# Patient Record
Sex: FEMALE | Race: ASIAN | NOT HISPANIC OR LATINO | URBAN - METROPOLITAN AREA
[De-identification: names, ages, dates, MRNs, and addresses within clinical notes are randomized per-mention and may not be internally consistent; named-entity substitution may affect disease eponyms.]

---

## 2017-01-04 DIAGNOSIS — E78.00 PURE HYPERCHOLESTEROLEMIA: Primary | ICD-10-CM

## 2017-01-04 RX ORDER — ATORVASTATIN CALCIUM 40 MG/1
40 TABLET, FILM COATED ORAL DAILY
Qty: 90 TABLET | Refills: 4 | Status: SHIPPED | OUTPATIENT
Start: 2017-01-04 | End: 2018-04-16

## 2017-01-19 ENCOUNTER — MEDICAL CORRESPONDENCE (OUTPATIENT)
Dept: HEALTH INFORMATION MANAGEMENT | Facility: CLINIC | Age: 72
End: 2017-01-19

## 2017-01-26 ENCOUNTER — MEDICAL CORRESPONDENCE (OUTPATIENT)
Dept: HEALTH INFORMATION MANAGEMENT | Facility: CLINIC | Age: 72
End: 2017-01-26

## 2017-02-13 DIAGNOSIS — E11.9 TYPE 2 DIABETES MELLITUS WITHOUT COMPLICATION, WITHOUT LONG-TERM CURRENT USE OF INSULIN (H): ICD-10-CM

## 2017-03-06 ENCOUNTER — DOCUMENTATION ONLY (OUTPATIENT)
Dept: FAMILY MEDICINE | Facility: CLINIC | Age: 72
End: 2017-03-06

## 2017-03-08 ENCOUNTER — TELEPHONE (OUTPATIENT)
Dept: FAMILY MEDICINE | Facility: CLINIC | Age: 72
End: 2017-03-08

## 2017-03-08 NOTE — TELEPHONE ENCOUNTER
Dm: The interp reached out to the  and they gave him a different number for this patient  but that is a wrong number EDILIA 3/8/17

## 2017-03-15 DIAGNOSIS — M54.50 CHRONIC LEFT-SIDED LOW BACK PAIN WITHOUT SCIATICA: ICD-10-CM

## 2017-03-15 DIAGNOSIS — G89.29 CHRONIC LEFT-SIDED LOW BACK PAIN WITHOUT SCIATICA: ICD-10-CM

## 2017-03-15 RX ORDER — GABAPENTIN 300 MG/1
CAPSULE ORAL
Qty: 90 CAPSULE | Refills: 5 | Status: SHIPPED | OUTPATIENT
Start: 2017-03-15 | End: 2017-11-27

## 2017-03-16 ENCOUNTER — TRANSFERRED RECORDS (OUTPATIENT)
Dept: HEALTH INFORMATION MANAGEMENT | Facility: CLINIC | Age: 72
End: 2017-03-16

## 2017-03-20 ENCOUNTER — MEDICAL CORRESPONDENCE (OUTPATIENT)
Dept: HEALTH INFORMATION MANAGEMENT | Facility: CLINIC | Age: 72
End: 2017-03-20

## 2017-03-22 DIAGNOSIS — E11.29 TYPE 2 DIABETES MELLITUS WITH MICROALBUMINURIA, WITHOUT LONG-TERM CURRENT USE OF INSULIN (H): ICD-10-CM

## 2017-03-22 DIAGNOSIS — R80.9 TYPE 2 DIABETES MELLITUS WITH MICROALBUMINURIA, WITHOUT LONG-TERM CURRENT USE OF INSULIN (H): ICD-10-CM
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
## 2017-04-18 ENCOUNTER — MEDICAL CORRESPONDENCE (OUTPATIENT)
Dept: HEALTH INFORMATION MANAGEMENT | Facility: CLINIC | Age: 72
End: 2017-04-18

## 2017-04-20 DIAGNOSIS — E11.9 TYPE 2 DIABETES MELLITUS WITHOUT COMPLICATION, WITHOUT LONG-TERM CURRENT USE OF INSULIN (H): ICD-10-CM

## 2017-05-01 ENCOUNTER — OFFICE VISIT (OUTPATIENT)
Dept: FAMILY MEDICINE | Facility: CLINIC | Age: 72
End: 2017-05-01

## 2017-05-01 ENCOUNTER — RECORDS - HEALTHEAST (OUTPATIENT)
Dept: ADMINISTRATIVE | Facility: OTHER | Age: 72
End: 2017-05-01

## 2017-05-01 VITALS
BODY MASS INDEX: 25.5 KG/M2 | SYSTOLIC BLOOD PRESSURE: 115 MMHG | DIASTOLIC BLOOD PRESSURE: 73 MMHG | HEART RATE: 84 BPM | OXYGEN SATURATION: 97 % | WEIGHT: 121.5 LBS | HEIGHT: 58 IN

## 2017-05-01 DIAGNOSIS — E11.65 TYPE 2 DIABETES MELLITUS WITH HYPERGLYCEMIA, WITHOUT LONG-TERM CURRENT USE OF INSULIN (H): ICD-10-CM

## 2017-05-01 DIAGNOSIS — E11.65 TYPE 2 DIABETES MELLITUS WITH HYPERGLYCEMIA, WITHOUT LONG-TERM CURRENT USE OF INSULIN (H): Primary | ICD-10-CM

## 2017-05-01 LAB
BUN SERPL-MCNC: 12.1 MG/DL (ref 7–19)
CALCIUM SERPL-MCNC: 9.9 MG/DL (ref 8.5–10.1)
CHLORIDE SERPLBLD-SCNC: 97.6 MMOL/L (ref 98–110)
CO2 SERPL-SCNC: 24.2 MMOL/L (ref 20–32)
CREAT SERPL-MCNC: 0.6 MG/DL (ref 0.5–1)
GFR SERPL CREATININE-BSD FRML MDRD: >90 ML/MIN/1.7 M2
GLUCOSE SERPL-MCNC: 359.8 MG'DL (ref 70–99)
HBA1C MFR BLD: 12.3 % (ref 4.1–5.7)
POTASSIUM SERPL-SCNC: 3.9 MMOL/DL (ref 3.2–4.6)
SODIUM SERPL-SCNC: 131.6 MMOL/L (ref 132–142)

## 2017-05-01 RX ORDER — GLYBURIDE-METFORMIN HYDROCHLORIDE 2.5; 5 MG/1; MG/1
2 TABLET ORAL 2 TIMES DAILY WITH MEALS
Qty: 120 TABLET | Refills: 11 | Status: SHIPPED | OUTPATIENT
Start: 2017-05-01 | End: 2017-07-13 | Stop reason: ALTCHOICE

## 2017-05-01 RX ORDER — GLYBURIDE-METFORMIN HYDROCHLORIDE 2.5; 5 MG/1; MG/1
2 TABLET ORAL 2 TIMES DAILY WITH MEALS
Qty: 60 TABLET | Refills: 11 | Status: SHIPPED | OUTPATIENT
Start: 2017-05-01 | End: 2017-05-01

## 2017-05-01 NOTE — PATIENT INSTRUCTIONS
Increase januvia to 100mg daily.    Switch to glyburide/metformin 2.5mg/500mg 2 pills twice a day.    Recheck in 1 month.    Consider insulin at that time.

## 2017-05-01 NOTE — MR AVS SNAPSHOT
After Visit Summary   5/1/2017    Emmanuel Say    MRN: [id number]           Patient Information     Date Of Birth          1945        Visit Information        Provider Department      5/1/2017 3:10 PM Clarissa Rodriguez MD [facility]        Today's Diagnoses     Type 2 diabetes mellitus with hyperglycemia, without long-term current use of insulin (H)          Care Instructions    Increase januvia to 100mg daily.    Switch to glyburide/metformin 2.5mg/500mg 2 pills twice a day.    Recheck in 1 month.    Consider insulin at that time.        Follow-ups after your visit        Follow-up notes from your care team     Return in about 4 weeks (around 5/29/2017) for Diabetes.      Your next 10 appointments already scheduled     Jun 01, 2017  9:20 AM CDT   Return Visit with Clarissa Rodriguez MD   [facility] ([facility])    [address] [place] [address]   [phone]              Who to contact     Please call your clinic at [phone] to:    Ask questions about your health    Make or cancel appointments    Discuss your medicines    Learn about your test results    Speak to your doctor   If you have compliments or concerns about an experience at your clinic, or if you wish to file a complaint, please contact [facility] Patient Relations at [phone] or email us at [email or web address]         Additional Information About Your Visit        MyChart Information     Capzles is an electronic gateway that provides easy, online access to your medical records. With Capzles, you can request a clinic appointment, read your test results, renew a prescription or communicate with your care team.     To sign up for [employer] visit the website at [email or web address]   You will be asked to enter the access code listed below, as well as some personal information. Please follow the directions to create your username and password.     Your access code is:  [email or web address]  Expires: 2017  4:12 PM     Your access code will  in 90 days. If you need help or a new code, please contact your [facility] or call [phone] for assistance.        Care EveryWhere ID     This is your Care EveryWhere ID. This could be used by other organizations to access your [facility] medical records  [id number]        Your Vitals Were     Pulse Height Pulse Oximetry Breastfeeding? BMI (Body Mass Index)       84 4' 10.47\" (148.5 cm) 97% No 24.99 kg/m2        Blood Pressure from Last 3 Encounters:   17 115/73   16 117/79   16 125/80    Weight from Last 3 Encounters:   17 121 lb 8 oz (55.1 kg)   16 119 lb 9.6 oz (54.3 kg)   16 116 lb (52.6 kg)              We Performed the Following     Basic Metabolic Panel (LabDAQ)     Hemoglobin A1c (LabDAQ)          Today's Medication Changes          These changes are accurate as of: 17  4:17 PM.  If you have any questions, ask your nurse or doctor.               Start taking these medicines.        Dose/Directions    glyBURIDE-metFORMIN 2.5-500 MG per tablet   Commonly known as:  GLUCOVANCE   Used for:  Type 2 diabetes mellitus with hyperglycemia, without long-term current use of insulin (H)   Started by:  Clarissa Rodriguez MD        Dose:  2 tablet   Take 2 tablets by mouth 2 times daily (with meals)   Quantity:  120 tablet   Refills:  11         These medicines have changed or have updated prescriptions.        Dose/Directions    sitagliptin 100 MG tablet   Commonly known as:  JANUVIA   This may have changed:    - medication strength  - how much to take   Used for:  Type 2 diabetes mellitus with hyperglycemia, without long-term current use of insulin (H)   Changed by:  Clarissa Rodriguez MD        Dose:  100 mg   Take 1 tablet (100 mg) by mouth daily   Quantity:  90 tablet   Refills:  3         Stop taking these medicines if you haven't already. Please contact your care team if " you have questions.     metFORMIN 500 MG tablet   Commonly known as:  GLUCOPHAGE   Stopped by:  Clarissa Rodriguez MD                Where to get your medicines      These medications were sent to [facility] - [place] - [address]  [address], [place] [address]     Phone:  [phone]     glyBURIDE-metFORMIN 2.5-500 MG per tablet    sitagliptin 100 MG tablet                Primary Care Provider Office Phone # Fax #    Clarissa Rodriguez [phone]       [address] [address]        Thank you!     Thank you for choosing [facility]  for your care. Our goal is always to provide you with excellent care. Hearing back from our patients is one way we can continue to improve our services. Please take a few minutes to complete the written survey that you may receive in the mail after your visit with us. Thank you!             Your Updated Medication List - Protect others around you: Learn how to safely use, store and throw away your medicines at www.disposemymeds.org.          This list is accurate as of: 5/1/17  4:17 PM.  Always use your most recent med list.                   Brand Name Dispense Instructions for use    acetaminophen 500 MG tablet    TYLENOL    100 tablet    Take 1-2 tablets by mouth 3 times daily as needed for pain.       atorvastatin 40 MG tablet    LIPITOR    90 tablet    Take 1 tablet (40 mg) by mouth daily       blood glucose lancing device     100 each    Use to test blood sugars 2 times daily. Fasting and 2 hours after eating.       blood glucose monitoring meter device kit     1 kit    Check blood glucose twice a day.  Once fasting and once 2 hours after eating.       blood glucose monitoring test strip    REINIER CONTOUR    1 Box    Check blood glucose twice a day, once fasting and once 2 hours after eating.       calcium-vitamin D 600-400 MG-UNIT per tablet    calcium 600 + D    100  tablet    Take 2 tablets by mouth daily       cetirizine 10 MG tablet    zyrTEC    30 tablet    Take 1 tablet (10 mg) by mouth every evening       DIPHENHYDRAMINE HCL PO      Take 25 mg by mouth       eucerin cream     226 g    Use as directed for dry skin       gabapentin 300 MG capsule    NEURONTIN    90 capsule    Take 1 tablet three times daily       glyBURIDE-metFORMIN 2.5-500 MG per tablet    GLUCOVANCE    120 tablet    Take 2 tablets by mouth 2 times daily (with meals)       hydrocortisone 1 % cream    CORTAID    40 g    Apply twice a day to affected areas prn itching and rash.       ketotifen 0.025 % Soln ophthalmic solution    ZADITOR    1 Bottle    Place 1 drop into the right eye 3 times daily       ranitidine 150 MG tablet    ZANTAC    180 tablet    Take 1 tablet (150 mg) by mouth 2 times daily as needed for heartburn       sitagliptin 100 MG tablet    JANUVIA    90 tablet    Take 1 tablet (100 mg) by mouth daily

## 2017-05-01 NOTE — PROGRESS NOTES
"    There are no exam notes on file for this visit.  Chief Complaint   Patient presents with     Recheck Medication     Blood pressure 115/73, pulse 84, height 4' 10.47\" (148.5 cm), weight 121 lb 8 oz (55.1 kg), SpO2 97 %, not currently breastfeeding.                 KEYUR Vences is a 71 year old  female with a PMH significant for:     Patient Active Problem List   Diagnosis     Health Care Home     Esophageal reflux     Osteoarthrosis, unspecified whether generalized or localized, involving lower leg     Lumbosacral spondylosis without myelopathy     Hyperlipidemia     Type 2 diabetes mellitus with hyperglycemia, without long-term current use of insulin (H)     Seasonal allergies     Screening for depression     Microalbuminuria     She presents with follow up of DM.  She does not want as many medications as she thinks they make her too tired.  We discussed how her DM is very uncontrolled and that this is more likely making her feel poorly.  She states she is taking her medication regularly.  Misses every once in awhile and thinks she feels better.    No hypoglycemia symptoms, CP, SOB, numbness, tingling or burning pain.      PMH, Medications and Allergies were reviewed and updated as needed.     A Barafon  was used for this visit.           Physical Exam:     Vitals:    05/01/17 1526   BP: 115/73   Pulse: 84   SpO2: 97%   Weight: 121 lb 8 oz (55.1 kg)   Height: 4' 10.47\" (148.5 cm)     Body mass index is 24.99 kg/(m^2).    Exam:  Constitutional: healthy, alert and no distress  Neck: Neck supple. No adenopathy. Thyroid symmetric, normal size,  Eyes: PERRLA, EOMI, conjunctiva are clear.  ENT: No nasal discharge. TMs clear, Oropharynx clear with no erythema or exudates.  Cardiovascular:RRR. No murmurs, clicks gallops or rub  Respiratory:  normal respiratory rate and rhythm, lungs clear to auscultation. No wheezes or crackles.  Extremities: No C/C/E in BLE. 2+DP pulses.  Normal foot exam  Skin: no " marce.  Psychiatric: mentation appears normal and affect normal/bright      PHQ-9 SCORE 11/29/2016   Total Score 0     Results for orders placed or performed in visit on 05/01/17   Hemoglobin A1c (LabDAQ)   Result Value Ref Range    Hemoglobin A1C 12.3 (H) 4.1 - 5.7 %   Basic Metabolic Panel (LabDAQ)   Result Value Ref Range    Urea Nitrogen 12.1 7.0 - 19.0 mg/dL    Calcium 9.9 8.5 - 10.1 mg/dL    Chloride 97.6 (L) 98.0 - 110.0 mmol/L    Carbon Dioxide 24.2 20.0 - 32.0 mmol/L    Creatinine 0.6 0.5 - 1.0 mg/dL    Glucose 359.8 (H) 70.0 - 99.0 mg'dL    Potassium 3.9 3.2 - 4.6 mmol/dL    Sodium 131.6 (L) 132.0 - 142.0 mmol/L    GFR Estimate >90 >60.0 mL/min/1.7 m2    GFR Estimate If Black >90 >60.0 mL/min/1.7 m2       Assessment and Plan     Emmanuel was seen today for recheck medication.    Diagnoses and all orders for this visit:    Type 2 diabetes mellitus with hyperglycemia, without long-term current use of insulin (H):  Discussed diabetes complications in detail.  Discussed the possibility of insulin but she is quite resistant to this. She is willing to increase and add medications to get better control. Discussed diet changes a little as well. Declined further DM ed, but will encourage again at next visit.  -     Hemoglobin A1c (LabDAQ)  -     Basic Metabolic Panel (LabDAQ)  -     sitagliptin (JANUVIA) 100 MG tablet; Take 1 tablet (100 mg) by mouth daily  -     glyBURIDE-metFORMIN (GLUCOVANCE) 2.5-500 MG per tablet; Take 2 tablets by mouth 2 times daily (with meals)        Patient Instructions   Increase januvia to 100mg daily.    Switch to glyburide/metformin 2.5mg/500mg 2 pills twice a day.    Recheck in 1 month.    Consider insulin at that time.       Options for treatment and/or follow-up care were reviewed with the patient. Emmanuel Vences was engaged and actively involved in the decision making process. She verbalized understanding of the options discussed and was satisfied with the final plan.    Clarissa Rodriguez MD

## 2017-05-03 ENCOUNTER — TELEPHONE (OUTPATIENT)
Dept: FAMILY MEDICINE | Facility: CLINIC | Age: 72
End: 2017-05-03

## 2017-05-03 DIAGNOSIS — R80.9 TYPE 2 DIABETES MELLITUS WITH MICROALBUMINURIA, WITHOUT LONG-TERM CURRENT USE OF INSULIN (H): ICD-10-CM

## 2017-05-03 DIAGNOSIS — E11.29 TYPE 2 DIABETES MELLITUS WITH MICROALBUMINURIA, WITHOUT LONG-TERM CURRENT USE OF INSULIN (H): ICD-10-CM

## 2017-05-04 ENCOUNTER — TELEPHONE (OUTPATIENT)
Dept: FAMILY MEDICINE | Facility: CLINIC | Age: 72
End: 2017-05-04

## 2017-05-04 NOTE — TELEPHONE ENCOUNTER
Questions if the patient should be taking the calcium and D? States the patient has not been on this medication for some time now because it was discontinued. States she can restart it if Dr. Rodriguez want her to. Please advise. /ADEN Jorge  Routed to Dr. Rodriguez

## 2017-05-04 NOTE — TELEPHONE ENCOUNTER
[facility] phone call message- general phone call:    Reason for call: She is calling re an updated me list she is not sure of some of them are discontinued or not.    Return call needed: Yes    OK to leave a message on voice mail? Yes    Primary language: Yasmin      needed? Yes    Call taken on May 4, 2017 at 12:06 PM by Mike Del Cid

## 2017-05-08 DIAGNOSIS — E11.65 TYPE 2 DIABETES MELLITUS WITH HYPERGLYCEMIA, WITHOUT LONG-TERM CURRENT USE OF INSULIN (H): Primary | ICD-10-CM

## 2017-05-08 NOTE — TELEPHONE ENCOUNTER
No does not need calcium and vitamin D at this time.  But should schedule DEXA scan soon to evaluate for osteoporosis. Patient currently declining.    Clarissa Rodriguez MD

## 2017-05-16 ENCOUNTER — MEDICAL CORRESPONDENCE (OUTPATIENT)
Dept: HEALTH INFORMATION MANAGEMENT | Facility: CLINIC | Age: 72
End: 2017-05-16

## 2017-05-17 DIAGNOSIS — E11.65 TYPE 2 DIABETES MELLITUS WITH HYPERGLYCEMIA, WITHOUT LONG-TERM CURRENT USE OF INSULIN (H): ICD-10-CM

## 2017-05-19 ENCOUNTER — MEDICAL CORRESPONDENCE (OUTPATIENT)
Dept: HEALTH INFORMATION MANAGEMENT | Facility: CLINIC | Age: 72
End: 2017-05-19

## 2017-06-01 ENCOUNTER — OFFICE VISIT (OUTPATIENT)
Dept: FAMILY MEDICINE | Facility: CLINIC | Age: 72
End: 2017-06-01

## 2017-06-01 ENCOUNTER — RECORDS - HEALTHEAST (OUTPATIENT)
Dept: ADMINISTRATIVE | Facility: OTHER | Age: 72
End: 2017-06-01

## 2017-06-01 VITALS
OXYGEN SATURATION: 98 % | SYSTOLIC BLOOD PRESSURE: 110 MMHG | HEIGHT: 59 IN | WEIGHT: 122.8 LBS | HEART RATE: 92 BPM | DIASTOLIC BLOOD PRESSURE: 70 MMHG | TEMPERATURE: 98.4 F | BODY MASS INDEX: 24.76 KG/M2

## 2017-06-01 DIAGNOSIS — E11.65 TYPE 2 DIABETES MELLITUS WITH HYPERGLYCEMIA, WITHOUT LONG-TERM CURRENT USE OF INSULIN (H): Primary | ICD-10-CM

## 2017-06-01 DIAGNOSIS — Z00.00 ROUTINE GENERAL MEDICAL EXAMINATION AT A HEALTH CARE FACILITY: ICD-10-CM

## 2017-06-01 NOTE — PATIENT INSTRUCTIONS
Come back in one month for diabetes.    Bring your blood sugar readings or meter.    We will talk about starting insulin at next visit.    [facility]  [phone]  Referral and records have been faxed they will contact pt to schedule  Bee Warner 7:47 AM 6/2/2017      [facility]  [address], [place] [address]  [phone]    Appointment  Date:6/8/17  Time:11:00am arrival     No lotions, smith, or perfumes the day of the mammogram.    Please contact the above clinic if you need to cancel or reschedule. Feel free to contact me with any questions. Thanks!    Juana  Referral Coordinator  [phone]    Gave to Don Longoria over the phone.

## 2017-06-01 NOTE — NURSING NOTE
name: Don Longoria  Language: Yasmin  Agency: [facility]  Phone number: [phone]      Don't review the medication today because patient doesn't know the name of the medication she is taking or doesn't bring the med bottles with her.

## 2017-06-01 NOTE — MR AVS SNAPSHOT
After Visit Summary   6/1/2017    Emmanuel Vences    MRN: [id number]           Patient Information     Date Of Birth          1945        Visit Information        Provider Department      6/1/2017 9:20 AM Clarissa Rodriguez MD [facility]        Today's Diagnoses     Type 2 diabetes mellitus with hyperglycemia, without long-term current use of insulin (H)    -  1      Care Instructions    Come back in one month for diabetes.    Bring your blood sugar readings or meter.    We will talk about starting insulin at next visit.            Follow-ups after your visit        Additional Services     DIABETES EDUCATOR REFERRAL       Date of Diagnosis: 2013, but recently worsened control. Trying to convince to start insulin.    Diabetes Education Order:  Choose either self management, prediabetes,  one on one, endocrine consult, GDM, pump therapy or exanitide start   Diabetes Self Management Class  One on One with Diabetes Educator:  Medical Nutrition Therapy, Glucose Monitoring, Meal Planning , Weight Management/Exercise, Hypoglycemia, Foot Care and Coping with Diabetes   Insulin Start or Adjust:                        New to insulin?  Yes, has not agreed to start yet.  Planning to start at next visit.                        RN to calculate and adjust insulin?   N/A                        Patient to continue oral medicine?  Yes       needed: Yes  Language: Yasmin    Recent labs including A1C, Lipid panel, FBS or casual glucose or GCT:    Lab Results       Component                Value               Date                       A1C                      12.3                05/01/2017                 A1C                      9.7                 09/07/2016            Lab Results       Component                Value               Date                       CHOL                     252.0               09/07/2016                 CHOL                     303                 12/01/2014            Lab Results        Component                Value               Date                       HDL                      58.8                09/07/2016                 HDL                      54                  12/01/2014            Lab Results       Component                Value               Date                       LDL                      145                 09/07/2016                 LDL                      190                 12/01/2014            Lab Results       Component                Value               Date                       TRIG                     239.5               09/07/2016                 TRIG                     191.0               01/03/2014            Lab Results       Component                Value               Date                       CHOLHDLRATIO             4.3                 09/07/2016                 CHOLHDLRATIO             4.7                 01/03/2014            Lab Results       Component                Value               Date                       GLC                      359.8               05/01/2017                 GLC                      286.8               09/07/2016          ]    ([place] Only) Referral should be tracked (Yes/No)?                  Follow-up notes from your care team     Return in about 4 weeks (around 6/29/2017) for Diabetes.      Future tests that were ordered for you today     Open Future Orders        Priority Expected Expires Ordered    DIABETES EDUCATOR REFERRAL Routine  6/1/2018 6/1/2017            Who to contact     Please call your clinic at [phone] to:    Ask questions about your health    Make or cancel appointments    Discuss your medicines    Learn about your test results    Speak to your doctor   If you have compliments or concerns about an experience at your clinic, or if you wish to file a complaint, please contact [facility] Patient Relations at [phone] or email us at [email or web address]          "Additional Information About Your Visit        NEBOTRADEhart Information     JamOrigin is an electronic gateway that provides easy, online access to your medical records. With JamOrigin, you can request a clinic appointment, read your test results, renew a prescription or communicate with your care team.     To sign up for JamOrigin visit the website at [email or web address]   You will be asked to enter the access code listed below, as well as some personal information. Please follow the directions to create your username and password.     Your access code is: [email or web address]  Expires: 2017  4:12 PM     Your access code will  in 90 days. If you need help or a new code, please contact your [facility] or call [phone] for assistance.        Care EveryWhere ID     This is your Care EveryWhere ID. This could be used by other organizations to access your [place] medical records  [id number]        Your Vitals Were     Pulse Temperature Height Pulse Oximetry BMI (Body Mass Index)       92 98.4  F (36.9  C) (Oral) 4' 10.5\" (148.6 cm) 98% 25.23 kg/m2        Blood Pressure from Last 3 Encounters:   17 110/70   17 115/73   16 117/79    Weight from Last 3 Encounters:   17 122 lb 12.8 oz (55.7 kg)   17 121 lb 8 oz (55.1 kg)   16 119 lb 9.6 oz (54.3 kg)                 Today's Medication Changes          These changes are accurate as of: 17  9:54 AM.  If you have any questions, ask your nurse or doctor.               Stop taking these medicines if you haven't already. Please contact your care team if you have questions.     metFORMIN 500 MG tablet   Commonly known as:  GLUCOPHAGE   Stopped by:  Clarissa Rodriguez MD                    Primary Care Provider Office Phone # Fax #    Clarissa Rodriguez [phone]       [address] [address]        Thank you!     Thank you for choosing [facility]  for your " care. Our goal is always to provide you with excellent care. Hearing back from our patients is one way we can continue to improve our services. Please take a few minutes to complete the written survey that you may receive in the mail after your visit with us. Thank you!             Your Updated Medication List - Protect others around you: Learn how to safely use, store and throw away your medicines at www.disposemymeds.org.          This list is accurate as of: 6/1/17  9:54 AM.  Always use your most recent med list.                   Brand Name Dispense Instructions for use    acetaminophen 500 MG tablet    TYLENOL    100 tablet    Take 1-2 tablets by mouth 3 times daily as needed for pain.       atorvastatin 40 MG tablet    LIPITOR    90 tablet    Take 1 tablet (40 mg) by mouth daily       blood glucose lancing device     100 each    Use to test blood sugars 2 times daily. Fasting and 2 hours after eating.       blood glucose monitoring meter device kit     1 kit    Check blood glucose twice a day.  Once fasting and once 2 hours after eating.       blood glucose monitoring test strip    REINIER CONTOUR    1 Box    Check blood glucose twice a day, once fasting and once 2 hours after eating.       cetirizine 10 MG tablet    zyrTEC    30 tablet    Take 1 tablet (10 mg) by mouth every evening       DIPHENHYDRAMINE HCL PO      Take 25 mg by mouth       eucerin cream     226 g    Use as directed for dry skin       gabapentin 300 MG capsule    NEURONTIN    90 capsule    Take 1 tablet three times daily       glyBURIDE-metFORMIN 2.5-500 MG per tablet    GLUCOVANCE    120 tablet    Take 2 tablets by mouth 2 times daily (with meals)       hydrocortisone 1 % cream    CORTAID    40 g    Apply twice a day to affected areas prn itching and rash.       ketotifen 0.025 % Soln ophthalmic solution    ZADITOR    1 Bottle    Place 1 drop into the right eye 3 times daily       ranitidine 150 MG tablet    ZANTAC    180 tablet    Take 1  tablet (150 mg) by mouth 2 times daily as needed for heartburn       sitagliptin 100 MG tablet    JANUVIA    90 tablet    Take 1 tablet (100 mg) by mouth daily

## 2017-06-01 NOTE — PROGRESS NOTES
"    Nursing Notes:   oVn, November, WADE  6/1/2017  9:25 AM  Signed   name: Don Longoria  Language: Yasmin  Agency: [employer]  Phone number: [phone]      Don't review the medication today because patient doesn't know the name of the medication she is taking or doesn't bring the med bottles with her.   Chief Complaint   Patient presents with     RECHECK     come here today to follow on medication per pt.      other     with the new medication, get little burning on the sides (both sides) after taking it per pt.      Blood pressure 110/70, pulse 92, temperature 98.4  F (36.9  C), temperature source Oral, height 4' 10.5\" (148.6 cm), weight 122 lb 12.8 oz (55.7 kg), SpO2 98 %, not currently breastfeeding.                 KEYUR Vences is a 71 year old  female with a PMH significant for:     Patient Active Problem List   Diagnosis     Health Care Home     Esophageal reflux     Osteoarthrosis, unspecified whether generalized or localized, involving lower leg     Lumbosacral spondylosis without myelopathy     Hyperlipidemia     Type 2 diabetes mellitus with hyperglycemia, without long-term current use of insulin (H)     Seasonal allergies     Screening for depression     Microalbuminuria     She presents with diabetes follow up.  A1c 12.3% last month.  Added Januvia 100mg on 5/17.  She is having a little bit of burning on both sides after taking the medicine.  This only lasts for about an hour and then goes away.  It is not that bothersome and she can tolerate it.  She is willing to continue the medication.  She is only checking fasting blood sugars in the morning.  She forgot her meter today.  She states her blood sugars run between 130s and 180s.  She does not check postprandials.  She does think she can check twice a day if needed.  She usually around 4 PM goes to bed about 9 or 10 PM.  She is going to start checking at bedtime as well.  Discussed diabetes education in detail and she is willing to go to that.  " "She denies any changes in vision, chest pain, shortness of breath, nausea, vomiting, abdominal pain, or foot pain.    Discussed that she is due for colon cancer screening mammogram and DEXA scan.  She is willing to do 1 of these at a time.  It is too overwhelming to discuss more than one at a time.  Agree to mammogram at this time.      PMH, Medications and Allergies were reviewed and updated as needed.     A Adwanted  was used for this visit.           Physical Exam:     Vitals:    06/01/17 0922   BP: 110/70   Pulse: 92   Temp: 98.4  F (36.9  C)   TempSrc: Oral   SpO2: 98%   Weight: 122 lb 12.8 oz (55.7 kg)   Height: 4' 10.5\" (148.6 cm)     Body mass index is 25.23 kg/(m^2).    Exam:  Constitutional: healthy, alert and no distress  Cardiovascular:RRR. No murmurs, clicks gallops or rub  Respiratory:  normal respiratory rate and rhythm, lungs clear to auscultation. No wheezes or crackles.  Abdomen:  soft, nontender, nondistended.   Extremities: No C/C/E in BLE. 2+DP pulses.  Joint exam without erythema, effusion and full ROM throughout.  Skin: no rashes.  Psychiatric: mentation appears normal and affect normal/bright      PHQ-9 SCORE 11/29/2016   Total Score 0     Results for orders placed or performed in visit on 05/01/17   Hemoglobin A1c (LabDAQ)   Result Value Ref Range    Hemoglobin A1C 12.3 (H) 4.1 - 5.7 %   Basic Metabolic Panel (LabDAQ)   Result Value Ref Range    Urea Nitrogen 12.1 7.0 - 19.0 mg/dL    Calcium 9.9 8.5 - 10.1 mg/dL    Chloride 97.6 (L) 98.0 - 110.0 mmol/L    Carbon Dioxide 24.2 20.0 - 32.0 mmol/L    Creatinine 0.6 0.5 - 1.0 mg/dL    Glucose 359.8 (H) 70.0 - 99.0 mg'dL    Potassium 3.9 3.2 - 4.6 mmol/dL    Sodium 131.6 (L) 132.0 - 142.0 mmol/L    GFR Estimate >90 >60.0 mL/min/1.7 m2    GFR Estimate If Black >90 >60.0 mL/min/1.7 m2       Assessment and Plan     Khu was seen today for recheck and other.    Diagnoses and all orders for this visit:    Type 2 diabetes mellitus with " hyperglycemia, without long-term current use of insulin (H)  Controlled.  A1c 12.3 last month.  Have added Januvia and glipizide to her regimen since that time.  Sending to diabetes education.  I want to start long-acting insulin on her but she is resistant to this at this time.  I discussed in detail that this is likely the only way to control her diabetes at this point.  Showing insulin pen and pen needle to familiarize her with this.  She is most afraid of using the needle.  She was less afraid after seeing the needle.  She promises to consider using insulin at her next visit.    -     DIABETES EDUCATOR REFERRAL; Future    Routine general medical examination at a health care facility: Agreed to mammo.  Will discuss colorectal and Dexa at next visit.  -     PCS Use: Screening Digital Bilateral Mammogram; Future      Patient Instructions   Come back in one month for diabetes.    Bring your blood sugar readings or meter.    We will talk about starting insulin at next visit.         Options for treatment and/or follow-up care were reviewed with the patient. Emmanuel Vences was engaged and actively involved in the decision making process. She verbalized understanding of the options discussed and was satisfied with the final plan.    Clarissa Rodriguez MD

## 2017-06-02 ENCOUNTER — COMMUNICATION - HEALTHEAST (OUTPATIENT)
Dept: ADMINISTRATIVE | Facility: CLINIC | Age: 72
End: 2017-06-02

## 2017-06-02 ENCOUNTER — MEDICAL CORRESPONDENCE (OUTPATIENT)
Dept: HEALTH INFORMATION MANAGEMENT | Facility: CLINIC | Age: 72
End: 2017-06-02

## 2017-06-02 ENCOUNTER — RECORDS - HEALTHEAST (OUTPATIENT)
Dept: ADMINISTRATIVE | Facility: OTHER | Age: 72
End: 2017-06-02

## 2017-06-09 ENCOUNTER — AMBULATORY - HEALTHEAST (OUTPATIENT)
Dept: EDUCATION SERVICES | Facility: CLINIC | Age: 72
End: 2017-06-09

## 2017-06-09 ENCOUNTER — COMMUNICATION - HEALTHEAST (OUTPATIENT)
Dept: ADMINISTRATIVE | Facility: CLINIC | Age: 72
End: 2017-06-09

## 2017-06-19 ENCOUNTER — OFFICE VISIT - HEALTHEAST (OUTPATIENT)
Dept: EDUCATION SERVICES | Facility: CLINIC | Age: 72
End: 2017-06-19

## 2017-06-19 DIAGNOSIS — E11.9 DIABETES (H): ICD-10-CM

## 2017-06-22 ENCOUNTER — MEDICAL CORRESPONDENCE (OUTPATIENT)
Dept: HEALTH INFORMATION MANAGEMENT | Facility: CLINIC | Age: 72
End: 2017-06-22

## 2017-07-05 DIAGNOSIS — E11.9 TYPE 2 DIABETES, HBA1C GOAL < 8% (H): ICD-10-CM

## 2017-07-10 ENCOUNTER — OFFICE VISIT - HEALTHEAST (OUTPATIENT)
Dept: EDUCATION SERVICES | Facility: CLINIC | Age: 72
End: 2017-07-10

## 2017-07-10 DIAGNOSIS — E11.9 DIABETES (H): ICD-10-CM

## 2017-07-13 ENCOUNTER — OFFICE VISIT (OUTPATIENT)
Dept: FAMILY MEDICINE | Facility: CLINIC | Age: 72
End: 2017-07-13

## 2017-07-13 VITALS
DIASTOLIC BLOOD PRESSURE: 71 MMHG | TEMPERATURE: 98 F | BODY MASS INDEX: 25.11 KG/M2 | WEIGHT: 122.2 LBS | OXYGEN SATURATION: 97 % | HEART RATE: 74 BPM | SYSTOLIC BLOOD PRESSURE: 115 MMHG

## 2017-07-13 DIAGNOSIS — E11.65 TYPE 2 DIABETES MELLITUS WITH HYPERGLYCEMIA, WITHOUT LONG-TERM CURRENT USE OF INSULIN (H): Primary | ICD-10-CM

## 2017-07-13 LAB — HBA1C MFR BLD: 9.1 % (ref 4.1–5.7)

## 2017-07-13 RX ORDER — GLIPIZIDE 10 MG/1
10 TABLET, FILM COATED, EXTENDED RELEASE ORAL DAILY
Qty: 30 TABLET | Refills: 3 | Status: SHIPPED | OUTPATIENT
Start: 2017-07-13 | End: 2017-11-06

## 2017-07-13 RX ORDER — METFORMIN HCL 500 MG
2000 TABLET, EXTENDED RELEASE 24 HR ORAL
Qty: 360 TABLET | Refills: 3 | Status: SHIPPED | OUTPATIENT
Start: 2017-07-13 | End: 2018-04-16

## 2017-07-13 NOTE — MR AVS SNAPSHOT
After Visit Summary   7/13/2017    Emmanuel Say    MRN: [id number]           Patient Information     Date Of Birth          1945        Visit Information        Provider Department      7/13/2017 9:00 AM Clarissa Rodriguez MD [facility]        Today's Diagnoses     Type 2 diabetes mellitus with hyperglycemia, without long-term current use of insulin (H)    -  1      Care Instructions    Stop by the lab to get your blood drawn to check for your diabetes. Your A1c last visit was very high    I will change your medicines to all be taken in the morning    I will add on a Aspirin for you to take daily in the morning    Stop the Metformin/Glyburide     Start Metformin ER 500mg, take 4 tablets in the morning    Start Glipizide ER 10mg, take 1 tablet in the morning    Keep Januvia the same, continue taking     I will update your med list and print out the AVS and will fax to your nurse at [facility]    Return to clinic in 1 mos for f/u Diabetes    I will call Don Longoria once your lab result comes back    Schedule a mammogram, they will call you to schedule          Follow-ups after your visit        Who to contact     Please call your clinic at [phone] to:    Ask questions about your health    Make or cancel appointments    Discuss your medicines    Learn about your test results    Speak to your doctor   If you have compliments or concerns about an experience at your clinic, or if you wish to file a complaint, please contact [facility] Patient Relations at [phone] or email us at [email or web address]         Additional Information About Your Visit        MyChart Information     PK Clean is an electronic gateway that provides easy, online access to your medical records. With [employer], you can request a clinic appointment, read your test results, renew a prescription or communicate with your care team.     To sign up for [employer] visit the website at  [email or web address]   You will be asked to enter the access code listed below, as well as some personal information. Please follow the directions to create your username and password.     Your access code is: [email or web address]  Expires: 2017  4:12 PM     Your access code will  in 90 days. If you need help or a new code, please contact your [facility] or call [phone] for assistance.        Care EveryWhere ID     This is your Care EveryWhere ID. This could be used by other organizations to access your [place] medical records  [id number]        Your Vitals Were     Pulse Temperature Pulse Oximetry BMI (Body Mass Index)          74 98  F (36.7  C) (Oral) 97% 25.11 kg/m2         Blood Pressure from Last 3 Encounters:   17 115/71   17 110/70   17 115/73    Weight from Last 3 Encounters:   17 122 lb 3.2 oz (55.4 kg)   17 122 lb 12.8 oz (55.7 kg)   17 121 lb 8 oz (55.1 kg)              We Performed the Following     Hemoglobin A1c (LabDAQ)          Today's Medication Changes          These changes are accurate as of: 17  9:48 AM.  If you have any questions, ask your nurse or doctor.               Start taking these medicines.        Dose/Directions    aspirin 81 MG tablet   Used for:  Type 2 diabetes mellitus with hyperglycemia, without long-term current use of insulin (H)   Started by:  Clarissa Rodriguez MD        Dose:  81 mg   Take 1 tablet (81 mg) by mouth daily   Quantity:  90 tablet   Refills:  3       glipiZIDE 10 MG 24 hr tablet   Commonly known as:  glipiZIDE XL   Used for:  Type 2 diabetes mellitus with hyperglycemia, without long-term current use of insulin (H)   Started by:  Clarissa Rodriguez MD        Dose:  10 mg   Take 1 tablet (10 mg) by mouth daily   Quantity:  30 tablet   Refills:  3       metFORMIN 500 MG 24 hr tablet   Commonly known as:  GLUCOPHAGE-XR   Used for:  Type 2 diabetes mellitus with hyperglycemia,  without long-term current use of insulin (H)   Started by:  Clarissa Rodriguez MD        Dose:  2000 mg   Take 4 tablets (2,000 mg) by mouth daily (with breakfast)   Quantity:  360 tablet   Refills:  3         Stop taking these medicines if you haven't already. Please contact your care team if you have questions.     glyBURIDE-metFORMIN 2.5-500 MG per tablet   Commonly known as:  GLUCOVANCE   Stopped by:  Clarissa Rodriguez MD                Where to get your medicines      These medications were sent to [facility] - [place], [place] - [address]  [address], [place] [address]     Phone:  [phone]     aspirin 81 MG tablet    glipiZIDE 10 MG 24 hr tablet    metFORMIN 500 MG 24 hr tablet                Primary Care Provider Office Phone # Fax #    Clarissa Rodriguez [phone]       [address] [address]        Equal Access to Services     ANGELA PERDOMO AH: Hadii aad ku hadasho Soomaali, waaxda luqadaha, qaybta kaalmada adeegyada, waxay idiin hayaan adeeg josearaveronique tran . So [facility] [phone].    ATENCIÓN: Si habla español, tiene a em disposición servicios gratuitos de asistencia lingüística. Llame al [phone].    We comply with applicable federal civil rights laws and [place] laws. We do not discriminate on the basis of race, color, national origin, age, disability sex, sexual orientation or gender identity.            Thank you!     Thank you for choosing [facility]  for your care. Our goal is always to provide you with excellent care. Hearing back from our patients is one way we can continue to improve our services. Please take a few minutes to complete the written survey that you may receive in the mail after your visit with us. Thank you!             Your Updated Medication List - Protect others around you: Learn how to safely use, store and throw away your medicines at www.disposemymeds.org.           This list is accurate as of: 7/13/17  9:48 AM.  Always use your most recent med list.                   Brand Name Dispense Instructions for use Diagnosis    acetaminophen 500 MG tablet    TYLENOL    100 tablet    Take 1-2 tablets by mouth 3 times daily as needed for pain.    Osteoarthritis       aspirin 81 MG tablet     90 tablet    Take 1 tablet (81 mg) by mouth daily    Type 2 diabetes mellitus with hyperglycemia, without long-term current use of insulin (H)       atorvastatin 40 MG tablet    LIPITOR    90 tablet    Take 1 tablet (40 mg) by mouth daily    Pure hypercholesterolemia       blood glucose lancing device     100 each    Use to test blood sugars 2 times daily. Fasting and 2 hours after eating.    Type 2 diabetes, HbA1C goal < 8% (H)       blood glucose monitoring meter device kit     1 kit    Check blood glucose twice a day.  Once fasting and once 2 hours after eating.    Type 2 diabetes, HbA1C goal < 8% (H)       blood glucose monitoring test strip    REINIER CONTOUR    100 each    Check blood glucose twice a day, once fasting and once 2 hours after eating.    Type 2 diabetes, HbA1C goal < 8% (H)       cetirizine 10 MG tablet    zyrTEC    30 tablet    Take 1 tablet (10 mg) by mouth every evening    Seasonal allergies       DIPHENHYDRAMINE HCL PO      Take 25 mg by mouth        eucerin cream     226 g    Use as directed for dry skin    Dry skin       gabapentin 300 MG capsule    NEURONTIN    90 capsule    Take 1 tablet three times daily    Chronic left-sided low back pain without sciatica       glipiZIDE 10 MG 24 hr tablet    glipiZIDE XL    30 tablet    Take 1 tablet (10 mg) by mouth daily    Type 2 diabetes mellitus with hyperglycemia, without long-term current use of insulin (H)       hydrocortisone 1 % cream    CORTAID    40 g    Apply twice a day to affected areas prn itching and rash.    Dermatitis       ketotifen 0.025 % Soln ophthalmic solution    ZADITOR    1 Bottle    Place 1 drop into the  right eye 3 times daily    Seasonal allergies       metFORMIN 500 MG 24 hr tablet    GLUCOPHAGE-XR    360 tablet    Take 4 tablets (2,000 mg) by mouth daily (with breakfast)    Type 2 diabetes mellitus with hyperglycemia, without long-term current use of insulin (H)       ranitidine 150 MG tablet    ZANTAC    180 tablet    Take 1 tablet (150 mg) by mouth 2 times daily as needed for heartburn    Gastroesophageal reflux disease, esophagitis presence not specified       sitagliptin 100 MG tablet    JANUVIA    90 tablet    Take 1 tablet (100 mg) by mouth daily    Type 2 diabetes mellitus with hyperglycemia, without long-term current use of insulin (H)

## 2017-07-13 NOTE — PROGRESS NOTES
Nursing Notes:   Juancarlos Warner, [facility]  7/13/2017  9:11 AM  Signed   name: Don Longoria  Language: Lisu/ Yasmin/ Servando/ French  Agency: [employer]  Phone number: [phone]      Chief Complaint   Patient presents with     Diabetes     Blood pressure 115/71, pulse 74, temperature 98  F (36.7  C), temperature source Oral, weight 122 lb 3.2 oz (55.4 kg), SpO2 97 %, not currently breastfeeding.                 KEYUR Vences is a 71 year old  female with a PMH significant for:     Patient Active Problem List   Diagnosis     Health Care Home     Esophageal reflux     Osteoarthrosis, unspecified whether generalized or localized, involving lower leg     Lumbosacral spondylosis without myelopathy     Hyperlipidemia     Type 2 diabetes mellitus with hyperglycemia, without long-term current use of insulin (H)     Seasonal allergies     Screening for depression     Microalbuminuria     She presents in follow-up for diabetes.  She forgot to bring her meter and sugars today.  She did bring them to diabetes educator a couple days ago however.  I reviewed this note in detail.  She is tolerating all medications without any problem.  This did report to me that she is doing all her medicines besides her diabetes wants.  She does confirm this today.  However she has restarted these now and is finally taking this.  She now states she understands why she needs to be on them.  We discussed again why I wanted her to take the aspirin and the cholesterol medicine.  She does state that she forgets her evening meds about 3 times a week.  In the past she had wanted to decrease the pain greater than hence her current regimen with metformin and glyburide combination.  However now she is willing to switch medications to take once a day so she forgets them less often.  She denies chest pain shortness of breath weakness numbness tingling.    She has not gotten her mammogram yet.  She is not sure how to do this.  Last time.  She is willing to get  this.      PMH, Medications and Allergies were reviewed and updated as needed.     A DancingAnchovy  was used for this visit.           Physical Exam:     Vitals:    07/13/17 0909   BP: 115/71   Pulse: 74   Temp: 98  F (36.7  C)   TempSrc: Oral   SpO2: 97%   Weight: 122 lb 3.2 oz (55.4 kg)     Body mass index is 25.11 kg/(m^2).    Exam:  Constitutional: healthy, alert and no distress  Cardiovascular:RRR. No murmurs, clicks gallops or rub  Respiratory:  normal respiratory rate and rhythm, lungs clear to auscultation. No wheezes or crackles.  Abdomen:  soft, nontender, nondistended.   Extremities: No C/C/E in BLE. 2+DP pulses.   Psychiatric: mentation appears normal and affect normal/bright      PHQ-9 SCORE 11/29/2016   Total Score 0     Results for orders placed or performed in visit on 07/13/17   Hemoglobin A1c ([facility])   Result Value Ref Range    Hemoglobin A1C 9.1 (H) 4.1 - 5.7 %       Assessment and Plan     Emmanuel was seen today for diabetes.    Diagnoses and all orders for this visit:    Type 2 diabetes mellitus with hyperglycemia, without long-term current use of insulin (H):   -     aspirin 81 MG tablet; Take 1 tablet (81 mg) by mouth daily  -     Hemoglobin A1c ([facility])  -     metFORMIN (GLUCOPHAGE-XR) 500 MG 24 hr tablet; Take 4 tablets (2,000 mg) by mouth daily (with breakfast)  -     glipiZIDE (GLIPIZIDE XL) 10 MG 24 hr tablet; Take 1 tablet (10 mg) by mouth daily      Patient Instructions   Stop by the lab to get your blood drawn to check for your diabetes. Your A1c last visit was very high    I will change your medicines to all be taken in the morning    I will add on a Aspirin for you to take daily in the morning    Stop the Metformin/Glyburide     Start Metformin ER 500mg, take 4 tablets in the morning    Start Glipizide ER 10mg, take 1 tablet in the morning    Keep Januvia the same, continue taking     I will update your med list and print out the AVS and will fax to your nurse at [facility]    Return to clinic in 1 mos for f/u Diabetes    I will call Don Longoria once your lab result comes back    Schedule a mammogram, they will call you to schedule       Options for treatment and/or follow-up care were reviewed with the patient. Emmanuel Ru was engaged and actively involved in the decision making process. She verbalized understanding of the options discussed and was satisfied with the final plan.    Clarissa Rodriguez MD

## 2017-07-13 NOTE — PROGRESS NOTES
Good news!  Your diabetes test is much lower than last check.  It is still not at goal, so we can discuss this at your next visit. Continue to take your medications ever day.  We will see you in a month for follow up.

## 2017-07-13 NOTE — NURSING NOTE
name: Don Longoria  Language: Ramya/ Yasmin/ Servando/ Sierra Leonean  Agency: [facility]  Phone number: [phone]

## 2017-07-13 NOTE — PATIENT INSTRUCTIONS
Stop by the lab to get your blood drawn to check for your diabetes. Your A1c last visit was very high    I will change your medicines to all be taken in the morning    I will add on a Aspirin for you to take daily in the morning    Stop the Metformin/Glyburide     Start Metformin ER 500mg, take 4 tablets in the morning    Start Glipizide ER 10mg, take 1 tablet in the morning    Keep Januvia the same, continue taking     I will update your med list and print out the AVS and will fax to your nurse at [facility]    Return to clinic in 1 mos for f/u Diabetes    I will call Don Longoria once your lab result comes back    Schedule a mammogram, they will call you to schedule

## 2017-07-17 ENCOUNTER — TELEPHONE (OUTPATIENT)
Dept: FAMILY MEDICINE | Facility: CLINIC | Age: 72
End: 2017-07-17

## 2017-07-17 NOTE — TELEPHONE ENCOUNTER
States she was waiting on medication orders but have not received them yet. Patient had medication changes at her las visit and she would like to know what was sent and the directions. Information was given to DEJON Hoffman per EMR. States she was not able to set up medications as they were not in the home, but she was told by the pharmacy that they will be mailed out today /ADEN Jorge  Routed to Dr. Rodriguez

## 2017-07-17 NOTE — TELEPHONE ENCOUNTER
We had planned to fax the AVS from the visit that summarized the changes.  Please give her that information.  I did not physically see my MA do this but she said she would.  Clarissa Rodriguez MD   Routed to RN.

## 2017-07-17 NOTE — TELEPHONE ENCOUNTER
[facility] phone call message- general phone call:    Reason for call: the home care nurse called to ask about the order she was supposed to get she did not get them and would like a call back     Return call needed: Yes    OK to leave a message on voice mail? Yes    Primary language: Yasmin      needed? Yes    Call taken on July 17, 2017 at 12:54 PM by Edmond Buchanan

## 2017-07-18 ENCOUNTER — MEDICAL CORRESPONDENCE (OUTPATIENT)
Dept: HEALTH INFORMATION MANAGEMENT | Facility: CLINIC | Age: 72
End: 2017-07-18

## 2017-08-14 ENCOUNTER — MEDICAL CORRESPONDENCE (OUTPATIENT)
Dept: HEALTH INFORMATION MANAGEMENT | Facility: CLINIC | Age: 72
End: 2017-08-14

## 2017-09-06 ENCOUNTER — MEDICAL CORRESPONDENCE (OUTPATIENT)
Dept: HEALTH INFORMATION MANAGEMENT | Facility: CLINIC | Age: 72
End: 2017-09-06

## 2017-09-16 ENCOUNTER — MEDICAL CORRESPONDENCE (OUTPATIENT)
Dept: HEALTH INFORMATION MANAGEMENT | Facility: CLINIC | Age: 72
End: 2017-09-16

## 2017-09-27 ENCOUNTER — MEDICAL CORRESPONDENCE (OUTPATIENT)
Dept: HEALTH INFORMATION MANAGEMENT | Facility: CLINIC | Age: 72
End: 2017-09-27

## 2017-10-10 DIAGNOSIS — J30.2 CHRONIC SEASONAL ALLERGIC RHINITIS, UNSPECIFIED TRIGGER: ICD-10-CM

## 2017-10-10 RX ORDER — CETIRIZINE HYDROCHLORIDE 10 MG/1
10 TABLET ORAL EVERY EVENING
Qty: 90 TABLET | Refills: 0 | Status: SHIPPED | OUTPATIENT
Start: 2017-10-10 | End: 2018-04-16

## 2017-11-06 DIAGNOSIS — E11.65 TYPE 2 DIABETES MELLITUS WITH HYPERGLYCEMIA, WITHOUT LONG-TERM CURRENT USE OF INSULIN (H): ICD-10-CM

## 2017-11-06 RX ORDER — GLIPIZIDE 10 MG/1
10 TABLET, FILM COATED, EXTENDED RELEASE ORAL DAILY
Qty: 90 TABLET | Refills: 0 | Status: SHIPPED | OUTPATIENT
Start: 2017-11-06 | End: 2018-04-16

## 2017-11-21 ENCOUNTER — MEDICAL CORRESPONDENCE (OUTPATIENT)
Dept: HEALTH INFORMATION MANAGEMENT | Facility: CLINIC | Age: 72
End: 2017-11-21

## 2017-11-26 NOTE — TELEPHONE ENCOUNTER
[facility] Family Medicine phone call message- medication clarification/question:    Full Medication Name: glyBURIDE-metFORMIN (GLUCOVANCE) 2.5-500 MG per tablet    Question: Dr Rodriguez just prescribed the above medication and the patient is already on Metformin with refills available.  Should she just suspend the regular Metformin or do you want her on all of it.      Pharmacy confirmed as    [place], [place] - [address] [place] [address]. [address]       OK to leave a message on voice mail? Yes    Primary language: Yasmin      needed? Yes    Call taken on May 3, 2017 at 4:56 PM by Emily Curry          Needs appt with me for labs etc, haven't seen her in a year

## 2017-11-27 DIAGNOSIS — M54.50 CHRONIC LEFT-SIDED LOW BACK PAIN WITHOUT SCIATICA: ICD-10-CM

## 2017-11-27 DIAGNOSIS — G89.29 CHRONIC LEFT-SIDED LOW BACK PAIN WITHOUT SCIATICA: ICD-10-CM

## 2017-11-28 RX ORDER — GABAPENTIN 300 MG/1
CAPSULE ORAL
Qty: 90 CAPSULE | Refills: 5 | Status: SHIPPED | OUTPATIENT
Start: 2017-11-28 | End: 2019-01-21

## 2018-01-25 ENCOUNTER — TELEPHONE (OUTPATIENT)
Dept: FAMILY MEDICINE | Facility: CLINIC | Age: 73
End: 2018-01-25

## 2018-01-25 NOTE — TELEPHONE ENCOUNTER
[facility] Family Medicine phone call message- general phone call:    Reason for call: The patient is on Glipizide and Glyburide usual patients are on one or the other he just need to know which one she is supposed to be on.    Return call needed: Yes    OK to leave a message on voice mail? Yes    Primary language: Yasmin      needed? Yes    Call taken on January 25, 2018 at 9:04 AM by Mike Del Cid

## 2018-01-30 NOTE — TELEPHONE ENCOUNTER
Called pt yesterday and was interpreted by her friend. Told her that she needs to be on the Glipizide only, pt understood.  Juancarlos Warner, CMA

## 2018-03-15 ENCOUNTER — MEDICAL CORRESPONDENCE (OUTPATIENT)
Dept: HEALTH INFORMATION MANAGEMENT | Facility: CLINIC | Age: 73
End: 2018-03-15

## 2018-03-16 ENCOUNTER — DOCUMENTATION ONLY (OUTPATIENT)
Dept: FAMILY MEDICINE | Facility: CLINIC | Age: 73
End: 2018-03-16
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
## 2018-03-19 ENCOUNTER — OFFICE VISIT (OUTPATIENT)
Dept: FAMILY MEDICINE | Facility: CLINIC | Age: 73
End: 2018-03-19
Payer: COMMERCIAL

## 2018-03-19 VITALS
WEIGHT: 115.4 LBS | SYSTOLIC BLOOD PRESSURE: 127 MMHG | RESPIRATION RATE: 16 BRPM | HEART RATE: 68 BPM | DIASTOLIC BLOOD PRESSURE: 73 MMHG | BODY MASS INDEX: 23.26 KG/M2 | HEIGHT: 59 IN

## 2018-03-19 DIAGNOSIS — E11.65 TYPE 2 DIABETES MELLITUS WITH HYPERGLYCEMIA, WITHOUT LONG-TERM CURRENT USE OF INSULIN (H): Primary | ICD-10-CM

## 2018-03-19 LAB
CHOLEST SERPL-MCNC: 284 MG/DL
FASTING?: ABNORMAL
HBA1C MFR BLD: 11.1 % (ref 4.1–5.7)
HDLC SERPL-MCNC: 57 MG/DL
LDLC SERPL CALC-MCNC: 182 MG/DL
TRIGL SERPL-MCNC: 224 MG/DL

## 2018-03-19 NOTE — PROGRESS NOTES
Preceptor attestation:  Patient seen and discussed with the resident. Assessment and plan reviewed with resident and agreed upon.  Supervising physician: Florentino Schmidt  [facility]

## 2018-03-19 NOTE — LETTER
March 20, 2018      Emmanuel Say  [address]  [place] [address]        Dear Emmanuel,    Please see below for your test results.  The result of the lab resuls show that your cholesterol is slightly higher this year than last year. Please bring your medications at your next visit to ensure that you are taking the correct medications.     Resulted Orders   Hemoglobin A1c ([facility])   Result Value Ref Range    Hemoglobin A1C 11.1 (H) 4.1 - 5.7 %   Lipid [place] ([facility]) - Results > 1 hr   Result Value Ref Range    Cholesterol 284 (H) <=199 mg/dL    Triglycerides 224 (H) <=149 mg/dL    HDL Cholesterol 57 >=50 mg/dL    LDL Cholesterol Calculated 182 (H) <=129 mg/dL    Fasting? Unknown     Narrative    Test performed by:  [facility]  [address] [place] [address]       If you have any questions, please call the clinic to make an appointment.    Sincerely,    John Ramirez, DO

## 2018-03-19 NOTE — PROGRESS NOTES
"  Family History   Problem Relation Age of Onset     DIABETES No family hx of      Hypertension No family hx of      Coronary Artery Disease No family hx of      Hyperlipidemia No family hx of      CEREBROVASCULAR DISEASE No family hx of      Breast Cancer No family hx of      Colon Cancer No family hx of      Prostate Cancer No family hx of      Other Cancer No family hx of      Depression No family hx of      Anxiety Disorder No family hx of      MENTAL ILLNESS No family hx of      Substance Abuse No family hx of      Anesthesia Reaction No family hx of      Asthma No family hx of      OSTEOPOROSIS No family hx of      Genetic Disorder No family hx of      Thyroid Disease No family hx of      Obesity No family hx of      Unknown/Adopted No family hx of      Social History     Social History     Marital status:      Spouse name: N/A     Number of children: N/A     Years of education: N/A     Social History Main Topics     Smoking status: Former Smoker     Years: 50.00     Types: Cigarettes     Smokeless tobacco: Never Used      Comment: chews betel nut     Alcohol use No     Drug use: No     Sexual activity: Not Asked     Other Topics Concern     None     Social History Narrative       Nursing Notes:   Stephanie Balderas CMA  3/19/2018 10:22 AM  Signed   name: Evaristo SilvioDemarcus Finn  Language: Yasmin  Agency: [employer]  Phone number: [phone]  Chief Complaint   Patient presents with     RECHECK     DM, Doctor wanted a followup     Blood pressure 127/73, pulse 68, resp. rate 16, height 4' 10.5\" (148.6 cm), weight 115 lb 6.4 oz (52.3 kg), not currently breastfeeding.    S:  Patient is here for \"labs\". Reports that she has an appointement today for labs and a physical exam in 3 weeks. No acute concerns today. Patient is on Sitagliptin 100mg, Metformin 2000mg, glipizide 10 mg. However, Reports that she only has 4 medications she takes 2 x a day but did not bring with her. Nurse sets up medication and " "does not know which are diabetes medications. Knows she has medication for diabetes abd cholesterol  Reports seeing eye doctor and getting cataracts surgery.  No foot pain, no numbness, tingling, no lesion. Patient reports that she has knee pain \"with weather\". No other complaints.    Type 2 Diabetes Assessment  Do you have any questions about your diabetes today? no  How often do you check your blood sugar? daily  What are your average readings? 150-200,   Have you had any low blood sugars? no  Do/will you need Refills on meds or testing supplies soon? no  How often do you miss taking your medicine? Mark home health nurse set up medications  Changes to diabetes care today: no  Future orders placed in Epic this visit? (A1c, microalbumin, BMP): Yes, A1C, Microalbumin, lipid,   F/u plan (next appt date or testing): 4/16/18 for CPE    O:  /73  Pulse 68  Resp 16  Ht 4' 10.5\" (148.6 cm)  Wt 115 lb 6.4 oz (52.3 kg)  BMI 23.71 kg/m2  General: On Chair, no acute distress  HEENT: No conjunctivitis, EOM grossly intact,   Heart: RRR, no murmurs, rubs, or clicks  Lungs: CTA all fields, no wheeze, no crackles, no respiratory distress  Extremites: No edema. Patient had sensation intact with monofilament exam. Full ROM of ankle and toes. No open lesions, no erythema, no redness  Skin: No lesions, no edema, excorations, or rashes noted      1. Type 2 diabetes mellitus with hyperglycemia, without long-term current use of insulin (H)  A1C increase today to 11.1. Unsure if or what medications patient is taken as she reports taking 4 meds BID but it may be 8 different medications which is somewhat consistent with her med list. Due to concern for hypoglycemia especially as patient may be taking glipizide, patients medication will not be changed at this time. May benefit from starting insulin according to med rec findings and may need to discuss goals of care with with patient as well. Will draw lipids and   - Hemoglobin A1c " ([facility])  - Microalbumin Creatinine Ratio Random Ur ([facility])  - Lipid Panel ([facility])  - INITIAL FOOT EXAM PT MISSY Ramirez  Family Medicine Resident PGY3

## 2018-03-19 NOTE — MR AVS SNAPSHOT
"              After Visit Summary   3/19/2018    Emmanuel Say    MRN: [id number]           Patient Information     Date Of Birth          1945        Visit Information        Provider Department      3/19/2018 10:20 AM John Ramirez DO [facility]        Today's Diagnoses     Type 2 diabetes mellitus with hyperglycemia, without long-term current use of insulin (H)    -  1       Follow-ups after your visit        Your next 10 appointments already scheduled     2018 10:00 AM CDT   PHYSICAL with Clarissa Rodriguez MD   [facility] ([facility])    [address] [place] [id number]   [phone]              Who to contact     Please call your clinic at [phone] to:    Ask questions about your health    Make or cancel appointments    Discuss your medicines    Learn about your test results    Speak to your doctor            Additional Information About Your Visit        MyChart Information     avandeo is an electronic gateway that provides easy, online access to your medical records. With avandeo, you can request a clinic appointment, read your test results, renew a prescription or communicate with your care team.     To sign up for [employer] visit the website at [email or web address]   You will be asked to enter the access code listed below, as well as some personal information. Please follow the directions to create your username and password.     Your access code is: [email or web address]  Expires: 2018 12:55 PM     Your access code will  in 90 days. If you need help or a new code, please contact your [facility] or call [phone] for assistance.        Care EveryWhere ID     This is your Care EveryWhere ID. This could be used by other organizations to access your [place] medical records  [id number]        Your Vitals Were     Pulse Respirations Height BMI (Body Mass Index)          68 16 4' 10.5\" (148.6 cm) 23.71 kg/m2         Blood Pressure from " Last 3 Encounters:   03/19/18 127/73   07/13/17 115/71   06/01/17 110/70    Weight from Last 3 Encounters:   03/19/18 115 lb 6.4 oz (52.3 kg)   07/13/17 122 lb 3.2 oz (55.4 kg)   06/01/17 122 lb 12.8 oz (55.7 kg)              We Performed the Following     Hemoglobin A1c ([facility])     INITIAL FOOT EXAM PT LOPS     Lipid [place] ([facility]) - Results > 1 hr        Primary Care Provider Office Phone # Fax #    Clarissa Rodriguez [phone]       [place] [address]        Equal Access to Services     ANGELA PERDOMO : Kareen Grigsby, waeva chiu, chantel kaalmada lani, sandra tran . So [facility] [phone].    ATENCIÓN: Si habla español, tiene a em disposición servicios gratuitos de asistencia lingüística. Llame al [phone].    We comply with applicable federal civil rights laws and [place] laws. We do not discriminate on the basis of race, color, national origin, age, disability, sex, sexual orientation, or gender identity.            Thank you!     Thank you for choosing [facility]  for your care. Our goal is always to provide you with excellent care. Hearing back from our patients is one way we can continue to improve our services. Please take a few minutes to complete the written survey that you may receive in the mail after your visit with us. Thank you!             Your Updated Medication List - Protect others around you: Learn how to safely use, store and throw away your medicines at www.disposemymeds.org.          This list is accurate as of 3/19/18 12:55 PM.  Always use your most recent med list.                   Brand Name Dispense Instructions for use Diagnosis    acetaminophen 500 MG tablet    TYLENOL    100 tablet    Take 1-2 tablets by mouth 3 times daily as needed for pain.    Osteoarthritis       aspirin 81 MG tablet     90 tablet    Take 1 tablet (81 mg) by mouth daily    Type 2 diabetes mellitus  with hyperglycemia, without long-term current use of insulin (H)       atorvastatin 40 MG tablet    LIPITOR    90 tablet    Take 1 tablet (40 mg) by mouth daily    Pure hypercholesterolemia       blood glucose lancing device     100 each    Use to test blood sugars 2 times daily. Fasting and 2 hours after eating.    Type 2 diabetes, HbA1C goal < 8% (H)       blood glucose monitoring meter device kit     1 kit    Check blood glucose twice a day.  Once fasting and once 2 hours after eating.    Type 2 diabetes, HbA1C goal < 8% (H)       blood glucose monitoring test strip    REINIER CONTOUR    100 each    Check blood glucose twice a day, once fasting and once 2 hours after eating.    Type 2 diabetes, HbA1C goal < 8% (H)       cetirizine 10 MG tablet    zyrTEC    90 tablet    Take 1 tablet (10 mg) by mouth every evening    Chronic seasonal allergic rhinitis, unspecified trigger       DIPHENHYDRAMINE HCL PO      Take 25 mg by mouth        eucerin cream     226 g    Use as directed for dry skin    Dry skin       gabapentin 300 MG capsule    NEURONTIN    90 capsule    Take 1 tablet three times daily    Chronic left-sided low back pain without sciatica       glipiZIDE 10 MG 24 hr tablet    glipiZIDE XL    90 tablet    Take 1 tablet (10 mg) by mouth daily    Type 2 diabetes mellitus with hyperglycemia, without long-term current use of insulin (H)       hydrocortisone 1 % cream    CORTAID    40 g    Apply twice a day to affected areas prn itching and rash.    Dermatitis       ketotifen 0.025 % Soln ophthalmic solution    ZADITOR    1 Bottle    Place 1 drop into the right eye 3 times daily    Seasonal allergies       metFORMIN 500 MG 24 hr tablet    GLUCOPHAGE-XR    360 tablet    Take 4 tablets (2,000 mg) by mouth daily (with breakfast)    Type 2 diabetes mellitus with hyperglycemia, without long-term current use of insulin (H)       ranitidine 150 MG tablet    ZANTAC    180 tablet    Take 1 tablet (150 mg) by mouth 2 times  daily as needed for heartburn    Gastroesophageal reflux disease, esophagitis presence not specified       sitagliptin 100 MG tablet    JANUVIA    90 tablet    Take 1 tablet (100 mg) by mouth daily    Type 2 diabetes mellitus with hyperglycemia, without long-term current use of insulin (H)

## 2018-03-19 NOTE — NURSING NOTE
name: Evaristo (Thea) Huma  Language: Yasmin  Agency: [employer]  Phone number: [phone]

## 2018-04-16 ENCOUNTER — OFFICE VISIT (OUTPATIENT)
Dept: FAMILY MEDICINE | Facility: CLINIC | Age: 73
End: 2018-04-16
Payer: COMMERCIAL

## 2018-04-16 VITALS
HEART RATE: 98 BPM | BODY MASS INDEX: 23.47 KG/M2 | SYSTOLIC BLOOD PRESSURE: 129 MMHG | DIASTOLIC BLOOD PRESSURE: 78 MMHG | WEIGHT: 116.4 LBS | HEIGHT: 59 IN | RESPIRATION RATE: 16 BRPM

## 2018-04-16 DIAGNOSIS — Z00.00 MEDICARE ANNUAL WELLNESS VISIT, INITIAL: Primary | ICD-10-CM

## 2018-04-16 DIAGNOSIS — E78.00 PURE HYPERCHOLESTEROLEMIA: ICD-10-CM

## 2018-04-16 DIAGNOSIS — E11.65 TYPE 2 DIABETES MELLITUS WITH HYPERGLYCEMIA, WITHOUT LONG-TERM CURRENT USE OF INSULIN (H): ICD-10-CM

## 2018-04-16 LAB
CREAT UR-MCNC: 32.6 MG/DL
MICROALBUMIN UR-MCNC: 9.36 MG/DL (ref 0–1.99)
MICROALBUMIN/CREAT UR: 287.1 MG/G

## 2018-04-16 RX ORDER — GLYBURIDE-METFORMIN HYDROCHLORIDE 2.5; 5 MG/1; MG/1
2 TABLET ORAL 2 TIMES DAILY WITH MEALS
COMMUNITY
End: 2018-07-24

## 2018-04-16 RX ORDER — ATORVASTATIN CALCIUM 40 MG/1
40 TABLET, FILM COATED ORAL DAILY
Qty: 90 TABLET | Refills: 4 | Status: SHIPPED | OUTPATIENT
Start: 2018-04-16 | End: 2019-04-05

## 2018-04-16 NOTE — PATIENT INSTRUCTIONS
MEDICARE PERSONAL PREVENTIVE SERVICES PLAN - IMMUNIZATIONS     Here are your recommended immunizations.  Take this home for your reference.                                                    IMMUNIZATIONS Description Recommend today?     Influenza (Flu shot) Prevents flu; should get every year No; is up to date.   PCV 13 Pneumonia vaccination; you get it once No; is up to date.   PPSV 23 Second pneumonia vaccination; usually get it 1 year after PCV 13 No; is up to date.   Zoster (Shingles) Prevents shingles; you get it once  (Check with Part D insurance for coverage, must receive at a pharmacy, not clinic) Recommended   Tetanus Prevents tetanus; once every 10 years No; is up to date.     MEDICARE PERSONAL PREVENTIVE SERVICES PLAN   SERVICES     Review these tests with your doctor then decide which ones you want and take this page home for your reference                                                    IMMUNIZATIONS Description Recommend today?     Hepatitis B  If you have any of the following risk factors you should be immunized for hepatitis B: severe kidney disease, people who live in the same house as a carrier of Hepatitis B virus, people who live in  institutions (e.g. nursing homes or group homes), homosexual men, patients with hemophilia who received Factor VIII or IX concentrates, abusers of illicit injectable drugs No; is up to date.     SCREENING TESTS     Description   Year of Last Screening   Recommended Today?   Heart disease screening blood tests    Cholesterol level Reducing cholesterol can reduce your risk of heart attacks by 25%.  Screening is recommended yearly if you are at risk of heart disease otherwise every 4-5 years 3/2018 No, Needs to take statin daily   Diabetes screening tests    Hemoglobin A1c blood test   Finding and treating diabetes early can reduce complications.  Screening recommended/covered yearly if you have high blood pressure, high cholesterol, obesity (BMI >30), or a history  of high blood glucose tests; or 2 of the following: family history of diabetes, overweight (BMI >25 but <30), age 65 years or older, and a history of diabetes of pregnancy or gave birth to baby weighing more than 9 lbs. 3/2018, has diabetes No: is not indicated today.     Needs follow up of this every  Month until controlled.   Hepatitis B screening Finding hepatitis B early can reduce complications.  Screening is recommended for persons with selected risk factors. Not needed No: is not indicated today.   Hepatitis C screening Finding hepatitis C early can reduce complications.  Screening is recommended for all persons born from 1945 through 1965 and for those with selected other risk factors.  9/2017 No; is up to date.   HIV screening Finding HIV early can reduce complications.  Screening is recommended for persons with risk factors for HIV infection. Not needed No: is not indicated today.   Glaucoma screening Early detection of glaucoma can prevent blindness.   Please talk to your eye doctor about this.       SCREENING TESTS     Description   Year of Last Screening   Recommended Today?   Colorectal cancer screening    Fecal occult blood test     Screening colonoscopy Screening for colon cancer has been shown to reduce death from colon cancer by 25-30%. Screening recommended to start at 50 years and continuing until age 75 years.   Positive FIT test  6/2015 Yes; Recommended and  Patient declining colonoscopy   Breast Cancer Screening (women)    Mammogram Mammogram screening for breast cancer has been shown to reduce the risk of dying from breast cancer and prolong life. Screening is recommended every 1-2 years for women aged 50 to 74 years.  2014 Yes; Recommended and ordered.   Cervical Cancer screening (women)    Pap Cervical pap smears can reduce cervical cancer. Screening is recommended annually if high risk (history of abnormal pap smears) otherwise every 2-3 years, stop screening at 65 years of age if history  of normal paps. unclear No: is not indicated today.   Screening for Osteoporosis:  Bone mass measurements (women)    Dexa Scan Screening and treating Osteoporosis can reduce the risk of hip and spine fractures. Screening is recommended in women 65 years or older and in women and men at risk of osteoporosis. Never Yes; Recommended and ordered.   Screening for Lung Cancer     Low-dose CT scanning Screening can reduce mortality in persons aged 55-80 who have smoked at least 30 pack-years and who are either still smoking or have quit in the past 15 years. N/A No: is not indicated today.   Abdominal Aortic Aneurysm (AAA) screening    Ultrasound (US)   An aneurysm treated before rupture is very safe  a ruptured aneurysm can be fatal.  Screening  by US for AAA is limited to patients who meet one of the following criteria:    Men who are 65-75 years old and have smoked more than 100 cigarettes in their lifetime    Anyone with a family history of abdominal aortic aneurysm n/a No: is not indicated today.             MEDICARE WELLNESS EXAM PATIENT INSTRUCTIONS    Yearly exam:     See your health care provider every year in order to review changes in your health, review medicines that you take, and discuss preventive care needs such as immunizations and cancer screening.    Get a flu shot each year.     Advance Directives:    If you have not done so, you are encouraged to complete advance directives and/or a living will.   More information about advance directives can be found at: http://www.[place]med.org/advocacy/Key-Issues/Advance-Directives    Nutrition:     Eat at least 5 servings of fruits and vegetables each day.     Eat whole-grain bread, whole-wheat pasta and brown rice instead of white grains and rice.     Talk to your doctor about Calcium and Vitamin D.     Lifestyle:    Exercise for at least 150 minutes a week (30 minutes a day, 5 days a week). This will help you control your weight and prevent disease.     Limit alcohol  to one drink per day.     If you smoke, try to quit   your doctor will be happy to help.     Wear sunscreen to prevent skin cancer.     See your dentist every six months for an exam and cleaning.     See your eye doctor every 1 to 2 years to screen for conditions such as glaucoma, macular degeneration and cataracts.        Evaristo will scheduled the eye appointment.   Juana  04/16/18    MAMMOGRAM SCREENING AND DEXA SCAN:  [facility]  [address] [place] [id number]  [phone]  Date:   Friday April 27, 2018  Time:   11:00 AM   has been requested for this appointment.  Mammogram instructions:  No lotion, powder, deodorant or perfume under the arms and around the breast area.    Dexa instructions:  No vitamin pills, calcium supplements or antacids such as tums, rolaids etc.. 2 days prior to appointment and the day of the appointment.  No clothing with metal such as zippers, buttons or belts.    Given to Evaristo MASON Pack  4/18/18

## 2018-04-16 NOTE — MR AVS SNAPSHOT
After Visit Summary   4/16/2018    Emmanuel Say    MRN: [id number]           Patient Information     Date Of Birth          1945        Visit Information        Provider Department      4/16/2018 10:00 AM Clarissa Rodriguez MD [facility]        Today's Diagnoses     Medicare annual wellness visit, initial    -  1    Type 2 diabetes mellitus with hyperglycemia, without long-term current use of insulin (H)        Pure hypercholesterolemia          Care Instructions    MEDICARE PERSONAL PREVENTIVE SERVICES PLAN - IMMUNIZATIONS     Here are your recommended immunizations.  Take this home for your reference.                                                    IMMUNIZATIONS Description Recommend today?     Influenza (Flu shot) Prevents flu; should get every year No; is up to date.   PCV 13 Pneumonia vaccination; you get it once No; is up to date.   PPSV 23 Second pneumonia vaccination; usually get it 1 year after PCV 13 No; is up to date.   Zoster (Shingles) Prevents shingles; you get it once  (Check with Part D insurance for coverage, must receive at a pharmacy, not clinic) Recommended   Tetanus Prevents tetanus; once every 10 years No; is up to date.     MEDICARE PERSONAL PREVENTIVE SERVICES PLAN - SERVICES     Review these tests with your doctor then decide which ones you want and take this page home for your reference                                                    IMMUNIZATIONS Description Recommend today?     Hepatitis B  If you have any of the following risk factors you should be immunized for hepatitis B: severe kidney disease, people who live in the same house as a carrier of Hepatitis B virus, people who live in  institutions (e.g. nursing homes or group homes), homosexual men, patients with hemophilia who received Factor VIII or IX concentrates, abusers of illicit injectable drugs No; is up to date.     SCREENING TESTS     Description   Year of Last Screening   Recommended Today?   Heart  disease screening blood tests    Cholesterol level Reducing cholesterol can reduce your risk of heart attacks by 25%.  Screening is recommended yearly if you are at risk of heart disease otherwise every 4-5 years 3/2018 No, Needs to take statin daily   Diabetes screening tests    Hemoglobin A1c blood test   Finding and treating diabetes early can reduce complications.  Screening recommended/covered yearly if you have high blood pressure, high cholesterol, obesity (BMI >30), or a history of high blood glucose tests; or 2 of the following: family history of diabetes, overweight (BMI >25 but <30), age 65 years or older, and a history of diabetes of pregnancy or gave birth to baby weighing more than 9 lbs. 3/2018, has diabetes No: is not indicated today.     Needs follow up of this every  Month until controlled.   Hepatitis B screening Finding hepatitis B early can reduce complications.  Screening is recommended for persons with selected risk factors. Not needed No: is not indicated today.   Hepatitis C screening Finding hepatitis C early can reduce complications.  Screening is recommended for all persons born from 1945 through 1965 and for those with selected other risk factors.  9/2017 No; is up to date.   HIV screening Finding HIV early can reduce complications.  Screening is recommended for persons with risk factors for HIV infection. Not needed No: is not indicated today.   Glaucoma screening Early detection of glaucoma can prevent blindness.   Please talk to your eye doctor about this.       SCREENING TESTS     Description   Year of Last Screening   Recommended Today?   Colorectal cancer screening    Fecal occult blood test     Screening colonoscopy Screening for colon cancer has been shown to reduce death from colon cancer by 25-30%. Screening recommended to start at 50 years and continuing until age 75 years.   Positive FIT test  6/2015 Yes; Recommended and  Patient declining colonoscopy   Breast Cancer  Screening (women)    Mammogram Mammogram screening for breast cancer has been shown to reduce the risk of dying from breast cancer and prolong life. Screening is recommended every 1-2 years for women aged 50 to 74 years.  2014 Yes; Recommended and ordered.   Cervical Cancer screening (women)    Pap Cervical pap smears can reduce cervical cancer. Screening is recommended annually if high risk (history of abnormal pap smears) otherwise every 2-3 years, stop screening at 65 years of age if history of normal paps. unclear No: is not indicated today.   Screening for Osteoporosis:  Bone mass measurements (women)    Dexa Scan Screening and treating Osteoporosis can reduce the risk of hip and spine fractures. Screening is recommended in women 65 years or older and in women and men at risk of osteoporosis. Never Yes; Recommended and ordered.   Screening for Lung Cancer     Low-dose CT scanning Screening can reduce mortality in persons aged 55-80 who have smoked at least 30 pack-years and who are either still smoking or have quit in the past 15 years. N/A No: is not indicated today.   Abdominal Aortic Aneurysm (AAA) screening    Ultrasound (US)   An aneurysm treated before rupture is very safe -a ruptured aneurysm can be fatal.  Screening  by US for AAA is limited to patients who meet one of the following criteria:    Men who are 65-75 years old and have smoked more than 100 cigarettes in their lifetime    Anyone with a family history of abdominal aortic aneurysm n/a No: is not indicated today.             MEDICARE WELLNESS EXAM PATIENT INSTRUCTIONS    Yearly exam:     See your health care provider every year in order to review changes in your health, review medicines that you take, and discuss preventive care needs such as immunizations and cancer screening.    Get a flu shot each year.     Advance Directives:    If you have not done so, you are encouraged to complete advance directives and/or a living will.   More  information about advance directives can be found at: http://www.[place]med.org/advocacy/Key-Issues/Advance-Directives    Nutrition:     Eat at least 5 servings of fruits and vegetables each day.     Eat whole-grain bread, whole-wheat pasta and brown rice instead of white grains and rice.     Talk to your doctor about Calcium and Vitamin D.     Lifestyle:    Exercise for at least 150 minutes a week (30 minutes a day, 5 days a week). This will help you control your weight and prevent disease.     Limit alcohol to one drink per day.     If you smoke, try to quit - your doctor will be happy to help.     Wear sunscreen to prevent skin cancer.     See your dentist every six months for an exam and cleaning.     See your eye doctor every 1 to 2 years to screen for conditions such as glaucoma, macular degeneration and cataracts.                  Follow-ups after your visit        Additional Services     OPHTHALMOLOGY ADULT REFERRAL       Patient to stop at the [facility]    Reason for Referral: diabetes. Due for yearly eye exam.     needed: Yes  Language: Yasmin    May leave message on voicemail: Yes    ([place] Only) Referral should be tracked (Yes/No)?                  Follow-up notes from your care team     Return in about 4 weeks (around 5/14/2018) for Diabetes.      Future tests that were ordered for you today     Open Future Orders        Priority Expected Expires Ordered    OPHTHALMOLOGY ADULT REFERRAL Routine  4/16/2019 4/16/2018    Dexa hip/pelvis/spine* Routine  4/16/2019 4/16/2018            Who to contact     Please call your clinic at [phone] to:    Ask questions about your health    Make or cancel appointments    Discuss your medicines    Learn about your test results    Speak to your doctor            Additional Information About Your Visit        [employer] Information     [employer] is an electronic gateway that provides easy, online access to your medical records. With [employer], you can request a clinic  "appointment, read your test results, renew a prescription or communicate with your care team.     To sign up for [employer] visit the website at [email or web address]   You will be asked to enter the access code listed below, as well as some personal information. Please follow the directions to create your username and password.     Your access code is: [email or web address]  Expires: 2018 12:55 PM     Your access code will  in 90 days. If you need help or a new code, please contact your [facility] or call [phone] for assistance.        Care EveryWhere ID     This is your Care EveryWhere ID. This could be used by other organizations to access your [place] medical records  [id number]        Your Vitals Were     Pulse Respirations Height BMI (Body Mass Index)          98 16 4' 10.5\" (148.6 cm) 23.91 kg/m2         Blood Pressure from Last 3 Encounters:   18 129/78   18 127/73   17 115/71    Weight from Last 3 Encounters:   18 116 lb 6.4 oz (52.8 kg)   18 115 lb 6.4 oz (52.3 kg)   17 122 lb 3.2 oz (55.4 kg)              We Performed the Following     Microalbumin Random Ur ([facility])          Today's Medication Changes          These changes are accurate as of 18 11:25 AM.  If you have any questions, ask your nurse or doctor.               Stop taking these medicines if you haven't already. Please contact your care team if you have questions.     cetirizine 10 MG tablet   Commonly known as:  zyrTEC   Stopped by:  Clarissa Rodriguez MD           DIPHENHYDRAMINE HCL PO   Stopped by:  Clarissa Rodriguez MD           eucerin cream   Stopped by:  Clarissa Rodriguez MD           glipiZIDE 10 MG 24 hr tablet   Commonly known as:  glipiZIDE XL   Stopped by:  Clarissa Rodriguez MD           hydrocortisone 1 % cream   Commonly known as:  CORTAID   Stopped by:  Clarissa Rodriguez MD           ketotifen 0.025 % Soln ophthalmic solution   Commonly known as: "  ZADITOR   Stopped by:  Clarissa Rodriguez MD           metFORMIN 500 MG 24 hr tablet   Commonly known as:  GLUCOPHAGE-XR   Stopped by:  Clarissa Rodriguez MD           ranitidine 150 MG tablet   Commonly known as:  ZANTAC   Stopped by:  Clarissa Rodriguez MD                Where to get your medicines      These medications were sent to [facility] [place], [place] - [address] [facility] [address]  [address] [facility] [address], [place] [address]     Phone:  [phone]     atorvastatin 40 MG tablet                Primary Care Provider Office Phone # Fax #    Clarissa Rodriguez [phone]       [address] [address]        Equal Access to Services     YADIRA PERDOMO : Hadii aad ku hadasho Soomaali, waaxda luqadaha, qaybta kaalmada adeegyada, waxay idiin hayryley tran . So [facility] [phone].    ATENCIÓN: Si habla español, tiene a em disposición servicios gratuitos de asistencia lingüística. [facility] [phone].    We comply with applicable federal civil rights laws and [place] laws. We do not discriminate on the basis of race, color, national origin, age, disability, sex, sexual orientation, or gender identity.            Thank you!     Thank you for choosing [facility]  for your care. Our goal is always to provide you with excellent care. Hearing back from our patients is one way we can continue to improve our services. Please take a few minutes to complete the written survey that you may receive in the mail after your visit with us. Thank you!             Your Updated Medication List - Protect others around you: Learn how to safely use, store and throw away your medicines at www.disposemymeds.org.          This list is accurate as of 4/16/18 11:25 AM.  Always use your most recent med list.                   Brand Name Dispense Instructions for use Diagnosis    acetaminophen 500 MG tablet    TYLENOL    100 tablet    Take 1-2 tablets  by mouth 3 times daily as needed for pain.    Osteoarthritis       aspirin 81 MG tablet     90 tablet    Take 1 tablet (81 mg) by mouth daily    Type 2 diabetes mellitus with hyperglycemia, without long-term current use of insulin (H)       atorvastatin 40 MG tablet    LIPITOR    90 tablet    Take 1 tablet (40 mg) by mouth daily    Pure hypercholesterolemia       blood glucose lancing device     100 each    Use to test blood sugars 2 times daily. Fasting and 2 hours after eating.    Type 2 diabetes, HbA1C goal < 8% (H)       blood glucose monitoring meter device kit     1 kit    Check blood glucose twice a day.  Once fasting and once 2 hours after eating.    Type 2 diabetes, HbA1C goal < 8% (H)       blood glucose monitoring test strip    REINIER CONTOUR    100 each    Check blood glucose twice a day, once fasting and once 2 hours after eating.    Type 2 diabetes, HbA1C goal < 8% (H)       gabapentin 300 MG capsule    NEURONTIN    90 capsule    Take 1 tablet three times daily    Chronic left-sided low back pain without sciatica       glyBURIDE-metFORMIN 2.5-500 MG per tablet    GLUCOVANCE     Take 2 tablets by mouth 2 times daily (with meals)        sitagliptin 100 MG tablet    JANUVIA    90 tablet    Take 1 tablet (100 mg) by mouth daily    Type 2 diabetes mellitus with hyperglycemia, without long-term current use of insulin (H)

## 2018-04-16 NOTE — PROGRESS NOTES
65+ Annual Wellness Visit         HPI     This 72 year old female presents as an established patient  Clarissa Rodriguez who presents for a Initial Medicare Wellness Visit    Other issues patient wants to be addressed today:    Chief Complaint   Patient presents with     Medicare Visit     Medicare Wellness visit.     Wonders why she needs to take so many diabetes medicines when she feels fine.     Patient Active Problem List   Diagnosis     Health Care Home     Esophageal reflux     Osteoarthrosis, unspecified whether generalized or localized, involving lower leg     Lumbosacral spondylosis without myelopathy     Hyperlipidemia     Type 2 diabetes mellitus with hyperglycemia, without long-term current use of insulin (H)     Seasonal allergies     Screening for depression     Microalbuminuria       Past Medical History:   Diagnosis Date     Aspirin contraindicated 11/8/13    allergy to ibuprofen/amp     Diabetes (H)      Hypertension         Family History   Problem Relation Age of Onset     DIABETES No family hx of      Hypertension No family hx of      Coronary Artery Disease No family hx of      Hyperlipidemia No family hx of      CEREBROVASCULAR DISEASE No family hx of      Breast Cancer No family hx of      Colon Cancer No family hx of      Prostate Cancer No family hx of      Other Cancer No family hx of      Depression No family hx of      Anxiety Disorder No family hx of      MENTAL ILLNESS No family hx of      Substance Abuse No family hx of      Anesthesia Reaction No family hx of      Asthma No family hx of      OSTEOPOROSIS No family hx of      Genetic Disorder No family hx of      Thyroid Disease No family hx of      Obesity No family hx of      Unknown/Adopted No family hx of          Problem List, Family History and past Medical History reviewed and unchanged/updated.    Nursing Notes:   Stephanie Balderas, [facility]  4/16/2018 10:38 AM  Signed   name: Evaristo GoldbergooIrwin Finn  Language: Yasmin  Agency:  [employer]  Phone number: [phone]    Stephanie Balderas CMA  4/16/2018 10:38 AM  Signed  MEDICARE PERSONAL PREVENTIVE SERVICES PLAN - IMMUNIZATIONS     Here are your recommended immunizations.  Take this home for your reference.                                                    IMMUNIZATIONS Description Recommend today?     Influenza (Flu shot) Prevents flu; should get every year No; is up to date.   PCV 13 Pneumonia vaccination; you get it once No; is up to date.   PPSV 23 Second pneumonia vaccination; usually get it 1 year after PCV 13 No; is up to date.   Zoster (Shingles) Prevents shingles; you get it once  (Check with Part D insurance for coverage, must receive at a pharmacy, not clinic) Yes; Recommended and ordered.   Tetanus Prevents tetanus; once every 10 years No; is up to date.     Medicare Wellness Visit  Health Risk Assessment        Visual Acuity:  Right Eye: 10/20   Left Eye:         FALL RISK ASSESSMENT 4/16/2018 3/16/2018   Fallen 2 or more times in the past year? No No   Any fall with injury in the past year? No No            Health Risk Assessment / Review of Systems     Constitutional: Any fevers or night sweats? No     Eyes:  Vision problems    YES Had cataract surgery, says didn't improve    Hearing Do you feel you have hearing loss?   YES Left ear trouble hearing sometimes    Cardiovascular: Any chest pain, fast or irregular heart beat, calf pain with walking?     No           Respiratory:   Any breathing problems or cough?   No     Gastrointestinal: Any stomach or stool problems?   No      Genitourinary: Do you have difficulty controlling urination?    YES Sometimes incontinence    Muscles and Joints: Any joint stiffness or soreness?    YES Legs sore    Skin: Any concerning lesions or moles?   No     Nervous System: Any loss of strength or feeling, numbness or tingling, shaking, dizziness, or headache?  No     Mental Health: Any depression, anxiety or problems sleeping?    Yes, sometimes  has trouble sleeping    Cognition: Do you have any problems with your memory?   YES Sometimes    PHQ-2 Score:   PHQ-2 ( 1999 Pfizer) 6/1/2017 9/26/2016   Q1: Little interest or pleasure in doing things 0 0   Q2: Feeling down, depressed or hopeless 0 0   PHQ-2 Score 0 0       PHQ-9 Score:   No flowsheet data found.         Medical Care     What other specialists or organizations are involved in your medical care?    Patient Care Team       Relationship Specialty Notifications Start End    Clarissa Rodriguez MD PCP - General Family Practice  9/26/16     Phone: [phone] Fax: [phone]         [facility] [address] [address]    Mere Lainez [facility] Coordinator  Abnormal results only, Admissions 5/14/13     Comment:  [facility]  Care Manager:  :  [phone]    Don Law Other (see comments)   1/22/15     Comment:      Phone: [phone]         Yasmin     10/20/16     Comment:  DEJON frye/ [employer]    Phone: [phone]         [facility]   11/15/16     Comment:  Nicole Harper    Phone: [phone]         [address] [place], [place]                 Social History / Home Safety     Social History   Substance Use Topics     Smoking status: Former Smoker     Years: 50.00     Types: Cigarettes     Smokeless tobacco: Never Used      Comment: chews betel nut     Alcohol use No     Marital Status:  Who lives in your household? Lives with Niece    Does your home have any of the following safety concerns? Loose rugs in the hallway, no grab bars in the bathroom, no handrails on the stairs or have poorly lit areas?  No     Do you feel threatened or controlled by a partner, ex-partner or anyone in your life? No     Has anyone hurt you physically, for example by pushing, hitting, slapping or kicking you   or forcing you to have sex? No          Functional Status     Do you need help with dressing yourself, bathing, or  "walking?No     Do you need help with the phone, transportation, shopping, preparing meals, housework, laundry, medications or managing money? YES       Risk Behaviors and Healthy Habits     History   Smoking Status     Former Smoker     Years: 50.00     Types: Cigarettes   Smokeless Tobacco     Never Used     Comment: chews betel nut     How many servings of fruits and vegetables do you eat a day? 2-3    Exercise: 1 day/week for an average of less than 15 minutes walking      Do you frequently drive without a seatbelt? No     Do you use any other drugs? No         Do you use alcohol?No      Frailty Assessment            1. By yourself and note using aids, do you have difficulty walking up 10 steps without resting?  No  (1 for Yes, 0 for No)    2. By yourself and not using mobility aids, do you have any difficulty walking several hundred yards?  YES 1 (1 for Yes, 0 for No)    3. Have you lost 10 or more pounds unintentionally in the previous year? No  (If \"Yes\" and >5% weight loss, then score 1.  Score 0, if <5% weight loss or \"No\" weight loss)    4. How much of the times during the past 3 weeks did you feel tired? 3. Some of the time (\"1\" or \"2\" are scored 1, others 0)    5.  A doctor told the patient they had the following illnesses:  Diabetes (0-4 = score 0, 5-11= score 1)      Stephanie Balderas, [facility]        Frailty Assessment              1. (1 for Yes, 0 for No)    2. (1 for Yes, 0 for No)    3. (If \"Yes\" and >5% weight loss, then score 1.  Score 0, if <5% weight loss or \"No\" weight loss)    4. (\"1\" or \"2\" are scored 1, others 0)    5. Count number of illnesses. (0-4 = score 0, 5-11= score 1)    Total score: 1    Frailty screen score (3-5 = frail; consider interdisciplinary assessment and care.  1-2 = prefrail; at risk for adverse health events; 0 = robust)      EVALUATION OF COGNITIVE FUNCTION     Mood/affect:Normal  Appearance:Normal  Family member/caregiver input: not available    Planned Grand, but ran out of " "time today. Will do at next visit.  Cognitive screen is:Negative based on history and conversation today.      Other Assessments     CV Risk based on Pooled Cohort Risk (consider assessing every 4-6 years; consider statin in patients with 10-yr risk > 7.5%):   The 10-year ASCVD risk score (Lenard PAREDES Jr, et al., 2013) is: 23.2%    Values used to calculate the score:      Age: 72 years      Sex: Female      Is Non- : No      Diabetic: Yes      Tobacco smoker: No      Systolic Blood Pressure: 129 mmHg      Is BP treated: No      HDL Cholesterol: 57 mg/dL      Total Cholesterol: 284 mg/dL    Advance Directives: Discussed with patient and family as appropriate.  Has patient completed advance directives and/or a living will?  no   She clearly states that she would never want dialysis.  She states a niece or nephew had to be a dialysis until he got it renal transplant in the past so she knows what this means.  She would like to talk more about advanced directives at the next visit.      Immunization History   Administered Date(s) Administered     HEPA 06/22/2015     HepB 05/15/2007, 03/10/2008, 10/14/2011     Influenza (High Dose) 3 valent vaccine 09/26/2016     Influenza (IIV3) PF 09/23/2009, 01/20/2011, 10/14/2011, 09/10/2013     Influenza Vaccine, 3 YRS +, IM (QUADRIVALENT W/PRESERVATIVES) 12/01/2014     MMR 05/05/2007     Mantoux Tuberculin Skin Test 03/03/2008     Pneumo Conj 13-V (2010&after) 11/29/2016     Pneumococcal 23 valent 10/14/2011     TD (ADULT, 7+) 05/15/2007, 10/14/2011     TDAP Vaccine (Boostrix) 06/22/2015     Tdap (Adacel,Boostrix) 03/10/2008     Typhoid IM 06/22/2015     Yellow Fever 06/22/2015     Reviewed Immunization Record Today         Physical Exam     Vitals: /78  Pulse 98  Resp 16  Ht 4' 10.5\" (148.6 cm)  Wt 116 lb 6.4 oz (52.8 kg)  BMI 23.91 kg/m2  BMI= Body mass index is 23.91 kg/(m^2).  EXAM:  Constitutional: healthy, alert and no distress   Cardiovascular: " negative, PMI normal. No lifts, heaves, or thrills. RRR. No murmurs, clicks gallops or rub  Respiratory: negative, Percussion normal. Good diaphragmatic excursion. Lungs clear  JOINT/EXTREMITIES: extremities normal- no gross deformities noted, gait normal and normal muscle tone        Assessment and Plan       Reviewed Preventive Services and Plan form with patient as specified in Patient Instructions.      Positive findings on assessment:  Some memory concerns but nothing concerning on exam today.  Plan for MOCA next visit.  May be not very helpful and non-English-speaking patient.  Emmanuel was seen today for medicare visit.    Diagnoses and all orders for this visit:    Medicare annual wellness visit, initial: Due for colorectal cancer screening and mammogram as well.  Declining colonoscopy.  Will get DEXA and mammogram.  Advanced directive talk with nurses and MOCA at next visit.  -     Dexa hip/pelvis/spine*; Future    Type 2 diabetes mellitus with hyperglycemia, without long-term current use of insulin (H): Very poorly controlled diabetes.  Had been noncompliant with medications.  Had a very long discussion about compliance and reason for needing to treat diabetes even if she does not have symptoms.  Majority of the visit was spent discussing diabetes complications and medication compliance and reasons for the necessity of this.  She does need a repeat basic metabolic panel and A1c at the next visit.  She did not want to get blood drawn today.  Started discussion about likely needing insulin in the future.  She is very scared of needles and states she would never really want to do insulin she would rather just 2 pills.  She would be willing to learn more about the pen needles in the future still not sure if she ever wants to do this.  After A1c rechecked at next visit we will likely need to add more orals or Lantus.  -     Microalbumin Random Ur ([facility])  -     OPHTHALMOLOGY ADULT REFERRAL; Future    Pure  hypercholesterolemia: Very high LDL at 182 with diabetes needs to be on a statin.  Start atorvastatin today.  Already on aspirin.  -     atorvastatin (LIPITOR) 40 MG tablet; Take 1 tablet (40 mg) by mouth daily      Return in about 4 weeks (around 5/14/2018) for Diabetes.  Needs BMP A1c, nurse education for advanced directive and diet and diabetes.  Due moca.      Options for treatment and follow-up care were reviewed with the u Say and/or guardian engaged in the decision making process and verbalized understanding of the options discussed and agreed with the final plan.    Clarissa Rodriguez MD

## 2018-04-16 NOTE — LETTER
April 19, 2018      Emmanuel Say  [address]  [place] [address]        Dear Emmanuel,    Please see below for your test results.    Resulted Orders   Microalbumin Random Ur ([facility])   Result Value Ref Range    Microalbumin, Urine 9.36 (H) 0.00 - 1.99 mg/dL    Creatinine, Urine 32.6 mg/dL    Albumin Urine mg/g Cr 287.1 (H) <=19.9 mg/g    Narrative    Test performed by:  [facility] [address] [place] [address]  Microalbumin, Random Urine  <2.0 mg/dL . . . . . . . . Normal  3.0-30.0 mg/dL . . . . . . Microalbuminuria  >30.0 mg/dL . . . . . .  . Clinical Proteinuria  Microalbumin/Creatinine Ratio, Random Urine  <20 mg/g . . . . .. . . . Normal   mg/g . . . . . . . Microalbuminuria  >300 mg/g . . . . . . . . Clinical Proteinuria       You have increasing protein in your urine.  This is the first sign of kidney damage from diabetes.  Controlling your diabetes better  will help prevent worsening kidney disease.  We can also talk about adding another medication to help protect your kidneys at your next visit.    If you have any questions, please call the clinic to make an appointment.    Sincerely,    Clarissa Rodriguez MD

## 2018-04-16 NOTE — NURSING NOTE
MEDICARE PERSONAL PREVENTIVE SERVICES PLAN - IMMUNIZATIONS     Here are your recommended immunizations.  Take this home for your reference.                                                    IMMUNIZATIONS Description Recommend today?     Influenza (Flu shot) Prevents flu; should get every year No; is up to date.   PCV 13 Pneumonia vaccination; you get it once No; is up to date.   PPSV 23 Second pneumonia vaccination; usually get it 1 year after PCV 13 No; is up to date.   Zoster (Shingles) Prevents shingles; you get it once  (Check with Part D insurance for coverage, must receive at a pharmacy, not clinic) Yes; Recommended and ordered.   Tetanus Prevents tetanus; once every 10 years No; is up to date.     Medicare Wellness Visit  Health Risk Assessment        Visual Acuity:  Right Eye: 10/20   Left Eye:         FALL RISK ASSESSMENT 4/16/2018 3/16/2018   Fallen 2 or more times in the past year? No No   Any fall with injury in the past year? No No            Health Risk Assessment / Review of Systems     Constitutional: Any fevers or night sweats? No     Eyes:  Vision problems    YES Had cataract surgery, says didn't improve    Hearing Do you feel you have hearing loss?   YES Left ear trouble hearing sometimes    Cardiovascular: Any chest pain, fast or irregular heart beat, calf pain with walking?     No           Respiratory:   Any breathing problems or cough?   No     Gastrointestinal: Any stomach or stool problems?   No      Genitourinary: Do you have difficulty controlling urination?    YES Sometimes incontinence    Muscles and Joints: Any joint stiffness or soreness?    YES Legs sore    Skin: Any concerning lesions or moles?   No     Nervous System: Any loss of strength or feeling, numbness or tingling, shaking, dizziness, or headache?  No     Mental Health: Any depression, anxiety or problems sleeping?    Yes, sometimes has trouble sleeping    Cognition: Do you have any problems with your memory?   YES  Sometimes    PHQ-2 Score:   PHQ-2 ( 1999 Pfizer) 6/1/2017 9/26/2016   Q1: Little interest or pleasure in doing things 0 0   Q2: Feeling down, depressed or hopeless 0 0   PHQ-2 Score 0 0       PHQ-9 Score:   No flowsheet data found.         Medical Care     What other specialists or organizations are involved in your medical care?    Patient Care Team       Relationship Specialty Notifications Start End    Clarissa Rodriguez MD PCP - General Family Practice  9/26/16     Phone: [phone] Fax: [phone]         [facility] [address] [address]    Mere Lainez [facility] [place]inator  Abnormal results only, Admissions 5/14/13     Comment:  [facility]  Care Manager:  :  [phone]    Don aLw Other (see comments)   1/22/15     Comment:      Phone: [phone]         Yasmin     10/20/16     Comment:  DEJON frye/ [employer]    Phone: [phone]         [facility] [employer]   11/15/16     Comment:  Nicole Harper    Phone: [phone]          [place], [place]                 Social History / Home Safety     Social History   Substance Use Topics     Smoking status: Former Smoker     Years: 50.00     Types: Cigarettes     Smokeless tobacco: Never Used      Comment: chews betel nut     Alcohol use No     Marital Status:  Who lives in your household? Lives with Niece    Does your home have any of the following safety concerns? Loose rugs in the hallway, no grab bars in the bathroom, no handrails on the stairs or have poorly lit areas?  No     Do you feel threatened or controlled by a partner, ex-partner or anyone in your life? No     Has anyone hurt you physically, for example by pushing, hitting, slapping or kicking you   or forcing you to have sex? No          Functional Status     Do you need help with dressing yourself, bathing, or walking?No     Do you need help with the phone, transportation, shopping, preparing  "meals, housework, laundry, medications or managing money? YES       Risk Behaviors and Healthy Habits     History   Smoking Status     Former Smoker     Years: 50.00     Types: Cigarettes   Smokeless Tobacco     Never Used     Comment: chews betel nut     How many servings of fruits and vegetables do you eat a day? 2-3    Exercise: 1 day/week for an average of less than 15 minutes walking      Do you frequently drive without a seatbelt? No     Do you use any other drugs? No         Do you use alcohol?No      Frailty Assessment            1. By yourself and note using aids, do you have difficulty walking up 10 steps without resting?  No  (1 for Yes, 0 for No)    2. By yourself and not using mobility aids, do you have any difficulty walking several hundred yards?  YES 1 (1 for Yes, 0 for No)    3. Have you lost 10 or more pounds unintentionally in the previous year? No  (If \"Yes\" and >5% weight loss, then score 1.  Score 0, if <5% weight loss or \"No\" weight loss)    4. How much of the times during the past 3 weeks did you feel tired? 3. Some of the time (\"1\" or \"2\" are scored 1, others 0)    5.  A doctor told the patient they had the following illnesses:  Diabetes (0-4 = score 0, 5-11= score 1)      Stephanie Balderas, [facility]  "

## 2018-04-17 ASSESSMENT — PATIENT HEALTH QUESTIONNAIRE - PHQ9: SUM OF ALL RESPONSES TO PHQ QUESTIONS 1-9: 7

## 2018-04-19 NOTE — PROGRESS NOTES
You have increasing protein in your urine.  This is the first sign of kidney damage from diabetes.  Controlling your diabetes better  will help prevent worsening kidney disease.  We can also talk about adding another medication to help protect your kidneys at your next visit.

## 2018-04-27 ENCOUNTER — TRANSFERRED RECORDS (OUTPATIENT)
Dept: HEALTH INFORMATION MANAGEMENT | Facility: CLINIC | Age: 73
End: 2018-04-27

## 2018-04-30 ENCOUNTER — RECORDS - HEALTHEAST (OUTPATIENT)
Dept: ADMINISTRATIVE | Facility: OTHER | Age: 73
End: 2018-04-30

## 2018-05-01 DIAGNOSIS — Z00.00 ROUTINE GENERAL MEDICAL EXAMINATION AT A HEALTH CARE FACILITY: ICD-10-CM

## 2018-05-01 LAB — MAMMOGRAM: NORMAL

## 2018-05-01 NOTE — LETTER
May 2, 2018      Emmanuel Say  [address]  [place] [address]        Dear Emmanuel,    Please see below for your test results.    Resulted Orders   PCS Use: Screening Digital Bilateral Mammogram   Result Value Ref Range    MAMMOGRAM       Normal mammogram. I recommend a repeat mammogram every year    If you have any questions, please call the clinic to make an appointment.    Sincerely,    Clarissa Rodriguez MD

## 2018-06-19 DIAGNOSIS — E11.65 TYPE 2 DIABETES MELLITUS WITH HYPERGLYCEMIA, WITHOUT LONG-TERM CURRENT USE OF INSULIN (H): ICD-10-CM

## 2018-07-24 DIAGNOSIS — E11.65 TYPE 2 DIABETES MELLITUS WITH HYPERGLYCEMIA, WITHOUT LONG-TERM CURRENT USE OF INSULIN (H): ICD-10-CM

## 2018-07-24 DIAGNOSIS — J30.2 CHRONIC SEASONAL ALLERGIC RHINITIS, UNSPECIFIED TRIGGER: ICD-10-CM

## 2018-07-24 DIAGNOSIS — E11.9 TYPE 2 DIABETES, HBA1C GOAL < 8% (H): ICD-10-CM

## 2018-07-24 RX ORDER — GLYBURIDE-METFORMIN HYDROCHLORIDE 2.5; 5 MG/1; MG/1
2 TABLET ORAL 2 TIMES DAILY WITH MEALS
Qty: 60 TABLET | Refills: 11 | Status: SHIPPED | OUTPATIENT
Start: 2018-07-24 | End: 2018-10-22

## 2018-07-24 RX ORDER — CETIRIZINE HYDROCHLORIDE 10 MG/1
10 TABLET ORAL EVERY EVENING
Qty: 90 TABLET | Refills: 3 | Status: SHIPPED | OUTPATIENT
Start: 2018-07-24 | End: 2019-04-05

## 2018-07-30 DIAGNOSIS — E11.9 DIABETES MELLITUS, TYPE 2 (H): Primary | ICD-10-CM

## 2018-07-30 RX ORDER — BLOOD-GLUCOSE METER
EACH MISCELLANEOUS
Qty: 1 KIT | Refills: 0 | Status: SHIPPED | OUTPATIENT
Start: 2018-07-30 | End: 2019-04-05

## 2018-09-21 ENCOUNTER — OFFICE VISIT (OUTPATIENT)
Dept: FAMILY MEDICINE | Facility: CLINIC | Age: 73
End: 2018-09-21
Payer: COMMERCIAL

## 2018-09-21 VITALS
HEIGHT: 58 IN | WEIGHT: 113.6 LBS | SYSTOLIC BLOOD PRESSURE: 122 MMHG | OXYGEN SATURATION: 98 % | BODY MASS INDEX: 23.85 KG/M2 | DIASTOLIC BLOOD PRESSURE: 82 MMHG | RESPIRATION RATE: 20 BRPM | TEMPERATURE: 97.8 F | HEART RATE: 89 BPM

## 2018-09-21 DIAGNOSIS — M81.0 AGE-RELATED OSTEOPOROSIS WITHOUT CURRENT PATHOLOGICAL FRACTURE: ICD-10-CM

## 2018-09-21 DIAGNOSIS — E11.65 TYPE 2 DIABETES MELLITUS WITH HYPERGLYCEMIA, WITHOUT LONG-TERM CURRENT USE OF INSULIN (H): Primary | ICD-10-CM

## 2018-09-21 LAB
BUN SERPL-MCNC: 8.8 MG/DL (ref 7–19)
CALCIUM SERPL-MCNC: 10 MG/DL (ref 8.5–10.1)
CHLORIDE SERPLBLD-SCNC: 95.8 MMOL/L (ref 98–110)
CO2 SERPL-SCNC: 29.8 MMOL/L (ref 20–32)
CREAT SERPL-MCNC: 0.6 MG/DL (ref 0.5–1)
GFR SERPL CREATININE-BSD FRML MDRD: >90 ML/MIN/1.7 M2
GLUCOSE SERPL-MCNC: 320.4 MG'DL (ref 70–99)
HBA1C MFR BLD: 11.1 % (ref 4.1–5.7)
HCT VFR BLD AUTO: 42.1 % (ref 35–47)
HEMOGLOBIN: 14.3 G/DL (ref 11.7–15.7)
MCH RBC QN AUTO: 31.6 PG (ref 26.5–35)
MCHC RBC AUTO-ENTMCNC: 34 G/DL (ref 32–36)
MCV RBC AUTO: 93.2 FL (ref 78–100)
PLATELET # BLD AUTO: 237 K/UL (ref 150–450)
POTASSIUM SERPL-SCNC: 4.1 MMOL/DL (ref 3.2–4.6)
RBC # BLD AUTO: 4.5 M/UL (ref 3.8–5.2)
SODIUM SERPL-SCNC: 130.9 MMOL/L (ref 132–142)
WBC # BLD AUTO: 7.6 K/UL (ref 4–11)

## 2018-09-21 NOTE — NURSING NOTE
Due to patient being non-English speaking/uses sign language, an  was used for this visit. Only for face-to-face interpretation by an external agency, date and length of interpretation can be found on the scanned worksheet.     name: Rut Mccracken  Agency: [employer]  Language: Yasmin   Telephone number: [phone]  Type of interpretation: Face-to-face, spoken

## 2018-09-21 NOTE — MR AVS SNAPSHOT
"              After Visit Summary   9/21/2018    Emmanuel Say    MRN: [id number]           Patient Information     Date Of Birth          1945        Visit Information        Provider Department      9/21/2018 1:10 PM Nelly Scott MD [facility]        Today's Diagnoses     Type 2 diabetes mellitus with hyperglycemia, without long-term current use of insulin (H)    -  1    Age-related osteoporosis without current pathological fracture          Care Instructions    Come back next week to talk about your blood sugar numbers and diabetes control.                Follow-ups after your visit        Your next 10 appointments already scheduled     Oct 05, 2018  1:10 PM CDT   Return Visit with Nelly Scott MD   [facility] ([facility])    [address] [place] [id number] [phone]              Who to contact     Please call your clinic at [phone] to:    Ask questions about your health    Make or cancel appointments    Discuss your medicines    Learn about your test results    Speak to your doctor            Additional Information About Your Visit        Care EveryWhere ID     This is your Care EveryWhere ID. This could be used by other organizations to access your [place] medical records  [id number]        Your Vitals Were     Pulse Temperature Respirations Height Pulse Oximetry BMI (Body Mass Index)    89 97.8  F (36.6  C) (Oral) 20 4' 10\" (147.3 cm) 98% 23.74 kg/m2       Blood Pressure from Last 3 Encounters:   09/21/18 122/82   04/16/18 129/78   03/19/18 127/73    Weight from Last 3 Encounters:   09/21/18 113 lb 9.6 oz (51.5 kg)   04/16/18 116 lb 6.4 oz (52.8 kg)   03/19/18 115 lb 6.4 oz (52.3 kg)              We Performed the Following     Basic Metabolic Panel ([place])     CBC with Plt ([facility])     Hemoglobin A1c ([facility])        Primary Care Provider Office Phone # Fax #    Clarissa Rodriguez [phone]       [address] [place] [id number]        Equal Access to " Services     [facility]: Hadii aad ku hadramirolizeth Grigsby, waaxda luqadaha, qaybta kaalmasandra morse . So [facility] [phone].    ATENCIÓN: Si habla español, tiene a em disposición servicios gratuitos de asistencia lingüística. Llame al [phone].    We comply with applicable federal civil rights laws and [place] laws. We do not discriminate on the basis of race, color, national origin, age, disability, sex, sexual orientation, or gender identity.            Thank you!     Thank you for choosing [facility]  for your care. Our goal is always to provide you with excellent care. Hearing back from our patients is one way we can continue to improve our services. Please take a few minutes to complete the written survey that you may receive in the mail after your visit with us. Thank you!             Your Updated Medication List - Protect others around you: Learn how to safely use, store and throw away your medicines at www.disposemymeds.org.          This list is accurate as of 9/21/18 11:59 PM.  Always use your most recent med list.                   Brand Name Dispense Instructions for use Diagnosis    acetaminophen 500 MG tablet    TYLENOL    100 tablet    Take 1-2 tablets by mouth 3 times daily as needed for pain.    Osteoarthritis       aspirin 81 MG tablet     90 tablet    Take 1 tablet (81 mg) by mouth daily    Type 2 diabetes mellitus with hyperglycemia, without long-term current use of insulin (H)       atorvastatin 40 MG tablet    LIPITOR    90 tablet    Take 1 tablet (40 mg) by mouth daily    Pure hypercholesterolemia       blood glucose monitoring lancets     100 each    Check blood glucose twice a day, once fasting and once 2 hours after eating    Diabetes mellitus, type 2 (H)       blood glucose monitoring meter device kit     1 kit    Check blood glucose twice a day, once fasting and once 2 hours after eating    Diabetes mellitus, type 2 (H)       blood  glucose monitoring test strip    ONETOUCH ULTRA    100 strip    Check blood glucose twice a day, once fasting and once 2 hours after eating    Diabetes mellitus, type 2 (H)       cetirizine 10 MG tablet    zyrTEC    90 tablet    Take 1 tablet (10 mg) by mouth every evening    Chronic seasonal allergic rhinitis, unspecified trigger       gabapentin 300 MG capsule    NEURONTIN    90 capsule    Take 1 tablet three times daily    Chronic left-sided low back pain without sciatica       glyBURIDE-metFORMIN 2.5-500 MG per tablet    GLUCOVANCE    60 tablet    Take 2 tablets by mouth 2 times daily (with meals)    Type 2 diabetes mellitus with hyperglycemia, without long-term current use of insulin (H)       sitagliptin 100 MG tablet    JANUVIA    90 tablet    Take 1 tablet (100 mg) by mouth daily    Type 2 diabetes mellitus with hyperglycemia, without long-term current use of insulin (H)

## 2018-09-21 NOTE — PROGRESS NOTES
Preceptor Attestation:   Patient seen, evaluated and discussed with the resident. I have verified the content of the note, which accurately reflects my assessment of the patient and the plan of care.   Supervising Physician:  Brynn Parish MD

## 2018-09-21 NOTE — PROGRESS NOTES
NEMESIO Benitez Say is a 73 year old  female with a PMH significant for:     Patient Active Problem List   Diagnosis     Health Care Home     Esophageal reflux     Osteoarthrosis, unspecified whether generalized or localized, involving lower leg     Lumbosacral spondylosis without myelopathy     Hyperlipidemia     Type 2 diabetes mellitus with hyperglycemia, without long-term current use of insulin (H)     Seasonal allergies     Screening for depression     Microalbuminuria     Patient presents with:  Diabetes    Patient is a 73-year-old female with a past medical history of type 2 diabetes, hyperlipidemia and GERD.  She presents to have a physical form filled out for her adult  services.  She had her Medicare wellness visit with her primary, Dr. munoz, in April.  There is concerned that her diabetes is not well-controlled on her oral medications.  She was supposed to follow-up for ongoing management but has not.    Regarding her diabetes, she is currently on glyburide/metformin twice daily and Januvia 100 mg per day.  She states that she checks her blood sugars in the mornings only and does not bring her glucometer today and she thinks her levels run from .  She has a home nurse set up her medications for her.  Her PCP has discussed insulin with her in the past, and she has not been interested.  I did bring this up again today and offered to have our pharmacist show her the insulin injections, however she refuses insulin education.  She is not interested in seeing a diabetes educator as she states that she has not done this in the past.  She typically eats rice, that she is in fish.  She does not drink any sugary beverages.    She had a DEXA scan done in April showing a T score of -3.6 at the lumbar spine and -2.7 at the femoral neck.  She would be interested in starting a medication for osteoporosis.  She also states that she followed up for her eye exam, which she was referred for back in  "April.  She is not interested in having a colonoscopy.    PMH, Medications and Allergies were reviewed and updated as needed.    ROS: As above per HPI        OBJECTIVE     Vitals:    09/21/18 1316   BP: 122/82   Pulse: 89   Resp: 20   Temp: 97.8  F (36.6  C)   TempSrc: Oral   SpO2: 98%   Weight: 113 lb 9.6 oz (51.5 kg)   Height: 4' 10\" (147.3 cm)     Body mass index is 23.74 kg/(m^2).    GEN: NAD, AAox3, appears stated age  CV: RRR no m/r/g, s1 and s2 noted  HEENT: EOMI, head normocephalic, sclera anicteric, trachea midline  PULM: clear bilaterally without wheezes/rhonchi/rales, non-labored  ABD: soft, non-tender, + BS in all quadrants  EXT: No peripheral edema  NEURO: GCS 15  GAIT: normal  PSYCH: euthymic, linear thoughts, no delusions/hallucinations, comprehensible    LABS/IMAGING/EKG  No results found for this or any previous visit (from the past 24 hour(s)).    ASSESSMENT AND PLAN     1. Type 2 diabetes mellitus with hyperglycemia, without long-term current use of insulin (H)  Diabetes control has been difficult.  Her most recent A1c from March of this year was 11.1.  She is a 3 medication regimen of glyburide, metformin and Januvia at max doses.  I did discuss that her diabetes control will likely be suboptimal unless she were to start on insulin, however she refuses consideration of this today.  I asked her to check her blood sugars postprandial as well as fasting and bring her glucometer and at her next visit.  We will check an A1c today in addition to a CBC to rule out anemia as a possible factor contributing to elevated A1c.  If her hemoglobin is normal and A1c is elevated, she would benefit from being started on a fourth medication, such as a sulfonylurea.  I did discuss this with her briefly.  She will follow-up in 2 weeks and we will plan to go over her lab results and address her medication regimen further at this time.  - Hemoglobin A1c ([facility])  - CBC with Plt ([facility])  - Basic Metabolic Panel " ([place])    2. Age-related osteoporosis without current pathological fracture  DEXA scan in 4/2018 showed an L1-L4 T score of -3.6 and femoral neck T score of -2.7, which qualifies her for osteoporosis.  We will check a vitamin D level today as well as a BMP to ensure that she has normal kidney function (she did have microalbuminuria at last check).  If these are normal, plan to start on a  bisphosphonate at the next visit.  - Vitamin D 25-Hydroxy ([facility])  - CBC with Plt ([facility])    Medicare wellness visit follow-up: We were unable to complete  a cognitive assessment today due to time or advanced care directive as teaching staff was unavailable.  Plan to address these topics at the next visit.    Next visit scheduled in 2 weeks on 10/6/18 for follow up of DM, osteoporosis or sooner if develops new or worsening symptoms.      Nelly Scott MD PGY-2  [facility]    Discussed with Dr. Rodriguez who agrees with the above assessment and plan.

## 2018-10-10 ENCOUNTER — TELEPHONE (OUTPATIENT)
Dept: FAMILY MEDICINE | Facility: CLINIC | Age: 73
End: 2018-10-10

## 2018-10-10 NOTE — TELEPHONE ENCOUNTER
----- Message from Clarissa Rodriguez MD sent at 10/8/2018  3:19 PM CDT -----  Regarding: RE: Update on mutual client  We are very good at arranging rides for patients as wekk.  We do this for many patients. I will pass along to my  to help with this for future visits.  Care coordinators: Can we note in the chart that this patient needs a ride scheduled every visit?    Clarissa Rodriguez MD      ----- Message -----     From: Mere Lainez     Sent: 10/8/2018  10:19 AM       To: Clarissa Rodriguez MD, Stephanie Balderas CMA  Subject: RE: Update on mutual client                      Good Morning,    I see that Emmanuel has been scheduled to come to the clinic on October 15th.  Transportation is an issue.  She has no family support so transportation has to be arranged in order for her to get to the clinic.    I will reach out to her and schedule transportation.    Thank you,    Mere         ----- Message -----     From: Clarissa Rodriguez MD     Sent: 10/5/2018   1:54 PM       To: Stephanie Balderas CMA, Mere Lainez  Subject: RE: Update on mutual client                      Kiet Severino for the update.  Emmanuel needs to come for a visit to discuss diabetes and travel.  In order to give the best advice for travel we need to know exactly where she is going in the country to know about malarial prophylaxis as well as see which shots she may need updated. Looks like she no showed her appointment today.    We have pharmacists at our clinic as well.  Trulicity is formulary for many insurances at this time and I agree that it is a good option for many patients.  She is on Januvia currently which is a similar category and we would need to stop that and get her buy in for a once weekly shot.  With A1c of 11.1, it is unlikely she will be controlled without insulin.    Again she needs come for visits at least every 3 months, but ideally every 2-4 weeks until her diabetes is controlled.  It appears that she no showed today.   I will have my staff reach out to her to reschedule ASAP.  Also restarting a home care nurse to help with medication compliance may be helpful.  We can reorder at her next visit.    MD Stephanie White,  Please assist in calling patient and getting her back in for a follow up appointment. For travel and diabetes.      ----- Message -----     From: Mere Lainez     Sent: 10/3/2018   1:46 PM       To: Clarissa Rodriguez MD, Nelly Scott MD  Subject: Update on mutual client                          Good Afternoon,    I just spoke with Emmanuel today.  I see that she has a clinic visit this week. She is planning on going to [facility] November 2 and returning November 28.  I am not sure if she will need any immunizations for this trip.    Also, I see that her A1C are not within range.  I have had success with non compliant clients being put on Trulicity.  I have worked closely with one of the pharmacist at [facility] and we have seen a decrease in A1C in a very short time when someone was getting the injection one time a week.  I am not sure if Emmanuel would qualify but thought I would mention it.  She is no longer getting a home care nurse but this could be restarted if needed.     Please let me know if you have any questions or need help with anything.    Thank you,    Mere Lainez [facility]

## 2018-10-11 NOTE — TELEPHONE ENCOUNTER
SW called [employer] (P: [phone]) and arranged transportation. Per [facility] rep, patient frequently uses [employer].     [employer]  : 3:10-3:40pm  10/15/18    SW called patient and informed her of the  time and ride arrangement. She was appreciative.     Patient's permanent comments updated to alert clinic staff to inform care coordination of scheduled visits to assure transportation get's arranged.     Routing to /Mere [facility] Coordinator.     ABRIL Naylor

## 2018-10-15 ENCOUNTER — OFFICE VISIT (OUTPATIENT)
Dept: FAMILY MEDICINE | Facility: CLINIC | Age: 73
End: 2018-10-15
Payer: COMMERCIAL

## 2018-10-15 VITALS
HEART RATE: 90 BPM | WEIGHT: 144.4 LBS | SYSTOLIC BLOOD PRESSURE: 144 MMHG | DIASTOLIC BLOOD PRESSURE: 80 MMHG | RESPIRATION RATE: 12 BRPM | HEIGHT: 58 IN | BODY MASS INDEX: 30.31 KG/M2 | TEMPERATURE: 98.2 F | OXYGEN SATURATION: 99 %

## 2018-10-15 DIAGNOSIS — E11.65 TYPE 2 DIABETES MELLITUS WITH HYPERGLYCEMIA, WITHOUT LONG-TERM CURRENT USE OF INSULIN (H): ICD-10-CM

## 2018-10-15 DIAGNOSIS — Z71.84 TRAVEL ADVICE ENCOUNTER: ICD-10-CM

## 2018-10-15 DIAGNOSIS — E11.65 TYPE 2 DIABETES MELLITUS WITH HYPERGLYCEMIA, WITHOUT LONG-TERM CURRENT USE OF INSULIN (H): Primary | ICD-10-CM

## 2018-10-15 DIAGNOSIS — Z23 NEED FOR IMMUNIZATION AGAINST INFLUENZA: ICD-10-CM

## 2018-10-15 DIAGNOSIS — Z23 NEED FOR IMMUNIZATION AGAINST TYPHOID: ICD-10-CM

## 2018-10-15 RX ORDER — ATOVAQUONE AND PROGUANIL HYDROCHLORIDE 250; 100 MG/1; MG/1
1 TABLET, FILM COATED ORAL DAILY
Qty: 36 TABLET | Refills: 0 | Status: SHIPPED | OUTPATIENT
Start: 2018-10-15 | End: 2018-10-22

## 2018-10-15 RX ORDER — LOPERAMIDE HYDROCHLORIDE 2 MG/1
TABLET ORAL
Qty: 30 TABLET | Refills: 0 | Status: SHIPPED | OUTPATIENT
Start: 2018-10-15 | End: 2018-10-22

## 2018-10-15 NOTE — Clinical Note
Please add this patient to my 10/22 9 AM appt for next week. Reason for visit: Diabetes. She needs to be seen by me before her trip on 11/2 and she also needs a ride and  for this visit, see permanent comment.  Thank you!

## 2018-10-15 NOTE — PROGRESS NOTES
[facility] Travel Visit       Emmanuel Vences is a 73 year old female who presents for a pre-travel assessment visit.  She will be traveling to:    Destination(s):  Destination 1  [facility] [place] [facility]  Date of arrival 11/2/2018  Date of departure 11/28/18    Reason for travel: [place].      Emmanuel Vences has completed the travel questionnaire and it is reviewed: YES  Are there special circumstances which place this traveler at higher risk (List if 'yes')?: YES and poorly controlled diabetes.    (For women only)  Is patient currently pregnant or might become pregnant within three months of this trip? NO   Is she breastfeeding? NO     Past Medical History:   Diagnosis Date     Aspirin contraindicated 11/8/13    allergy to ibuprofen/amp     Diabetes (H)      Hypertension          Current Outpatient Prescriptions on File Prior to Visit:  acetaminophen (TYLENOL) 500 MG tablet Take 1-2 tablets by mouth 3 times daily as needed for pain.   aspirin 81 MG tablet Take 1 tablet (81 mg) by mouth daily   atorvastatin (LIPITOR) 40 MG tablet Take 1 tablet (40 mg) by mouth daily (Patient not taking: Reported on 10/15/2018)   blood glucose monitoring (ONE TOUCH DELICA) lancets Check blood glucose twice a day, once fasting and once 2 hours after eating (Patient not taking: Reported on 10/15/2018)   blood glucose monitoring (ONE TOUCH ULTRA 2) meter device kit Check blood glucose twice a day, once fasting and once 2 hours after eating (Patient not taking: Reported on 10/15/2018)   blood glucose monitoring (ONETOUCH ULTRA) test strip Check blood glucose twice a day, once fasting and once 2 hours after eating (Patient not taking: Reported on 10/15/2018)   cetirizine (ZYRTEC) 10 MG tablet Take 1 tablet (10 mg) by mouth every evening (Patient not taking: Reported on 10/15/2018)   gabapentin (NEURONTIN) 300 MG capsule Take 1 tablet three times daily   glyBURIDE-metFORMIN (GLUCOVANCE) 2.5-500 MG per tablet Take  "2 tablets by mouth 2 times daily (with meals) (Patient not taking: Reported on 10/15/2018)   sitagliptin (JANUVIA) 100 MG tablet Take 1 tablet (100 mg) by mouth daily (Patient not taking: Reported on 10/15/2018)     No current facility-administered medications on file prior to visit.         Allergies   Allergen Reactions     Ibuprofen GI Disturbance       Immunizations, travel specific:  -- Yellow fever: Not Required, Not Recommended  -- Hep A: Known to be not immune, not immunized, due for #2  -- Hep B: UTD with immunization   -- Typhoid (IM: booster every 2 years; PO: booster every 5 years): had shot 2015 due for booster  -- Rabies  (Data on the need for and timing of additional booster doses are not available): Known to be not immune, not immunized  -- Meningococcal meningitis Known to be not immune, not immunized   -- Andorran encephalitis (Data on the need for and timing of additional booster doses are not available):Known to be not immune, not immunized    Immunizations, routine adult:  -- Influenza Known to be not immune, not immunized  -- Polio: UTD with immunization   -- Diphtheria, Tetanus & Pertussis (DTaP): UTD with immunization   -- Measles/ Mumps/ Rubella: UTD with immunization   -- Varicella: UTD with immunization   -- Pneumococcal (PPSV23 (Pneumovax)): UTD with immunization   -- Pneumococcal (PCV13 (Prevnar)): UTD with immunization     Malaria prophylaxis:  Recommended (refer to [facility] for destination-specific recommendation) YES       Review of Systems:     CONSTITUTIONAL: NEGATIVE for fever, chills, change in weight  ENT/MOUTH: NEGATIVE for ear, mouth and throat problems  RESP: NEGATIVE for significant cough or SOB  CV: NEGATIVE for chest pain, palpitations or peripheral edema          Objective:     /80 (BP Location: Left arm, Patient Position: Sitting, Cuff Size: Adult Regular)  Pulse 90  Temp 98.2  F (36.8  C) (Oral)  Resp 12  Ht 4' 10.25\" (148 cm)  Wt 144 lb 6.4 oz (65.5 kg)  SpO2 " 99%  BMI 29.92 kg/m2  Body mass index is 29.92 kg/(m^2).    GENERAL: healthy, alert, well nourished, well hydrated, no distress         Assessment and Plan     Emmanuel was seen today for follow up for, travel clinic and medication reconciliation.    Diagnoses and all orders for this visit:    Travel advice encounter  Based on patient's past history, review of immunization and immunity status, planned itinerary and current recommendations, the following are recommended:    Hep A vaccine   Typhoid vaccine   Influenza vaccine  Malaria: [place]: Begin 1-2 days before travel. Take daily while in the malarious area and for 7 days after returning.  Traveler's diarrhea treatment prescribed.    Patient is given a copy of the [employer] traveller report as well as destination-specific recommendations on sun protection, avoiding insect bites and travel safety.  -     atovaquone-proguanil (MALARONE) 250-100 MG per tablet; Take 1 tablet by mouth daily Start 2 days before travel and continue 7 days after return.  -     loperamide (IMODIUM A-D) 2 MG tablet; Take 2 tabs (4 mg) after first loose stool, and then take one tab (2 mg) after each diarrheal stool.  Max of 8 tabs (16 mg) per day.  -     HEPATITIS A VACCINE ADULT IM    Type 2 diabetes mellitus with hyperglycemia, without long-term current use of insulin (H): not very compliant with diabetes medications.  Needs new plan for diabetes control.  Glucose >300 at last visit and A1c 11.1%.  Sodium 130 but pseudohyponatremia and corrects to 134. Will propose Trulicity instead of Januvia or Glargine instead of glyburide at the next visit.  Could also increase glyburide dose by using a different combination pill.  We think there may be a prior auth required for Trulicity.   Did not have time to go into more options today about this. She agrees to come back next week.     Need for immunization against influenza  -     ADMIN VACCINE, INITIAL  -     FLU VACCINE, INCREASED ANTIGEN, PRESV  FREE    Need for immunization against typhoid  -     ADMIN VACCINE, EACH ADDITIONAL  -     TYPHOID VACCINE, IM      Total of 30 minutes was spent in face to face contact with patient with > 50% in counseling and coordination of care.  Options for treatment and/or follow-up care were reviewed with the patient. Emmanuel Vences was engaged and actively involved in the decision making process. She verbalized understanding of the options discussed and was satisfied with the final plan.      Clarissa Rodriguez MD

## 2018-10-15 NOTE — NURSING NOTE
Injectable influenza vaccine documentation    1. Has the patient received the information for the influenza vaccine? YES    2. Does the patient have a severe allergy to eggs (Patients with a severe egg allergy should be assessed by a medical provider, RN, or clinical pharmacist. If they receive the influenza vaccine, please have them observed for 15 minutes.)? No    3. Has the patient had an allergic reaction to previous influenza vaccines? No    4. Has the patient had any severe allergic reactions to past influenza vaccines ? No       5. Does patient have a history of Guillain-[place] syndrome? No        Based on responses above, I administered the influenza vaccine.  Debbie Majano CMA

## 2018-10-15 NOTE — NURSING NOTE
Due to patient being non-English speaking/uses sign language, an  was used for this visit. Only for face-to-face interpretation by an external agency, date and length of interpretation can be found on the scanned worksheet.     name: Kaiser Schulz  Agency: [employer]  Language: Yasmin   Telephone number: [phone]  Type of interpretation: Face-to-face, spoken

## 2018-10-15 NOTE — MR AVS SNAPSHOT
"              After Visit Summary   10/15/2018    Emmanuel Say    MRN: [id number]           Patient Information     Date Of Birth          1945        Visit Information        Provider Department      10/15/2018 4:10 PM Clarissa Rodriguez MD [facility]        Today's Diagnoses     Need for immunization against influenza        Need for immunization against typhoid        Travel advice encounter        Type 2 diabetes mellitus with hyperglycemia, without long-term current use of insulin (H)          Care Instructions    Albuterol inhaler is the medicine that can be breathed in and help people with breathing issues.    Come back on Monday 10/22/18 at 9 AM to see Dr. Rodriguez about diabetes.          Follow-ups after your visit        Who to contact     Please call your clinic at [phone] to:    Ask questions about your health    Make or cancel appointments    Discuss your medicines    Learn about your test results    Speak to your doctor            Additional Information About Your Visit        Care EveryWhere ID     This is your Care EveryWhere ID. This could be used by other organizations to access your [place] medical records  [id number]        Your Vitals Were     Pulse Temperature Respirations Height Pulse Oximetry BMI (Body Mass Index)    90 98.2  F (36.8  C) (Oral) 12 4' 10.25\" (148 cm) 99% 29.92 kg/m2       Blood Pressure from Last 3 Encounters:   10/15/18 144/80   09/21/18 122/82   04/16/18 129/78    Weight from Last 3 Encounters:   10/15/18 144 lb 6.4 oz (65.5 kg)   09/21/18 113 lb 9.6 oz (51.5 kg)   04/16/18 116 lb 6.4 oz (52.8 kg)              We Performed the Following     ADMIN VACCINE, EACH ADDITIONAL     ADMIN VACCINE, INITIAL     FLU VACCINE, INCREASED ANTIGEN, PRESV FREE     HEPATITIS A VACCINE ADULT IM     TYPHOID VACCINE, IM          Today's Medication Changes          These changes are accurate as of 10/15/18  5:23 PM.  If you have any questions, ask your nurse or doctor.             "   Start taking these medicines.        Dose/Directions    atovaquone-proguanil 250-100 MG per tablet   Commonly known as:  MALARONE   Used for:  Travel advice encounter   Started by:  Clarissa Rodriguez MD        Dose:  1 tablet   Take 1 tablet by mouth daily Start 2 days before travel and continue 7 days after return.   Quantity:  36 tablet   Refills:  0       dulaglutide 0.75 MG/0.5ML pen   Commonly known as:  TRULICITY   Used for:  Type 2 diabetes mellitus with hyperglycemia, without long-term current use of insulin (H)   Started by:  Donna Zhao RP        Dose:  0.75 mg   Inject 0.75 mg Subcutaneous every 7 days   Quantity:  2 mL   Refills:  1       loperamide 2 MG tablet   Commonly known as:  IMODIUM A-D   Used for:  Travel advice encounter   Started by:  Clarissa Rodriguez MD        Take 2 tabs (4 mg) after first loose stool, and then take one tab (2 mg) after each diarrheal stool.  Max of 8 tabs (16 mg) per day.   Quantity:  30 tablet   Refills:  0            Where to get your medicines      These medications were sent to [facility] - [place], [place] - [address] [address], [facility] [address]     Phone:  [phone]     atovaquone-proguanil 250-100 MG per tablet    dulaglutide 0.75 MG/0.5ML pen    loperamide 2 MG tablet                Primary Care Provider Office Phone # Fax #    Clarissa Rodriguez [phone]       [address] [place] [address]        Equal Access to Services     [facility]: Hadii aad ku hadasho Soomaali, waaxda luqadaha, qaybta kaalmada adeegyada, waxay domenica wang. So [facility] [phone].    ATENCIÓN: Si habla español, tiene a em disposición servicios gratuitos de asistencia lingüística. Llame al [phone].    We comply with applicable federal civil rights laws and [place] laws. We do not discriminate on the basis of race, color, national origin, age, disability, sex, sexual  orientation, or gender identity.            Thank you!     Thank you for choosing [facility]  for your care. Our goal is always to provide you with excellent care. Hearing back from our patients is one way we can continue to improve our services. Please take a few minutes to complete the written survey that you may receive in the mail after your visit with us. Thank you!             Your Updated Medication List - Protect others around you: Learn how to safely use, store and throw away your medicines at www.disposemymeds.org.          This list is accurate as of 10/15/18  5:23 PM.  Always use your most recent med list.                   Brand Name Dispense Instructions for use Diagnosis    acetaminophen 500 MG tablet    TYLENOL    100 tablet    Take 1-2 tablets by mouth 3 times daily as needed for pain.    Osteoarthritis       aspirin 81 MG tablet     90 tablet    Take 1 tablet (81 mg) by mouth daily    Type 2 diabetes mellitus with hyperglycemia, without long-term current use of insulin (H)       atorvastatin 40 MG tablet    LIPITOR    90 tablet    Take 1 tablet (40 mg) by mouth daily    Pure hypercholesterolemia       atovaquone-proguanil 250-100 MG per tablet    MALARONE    36 tablet    Take 1 tablet by mouth daily Start 2 days before travel and continue 7 days after return.    Travel advice encounter       blood glucose monitoring lancets     100 each    Check blood glucose twice a day, once fasting and once 2 hours after eating    Diabetes mellitus, type 2 (H)       blood glucose monitoring meter device kit     1 kit    Check blood glucose twice a day, once fasting and once 2 hours after eating    Diabetes mellitus, type 2 (H)       blood glucose monitoring test strip    ONETOUCH ULTRA    100 strip    Check blood glucose twice a day, once fasting and once 2 hours after eating    Diabetes mellitus, type 2 (H)       cetirizine 10 MG tablet    zyrTEC    90 tablet    Take 1 tablet (10 mg) by mouth every evening     Chronic seasonal allergic rhinitis, unspecified trigger       dulaglutide 0.75 MG/0.5ML pen    TRULICITY    2 mL    Inject 0.75 mg Subcutaneous every 7 days    Type 2 diabetes mellitus with hyperglycemia, without long-term current use of insulin (H)       gabapentin 300 MG capsule    NEURONTIN    90 capsule    Take 1 tablet three times daily    Chronic left-sided low back pain without sciatica       glyBURIDE-metFORMIN 2.5-500 MG per tablet    GLUCOVANCE    60 tablet    Take 2 tablets by mouth 2 times daily (with meals)    Type 2 diabetes mellitus with hyperglycemia, without long-term current use of insulin (H)       loperamide 2 MG tablet    IMODIUM A-D    30 tablet    Take 2 tabs (4 mg) after first loose stool, and then take one tab (2 mg) after each diarrheal stool.  Max of 8 tabs (16 mg) per day.    Travel advice encounter       sitagliptin 100 MG tablet    JANUVIA    90 tablet    Take 1 tablet (100 mg) by mouth daily    Type 2 diabetes mellitus with hyperglycemia, without long-term current use of insulin (H)

## 2018-10-22 ENCOUNTER — OFFICE VISIT (OUTPATIENT)
Dept: FAMILY MEDICINE | Facility: CLINIC | Age: 73
End: 2018-10-22
Payer: COMMERCIAL

## 2018-10-22 VITALS
DIASTOLIC BLOOD PRESSURE: 76 MMHG | OXYGEN SATURATION: 100 % | HEART RATE: 77 BPM | SYSTOLIC BLOOD PRESSURE: 119 MMHG | TEMPERATURE: 98.2 F | RESPIRATION RATE: 16 BRPM

## 2018-10-22 DIAGNOSIS — E11.65 TYPE 2 DIABETES MELLITUS WITH HYPERGLYCEMIA, WITHOUT LONG-TERM CURRENT USE OF INSULIN (H): Primary | ICD-10-CM

## 2018-10-22 RX ORDER — GLIPIZIDE AND METFORMIN HCL 5; 500 MG/1; MG/1
2 TABLET, FILM COATED ORAL
Qty: 360 TABLET | Refills: 3 | Status: SHIPPED | OUTPATIENT
Start: 2018-10-22 | End: 2019-04-05

## 2018-10-22 RX ORDER — GLYBURIDE-METFORMIN HYDROCHLORIDE 2.5; 5 MG/1; MG/1
TABLET ORAL
Qty: 0.1 TABLET | Refills: 0 | Status: SHIPPED | OUTPATIENT
Start: 2018-10-22 | End: 2018-10-22

## 2018-10-22 NOTE — NURSING NOTE
Due to patient being non-English speaking/uses sign language, an  was used for this visit. Only for face-to-face interpretation by an external agency, date and length of interpretation can be found on the scanned worksheet.     name: Kaiser Schulz  Agency: [employer]  Language: Yasmin   Telephone number: [phone]  Type of interpretation: Face-to-face, spoken

## 2018-10-22 NOTE — PROGRESS NOTES
Nursing Notes:   Mary Kate Miguel, [facility]  10/22/2018  9:23 AM  Signed  Due to patient being non-English speaking/uses sign language, an  was used for this visit. Only for face-to-face interpretation by an external agency, date and length of interpretation can be found on the scanned worksheet.     name: Kaiser Schulz  Agency: [employer]  Language: Yasmin   Telephone number: [phone]  Type of interpretation: Face-to-face, spoken      Chief Complaint   Patient presents with     Diabetes     Pt is here to discuss her diabetes.     Medication Reconciliation     Pt states she takes her daily aspirin as needed and that she does not check her blood.      Blood pressure 119/76, pulse 77, temperature 98.2  F (36.8  C), temperature source Oral, resp. rate 16, SpO2 100 %, not currently breastfeeding.                 KEYUR Vences is a 73 year old  female with a PMH significant for:     Patient Active Problem List   Diagnosis     Health Care Home     Esophageal reflux     Osteoarthrosis, unspecified whether generalized or localized, involving lower leg     Lumbosacral spondylosis without myelopathy     Hyperlipidemia     Type 2 diabetes mellitus with hyperglycemia, without long-term current use of insulin (H)     Seasonal allergies     Screening for depression     Microalbuminuria     She presents with follow-up of diabetes.  Last A1c is 11.1%.  She has been resistant to adding insulin to her regimen.  She does not like the idea of using needles.  The other reason she is supposed to insulin as that her sister had diabetes and was started on insulin and then had a stroke.  She is connecting the insulin with her sister stroke.  We had a discussion about what causes strokes and why people diabetes get strokes.  Discussed how insulin works and how it is a natural hormone in your body and ultimately the best treatment for diabetes.  Discussed that I am more worried about her having a stroke if she does  not take insulin.  She also admits that she does not take her medications every day.  She is agreeing at this time to try to be more compliant with her pills.  She is currently on metformin 2000 mg daily glyburide 10 mg daily.  These are in a combination pill.  Sitagliptin 100 mg daily.  She is not checking her blood sugars at all.  She denies chest pain, shortness of breath, weakness, numbness, tingling, swelling or pain in her feet.        PMH, Medications and Allergies were reviewed and updated as needed.     [employer]  was used for this visit.           Physical Exam:     Vitals:    10/22/18 0921   BP: 119/76   BP Location: Left arm   Patient Position: Sitting   Cuff Size: Adult Regular   Pulse: 77   Resp: 16   Temp: 98.2  F (36.8  C)   TempSrc: Oral   SpO2: 100%     There is no height or weight on file to calculate BMI.    Exam:  Constitutional: healthy, alert and no distress  Cardiovascular:RRR. No murmurs, clicks gallops or rub  Respiratory:  normal respiratory rate and rhythm, lungs clear to auscultation. No wheezes or crackles.  Extremities: No C/C/E in BLE. 2+DP pulses.   Psychiatric: mentation appears normal and affect normal/bright      PHQ-9 SCORE 11/29/2016 4/16/2018   Total Score 0 7     Results for orders placed or performed in visit on 09/21/18   Hemoglobin A1c ([facility])   Result Value Ref Range    Hemoglobin A1C 11.1 (H) 4.1 - 5.7 %   CBC with Plt ([facility])   Result Value Ref Range    WBC 7.6 4.0 - 11.0 K/uL    RBC 4.5 3.8 - 5.2 M/uL    Hemoglobin 14.3 11.7 - 15.7 g/dL    Hematocrit 42.1 35.0 - 47.0 %    MCV 93.2 78.0 - 100.0 fL    MCH 31.6 26.5 - 35.0    MCHC 34.0 32.0 - 36.0 g/dL    Platelets 237.0 150.0 - 450.0 K/uL   Basic Metabolic Panel ([facility])   Result Value Ref Range    Urea Nitrogen 8.8 7.0 - 19.0 mg/dL    Calcium 10.0 8.5 - 10.1 mg/dL    Chloride 95.8 (L) 98.0 - 110.0 mmol/L    Carbon Dioxide 29.8 20.0 - 32.0 mmol/L    Creatinine 0.6 0.5 - 1.0 mg/dL    Glucose 320.4 (H) 70.0 -  99.0 mg'dL    Potassium 4.1 3.2 - 4.6 mmol/dL    Sodium 130.9 (L) 132.0 - 142.0 mmol/L    GFR Estimate >90 >60.0 mL/min/1.7 m2    GFR Estimate If Black >90 >60.0 mL/min/1.7 m2       Assessment and Plan     Emmanuel was seen today for diabetes and medication reconciliation.    Diagnoses and all orders for this visit:    Type 2 diabetes mellitus with hyperglycemia, without long-term current use of insulin (H):   Discussed long-acting insulin as an option to add to her treatment and get her better controlled.  Also discussed Trulicity as a once weekly injection to replace  Januvia.  She is willing to try this to get better glycemic control and think she can be more compliant with this than the daily insulin.  She also is worried about the insulin due to her sister's strokes although she is feeling a little more reassured than before conversation about this.    Called pharmacy as Trulicity was sent last week to see if it would be covered.  It is not covered by insurance nor other alternatives in this category covered.  Asked for prior authorization.  Discussed with the patient that we would start this medication after she gets back from her trip to [facility], which is throughout the month of November.    Continue Januvia for now.  Switching glyburide/metformin to glipizide metformin to increase the dose of sulfonylurea from 10 to 20 mg daily.    -     Discontinue: glyBURIDE-metFORMIN (GLUCOVANCE) 2.5-500 MG per tablet; Discontinue medication and all refills  Changing to glipizide-metformin.  -     glipiZIDE-metFORMIN (METAGLIP) 5-500 MG per tablet; Take 2 tablets by mouth 2 times daily (before meals)        Patient Instructions   We will work on getting the injection covered when you get back from your vacation.    Keep taking your pills every day for now to help control your diabetes.    Return in about 6 weeks (around 12/3/2018) for Diabetes.     Options for treatment and/or follow-up care were reviewed with the patient.  Emmanuel Vences was engaged and actively involved in the decision making process. She verbalized understanding of the options discussed and was satisfied with the final plan.    Clarissa Rodriguez MD

## 2018-10-22 NOTE — PATIENT INSTRUCTIONS
We will work on getting the injection covered when you get back from your vacation.    Keep taking your pills every day for now to help control your diabetes.

## 2018-10-22 NOTE — MR AVS SNAPSHOT
After Visit Summary   10/22/2018    Emmanuel Say    MRN: [id number]           Patient Information     Date Of Birth          1945        Visit Information        Provider Department      10/22/2018 9:00 AM Clarissa Rodriguez MD [facility]        Today's Diagnoses     Type 2 diabetes mellitus with hyperglycemia, without long-term current use of insulin (H)    -  1      Care Instructions    We will work on getting the injection covered when you get back from your vacation.    Keep taking your pills every day for now to help control your diabetes.          Follow-ups after your visit        Follow-up notes from your care team     Return in about 6 weeks (around 12/3/2018) for Diabetes.      Who to contact     Please call your clinic at [phone] to:    Ask questions about your health    Make or cancel appointments    Discuss your medicines    Learn about your test results    Speak to your doctor            Additional Information About Your Visit        Care EveryWhere ID     This is your Care EveryWhere ID. This could be used by other organizations to access your [place] medical records  [id number]        Your Vitals Were     Pulse Temperature Respirations Pulse Oximetry          77 98.2  F (36.8  C) (Oral) 16 100%         Blood Pressure from Last 3 Encounters:   10/22/18 119/76   10/15/18 144/80   09/21/18 122/82    Weight from Last 3 Encounters:   10/15/18 144 lb 6.4 oz (65.5 kg)   09/21/18 113 lb 9.6 oz (51.5 kg)   04/16/18 116 lb 6.4 oz (52.8 kg)              Today, you had the following     No orders found for display         Today's Medication Changes          These changes are accurate as of 10/22/18 10:04 AM.  If you have any questions, ask your nurse or doctor.               Start taking these medicines.        Dose/Directions    glipiZIDE-metFORMIN 5-500 MG per tablet   Commonly known as:  METAGLIP   Used for:  Type 2 diabetes mellitus with hyperglycemia, without long-term current use  of insulin (H)   Started by:  Clarissa Rodriguez MD        Dose:  2 tablet   Take 2 tablets by mouth 2 times daily (before meals)   Quantity:  360 tablet   Refills:  3         Stop taking these medicines if you haven't already. Please contact your care team if you have questions.     acetaminophen 500 MG tablet   Commonly known as:  TYLENOL   Stopped by:  Clarissa Rodriguez MD           atovaquone-proguanil 250-100 MG per tablet   Commonly known as:  MALARONE   Stopped by:  Clarissa Rodriguez MD           loperamide 2 MG tablet   Commonly known as:  IMODIUM A-D   Stopped by:  Clarissa Rodriguez MD                Where to get your medicines      These medications were sent to [facility] [address] [address]  [address] [address], [facility] [address]     Phone:  [phone]     glipiZIDE-metFORMIN 5-500 MG per tablet                Primary Care Provider Office Phone # Fax #    Clarissa Rodriguez [phone]       [address] [place] [address]        Equal Access to Services     [facility]: Hadii aad ku hadasho Soomaali, waaxda luqadaha, qaybta kaalmada adeegyada, sandra tran . So [facility] [phone].    ATENCIÓN: Si habla español, tiene a em disposición servicios gratuitos de asistencia lingüística. [facility] [phone].    We comply with applicable federal civil rights laws and [place] laws. We do not discriminate on the basis of race, color, national origin, age, disability, sex, sexual orientation, or gender identity.            Thank you!     Thank you for choosing [facility]  for your care. Our goal is always to provide you with excellent care. Hearing back from our patients is one way we can continue to improve our services. Please take a few minutes to complete the written survey that you may receive in the mail after your visit with us. Thank you!             Your Updated Medication List - Protect others  around you: Learn how to safely use, store and throw away your medicines at www.disposemymeds.org.          This list is accurate as of 10/22/18 10:04 AM.  Always use your most recent med list.                   Brand Name Dispense Instructions for use Diagnosis    aspirin 81 MG tablet     90 tablet    Take 1 tablet (81 mg) by mouth daily    Type 2 diabetes mellitus with hyperglycemia, without long-term current use of insulin (H)       atorvastatin 40 MG tablet    LIPITOR    90 tablet    Take 1 tablet (40 mg) by mouth daily    Pure hypercholesterolemia       blood glucose monitoring lancets     100 each    Check blood glucose twice a day, once fasting and once 2 hours after eating    Diabetes mellitus, type 2 (H)       blood glucose monitoring meter device kit     1 kit    Check blood glucose twice a day, once fasting and once 2 hours after eating    Diabetes mellitus, type 2 (H)       blood glucose monitoring test strip    ONETOUCH ULTRA    100 strip    Check blood glucose twice a day, once fasting and once 2 hours after eating    Diabetes mellitus, type 2 (H)       cetirizine 10 MG tablet    zyrTEC    90 tablet    Take 1 tablet (10 mg) by mouth every evening    Chronic seasonal allergic rhinitis, unspecified trigger       dulaglutide 0.75 MG/0.5ML pen    TRULICITY    2 mL    Inject 0.75 mg Subcutaneous every 7 days    Type 2 diabetes mellitus with hyperglycemia, without long-term current use of insulin (H)       gabapentin 300 MG capsule    NEURONTIN    90 capsule    Take 1 tablet three times daily    Chronic left-sided low back pain without sciatica       glipiZIDE-metFORMIN 5-500 MG per tablet    METAGLIP    360 tablet    Take 2 tablets by mouth 2 times daily (before meals)    Type 2 diabetes mellitus with hyperglycemia, without long-term current use of insulin (H)       sitagliptin 100 MG tablet    JANUVIA    90 tablet    Take 1 tablet (100 mg) by mouth daily    Type 2 diabetes mellitus with hyperglycemia,  without long-term current use of insulin (H)

## 2018-11-06 ENCOUNTER — TELEPHONE (OUTPATIENT)
Dept: FAMILY MEDICINE | Facility: CLINIC | Age: 73
End: 2018-11-06

## 2018-11-06 NOTE — TELEPHONE ENCOUNTER
[facility] phone call message- general phone call:    Reason for call: Pharmacy is calling to get med clarification on   glipiZIDE-metFORMIN (METAGLIP) 5-500 MG per tablet-Take 2 tablets by mouth 2 times daily (before meals) - Oral    Return call needed: Yes    OK to leave a message on voice mail? Yes    Primary language: Yasmin      needed? Yes    Call taken on November 6, 2018 at 10:46 AM by Grisel Flores-Cardona

## 2018-11-06 NOTE — TELEPHONE ENCOUNTER
Wants to Clarify it was meant to be changed to Glipizide-Metformin.  Used to be Glyburide-Metformin.

## 2018-11-19 DIAGNOSIS — Z71.84 TRAVEL ADVICE ENCOUNTER: ICD-10-CM

## 2018-11-19 RX ORDER — ATOVAQUONE AND PROGUANIL HYDROCHLORIDE 250; 100 MG/1; MG/1
1 TABLET, FILM COATED ORAL DAILY
Qty: 36 TABLET | Refills: 0 | OUTPATIENT
Start: 2018-11-19

## 2018-11-19 NOTE — TELEPHONE ENCOUNTER
Refill not appropriate. This is one time travel prescription and patient told me that her trip is 11/2 to 11/28.  Suspect this is a pharmacy generated request.    Please call pharmacy and inform them.

## 2019-01-18 DIAGNOSIS — E11.65 TYPE 2 DIABETES MELLITUS WITH HYPERGLYCEMIA, WITHOUT LONG-TERM CURRENT USE OF INSULIN (H): ICD-10-CM

## 2019-01-18 DIAGNOSIS — G89.29 CHRONIC LEFT-SIDED LOW BACK PAIN WITHOUT SCIATICA: ICD-10-CM

## 2019-01-18 DIAGNOSIS — M54.50 CHRONIC LEFT-SIDED LOW BACK PAIN WITHOUT SCIATICA: ICD-10-CM

## 2019-01-21 RX ORDER — GABAPENTIN 300 MG/1
CAPSULE ORAL
Qty: 90 CAPSULE | Refills: 5 | Status: SHIPPED | OUTPATIENT
Start: 2019-01-21 | End: 2019-09-10

## 2019-03-20 ENCOUNTER — DOCUMENTATION ONLY (OUTPATIENT)
Dept: FAMILY MEDICINE | Facility: CLINIC | Age: 74
End: 2019-03-20

## 2019-04-05 ENCOUNTER — OFFICE VISIT (OUTPATIENT)
Dept: FAMILY MEDICINE | Facility: CLINIC | Age: 74
End: 2019-04-05
Payer: COMMERCIAL

## 2019-04-05 VITALS
RESPIRATION RATE: 16 BRPM | BODY MASS INDEX: 23 KG/M2 | TEMPERATURE: 97.7 F | DIASTOLIC BLOOD PRESSURE: 76 MMHG | WEIGHT: 111 LBS | HEART RATE: 79 BPM | OXYGEN SATURATION: 100 % | SYSTOLIC BLOOD PRESSURE: 123 MMHG

## 2019-04-05 DIAGNOSIS — E11.65 TYPE 2 DIABETES MELLITUS WITH HYPERGLYCEMIA, WITHOUT LONG-TERM CURRENT USE OF INSULIN (H): ICD-10-CM

## 2019-04-05 DIAGNOSIS — E78.5 HYPERLIPIDEMIA: Primary | ICD-10-CM

## 2019-04-05 DIAGNOSIS — E78.00 PURE HYPERCHOLESTEROLEMIA: ICD-10-CM

## 2019-04-05 DIAGNOSIS — E11.65 TYPE 2 DIABETES MELLITUS WITH HYPERGLYCEMIA, WITHOUT LONG-TERM CURRENT USE OF INSULIN (H): Primary | ICD-10-CM

## 2019-04-05 DIAGNOSIS — Z12.11 ENCOUNTER FOR SCREENING COLONOSCOPY: ICD-10-CM

## 2019-04-05 LAB
CHOLEST SERPL-MCNC: 281.7 MG/DL (ref 0–200)
CHOLEST/HDLC SERPL: 4.5 {RATIO} (ref 0–5)
CREAT UR-MCNC: 11.6 MG/DL
HBA1C MFR BLD: 11.1 % (ref 4.1–5.7)
HDLC SERPL-MCNC: 62.5 MG/DL
LDLC SERPL CALC-MCNC: 181 MG/DL (ref 0–129)
MICROALBUMIN UR-MCNC: 1.12 MG/DL (ref 0–1.99)
MICROALBUMIN/CREAT UR: 96.6 MG/G
TRIGL SERPL-MCNC: 191.4 MG/DL (ref 0–150)
VLDL CHOLESTEROL: 38.3 MG/DL (ref 7–32)

## 2019-04-05 RX ORDER — GLIPIZIDE 10 MG/1
10 TABLET, FILM COATED, EXTENDED RELEASE ORAL DAILY
Qty: 90 TABLET | Refills: 3 | Status: SHIPPED | OUTPATIENT
Start: 2019-04-05 | End: 2019-04-19

## 2019-04-05 RX ORDER — BLOOD-GLUCOSE METER
EACH MISCELLANEOUS
Qty: 1 KIT | Refills: 0 | Status: SHIPPED | OUTPATIENT
Start: 2019-04-05 | End: 2019-05-10

## 2019-04-05 RX ORDER — METFORMIN HCL 500 MG
500 TABLET, EXTENDED RELEASE 24 HR ORAL
Qty: 180 TABLET | Refills: 3 | Status: SHIPPED | OUTPATIENT
Start: 2019-04-05 | End: 2019-05-31

## 2019-04-05 RX ORDER — ATORVASTATIN CALCIUM 40 MG/1
40 TABLET, FILM COATED ORAL DAILY
Qty: 90 TABLET | Refills: 4 | Status: SHIPPED | OUTPATIENT
Start: 2019-04-05 | End: 2020-04-23

## 2019-04-05 NOTE — PROGRESS NOTES
Nursing Notes:   Juancarlos Warner, [facility]  4/5/2019  2:58 PM  Signed    Due to patient being non-English speaking/uses sign language, an  was used for this visit. Only for face-to-face interpretation by an external agency, date and length of interpretation can be found on the scanned worksheet.     name: Kaiser Joy  Agency: [employer]  Language: Yasmin   Telephone number: [phone]  Type of interpretation: Face-to-face, spoken      Chief Complaint   Patient presents with     Diabetes     Colonoscopy     FIT testing ordered     Forms     Blood pressure 123/76, pulse 79, temperature 97.7  F (36.5  C), temperature source Oral, resp. rate 16, weight 50.3 kg (111 lb), SpO2 100 %, not currently breastfeeding.                 KEYUR Vences is a 73 year old  female with a PMH significant for:     Patient Active Problem List   Diagnosis     Health Care Home     Esophageal reflux     Osteoarthrosis, unspecified whether generalized or localized, involving lower leg     Lumbosacral spondylosis without myelopathy     Hyperlipidemia     Type 2 diabetes mellitus with hyperglycemia, without long-term current use of insulin (H)     Seasonal allergies     Screening for depression     Microalbuminuria     She presents with follow up of diabetes, knee pain.    Trip to [facility] went well. Felt happy there and was refugee [place] and visited two sons.  Was there for 28 days. Has many family members there. 8 grandchildren. She was healthy.    She has not been taking any of her medications regularly and needs them refilled.  We discussed the importance of taking them for uncontrolled diabetes.  She previously had a home nurse to help with her medications but does not have that now. She would be okay restarting that service to help with compliance.    PMH, Medications and Allergies were reviewed and updated as needed.     A Yasmin  was used for this visit.           Physical Exam:     Vitals:    04/05/19 1456   BP:  123/76   Pulse: 79   Resp: 16   Temp: 97.7  F (36.5  C)   TempSrc: Oral   SpO2: 100%   Weight: 50.3 kg (111 lb)     Body mass index is 23 kg/m .    Exam:  Constitutional: healthy, alert and no distress  Cardiovascular:RRR. No murmurs, clicks gallops or rub  Respiratory:  normal respiratory rate and rhythm, lungs clear to auscultation. No wheezes or crackles.  Abdomen: +BS, soft, nontender, nondistended. No HSM.  Extremities: No C/C/E in BLE. 2+DP pulses.   Skin: no rashes.  Psychiatric: mentation appears normal and affect normal/bright      PHQ-9 SCORE 11/29/2016 4/16/2018   PHQ-9 Total Score 0 7     Results for orders placed or performed in visit on 04/05/19   Hemoglobin A1c ([facility])   Result Value Ref Range    Hemoglobin A1C 11.1 (H) 4.1 - 5.7 %   Lipid Panel ([place])   Result Value Ref Range    Cholesterol 281.7 (H) 0.0 - 200.0 mg/dL    Cholesterol/HDL Ratio 4.5 0.0 - 5.0    HDL Cholesterol 62.5 >40.0 mg/dL    LDL Cholesterol Calculated 181 (H) 0 - 129 mg/dL    Triglycerides 191.4 (H) 0.0 - 150.0 mg/dL    VLDL Cholesterol 38.3 (H) 7.0 - 32.0 mg/dL    Narrative    Triglycerides were >400 mg/dL, unable to calculate the LDL   Microalbumin Creatinine Ratio Random Ur ([facility])   Result Value Ref Range    Microalbumin, Urine 1.12 0.00 - 1.99 mg/dL    Creatinine, Urine 11.6 mg/dL    Albumin Urine mg/g Cr 96.6 (H) <=19.9 mg/g    Narrative    Test performed by:  [facility]  [address] [place] [address]  Microalbumin, Random Urine  <2.0 mg/dL . . . . . . . . Normal  3.0-30.0 mg/dL . . . . . . Microalbuminuria  >30.0 mg/dL . . . . . .  . Clinical Proteinuria  Microalbumin/Creatinine Ratio, Random Urine  <20 mg/g . . . . .. . . . Normal   mg/g . . . . . . . Microalbuminuria  >300 mg/g . . . . . . . . Clinical Proteinuria       Assessment and Plan     Emmanuel was seen today for diabetes, colonoscopy and forms.    Diagnoses and all orders for this visit:    Type 2 diabetes mellitus with  hyperglycemia, without long-term current use of insulin (H): Very poor control. Has not been taking any medications.  Education on complication and she agreed to restart the medications. Titrating up on metformin slowly. Restart trulicity and glipizide.  She did not start trulicity in the past will likely need training at the next visit. Asked her to bring all medicines with her and to schedule with PharmD as well to help set up meds for her and teach trulicity if needed.  She will also ask if adult day care could help with this going forward. If they cannot, we can help and then set her up with home nursing again.    RNs to see at next visit too!  -     Hemoglobin A1c ([facility])  -     Lipid Panel ([place])  -     Microalbumin Creatinine Ratio Random Ur ([facility])  -     metFORMIN (GLUCOPHAGE-XR) 500 MG 24 hr tablet; Take 1 tablet (500 mg) by mouth daily (with dinner) For one week then increase to two a day.  -     glipiZIDE (GLUCOTROL XL) 10 MG 24 hr tablet; Take 1 tablet (10 mg) by mouth daily  -     dulaglutide (TRULICITY) 0.75 MG/0.5ML pen; Inject 0.75 mg Subcutaneous every 7 days  -     aspirin (ASA) 81 MG tablet; Take 1 tablet (81 mg) by mouth daily  -     blood glucose monitoring (ONE TOUCH DELICA) lancets; Check blood glucose twice a day, once fasting and once 2 hours after eating  -     blood glucose monitoring (ONE TOUCH ULTRA 2) meter device kit; Check blood glucose twice a day, once fasting and once 2 hours after eating  -     blood glucose (ONETOUCH ULTRA) test strip; Check blood glucose twice a day, once fasting and once 2 hours after eating    Pure hypercholesterolemia: refilled medication.  -     atorvastatin (LIPITOR) 40 MG tablet; Take 1 tablet (40 mg) by mouth daily    Encounter for screening colonoscopy  Had a positive FIT test in the past.  Needs colonoscopy. Will discuss more at the next visit.         Patient Instructions   Bring all medications to your next visit.    Restart 4  pills.    Bring medicine to adult day care for them to help set up your medicine box for you. We can have home nurse help if they cannot.    Return in about 2 weeks (around 4/19/2019) for Diabetes.     Options for treatment and/or follow-up care were reviewed with the patient. Emmanuel Vences was engaged and actively involved in the decision making process. She verbalized understanding of the options discussed and was satisfied with the final plan.    Clarissa Rodriguez MD

## 2019-04-05 NOTE — PATIENT INSTRUCTIONS
Bring all medications to your next visit.    Restart 4 pills.    Bring medicine to adult day care for them to help set up your medicine box for you. We can have home nurse help if they cannot.

## 2019-04-05 NOTE — NURSING NOTE
Due to patient being non-English speaking/uses sign language, an  was used for this visit. Only for face-to-face interpretation by an external agency, date and length of interpretation can be found on the scanned worksheet.     name: Kaiser Joy  Agency: [employer]oya  Language: Yasmin   Telephone number: [phone]  Type of interpretation: Face-to-face, spoken

## 2019-04-05 NOTE — LETTER
April 8, 2019      Emmanuel Say  [address]  [place] [address]        Dear Emmanuel,    Please see below for your test results.    Resulted Orders   Hemoglobin A1c ([facility])   Result Value Ref Range    Hemoglobin A1C 11.1 (H) 4.1 - 5.7 %   Lipid Panel ([place])   Result Value Ref Range    Cholesterol 281.7 (H) 0.0 - 200.0 mg/dL    Cholesterol/HDL Ratio 4.5 0.0 - 5.0    HDL Cholesterol 62.5 >40.0 mg/dL    LDL Cholesterol Calculated 181 (H) 0 - 129 mg/dL    Triglycerides 191.4 (H) 0.0 - 150.0 mg/dL    VLDL Cholesterol 38.3 (H) 7.0 - 32.0 mg/dL    Narrative    Triglycerides were >400 mg/dL, unable to calculate the LDL   Microalbumin Creatinine Ratio Random Ur ([facility])   Result Value Ref Range    Microalbumin, Urine 1.12 0.00 - 1.99 mg/dL    Creatinine, Urine 11.6 mg/dL    Albumin Urine mg/g Cr 96.6 (H) <=19.9 mg/g    Narrative    Test performed by:  [facility]  [address] [place] [address]  Microalbumin, Random Urine  <2.0 mg/dL . . . . . . . . Normal  3.0-30.0 mg/dL . . . . . . Microalbuminuria  >30.0 mg/dL . . . . . .  . Clinical Proteinuria  Microalbumin/Creatinine Ratio, Random Urine  <20 mg/g . . . . .. . . . Normal   mg/g . . . . . . . Microalbuminuria  >300 mg/g . . . . . . . . Clinical Proteinuria             Your kidney function is stable.  We need to control your diabetes to protect your kidneys.      If you have any questions, please call the clinic to make an appointment.    Sincerely,    Clarissa Rodriguez MD

## 2019-04-12 DIAGNOSIS — Z12.11 SPECIAL SCREENING FOR MALIGNANT NEOPLASMS, COLON: ICD-10-CM

## 2019-04-12 DIAGNOSIS — Z12.11 SPECIAL SCREENING FOR MALIGNANT NEOPLASMS, COLON: Primary | ICD-10-CM

## 2019-04-12 LAB — HEMOCCULT STL QL IA: NEGATIVE

## 2019-04-12 NOTE — RESULT ENCOUNTER NOTE
Your FIT test was negative which is good. However, I am still worried because your had a positive test when we checked last time.  I recommend colonoscopy to rule our colon cancer.

## 2019-04-12 NOTE — LETTER
April 17, 2019      Emmanuel Say  [address]  [place] [address]        Dear Emmanuel,    Please see below for your test results.    Resulted Orders   Fecal Occult Blood - FIT, iFOB ([facility])   Result Value Ref Range    Occult Blood Scn FIT NEGATIVE Negative     Your FIT test was negative which is good. However, I am still worried because your had a positive test when we checked last time.  I recommend colonoscopy to rule our colon cancer.

## 2019-04-16 NOTE — PROGRESS NOTES
Visit to the client's home for annual health risk assessment.  An  was present.    Current situation/living environment  Client currently lives at her friends home.  This is a two story home with the client's bedroom on the second story.  Client reports that it is difficult to walk up the stairs on her own due to her bilateral knee pain.  She is requiring assistance to climb the stairs.  She does have a cane that she says she is using more often for support.  Emmanuel said that her daughter, Sandhya, is still very active in her life even though she does not live with her anymore.      Activities of daily living (ADL)/instrumental activities of daily living (IADL) and functional issues  Client needs help with the following ADL's: dressing, bathing, walking and toileting  Client needs help with the following IADL's: shopping, cooking, housekeeping, laundry and transportation  Client states she is unable to perform the above due to bilateral knee pain, back pain and difficulty with bending over.     Client had requested a PCA assessment.  She has qualified for 3.5 hours per day.  She also gets 7 hours a day for homemaking services. Client reports she had a vision exam last year.  She is not interested in seeing a dentist because she is afraid the dentist will want to extract her teeth.  Client is wanting her pull ups restarted.  Client says she needs help with changing the saturated pull up in the morning because of her stiffness and difficulty with bending over.      Health concerns for today    Client reports that her main health concerns is her bilateral knee and back pain.  Reviewed her medication bottles and she is non compliant.  She has outdated bottles.  She will report that she is compliant with medications.  Discussed in length on compliance and she is agreeable to restart home care nurse from [employer].  Discussed DM2 and high A1C.  She wants to discuss the weekly injection Trulicity with her  doctor.  Also, discussed her positive FIT test and the need for a colonoscopy.  She reports that she is afraid to do the test.  Again, she will have this discussion with her doctor. Client also refuses a mammogram because she does not feel any thing wrong with her breasts.  CM assisted in scheduling clinic visit for April 5th at 2:30pm.  Client reports that she does smoke.  With the cold weather this winter she has not gone outside to smoke as much.  Client reports no hospitalizations.     Has patient fallen 2 or more times in the last year? No  Has patient fallen with injury in the last year? No    Cognition/mental health  Client reports that her memory is worsening.  The daughter of the friend is agreeing with this statement.  She has noticed a change in needing to provide more reminders to client.  Client says that she gets lost in the house at times, where she goes to one room and meant to go to a different one.  She also is confusing or forgetting her families names. Client attends adult day care three days a week.  She enjoys the socialization with the other woman.  She participates in exercises to help with her pain.     STARS/Med Adherence  Client is non-compliant with the following quality measures: Breast cancer screening, Colon cancer screening and Medication adherence: diabetes  Comments: education efforts    Client's Plan of Care consists of:  Adult day care (3 days per week), Homemaker services (7 hours per week), Personal care assistance (PCA) (3.5 hours per day), SNV (weekly visits per week) and Specialized supplies and equipment (restart pull ups)

## 2019-04-19 ENCOUNTER — OFFICE VISIT (OUTPATIENT)
Dept: FAMILY MEDICINE | Facility: CLINIC | Age: 74
End: 2019-04-19
Payer: COMMERCIAL

## 2019-04-19 VITALS
WEIGHT: 109.2 LBS | RESPIRATION RATE: 18 BRPM | OXYGEN SATURATION: 99 % | HEART RATE: 62 BPM | DIASTOLIC BLOOD PRESSURE: 73 MMHG | HEIGHT: 59 IN | BODY MASS INDEX: 22.01 KG/M2 | SYSTOLIC BLOOD PRESSURE: 129 MMHG | TEMPERATURE: 98.2 F

## 2019-04-19 DIAGNOSIS — E11.65 TYPE 2 DIABETES MELLITUS WITH HYPERGLYCEMIA, WITHOUT LONG-TERM CURRENT USE OF INSULIN (H): Primary | ICD-10-CM

## 2019-04-19 RX ORDER — GLYBURIDE 5 MG/1
5 TABLET ORAL
Qty: 90 TABLET | Refills: 3 | Status: SHIPPED | OUTPATIENT
Start: 2019-04-19 | End: 2020-02-06

## 2019-04-19 ASSESSMENT — MIFFLIN-ST. JEOR: SCORE: 898.02

## 2019-04-19 ASSESSMENT — PATIENT HEALTH QUESTIONNAIRE - PHQ9
SUM OF ALL RESPONSES TO PHQ QUESTIONS 1-9: 11
5. POOR APPETITE OR OVEREATING: NOT AT ALL

## 2019-04-19 ASSESSMENT — ANXIETY QUESTIONNAIRES
1. FEELING NERVOUS, ANXIOUS, OR ON EDGE: NOT AT ALL
GAD7 TOTAL SCORE: 3
2. NOT BEING ABLE TO STOP OR CONTROL WORRYING: SEVERAL DAYS
IF YOU CHECKED OFF ANY PROBLEMS ON THIS QUESTIONNAIRE, HOW DIFFICULT HAVE THESE PROBLEMS MADE IT FOR YOU TO DO YOUR WORK, TAKE CARE OF THINGS AT HOME, OR GET ALONG WITH OTHER PEOPLE: NOT DIFFICULT AT ALL
7. FEELING AFRAID AS IF SOMETHING AWFUL MIGHT HAPPEN: SEVERAL DAYS
6. BECOMING EASILY ANNOYED OR IRRITABLE: NOT AT ALL
3. WORRYING TOO MUCH ABOUT DIFFERENT THINGS: SEVERAL DAYS
5. BEING SO RESTLESS THAT IT IS HARD TO SIT STILL: NOT AT ALL

## 2019-04-19 NOTE — PROGRESS NOTES
"    Nursing Notes:   Yordan Longoria, WADE  4/19/2019  9:20 AM  Signed  Due to patient being non-English speaking/uses sign language, an  was used for this visit. Only for face-to-face interpretation by an external agency, date and length of interpretation can be found on the scanned worksheet.     name: Kaiser Schulz  Agency: [employer]aline [employer]oya  Language: Yasmin   Telephone number: [phone]  Type of interpretation: Face-to-face, spoken            Chief Complaint   Patient presents with     RECHECK     come here today for follow up per patient.      Fatigue     have fatigue and fever per patient.      Medication Reconciliation     don't review the medication today because Pharm D is seeing patient.     Blood pressure 129/73, pulse 62, temperature 98.2  F (36.8  C), temperature source Oral, resp. rate 18, height 1.486 m (4' 10.5\"), weight 49.5 kg (109 lb 3.2 oz), SpO2 99 %, not currently breastfeeding.                 KEYUR Vences is a 73 year old  female with a PMH significant for:     Patient Active Problem List   Diagnosis     Health Care Home     Esophageal reflux     Osteoarthrosis, unspecified whether generalized or localized, involving lower leg     Lumbosacral spondylosis without myelopathy     Hyperlipidemia     Type 2 diabetes mellitus with hyperglycemia, without long-term current use of insulin (H)     Seasonal allergies     Screening for depression     Microalbuminuria     She presents with medications follow up for DM.    She is taking some medicine and not other.  Has not restarted metformin.  Did restart aspirin and statin and tolerating well.  Takes medicines at different times.  Reassured that it is okay to take at the same time.  She take glipizide at 6-7PM and got itching at midnight in arms and legs.  Thinks it was the glipizide because the other medicines were taken earlier in the day. This happened three days in a row and she doesn't want to take it anymore.  Sulfonylureas can cause " "pruritis.  No itching or swelling in mouth or trouble breathing.    Discussed that Trulicity is not covered.  Bydureon is but it is daily injection and a very difficult process to injection for the patient.  PharmD's suggested that this is not a good option for the patient at this time.  She is agreeable to restart sitagliptan daily instead.    She also brought her meter but doesn't know how to use it.  She is agreeable to go back to diabetes education.  She brought PCA with her today.  She is new and willing to learn about medicines to help the patient remember to take them.  Thanked her for coming today.    Discussed that she is     PMH, Medications and Allergies were reviewed and updated as needed.     A [place]  was used for this visit.           Physical Exam:     Vitals:    04/19/19 0917 04/19/19 0919   BP: 138/69 129/73   BP Location: Right arm Right arm   Patient Position: Sitting Sitting   Cuff Size: Adult Regular Adult Regular   Pulse: 62 62   Resp: 18    Temp: 98.2  F (36.8  C)    TempSrc: Oral    SpO2: 99%    Weight: 49.5 kg (109 lb 3.2 oz)    Height: 1.486 m (4' 10.5\")      Body mass index is 22.43 kg/m .    Exam:  Constitutional: healthy, alert and no distress.  Psychiatric: mentation appears normal and affect normal/bright      PHQ-9 SCORE 11/29/2016 4/16/2018 4/19/2019   PHQ-9 Total Score 0 7 11     Results for orders placed or performed in visit on 04/12/19   Fecal Occult Blood - FIT, iFOB ([facility])   Result Value Ref Range    Occult Blood Scn FIT NEGATIVE Negative       Assessment and Plan     Emmanuel was seen today for recheck, fatigue and medication reconciliation.    Diagnoses and all orders for this visit:    Type 2 diabetes mellitus with hyperglycemia, without long-term current use of insulin (H)  -     DIABETES EDUCATOR REFERRAL; Future  -     glyBURIDE (DIABETA /MICRONASE) 5 MG tablet; Take 1 tablet (5 mg) by mouth daily (with breakfast)  -     sitagliptin (JANUVIA) 100 MG tablet; Take 1 " tablet (100 mg) by mouth daily        Patient Instructions   Stop glipizide.  Start glyburide and sitagliptan.     We will teach you the meter and set your meds up.    Documentation of Face to Face and Certification for Home Health Services    I certify that patient: Emmanuel Vences is under my care and that I, or a nurse practitioner or physician's assistant working with me, had a face-to-face encounter that meets the physician face-to-face encounter requirements with this patient on: 4/19/19.    This encounter with the patient was in whole, or in part, for the following medical condition, which is the primary reason for home health care: Diabetes and medication management.    I certify that, based on my findings, the following services are medically necessary home health services: Nursing.    My clinical findings support the need for the above services because: Nurse is needed: To provide assessment and oversight required in the home to assure adherence to the medical plan due to: need for setting up her medication due to not speaking English and poor medical literacy...        Based on the above findings. I certify that this patient needs intermittent skilled nursing care.  The patient is under my care, and I have initiated the establishment of the plan of care.  This patient will be followed by a physician who will periodically review the plan of care.  Physician/Provider to provide follow up care: Clarissa Rodriguez    Attending hospital physician (the Medicare certified [place] provider): Clarissa Rodrgiuez  Physician Signature: See electronic signature associated with these discharge orders.  Date: 4/24/2019       Options for treatment and/or follow-up care were reviewed with the patient. Emmanuel Vences was engaged and actively involved in the decision making process. She verbalized understanding of the options discussed and was satisfied with the final plan.    Clarissa Rodriguez MD

## 2019-04-19 NOTE — PATIENT INSTRUCTIONS
Stop glipizide.  Start glyburide and sitagliptan.     We will teach you the meter and set your meds up.

## 2019-04-19 NOTE — NURSING NOTE
Due to patient being non-English speaking/uses sign language, an  was used for this visit. Only for face-to-face interpretation by an external agency, date and length of interpretation can be found on the scanned worksheet.     name: Kaiser Schulz  Agency: [employer]aline Loya  Language: Yasmin   Telephone number: [phone]  Type of interpretation: Face-to-face, spoken

## 2019-04-19 NOTE — PROGRESS NOTES
Pharmacy Note    Dr. Royal's requested that pharmacy set up medications with what the patient brought to today's appointment. In addition, patient will be starting 2 new medications (glyburide, sitaglipitin) but does not have those yet. Patient and PCA were instructed to set up an appointment with pharmacy when they  the new medications to show them how to set up all medication and to increase the metformin if patient is tolerating it.     Showed patient and PCA how to set up and take her current medications from the pill box. Put sun stickers on the AM and star stickers on the PM.    The medication box was set up as follows:  AM - atorvastatin, aspirin, metformin (1 tab)  PM - none     Patient asked that we dispose of the glipizide for her today.     Naye Zepeda, PharmD Student    I was present with the pharmacy student who participated in the service and in the documentation of this note. I have verified the history, personally performed the medical decision making, and have verified the content of the note, which accurately reflects my assessment of the patient and the plan of care.   Michelle Marshall, YovaniD

## 2019-04-20 ASSESSMENT — ANXIETY QUESTIONNAIRES: GAD7 TOTAL SCORE: 3

## 2019-04-23 ENCOUNTER — TELEPHONE (OUTPATIENT)
Dept: FAMILY MEDICINE | Facility: CLINIC | Age: 74
End: 2019-04-23

## 2019-04-24 ENCOUNTER — MEDICAL CORRESPONDENCE (OUTPATIENT)
Dept: HEALTH INFORMATION MANAGEMENT | Facility: CLINIC | Age: 74
End: 2019-04-24

## 2019-04-25 ENCOUNTER — TELEPHONE (OUTPATIENT)
Dept: FAMILY MEDICINE | Facility: CLINIC | Age: 74
End: 2019-04-25

## 2019-04-25 NOTE — TELEPHONE ENCOUNTER
[facility] phone call message- general phone call:    Reason for call:  He has questions re the request for Glipizide and Glyburide patients are typically on one or the other but he had a prescription for this patient for both. He would like a call back.    Return call needed: Yes    OK to leave a message on voice mail? Yes    Primary language: Yasmin      needed? Yes    Call taken on April 25, 2019 at 9:05 AM by Mike Del Cid

## 2019-04-26 ENCOUNTER — OFFICE VISIT (OUTPATIENT)
Dept: FAMILY MEDICINE | Facility: CLINIC | Age: 74
End: 2019-04-26
Payer: COMMERCIAL

## 2019-04-26 DIAGNOSIS — L29.9 PRURITIC DISORDER: Primary | ICD-10-CM

## 2019-04-26 RX ORDER — CETIRIZINE HYDROCHLORIDE 10 MG/1
10 TABLET ORAL DAILY
Qty: 90 TABLET | Refills: 1 | Status: SHIPPED | OUTPATIENT
Start: 2019-04-26 | End: 2019-10-28

## 2019-04-26 NOTE — NURSING NOTE
Chief Complaint   Patient presents with     RECHECK     DIABETES MELLITUS F/U; itching since 2-3 days all over the body;      Gary Doty CMA    Due to patient being non-English speaking/uses sign language, an  was used for this visit. Only for face-to-face interpretation by an external agency, date and length of interpretation can be found on the scanned worksheet.     name: Kaiser Schulz  Agency: [employer]oya  Language: Yasmin   Telephone number: [phone]  Type of interpretation: Face-to-face, spoken     Gary Doty CMA

## 2019-04-26 NOTE — PROGRESS NOTES
Preceptor Attestation:   Patient seen, evaluated and discussed with the resident. I have verified the content of the note, which accurately reflects my assessment of the patient and the plan of care.   Supervising Physician:  Gio Davidson MD

## 2019-04-26 NOTE — TELEPHONE ENCOUNTER
Called Pharmacy today to see why glyburide and sitaglipatin were not received.  It sounds like this call was the hold up.  Told pharmacist that we were discontinuing the glipizide in favor of the gylburide.  Pharmacist said meds would be sent out today.     Michelle Marshall PharmTieraD.

## 2019-04-26 NOTE — PROGRESS NOTES
NEMESIO Vences is a 73 year old  female with a PMH significant for:     Patient Active Problem List   Diagnosis     Health Care Home     Esophageal reflux     Osteoarthrosis, unspecified whether generalized or localized, involving lower leg     Lumbosacral spondylosis without myelopathy     Hyperlipidemia     Type 2 diabetes mellitus with hyperglycemia, without long-term current use of insulin (H)     Seasonal allergies     Screening for depression     Microalbuminuria     She presents in follow-up for itching. States that after starting new diabetes medications recently, she had onset of all-over-body itching. Notes vaginal itching as well. No rash, just itching. She brings her medication bottles to me today.  The only medication she presents are aspirin, metformin, atorvastatin.  Not present are the medications that were started recently by Dr. munoz, including glyburide and Januvia.  The patient states that these have not been delivered to her home, and that she has not yet started them.  She did however stop the glipizide as recommended.    PMH, Medications and Allergies were reviewed and updated as needed.        REVIEW OF SYSTEMS     See HPI        OBJECTIVE     There were no vitals filed for this visit.  There is no height or weight on file to calculate BMI.    General appearance: Well-appearing elderly female.  Alert, oriented and appropriate.  No acute distress  HEENT: EOMI, no conjunctival injection or scleral icterus.  Mucous members are moist  Cardiovascular: Regular rate and rhythm, no rubs, murmurs or extra heart sounds  Pulmonary: Clear to auscultation bilaterally, no wheezes, rales or rhonchi  Skin: No rash or excoriation marks appreciated.    No results found for this or any previous visit (from the past 24 hour(s)).        ASSESSMENT AND PLAN     1. Pruritic disorder  Unclear etiology.  I do not think that this is related to medications, as the patient has been taking all 3  aspirin, metformin, atorvastatin for some time.  She has not yet started the new diabetes medications that were prescribed.  I did ask pharmacy to look into why these have not been delivered to her house.  Given this, we will do a work-up for idiopathic itching.  I did recommend LFTs today, but the patient refused lab draw.  We will try colloidal cream all over the body twice daily in addition to cetirizine 10 mg once daily.  Recommend follow-up in 1 month.  As  - Skin Protectants, Misc. (EUCERIN) cream; Apply topically as needed for dry skin  Dispense: 240 g; Refill: 3  - cetirizine (ZYRTEC) 10 MG tablet; Take 1 tablet (10 mg) by mouth daily  Dispense: 90 tablet; Refill: 1        RTC in 1 month for follow up of pruritis or sooner if develops new or worsening symptoms.    Kelsey Johnson    I precepted today with Gio Davidson MD.

## 2019-05-01 ENCOUNTER — OFFICE VISIT (OUTPATIENT)
Dept: FAMILY MEDICINE | Facility: CLINIC | Age: 74
End: 2019-05-01
Payer: COMMERCIAL

## 2019-05-01 VITALS
HEART RATE: 98 BPM | HEIGHT: 59 IN | TEMPERATURE: 98.3 F | OXYGEN SATURATION: 96 % | RESPIRATION RATE: 24 BRPM | BODY MASS INDEX: 21.53 KG/M2 | WEIGHT: 106.8 LBS | SYSTOLIC BLOOD PRESSURE: 122 MMHG | DIASTOLIC BLOOD PRESSURE: 77 MMHG

## 2019-05-01 DIAGNOSIS — R74.01 TRANSAMINITIS: ICD-10-CM

## 2019-05-01 DIAGNOSIS — E11.65 TYPE 2 DIABETES MELLITUS WITH HYPERGLYCEMIA, WITHOUT LONG-TERM CURRENT USE OF INSULIN (H): ICD-10-CM

## 2019-05-01 DIAGNOSIS — R53.83 FATIGUE, UNSPECIFIED TYPE: Primary | ICD-10-CM

## 2019-05-01 LAB
ALBUMIN SERPL-MCNC: 4.3 MG/DL (ref 3.3–4.9)
ALP SERPL-CCNC: 204.8 U/L (ref 40–150)
ALT SERPL-CCNC: 66.2 U/L (ref 0–45)
AST SERPL-CCNC: 48.5 U/L (ref 0–45)
BASOPHILS # BLD AUTO: 0.1 THOU/UL (ref 0–0.2)
BASOPHILS NFR BLD AUTO: 1 % (ref 0–2)
BILIRUB SERPL-MCNC: 0.7 MG/DL (ref 0.2–1.3)
BUN SERPL-MCNC: 12.1 MG/DL (ref 7–19)
CALCIUM SERPL-MCNC: 10.1 MG/DL (ref 8.5–10.1)
CHLORIDE SERPLBLD-SCNC: 99 MMOL/L (ref 98–110)
CO2 SERPL-SCNC: 21.9 MMOL/L (ref 20–32)
CREAT SERPL-MCNC: 0.7 MG/DL (ref 0.5–1)
EOSINOPHIL # BLD AUTO: 0.6 THOU/UL (ref 0–0.4)
EOSINOPHIL NFR BLD AUTO: 5 % (ref 0–6)
ERYTHROCYTE [DISTWIDTH] IN BLOOD BY AUTOMATED COUNT: 12.1 % (ref 11–14.5)
GFR SERPL CREATININE-BSD FRML MDRD: 87.2 ML/MIN/1.7 M2
GLUCOSE SERPL-MCNC: 469 MG'DL (ref 70–99)
HCT VFR BLD AUTO: 42.7 % (ref 35–47)
HGB BLD-MCNC: 14.4 G/DL (ref 12–16)
LYMPHOCYTES # BLD AUTO: 2.2 THOU/UL (ref 0.8–4.4)
LYMPHOCYTES NFR BLD AUTO: 21 % (ref 20–40)
MCH RBC QN AUTO: 30.6 PG (ref 27–34)
MCHC RBC AUTO-ENTMCNC: 33.7 G/DL (ref 32–36)
MCV RBC AUTO: 91 FL (ref 80–100)
MONOCYTES # BLD AUTO: 0.5 THOU/UL (ref 0–0.9)
MONOCYTES NFR BLD AUTO: 4 % (ref 2–10)
NEUTROPHILS # BLD AUTO: 7.1 THOU/UL (ref 2–7.7)
NEUTROPHILS NFR BLD AUTO: 68 % (ref 50–70)
PLATELET # BLD AUTO: 245 THOU/UL (ref 140–440)
PMV BLD AUTO: 11.6 FL (ref 8.5–12.5)
POTASSIUM SERPL-SCNC: 4.1 MMOL/DL (ref 3.2–4.6)
PROT SERPL-MCNC: 8.5 G/DL (ref 6.8–8.8)
RBC # BLD AUTO: 4.7 MILL/UL (ref 3.8–5.4)
SODIUM SERPL-SCNC: 130.8 MMOL/L (ref 132–142)
TSH SERPL DL<=0.05 MIU/L-ACNC: 2.34 UIU/ML (ref 0.3–5)
WBC # BLD AUTO: 10.5 THOU/UL (ref 4–11)

## 2019-05-01 ASSESSMENT — MIFFLIN-ST. JEOR: SCORE: 887.13

## 2019-05-01 NOTE — PROGRESS NOTES
[facility] Visit    Subjective:  Emmanuel Vences is a 73 year old female with a PMHx significant for   Patient Active Problem List   Diagnosis     Health Care Home     Esophageal reflux     Osteoarthrosis, unspecified whether generalized or localized, involving lower leg     Lumbosacral spondylosis without myelopathy     Hyperlipidemia     Type 2 diabetes mellitus with hyperglycemia, without long-term current use of insulin (H)     Seasonal allergies     Screening for depression     Microalbuminuria    who presents with fatigue and body aches. Her friend who lives with her is present.     Patient reports feeling tired, fatigued and having body aches for the past 2 weeks. She thinks it's getting worse over this time, but she's able to perform all ADLs and denies any other focal symptoms. She reports feeling frustrated that she doesn't know any of her diagnoses, medications or what's happening in her body. She decided to stop all of her home meds 3-4 days ago because she's concerned she's experiencing side effects. She cannot specify any of her home meds and reports she doesn't know how to check her blood sugars. Of note, patient and her friend report that a home nurse will be visiting in 2 days for the first time to help set up her home meds and teach her how to check her blood sugars.     ROS:   Gen: No fevers, chills, weight loss. +generalized fatigue  HEENT: No headache, vision changes, hearing loss, swallowing problems   CV: No chest pain, palpitations, peripheral edema  Resp: No SOB, cough, wheezing, congestion  GI: No abdominal pain, diarrhea, nausea, vomiting, heartburn, change in bowel habits. +mild constipation  : No dysuria, hematuria, discharge  MSK: No arthralgias, trouble ambulating. +chronic mild low back aches  Skin: No rash, lesions    Objective:  Vitals:    05/01/19 1022   BP: 122/77   Pulse: 98   Resp: 24   Temp: 98.3  F (36.8  C)   TempSrc: Oral   SpO2: 96%   Weight: 48.4 kg (106 lb  "12.8 oz)   Height: 1.486 m (4' 10.5\")     Body mass index is 21.94 kg/m .    GEN: NAD, alert and oriented to person/place, pleasant, cooperative  RESP: CTAB, no w/r/r  CV: RRR, nl S1/S2, no m/r/g, no peripheral edema  MSK: no MSK defects noted, gait is age appropriate w/o ataxia  SKIN: no suspicious lesions or rashes  NEURO: equal strength and tone of upper and lower extremities, sensory exam grossly normal, mentation intact, speech normal  PSYCH: mentation appears normal, affect normal/bright    Results for orders placed or performed in visit on 05/01/19 (from the past 24 hour(s))   Comprehensive Metabolic Panel ([facility])   Result Value Ref Range    Albumin 4.3 3.3 - 4.9 mg/dL    Alkaline Phosphatase 204.8 (H) 40.0 - 150.0 U/L    ALT 66.2 (H) 0.0 - 45.0 U/L    AST 48.5 (H) 0.0 - 45.0 U/L    Bilirubin Total 0.7 0.2 - 1.3 mg/dL    Urea Nitrogen 12.1 7.0 - 19.0 mg/dL    Calcium 10.1 8.5 - 10.1 mg/dL    Chloride 99.0 98.0 - 110.0 mmol/L    Carbon Dioxide 21.9 20.0 - 32.0 mmol/L    Creatinine 0.7 0.5 - 1.0 mg/dL    Glucose 469.0 (HH) 70.0 - 99.0 mg'dL    Potassium 4.1 3.2 - 4.6 mmol/dL    Sodium 130.8 (L) 132.0 - 142.0 mmol/L    Protein Total 8.5 6.8 - 8.8 g/dL    GFR Estimate 87.2 >60.0 mL/min/1.7 m2    GFR Estimate If Black >90 >60.0 mL/min/1.7 m2    Narrative    Critical Value was reported to and read back by Mayi at 5/1/2019 12:06 PM.   (lx)   CBC w/ Diff and Plt ([facility])   Result Value Ref Range    WBC 10.5 4.0 - 11.0 thou/uL    RBC 4.70 3.80 - 5.40 mill/uL    Hemoglobin 14.4 12.0 - 16.0 g/dL    Hematocrit 42.7 35.0 - 47.0 %    MCV 91 80 - 100 fL    MCH 30.6 27.0 - 34.0 pg    MCHC 33.7 32.0 - 36.0 g/dL    RDW 12.1 11.0 - 14.5 %    Platelets 245 140 - 440 thou/uL    Mean Platelet Volume 11.6 8.5 - 12.5 fL    % Neutrophils 68 50 - 70 %    % Lymphocytes 21 20 - 40 %    % Monocytes 4 2 - 10 %    % Eosinophils 5 0 - 6 %    % Basophils 1 0 - 2 %    Neutrophils (Absolute) 7.1 2.0 - 7.7 thou/uL    Lymphs (Absolute) " 2.2 0.8 - 4.4 thou/uL    Monocytes(Absolute) 0.5 0.0 - 0.9 thou/uL    Eos (Absolute) 0.6 (H) 0.0 - 0.4 thou/uL    Baso (Absolute) 0.1 0.0 - 0.2 thou/uL    Narrative    Test performed by:  [facility]  [address] [place] [address]   TSH  Sensitive ([facility])   Result Value Ref Range    TSH 2.34 0.30 - 5.00 uIU/mL    Narrative    Test performed by:  [facility] [address] [place] [address]       Assessment/Plan:  Emmanuel was seen today for generalized body aches and fatigue.    Diagnoses and all orders for this visit:    Fatigue, unspecified type  Type 2 diabetes mellitus with hyperglycemia, without long-term current use of insulin (H)  Mild transaminitis, unspecified  Patient presents with worsening fatigue over the past couple weeks, found to be hyperglycemic 2/2 medication non-adherence with unspecified mild transminitis. Stable vitals and benign exam. Normal TSH and no anemia/normal CBC. I suspect patient is feeling fatigued related to her uncontrolled blood sugars, though she denies any polyuria/polyphagia/polydipsia. Prescribed Glyburide, Metformin and Januvia as well as supplies to check her glucose but has no knowledge of these. She has a home nurse visiting in 2 days and several young people at home to assist with medication adherence so I encouraged her and her friend today to utilize these resources. Recommended to follow up with Dr. Rodriguez, next available time that works for patient is on 5/31/19. I recommended follow up in the next week with another provider anyway to recheck blood sugars. Patient agreeable. Plan to work up elevated LFTs at next visit, patient denies any GI symptoms except mild chronic constipation.   -     Comprehensive Metabolic Panel ([facility])  -     CBC w/ Diff and Plt [facility])  -     TSH  Sensitive ([facility])    Options for treatment and follow-up care were reviewed with the patient who was engaged and actively involved in the decision  making process, verbalized understanding of the options discussed, and satisfied with the final plan.    Patient was staffed with supervising physician, Dr. Buckner.     Luigi Durant MD, PGY-2  [facility]

## 2019-05-01 NOTE — RESULT ENCOUNTER NOTE
"Please call patient with results. \"Your lab tests returned abnormal. Your liver function tests are slightly abnormal and your blood sugar is very high. The good news is that your blood level and thyroid function are normal and there are no signs of infection. Please start taking your diabetes medications again soon. I encourage you to return to clinic or go to the ED if you're feeling sick at any time. We can discuss your abnormal liver tests at your next visit. Please call with any questions. All the best, Luigi Durant\""

## 2019-05-01 NOTE — PROGRESS NOTES
"Preceptor attestation:  Vital signs reviewed: /77   Pulse 98   Temp 98.3  F (36.8  C) (Oral)   Resp 24   Ht 1.486 m (4' 10.5\")   Wt 48.4 kg (106 lb 12.8 oz)   SpO2 96%   Breastfeeding? No   BMI 21.94 kg/m      Patient seen, evaluated, and discussed with the resident.  I have verified the content of the note, which accurately reflects my assessment of the patient and the plan of care.    Supervising physician: Lesia Buckner MD  [facility]  "

## 2019-05-01 NOTE — NURSING NOTE
Due to patient being non-English speaking/uses sign language, an  was used for this visit. Only for face-to-face interpretation by an external agency, date and length of interpretation can be found on the scanned worksheet.     name: [id number]  Agency: [employer]  Language: Yasmin   Telephone number:   Type of interpretation: Face-to-face, spoken

## 2019-05-01 NOTE — PATIENT INSTRUCTIONS
- Will call with blood test results  - Ask somebody at home to help set up medications: should be taking Metformin, Glyburide, Januvia once daily  - Ask your home nurse this Friday to help set up medications and teach you how to check blood sugars  - Follow up in 1 week with Dr. Rodriguez

## 2019-05-08 DIAGNOSIS — E11.65 TYPE 2 DIABETES MELLITUS WITH HYPERGLYCEMIA, WITHOUT LONG-TERM CURRENT USE OF INSULIN (H): ICD-10-CM

## 2019-05-09 ENCOUNTER — MEDICAL CORRESPONDENCE (OUTPATIENT)
Dept: HEALTH INFORMATION MANAGEMENT | Facility: CLINIC | Age: 74
End: 2019-05-09

## 2019-05-10 ENCOUNTER — TELEPHONE (OUTPATIENT)
Dept: FAMILY MEDICINE | Facility: CLINIC | Age: 74
End: 2019-05-10

## 2019-05-10 RX ORDER — BLOOD-GLUCOSE METER
EACH MISCELLANEOUS
Qty: 1 KIT | Refills: 0 | Status: SHIPPED | OUTPATIENT
Start: 2019-05-10 | End: 2019-07-29

## 2019-05-10 NOTE — TELEPHONE ENCOUNTER
I s/w the pt, she is willing to come to DM class with our health educator on Tuesday, May 21st at 2-3pm. She is checking her blood sugars at home but is not understanding how to read it. Advised pt to bring her supplies to the meeting on Tuesday and we can go over meter instructions.     At next visit, RN needs to do the followin. Check on how things are going with checking blood sugars  2. Do the initial assessment  3. Pt was referred to [facility] diabetic education -- how is that going? Did you go to any of these classes?    /ADEN Parra

## 2019-05-31 ENCOUNTER — ALLIED HEALTH/NURSE VISIT (OUTPATIENT)
Dept: FAMILY MEDICINE | Facility: CLINIC | Age: 74
End: 2019-05-31
Payer: COMMERCIAL

## 2019-05-31 ENCOUNTER — OFFICE VISIT (OUTPATIENT)
Dept: FAMILY MEDICINE | Facility: CLINIC | Age: 74
End: 2019-05-31
Payer: COMMERCIAL

## 2019-05-31 VITALS
TEMPERATURE: 98 F | OXYGEN SATURATION: 97 % | DIASTOLIC BLOOD PRESSURE: 72 MMHG | HEART RATE: 83 BPM | RESPIRATION RATE: 16 BRPM | BODY MASS INDEX: 23.22 KG/M2 | WEIGHT: 113 LBS | SYSTOLIC BLOOD PRESSURE: 116 MMHG

## 2019-05-31 DIAGNOSIS — E11.65 TYPE 2 DIABETES MELLITUS WITH HYPERGLYCEMIA, WITHOUT LONG-TERM CURRENT USE OF INSULIN (H): ICD-10-CM

## 2019-05-31 DIAGNOSIS — E11.65 TYPE 2 DIABETES MELLITUS WITH HYPERGLYCEMIA, WITHOUT LONG-TERM CURRENT USE OF INSULIN (H): Primary | ICD-10-CM

## 2019-05-31 DIAGNOSIS — Z71.9 ENCOUNTER FOR EDUCATION: Primary | ICD-10-CM

## 2019-05-31 LAB
ALBUMIN SERPL BCP-MCNC: 3.5 G/DL (ref 3.5–5)
ALP SERPL-CCNC: 135 U/L (ref 45–120)
ALT SERPL W/O P-5'-P-CCNC: 14 U/L (ref 0–45)
ANION GAP SERPL CALCULATED.3IONS-SCNC: 9 MMOL/L (ref 5–18)
AST SERPL-CCNC: 17 U/L (ref 0–40)
BILIRUB SERPL-MCNC: 0.4 MG/DL (ref 0–1)
BUN SERPL-MCNC: 6 MG/DL (ref 8–28)
CALCIUM SERPL-MCNC: 9.6 MG/DL (ref 8.5–10.5)
CHLORIDE SERPL-SCNC: 104 MMOL/L (ref 98–107)
CO2 SERPL-SCNC: 25 MMOL/L (ref 22–31)
CREAT SERPL-MCNC: 0.79 MG/DL (ref 0.6–1.1)
GLUCOSE SERPL-MCNC: 234 MG/DL (ref 70–125)
HBA1C MFR BLD: 12.1 % (ref 4.1–5.7)
POTASSIUM SERPL-SCNC: 3.8 MMOL/L (ref 3.5–5)
PROT SERPL-MCNC: 7.5 G/DL (ref 6–8)
SODIUM SERPL-SCNC: 138 MMOL/L (ref 136–145)

## 2019-05-31 RX ORDER — METFORMIN HCL 500 MG
1500 TABLET, EXTENDED RELEASE 24 HR ORAL
Qty: 360 TABLET | Refills: 3 | Status: SHIPPED | OUTPATIENT
Start: 2019-05-31 | End: 2020-01-16

## 2019-05-31 NOTE — PATIENT INSTRUCTIONS
We will discuss insulin as a treatment for your diabetes again at your next visit.    Increase metformin to 1500mg for one week and then increase to 2000mg daily.

## 2019-05-31 NOTE — LETTER
June 5, 2019      Emmanuel Say  [address]  [place] [address]        Dear Emmanuel,    Please see below for your test results.    Resulted Orders   Hemoglobin A1c ([facility])   Result Value Ref Range    Hemoglobin A1C 12.1 (H) 4.1 - 5.7 %   Comprehensive Metabolic ([facility]) - Results > 1 hr   Result Value Ref Range    Sodium 138 136 - 145 mmol/L    Potassium 3.8 3.5 - 5.0 mmol/L    Chloride 104 98 - 107 mmol/L    CO2, Total 25 22 - 31 mmol/L    Anion Gap 9 5 - 18 mmol/L    Glucose 234 (H) 70 - 125 mg/dL    Urea Nitrogen 6 (L) 8 - 28 mg/dL    Creatinine 0.79 0.60 - 1.10 mg/dL    GFR Estimate If Black >60 >60 mL/min/1.73m2    GFR Estimate >60 >60 mL/min/1.73m2    Bilirubin Total 0.4 0.0 - 1.0 mg/dL    Calcium 9.6 8.5 - 10.5 mg/dL    Protein Total 7.5 6.0 - 8.0 g/dL    Albumin 3.5 3.5 - 5.0 g/dL    Alkaline Phosphatase 135 (H) 45 - 120 U/L    AST (SGOT) 17 0 - 40 U/L    ALT (SGPT) 14 0 - 45 U/L    Narrative    Test performed by:  [facility] [address] [place] [address]  Fasting Glucose reference range is 70-99 mg/dL per  American Diabetes Association (ADA) guidelines.                 Your sugars are still very high.  I recommend that we increase your metformin and start insulin.   We will discuss insulin more at your next visit.    If you have any questions, please call the clinic to make an appointment.    Sincerely,    Clarissa Rodriguez MD

## 2019-05-31 NOTE — PROGRESS NOTES
Nursing Notes:   Juancarlos Warner, [facility]  5/31/2019  1:24 PM  Signed  Due to patient being non-English speaking/uses sign language, an  was used for this visit. Only for face-to-face interpretation by an external agency, date and length of interpretation can be found on the scanned worksheet.     name: Kaiser Joy  Agency: [employer]  Language: Yasmin   Telephone number: [phone]  Type of interpretation: Face-to-face, spoken        Chief Complaint   Patient presents with     Diabetes     Blood pressure 116/72, pulse 83, temperature 98  F (36.7  C), temperature source Oral, resp. rate 16, weight 51.3 kg (113 lb), SpO2 97 %, not currently breastfeeding.                 KEYUR Vences is a 73 year old  female with a PMH significant for:     Patient Active Problem List   Diagnosis     Health Care Home     Esophageal reflux     Osteoarthrosis, unspecified whether generalized or localized, involving lower leg     Lumbosacral spondylosis without myelopathy     Hyperlipidemia     Type 2 diabetes mellitus with hyperglycemia, without long-term current use of insulin (H)     Seasonal allergies     Screening for depression     Microalbuminuria     She presents with follow-up of type 2 diabetes.  She is having her nurse set up all of her pills.  She confirms she is taking them regularly.  She is on glyburide, metformin 1000 mg daily, Januvia 100 mg daily.  We tried to get a Trulicity but was not covered by her insurance at this time.  She has been resistant to starting insulin.  We discussed that her A1c is up to 12 from 11.  And this is despite her saying she is compliant with her medications.  We discussed diabetes physiology and that after having diabetes for a long time we do not make enough of your own insulin meaning we need to replace it.  Discussed that there is no other treatments besides which she is already on an insulin.  She is still quite resistant to this but willing to talk about a more next  visit to consider it.  Has not been using her meter.  She did not know how to use it.  Nurses met with her today to help her learn to use it.  Denies chest pain, shortness of breath, abdominal pain, swelling, pain in the feet.  She shows me a letter from her insurance company that they are switching from current One Touch meter to Accu check guide, domi smart meter.    PMH, Medications and Allergies were reviewed and updated as needed.     A [employer]  was used for this visit.           Physical Exam:     Vitals:    05/31/19 1321   BP: 116/72   Pulse: 83   Resp: 16   Temp: 98  F (36.7  C)   TempSrc: Oral   SpO2: 97%   Weight: 51.3 kg (113 lb)     Body mass index is 23.22 kg/m .    Exam:  Constitutional: healthy, alert and no distress  Cardiovascular:RRR. No murmurs, clicks gallops or rub  Respiratory:  normal respiratory rate and rhythm, lungs clear to auscultation. No wheezes or crackles.  Abdomen: +BS, soft, nontender, nondistended. No HSM.  Extremities: No C/C/E in BLE.  Psychiatric: mentation appears normal and affect normal/bright      PHQ-9 SCORE 11/29/2016 4/16/2018 4/19/2019   PHQ-9 Total Score 0 7 11     Results for orders placed or performed in visit on 05/31/19   Hemoglobin A1c ([facility])   Result Value Ref Range    Hemoglobin A1C 12.1 (H) 4.1 - 5.7 %   Comprehensive Metabolic ([facility]) - Results > 1 hr   Result Value Ref Range    Sodium 138 136 - 145 mmol/L    Potassium 3.8 3.5 - 5.0 mmol/L    Chloride 104 98 - 107 mmol/L    CO2, Total 25 22 - 31 mmol/L    Anion Gap 9 5 - 18 mmol/L    Glucose 234 (H) 70 - 125 mg/dL    Urea Nitrogen 6 (L) 8 - 28 mg/dL    Creatinine 0.79 0.60 - 1.10 mg/dL    GFR Estimate If Black >60 >60 mL/min/1.73m2    GFR Estimate >60 >60 mL/min/1.73m2    Bilirubin Total 0.4 0.0 - 1.0 mg/dL    Calcium 9.6 8.5 - 10.5 mg/dL    Protein Total 7.5 6.0 - 8.0 g/dL    Albumin 3.5 3.5 - 5.0 g/dL    Alkaline Phosphatase 135 (H) 45 - 120 U/L    AST (SGOT) 17 0 - 40 U/L    ALT (SGPT) 14 0 -  45 U/L    Narrative    Test performed by:  [facility]  [address] [place] [address]  Fasting Glucose reference range is 70-99 mg/dL per  American Diabetes Association (ADA) guidelines.       Assessment and Plan     Emmanuel was seen today for diabetes.    Diagnoses and all orders for this visit:    Type 2 diabetes mellitus with hyperglycemia, without long-term current use of insulin (H): Increasing metformin slowly.  Continue other oral medications at current doses.  Had one today but she is still not ready for this.  Will discuss more at next visit.  -     Hemoglobin A1c ([facility])  -     Cancel: Comprehensive Metabolic Panel ([facility])  -     metFORMIN (GLUCOPHAGE-XR) 500 MG 24 hr tablet; Take 3 tablets (1,500 mg) by mouth daily (with dinner) And increase to 2000mg daily after one week.  -     Comprehensive Metabolic ([facility]) - Results > 1 hr        Patient Instructions   We will discuss insulin as a treatment for your diabetes again at your next visit.    Increase metformin to 1500mg for one week and then increase to 2000mg daily.    Return in about 1 month (around 6/28/2019), or Diabetes.     Options for treatment and/or follow-up care were reviewed with the patient. Emmanuel Vences was engaged and actively involved in the decision making process. She verbalized understanding of the options discussed and was satisfied with the final plan.    Clarissa Rodriguez MD

## 2019-05-31 NOTE — NURSING NOTE
Due to patient being non-English speaking/uses sign language, an  was used for this visit. Only for face-to-face interpretation by an external agency, date and length of interpretation can be found on the scanned worksheet.     name: Kaiser Joy  Agency: [employer]  Language: Yasmin   Telephone number: [phone]  Type of interpretation: Face-to-face, spoken

## 2019-06-04 NOTE — RESULT ENCOUNTER NOTE
Your sugars are still very high.  I recommend that we increase your metformin and start insulin.   We will discuss insulin more at your next visit.

## 2019-06-12 ENCOUNTER — TELEPHONE (OUTPATIENT)
Dept: FAMILY MEDICINE | Facility: CLINIC | Age: 74
End: 2019-06-12

## 2019-06-12 NOTE — TELEPHONE ENCOUNTER
Using [employer] ID # [id number] Called and spoke with patient.  Patient stated that she is doing well with her Blood sugars.  She states that they are ranging from 107-120's.  Patient at adult  so unable to give specific date and results.  She confirms that she is taking her DM medication.    Reminded patient that she has an appointment with Dr. Rodriguez on 6/28/19.  No other concerns or questions at this time.    Routed to Dr. Rodriguez/RICHIE Guan RN

## 2019-06-28 ENCOUNTER — OFFICE VISIT (OUTPATIENT)
Dept: FAMILY MEDICINE | Facility: CLINIC | Age: 74
End: 2019-06-28
Payer: COMMERCIAL

## 2019-06-28 ENCOUNTER — TELEPHONE (OUTPATIENT)
Dept: FAMILY MEDICINE | Facility: CLINIC | Age: 74
End: 2019-06-28

## 2019-06-28 VITALS
WEIGHT: 115.8 LBS | HEART RATE: 93 BPM | DIASTOLIC BLOOD PRESSURE: 70 MMHG | BODY MASS INDEX: 23.79 KG/M2 | OXYGEN SATURATION: 98 % | SYSTOLIC BLOOD PRESSURE: 109 MMHG | RESPIRATION RATE: 16 BRPM | TEMPERATURE: 98.5 F

## 2019-06-28 DIAGNOSIS — G89.29 CHRONIC PAIN OF RIGHT KNEE: ICD-10-CM

## 2019-06-28 DIAGNOSIS — E11.65 TYPE 2 DIABETES MELLITUS WITH HYPERGLYCEMIA, WITHOUT LONG-TERM CURRENT USE OF INSULIN (H): Primary | ICD-10-CM

## 2019-06-28 DIAGNOSIS — M25.561 CHRONIC PAIN OF RIGHT KNEE: ICD-10-CM

## 2019-06-28 RX ORDER — CAPSAICIN 0.025 %
CREAM (GRAM) TOPICAL
Qty: 120 G | Refills: 11 | Status: SHIPPED | OUTPATIENT
Start: 2019-06-28 | End: 2019-10-28

## 2019-06-28 NOTE — TELEPHONE ENCOUNTER
Emmanuel Doan Say is a pt we are care coordinating. I see that she was seen by you today. Has there been any changes in her DM treatment and would you like us to f/u with her, and in what manner? Thank you.   /ADEN Parra

## 2019-06-28 NOTE — PROGRESS NOTES
Nursing Notes:   Mary Kate Miguel, [facility]  6/28/2019  1:48 PM  Signed  Due to patient being non-English speaking/uses sign language, an  was used for this visit. Only for face-to-face interpretation by an external agency, date and length of interpretation can be found on the scanned worksheet.     name: Kaiser Schulz  Agency: [employer]  Language: Yasmin   Telephone number: [phone]  Type of interpretation: Face-to-face, spoken      Chief Complaint   Patient presents with     Diabetes     Pt is here to follow up on diabetes.     Medication Reconciliation     Complete.      Blood pressure 109/70, pulse 93, temperature 98.5  F (36.9  C), temperature source Oral, resp. rate 16, weight 52.5 kg (115 lb 12.8 oz), SpO2 98 %, not currently breastfeeding.                 KEYUR Vences is a 73 year old  female with a PMH significant for:     Patient Active Problem List   Diagnosis     Health Care Home     Esophageal reflux     Osteoarthrosis, unspecified whether generalized or localized, involving lower leg     Lumbosacral spondylosis without myelopathy     Hyperlipidemia     Type 2 diabetes mellitus with hyperglycemia, without long-term current use of insulin (H)     Seasonal allergies     Screening for depression     Microalbuminuria     She presents with diabetes follow up.  Did not bring her meter today or medication today.  The nurse sets up her meds for her. She is on maximum metformin, januvia and glyburide.  States her glucose at home are good.  Discussed again that her A1c is 12 which not good control and that she needs insulin.  She is declining daily injections.  She states she knows what this entails that she has many friends at the adult  have to do insulin.  Tried to discuss reasons why she does not want to do insulin she states she is already on a lot of medications.  Try to discuss why insulin is different and why she needs it now when she did not before.  It is unclear to me  if she is understanding this.  It became clear that she will not to a daily injection.  She did agree to try a once a week injection.  Denies changes in vision, chest pain, shortness of breath, stomach pain, nausea, vomiting, dysuria, lower extremity edema, pain in feet.    She also has some chronic right knee pain which has flared up a little recently.  She is using Tylenol as needed.  She has had no new injuries.  It hurts more when she uses it more.  She does not want injections.  She is wondering about topical medications I discussed capsaicin with her and she states this helped her in the past and she like a refill.    PMH, Medications and Allergies were reviewed and updated as needed.     A [employer]  was used for this visit.           Physical Exam:     Vitals:    06/28/19 1347   BP: 109/70   BP Location: Left arm   Patient Position: Sitting   Cuff Size: Adult Regular   Pulse: 93   Resp: 16   Temp: 98.5  F (36.9  C)   TempSrc: Oral   SpO2: 98%   Weight: 52.5 kg (115 lb 12.8 oz)     Body mass index is 23.79 kg/m .    Exam:  Constitutional: healthy, alert and no distress  Cardiovascular:RRR. No murmurs, clicks gallops or rub  Respiratory:  normal respiratory rate and rhythm, lungs clear to auscultation. No wheezes or crackles.  Abdomen: +BS, soft, nontender, nondistended. No HSM.  Extremities: No C/C/E in BLE. 2+DP pulses.  Joint exam without erythema, effusion and full ROM throughout.  Skin: no rashes.  Psychiatric: mentation appears normal and affect normal/bright      PHQ-9 SCORE 11/29/2016 4/16/2018 4/19/2019   PHQ-9 Total Score 0 7 11     Results for orders placed or performed in visit on 05/31/19   Hemoglobin A1c ([facility])   Result Value Ref Range    Hemoglobin A1C 12.1 (H) 4.1 - 5.7 %   Comprehensive Metabolic ([facility]) - Results > 1 hr   Result Value Ref Range    Sodium 138 136 - 145 mmol/L    Potassium 3.8 3.5 - 5.0 mmol/L    Chloride 104 98 - 107 mmol/L    CO2, Total 25 22 - 31 mmol/L     Anion Gap 9 5 - 18 mmol/L    Glucose 234 (H) 70 - 125 mg/dL    Urea Nitrogen 6 (L) 8 - 28 mg/dL    Creatinine 0.79 0.60 - 1.10 mg/dL    GFR Estimate If Black >60 >60 mL/min/1.73m2    GFR Estimate >60 >60 mL/min/1.73m2    Bilirubin Total 0.4 0.0 - 1.0 mg/dL    Calcium 9.6 8.5 - 10.5 mg/dL    Protein Total 7.5 6.0 - 8.0 g/dL    Albumin 3.5 3.5 - 5.0 g/dL    Alkaline Phosphatase 135 (H) 45 - 120 U/L    AST (SGOT) 17 0 - 40 U/L    ALT (SGPT) 14 0 - 45 U/L    Narrative    Test performed by:  [facility] [address] [place] [address]  Fasting Glucose reference range is 70-99 mg/dL per  American Diabetes Association (ADA) guidelines.       Assessment and Plan     Emmanuel was seen today for diabetes and medication reconciliation.    Diagnoses and all orders for this visit:    Type 2 diabetes mellitus with hyperglycemia, without long-term current use of insulin (H): Discussed that she needed insulin but she is declining to do daily injections.  She did agree to a weekly injection  Bydureon will be on her formulary as of July 1 and she agreed to try this.  Pharmacist discussed with her in detail.  She will come for another Pharm.D. visit to learn how to do the injections versus have  home nurse to do the injection weekly for her.  We will stop Januvia when starting Bydureon.  -     exenatide ER (BYDUREON) 2 MG pen; Inject 2 mg Subcutaneous every 7 days    Chronic pain of right knee: May have some component of arthritis normal exam today.  Gave capsaicin refill to help with pain.  Can also use Tylenol as needed.  -     capsaicin (ZOSTRIX) 0.025 % external cream; Apply to painful joints three times a day as needed    Follow-up with Pharm.D.'s when she gets her Bydureon and with me in 1 month.     Options for treatment and/or follow-up care were reviewed with the patient. Emmanuel Vences was engaged and actively involved in the decision making process. She verbalized understanding of the options discussed and was  satisfied with the final plan.    Clarissa Rodriguez MD

## 2019-06-28 NOTE — NURSING NOTE
Due to patient being non-English speaking/uses sign language, an  was used for this visit. Only for face-to-face interpretation by an external agency, date and length of interpretation can be found on the scanned worksheet.     name: Kaiser Schulz  Agency: [employer]  Language: Yasmin   Telephone number: [phone]  Type of interpretation: Face-to-face, spoken

## 2019-06-29 NOTE — PROGRESS NOTES
I briefly met with this patient to discuss the use of by durian pen.  Went over how to use the pen with patient including showing her a video of how to use it on the Internet.  Patient voiced understanding of how to use it however she did not believe that she would be able to actually administer it to herself.  It sounds like her we will ask her home health nurse to administer this medication.    Michelle Marshall, Pharm.D.

## 2019-07-08 ENCOUNTER — MEDICAL CORRESPONDENCE (OUTPATIENT)
Dept: HEALTH INFORMATION MANAGEMENT | Facility: CLINIC | Age: 74
End: 2019-07-08

## 2019-07-11 ENCOUNTER — TELEPHONE (OUTPATIENT)
Dept: FAMILY MEDICINE | Facility: CLINIC | Age: 74
End: 2019-07-11

## 2019-07-11 DIAGNOSIS — E11.65 TYPE 2 DIABETES MELLITUS WITH HYPERGLYCEMIA, WITHOUT LONG-TERM CURRENT USE OF INSULIN (H): Primary | ICD-10-CM

## 2019-07-11 NOTE — TELEPHONE ENCOUNTER
[facility] phone call message- medication clarification/question:    Full Medication Name:    INSULIN    Question:     Home care nurse is wondering if insulin should be administered if pt's blood sugar is lower than 100    Pharmacy confirmed as    [facility] - [place], [place] - [address]  [facility] - [place], [place] - [address]: Yes    OK to leave a message on voice mail? Yes    Primary language: Yasmin      needed? Yes    Call taken on July 11, 2019 at 3:54 PM by Wanda Cole

## 2019-07-11 NOTE — TELEPHONE ENCOUNTER
Spoke with DEJON Butts who is in home with patient.   HHN states that she has been seeing the patient for the past 60 days and has seen a notable decline in her BS.  When she first started seeing patient she was in the 300-400 range.  Example:  6/9/19= 487, 6/101/ = 316.    Patient current BS's:  7/3/19= 104  7/4/19= 144  7/5/  7/6/19=120  7/7/19= 87  7/8/19=128  7/9/19=114  7/10/18=563    Patient has been taking her oral DM meds (Metformin, Glyburide and Januvia).  Currently patient is refusing to start Bydureon (which has just recently been delivered).  Did review office note from 6/28/19 with DEJON re: stopping the Januvia when starting Bydureon.    HHN would like direction on next steps as patient is currently refusing to start the Bydureon and told the HHN that she will think about starting it next week.  Instructed to have patient continue to take (and be compliant with ) her oral medication for now and to continue to check her blood sugars.    Please advise.    Routed to Dr. Rodriguez/RICHIE Guan RN

## 2019-07-12 DIAGNOSIS — E11.65 TYPE 2 DIABETES MELLITUS WITH HYPERGLYCEMIA, WITHOUT LONG-TERM CURRENT USE OF INSULIN (H): ICD-10-CM

## 2019-07-15 NOTE — TELEPHONE ENCOUNTER
If patient is declining, continue Januvia.  Keep encouraging.  A1c 12% and really needs insulin.  Would like to transition to once weekly injection because she actually needs insulin but refused daily injections.    Great that sugars are better but likely will not be controlled with current regimen.  Clarissa Rodriguez MD

## 2019-07-16 NOTE — TELEPHONE ENCOUNTER
Relayed message to Fátima ASHFORD, that if patient is declining the weekly injection of Bydureon, then she should continue to take the Januvia and to please encourage the patient to take the injection. Fátima ASHFORD expressed her understanding of the orders.   Routed to Dr. Jennifer Chatman, RN BSN

## 2019-07-19 ENCOUNTER — OFFICE VISIT (OUTPATIENT)
Dept: FAMILY MEDICINE | Facility: CLINIC | Age: 74
End: 2019-07-19
Payer: COMMERCIAL

## 2019-07-19 VITALS
DIASTOLIC BLOOD PRESSURE: 75 MMHG | TEMPERATURE: 98.2 F | BODY MASS INDEX: 24.08 KG/M2 | HEART RATE: 91 BPM | RESPIRATION RATE: 16 BRPM | OXYGEN SATURATION: 99 % | SYSTOLIC BLOOD PRESSURE: 117 MMHG | WEIGHT: 117.2 LBS

## 2019-07-19 DIAGNOSIS — E11.65 TYPE 2 DIABETES MELLITUS WITH HYPERGLYCEMIA, WITHOUT LONG-TERM CURRENT USE OF INSULIN (H): Primary | ICD-10-CM

## 2019-07-19 LAB — HBA1C MFR BLD: 7.7 % (ref 4.1–5.7)

## 2019-07-19 NOTE — LETTER
July 19, 2019      Emmanuel Say  [address] [place] [address]        Dear Emmanuel,    Your diabetes is much better controlled when you are taking the medicines.  I think we should continue with your current medications and not worry about shots right now.    Please see below for your test results.    Resulted Orders   Hemoglobin A1c ([facility])   Result Value Ref Range    Hemoglobin A1C 7.7 (H) 4.1 - 5.7 %       If you have any questions, please call the clinic to make an appointment.    Sincerely,    Clarissa Rodriguez MD

## 2019-07-19 NOTE — PROGRESS NOTES
Nursing Notes:   Mayi Calvillo CMA  7/19/2019 11:29 AM  Signed  Due to patient being non-English speaking/uses sign language, an  was used for this visit. Only for face-to-face interpretation by an external agency, date and length of interpretation can be found on the scanned worksheet.     name: Alexander Schulz  Agency: [employer]  Language: Yasmin   Telephone number: [phone]  Type of interpretation: Face-to-face, spoken      Chief Complaint   Patient presents with     RECHECK     Follow up DM pt doesnt want to do insulin injection because her fingers are becoming tingly and numb     Blood pressure 117/75, pulse 91, temperature 98.2  F (36.8  C), temperature source Oral, resp. rate 16, weight 53.2 kg (117 lb 3.2 oz), SpO2 99 %, not currently breastfeeding.                 KEYUR Vences is a 73 year old  female with a PMH significant for:     Patient Active Problem List   Diagnosis     Health Care Home     Esophageal reflux     Osteoarthrosis, unspecified whether generalized or localized, involving lower leg     Lumbosacral spondylosis without myelopathy     Hyperlipidemia     Type 2 diabetes mellitus with hyperglycemia, without long-term current use of insulin (H)     Seasonal allergies     Screening for depression     Microalbuminuria     She presents with diabetes follow up.  Taking medications regularly.  Home nurse had reported and patient confirmed that she did not want to do the bydureon shot weekly.  She is worried that the shot will make the diabetes worse. She never wants to do shots no matter how bad the diabetes is.  But then said that if I say the shots will make the diabetes go away she would take it.  Reexplained diabetes as a chronic disease and that it won't go away but that shots are needed to better control it sometimes. She states that she is taking the pills now and would like to continue that for now and wait to do the shots.  Due for A1c today and she is okay  re-discussing this at next visit depending on that result.    Reveiwed meter: All readings are fasting in the AM and most are within range 7d avg 113, 14d 120, 30d 173.  Checking once a day.  DIET: Not following special diet.  Eats a lot of vegetables and long grained rice.  Eats a little meat a few times a week.  Eats fruit like mangos.    Fingers numb and tingling just on the tips in the morning but exercises help.  She thinks the exercises also help the diabetes.      No CP, SOB, gets more tired walking outside just .  No weakness numbness or tingling besides fingeres in the am as above.       PMH, Medications and Allergies were reviewed and updated as needed.     A [employer]  was used for this visit.           Physical Exam:     Vitals:    07/19/19 1128   BP: 117/75   Pulse: 91   Resp: 16   Temp: 98.2  F (36.8  C)   TempSrc: Oral   SpO2: 99%   Weight: 53.2 kg (117 lb 3.2 oz)     Body mass index is 24.08 kg/m .    Exam:  Constitutional: healthy, alert and no distress  Cardiovascular:RRR. No murmurs, clicks gallops or rub  Respiratory:  normal respiratory rate and rhythm, lungs clear to auscultation. No wheezes or crackles.  Abdomen: +BS, soft, nontender, nondistended.   Extremities: No C/C/E in BLE. 2+DP and PT pulses. No ulcers or sores noted.  Psychiatric: mentation appears normal and affect normal/bright      PHQ-9 SCORE 11/29/2016 4/16/2018 4/19/2019   PHQ-9 Total Score 0 7 11     Results for orders placed or performed in visit on 07/19/19   Hemoglobin A1c ([facility])   Result Value Ref Range    Hemoglobin A1C 7.7 (H) 4.1 - 5.7 %       Assessment and Plan     Emmanuel was seen today for recheck.    Diagnoses and all orders for this visit:    Type 2 diabetes mellitus with hyperglycemia, without long-term current use of insulin (H): The patient was noncompliant with medications in the past hence A1c of 12.1 at last check.  After visit today hemoglobin A1c comes back at 7.7.  Recommend continuing oral medications  at this time as she is resistant to doing injections.  Will change medication list to reflect this.  See her back in 1 month to review this.  May go to every 3-month visits if good control is continued.  -     Hemoglobin A1c ([facility])    Follow-up 1 month.    Total of 25 minutes was spent in face to face contact with patient with > 50% in counseling and coordination of care.  Options for treatment and/or follow-up care were reviewed with the patient. Emmanuel Vences was engaged and actively involved in the decision making process. She verbalized understanding of the options discussed and was satisfied with the final plan.      Clarissa Rodriguez MD

## 2019-07-19 NOTE — NURSING NOTE
Due to patient being non-English speaking/uses sign language, an  was used for this visit. Only for face-to-face interpretation by an external agency, date and length of interpretation can be found on the scanned worksheet.     name: Alexander Schulz  Agency: [employer]  Language: Yasmin   Telephone number: [phone]  Type of interpretation: Face-to-face, spoken

## 2019-07-19 NOTE — RESULT ENCOUNTER NOTE
Your diabetes is much better controlled when you are taking the medicines.  I think we should continue with your current medications and not worry about shots right now.

## 2019-07-23 ENCOUNTER — TELEPHONE (OUTPATIENT)
Dept: FAMILY MEDICINE | Facility: CLINIC | Age: 74
End: 2019-07-23

## 2019-07-23 DIAGNOSIS — E11.65 TYPE 2 DIABETES MELLITUS WITH HYPERGLYCEMIA, WITHOUT LONG-TERM CURRENT USE OF INSULIN (H): Primary | ICD-10-CM

## 2019-08-15 ENCOUNTER — OFFICE VISIT (OUTPATIENT)
Dept: FAMILY MEDICINE | Facility: CLINIC | Age: 74
End: 2019-08-15
Payer: COMMERCIAL

## 2019-08-15 VITALS
BODY MASS INDEX: 25.11 KG/M2 | TEMPERATURE: 98.1 F | SYSTOLIC BLOOD PRESSURE: 126 MMHG | HEIGHT: 58 IN | HEART RATE: 94 BPM | DIASTOLIC BLOOD PRESSURE: 79 MMHG | WEIGHT: 119.6 LBS | RESPIRATION RATE: 18 BRPM

## 2019-08-15 DIAGNOSIS — E11.65 TYPE 2 DIABETES MELLITUS WITH HYPERGLYCEMIA, WITHOUT LONG-TERM CURRENT USE OF INSULIN (H): Primary | ICD-10-CM

## 2019-08-15 ASSESSMENT — MIFFLIN-ST. JEOR: SCORE: 936.22

## 2019-08-15 NOTE — PROGRESS NOTES
Preceptor Attestation:   Patient seen, evaluated and discussed with the resident. I have verified the content of the note, which accurately reflects my assessment of the patient and the plan of care.   Supervising Physician:  Alexx Jurado MD

## 2019-08-15 NOTE — PROGRESS NOTES
SUBJECTIVE       Emmanuel Vences is a 74 year old  female with a PMHx significant for   Patient Active Problem List   Diagnosis     Health Care Home     Esophageal reflux     Osteoarthrosis, unspecified whether generalized or localized, involving lower leg     Lumbosacral spondylosis without myelopathy     Hyperlipidemia     Type 2 diabetes mellitus with hyperglycemia, without long-term current use of insulin (H)     Seasonal allergies     Screening for depression     Microalbuminuria     Who presents today to follow up DM.     Current medications include glyburide 5 mg daily, metformin 2000 mg daily, sitagliptin 100 mg daily. On atorvastatin and ASA as well. Last Hb A1c was 7.7 on 7/19/19.     Today, she is doing well. She is taking her DM medications as prescribed. She has tingling in fingers/toes. Trying to eat a lot of vegetables/fruits. Some meat.     BGL meter review: She took her blood sugar 4 times over the past month, all fasting. Numbers are: 106, 85, 83, 116.       ROS: As stated in HPI.    Current medications include:   Current Outpatient Medications   Medication Sig Dispense Refill     aspirin (ASA) 81 MG tablet Take 1 tablet (81 mg) by mouth daily 90 tablet 3     atorvastatin (LIPITOR) 40 MG tablet Take 1 tablet (40 mg) by mouth daily 90 tablet 4     cetirizine (ZYRTEC) 10 MG tablet Take 1 tablet (10 mg) by mouth daily 90 tablet 1     gabapentin (NEURONTIN) 300 MG capsule Take 1 tablet three times daily 90 capsule 5     glyBURIDE (DIABETA /MICRONASE) 5 MG tablet Take 1 tablet (5 mg) by mouth daily (with breakfast) 90 tablet 3     metFORMIN (GLUCOPHAGE-XR) 500 MG 24 hr tablet Take 3 tablets (1,500 mg) by mouth daily (with dinner) And increase to 2000mg daily after one week. 360 tablet 3     sitagliptin (JANUVIA) 100 MG tablet Take 1 tablet (100 mg) by mouth daily 90 tablet 3     blood glucose (NO BRAND SPECIFIED) lancets standard Use to test blood sugar 2 times daily or as directed. 100 each 11     blood  "glucose (NO BRAND SPECIFIED) test strip Use to test blood sugar 2 times daily or as directed. 100 strip 11     blood glucose monitoring (NO BRAND SPECIFIED) meter device kit Use to test blood sugar 2 times daily or as directed. 1 kit 0     capsaicin (ZOSTRIX) 0.025 % external cream Apply to painful joints three times a day as needed 120 g 11     Skin Protectants, Misc. (EUCERIN) cream Apply topically as needed for dry skin 240 g 3       PMH, Medications and Allergies were reviewed and updated as needed.        OBJECTIVE     Vitals:    08/15/19 1606   BP: 126/79   Pulse: 94   Resp: 18   Temp: 98.1  F (36.7  C)   Weight: 54.3 kg (119 lb 9.6 oz)   Height: 1.48 m (4' 10.25\")     Body mass index is 24.78 kg/m .    Gen:  Sitting in exam chair, pleasant, NAD  CV:  RRR. No murmurs, rubs, or gallops. Good peripheral pulses  Pulm:  CTAB, no wheezes, rales, or rhonchi  Extrem: Warm and well perfused.     No results found for this or any previous visit (from the past 24 hour(s)).    ASSESSMENT AND PLAN     1. Type 2 diabetes mellitus with hyperglycemia, without long-term current use of insulin (H)  Emmanuel presents to follow up DM. Current medications include glyburide 5 mg daily, metformin 2000 mg daily, sitagliptin 100 mg daily. On atorvastatin and ASA as well. Last Hb A1c was 7.7 on 7/19/19. She took her blood sugar 4 times over the past month, fasting ranged from . Recommended to check her BGL every morning prior to eating. Discussed continuing the current regimen and follow up with her PCP in 2 months for HbA1c.     RTC in 2 months for follow up of DMII or sooner if develops new or worsening symptoms. Return precautions discussed.     Discussed with MD Patrick Santana, PGY3  [facility]     "

## 2019-08-19 ENCOUNTER — MEDICAL CORRESPONDENCE (OUTPATIENT)
Dept: HEALTH INFORMATION MANAGEMENT | Facility: CLINIC | Age: 74
End: 2019-08-19

## 2019-08-19 ASSESSMENT — PATIENT HEALTH QUESTIONNAIRE - PHQ9: SUM OF ALL RESPONSES TO PHQ QUESTIONS 1-9: 14

## 2019-09-06 ENCOUNTER — MEDICAL CORRESPONDENCE (OUTPATIENT)
Dept: HEALTH INFORMATION MANAGEMENT | Facility: CLINIC | Age: 74
End: 2019-09-06

## 2019-09-10 DIAGNOSIS — M54.50 CHRONIC LEFT-SIDED LOW BACK PAIN WITHOUT SCIATICA: ICD-10-CM

## 2019-09-10 DIAGNOSIS — G89.29 CHRONIC LEFT-SIDED LOW BACK PAIN WITHOUT SCIATICA: ICD-10-CM

## 2019-09-10 RX ORDER — GABAPENTIN 300 MG/1
CAPSULE ORAL
Qty: 90 CAPSULE | Refills: 5 | Status: SHIPPED | OUTPATIENT
Start: 2019-09-10 | End: 2019-10-21

## 2019-10-09 ENCOUNTER — MEDICAL CORRESPONDENCE (OUTPATIENT)
Dept: HEALTH INFORMATION MANAGEMENT | Facility: CLINIC | Age: 74
End: 2019-10-09

## 2019-10-09 ENCOUNTER — DOCUMENTATION ONLY (OUTPATIENT)
Dept: FAMILY MEDICINE | Facility: CLINIC | Age: 74
End: 2019-10-09

## 2019-10-09 NOTE — PROGRESS NOTES
To be completed in Nursing note:    Please reference list for forms that require a visit for completion.  Please remind patients that providers are given 3-5 business days to complete and return forms.      Form type: [facility]    Date form received: 10/9/19    Date form completed by Physician: 10/9/19      Date form faxed  and sent to HIM for scanning: 10/11/19      Once form is left for patient, faxed, or mailed PCS will then close the documentation only encounter.

## 2019-10-21 ENCOUNTER — TELEPHONE (OUTPATIENT)
Dept: FAMILY MEDICINE | Facility: CLINIC | Age: 74
End: 2019-10-21

## 2019-10-21 NOTE — TELEPHONE ENCOUNTER
[facility] phone call message- general phone call:    Reason for call: Would like to update you on a medication that the patient stopped taking.  She stopped taking gabapentin. It was making patient to sleepy.    Return call needed: No    OK to leave a message on voice mail? No    Primary language: Yasmin      needed? Yes    Call taken on October 21, 2019 at 3:28 PM by Emily Curry

## 2019-10-28 ENCOUNTER — OFFICE VISIT (OUTPATIENT)
Dept: FAMILY MEDICINE | Facility: CLINIC | Age: 74
End: 2019-10-28
Payer: COMMERCIAL

## 2019-10-28 VITALS
TEMPERATURE: 98.1 F | WEIGHT: 114.4 LBS | SYSTOLIC BLOOD PRESSURE: 135 MMHG | RESPIRATION RATE: 18 BRPM | DIASTOLIC BLOOD PRESSURE: 81 MMHG | HEART RATE: 98 BPM | BODY MASS INDEX: 24.01 KG/M2 | HEIGHT: 58 IN

## 2019-10-28 DIAGNOSIS — R39.81 FUNCTIONAL INCONTINENCE: ICD-10-CM

## 2019-10-28 DIAGNOSIS — G89.29 CHRONIC PAIN OF RIGHT KNEE: ICD-10-CM

## 2019-10-28 DIAGNOSIS — Z23 NEED FOR PROPHYLACTIC VACCINATION AND INOCULATION AGAINST INFLUENZA: ICD-10-CM

## 2019-10-28 DIAGNOSIS — E11.65 TYPE 2 DIABETES MELLITUS WITH HYPERGLYCEMIA, WITHOUT LONG-TERM CURRENT USE OF INSULIN (H): Primary | ICD-10-CM

## 2019-10-28 DIAGNOSIS — L29.9 PRURITIC DISORDER: ICD-10-CM

## 2019-10-28 DIAGNOSIS — M25.561 CHRONIC PAIN OF RIGHT KNEE: ICD-10-CM

## 2019-10-28 LAB — HBA1C MFR BLD: 8.5 % (ref 4.1–5.7)

## 2019-10-28 RX ORDER — CETIRIZINE HYDROCHLORIDE 10 MG/1
10 TABLET ORAL DAILY
Qty: 90 TABLET | Refills: 3 | Status: SHIPPED | OUTPATIENT
Start: 2019-10-28 | End: 2020-08-31

## 2019-10-28 RX ORDER — CAPSAICIN 0.025 %
CREAM (GRAM) TOPICAL
Qty: 120 G | Refills: 11 | Status: SHIPPED | OUTPATIENT
Start: 2019-10-28 | End: 2020-06-15

## 2019-10-28 RX ORDER — GABAPENTIN 300 MG/1
300 CAPSULE ORAL 3 TIMES DAILY
COMMUNITY
End: 2019-10-28

## 2019-10-28 ASSESSMENT — MIFFLIN-ST. JEOR: SCORE: 908.66

## 2019-10-28 NOTE — PROGRESS NOTES
"    Nursing Notes:   Stephanie Balderas, [facility]  10/28/2019  1:29 PM  Signed  Due to patient being non-English speaking/uses sign language, an  was used for this visit. Only for face-to-face interpretation by an external agency, date and length of interpretation can be found on the scanned worksheet.       name:  Harjeet Chew  Agency: [employer]  Language: Yasmin  Telephone number:   [phone]  Type of interpretation:  Face-to-face, Spoken  Chief Complaint   Patient presents with     Diabetes     med     After taking Gabapentin, Hands feel tingly     Blood pressure 135/81, pulse 98, temperature 98.1  F (36.7  C), resp. rate 18, height 1.473 m (4' 10\"), weight 51.9 kg (114 lb 6.4 oz), not currently breastfeeding.                 KEYUR Vences is a 74 year old  female with a PMH significant for:     Patient Active Problem List   Diagnosis     Health Care Home     Esophageal reflux     Osteoarthrosis, unspecified whether generalized or localized, involving lower leg     Lumbosacral spondylosis without myelopathy     Hyperlipidemia     Type 2 diabetes mellitus with hyperglycemia, without long-term current use of insulin (H)     Seasonal allergies     Screening for depression     Microalbuminuria     She presents with follow up diabetes.  Also gabapentin causing tingling in her hands.   She does not think it is helping any pains.  She was started on it in 2015 for some low back pain with radiculopathy.  No h/o neuropathy that I see in the chart.    No CP, SOB, weakness, numbness, or tingling. Does have pain in knees, but does not take medicine for this.  Does not want tylenol.  Does use capsaicin which is helpful.    In the morning she feels sick if sugar in the 100s.  Feels better if her sugar is in the 200s.  Does not check sugars regularly.  She checked once in the last 30 days on her meter. 231.    Also is needing a refill of adult pull-ups.  She uses these at night to help with functional " "incontinence.  She has trouble getting up from bed and getting to the bathroom in time.  She occasionally has this during the day as well but mostly at night.  She has a new Car[employer]  that sees her once a year but they have trouble communicating with her during the rest of the year due to language barriers.  They are not sure why they are not getting refills of pull-ups.  They do not know where they get them from.    PMH, Medications and Allergies were reviewed and updated as needed.     A Red Guru  was used for this visit.           Physical Exam:     Vitals:    10/28/19 1326   BP: 135/81   Pulse: 98   Resp: 18   Temp: 98.1  F (36.7  C)   Weight: 51.9 kg (114 lb 6.4 oz)   Height: 1.473 m (4' 10\")     Body mass index is 23.91 kg/m .    Exam:  Constitutional: healthy, alert and no distress  Cardiovascular:RRR. No murmurs, clicks gallops or rub  Respiratory:  normal respiratory rate and rhythm, lungs clear to auscultation. No wheezes or crackles.  Abdomen: +BS, soft, nontender, nondistended.   Extremities: No C/C/E in BLE.   Skin: no rashes.  Psychiatric: mentation appears normal and affect normal/bright      PHQ-9 SCORE 4/16/2018 4/19/2019 8/19/2019   PHQ-9 Total Score 7 11 14     Results for orders placed or performed in visit on 10/28/19   Hemoglobin A1c (LabDAQ)   Result Value Ref Range    Hemoglobin A1C 8.5 (H) 4.1 - 5.7 %       Assessment and Plan     Emmanuel was seen today for diabetes and med.    Diagnoses and all orders for this visit:    Type 2 diabetes mellitus with hyperglycemia, without long-term current use of insulin (H): Currently on Glyburide, metformin and Januvia at max doses.  Had tried to switch to Trulicity in the past but was not covered at that time.  A1c was 7.7 at last check so did not change regimen.  She is not checking her sugars regularly.  She feels better when her sugars in the 200s.  Suspect she is more poorly controlled today than last check.  Low threshold to switch to " GLP-1 from Januvia.  -     Hemoglobin A1c (LabDAQ)  -     Multiple Vitamins-Minerals (WOMENS 50+ MULTI VITAMIN/MIN) TABS; Take 1 tablet by mouth daily    Pruritic disorder: Requests refill of cetirizine for allergies and itching.    -     cetirizine (ZYRTEC) 10 MG tablet; Take 1 tablet (10 mg) by mouth daily    Chronic pain of right knee: Declines Tylenol.  Refilled capsaicin which helps sometimes.  -     capsaicin (ZOSTRIX) 0.025 % external cream; Apply to painful joints three times a day as needed    Functional incontinence: Printed prescription for adult pull-ups will work with care coordinator and  to see how we can get these coming regularly.  -     order for DME; Equipment being ordered: Adult pull ups.  Use one every night.    Healthcare maintenance: Given flu shot today and discussed Shingrix at pharmacy as well.      Patient Instructions   Stop gabapentin.  Use capsaicin cream as needed for knees.    Ask about Shingrix at Pharmacy.  This helps prevents shingles, a painful rash.         Follow-up: If diabetes well controlled can come back in 3 months otherwise we will have her come back sooner.      Options for treatment and/or follow-up care were reviewed with the patient. Emmanuel Vences was engaged and actively involved in the decision making process. She verbalized understanding of the options discussed and was satisfied with the final plan.    Clarissa Rodriguez MD

## 2019-10-28 NOTE — NURSING NOTE
Due to patient being non-English speaking/uses sign language, an  was used for this visit. Only for face-to-face interpretation by an external agency, date and length of interpretation can be found on the scanned worksheet.       name:  Harjeet Chew  Agency: [employer]  Language: Yasmin  Telephone number:   [phone]  Type of interpretation:  Face-to-face, Spoken

## 2019-10-28 NOTE — PATIENT INSTRUCTIONS
Stop gabapentin.  Use capsaicin cream as needed for knees.    Ask about Shingrix at Pharmacy.  This helps prevents shingles, a painful rash.

## 2019-10-29 NOTE — RESULT ENCOUNTER NOTE
Please call patient and ask her to come back in the next 2-4 weeks to discuss how to control diabetes better.  Her A1c was higher than last check at 8.5% and I would like to change one of her medicines to try to control the sugars better.

## 2019-11-01 ENCOUNTER — TELEPHONE (OUTPATIENT)
Dept: FAMILY MEDICINE | Facility: CLINIC | Age: 74
End: 2019-11-01

## 2019-11-01 NOTE — PROGRESS NOTES
DELIA attempted to contact [facility] [place] Mere Lainez ([phone]) regarding delay in brief supply. Mere is currently out of the office until 11/4/19. DELIA will abstain from further contact until 11/4/19.    NATANAEL Brizuela  11/1/2019

## 2019-11-01 NOTE — TELEPHONE ENCOUNTER
Patient has reoccurring orders for DME pull ups. Patient and family report that there are gap periods when they are waiting for the new order to arrive and they've already run out of the previous order. DELIA reviewed chart and noted [facility] worker is invovled in patient's cares. One note indicates reoccurring orders for pull ups as part of the [facility] cares. Called workerMere, and left a VM requesting a call back to discuss where the orders are coming from and how to increase the supply.     left a prescription in the case the vendor needs a new script. Will hold in SW f/u folder.    DANIELLE Naylor

## 2019-11-04 NOTE — TELEPHONE ENCOUNTER
DELIA recieved the following message from Mere [facility] Care Coordinator:    Sid Christopher,     I did get your phone call but thought I would respond this way.  Emmanuel gets her pullups through [facility].  I spoke with them and she has not had a supply since March.  The problem with this is that she is on auto call which means that every month she will get a call from [employer].  She has to call them or answer the phone when they call to request supplies for that month.  If they don't then it stops.  I have explained this multiple times but it does not help.  I have also explained this to the PCA who does speak English and she is the person who gets the calls. The prescription is good and I requested an order to be sent right away.  She gets 2 packs a month which totals 40.       Hope this helps.  Let me know if you need anything else.     Mere        Per the message, a new order is being placed currently. DELIA updated care teams with [employer] information as the pull up DME supply store- for future reference. DELIA will also make a note in the specialty comments to remind patient that she is on an auto call set up so she needs to (or have her PCA) confirm that a new order is needed on that phone call.     DANIELLE Naylor

## 2019-11-05 ENCOUNTER — MEDICAL CORRESPONDENCE (OUTPATIENT)
Dept: HEALTH INFORMATION MANAGEMENT | Facility: CLINIC | Age: 74
End: 2019-11-05

## 2019-11-06 ENCOUNTER — DOCUMENTATION ONLY (OUTPATIENT)
Dept: FAMILY MEDICINE | Facility: CLINIC | Age: 74
End: 2019-11-06

## 2019-11-06 NOTE — PROGRESS NOTES
To be completed in Nursing note:    Please reference list for forms that require a visit for completion.  Please remind patients that providers are given 3-5 business days to complete and return forms.      Form type:  [facility]    Date form received: 19    Date form completed by Physician: 19     Date form faxed for patient and sent to HIM for scannin19      Once form is left for patient, faxed, or mailed PCS will then close the documentation only encounter.

## 2019-11-08 ENCOUNTER — MEDICAL CORRESPONDENCE (OUTPATIENT)
Dept: HEALTH INFORMATION MANAGEMENT | Facility: CLINIC | Age: 74
End: 2019-11-08

## 2019-11-19 ENCOUNTER — DOCUMENTATION ONLY (OUTPATIENT)
Dept: FAMILY MEDICINE | Facility: CLINIC | Age: 74
End: 2019-11-19

## 2019-11-19 NOTE — PROGRESS NOTES
To be completed in Nursing note:    Please reference list for forms that require a visit for completion.  Please remind patients that providers are given 3-5 business days to complete and return forms.      Form type:Home Health Certification and Plan of Care    Date form received: 19    Date form completed by Physician:19    Date form faxed for patient and sent to HIM for scannin19      Once form is left for patient, faxed, or mailed PCS will then close the documentation only encounter.

## 2019-11-22 ENCOUNTER — MEDICAL CORRESPONDENCE (OUTPATIENT)
Dept: HEALTH INFORMATION MANAGEMENT | Facility: CLINIC | Age: 74
End: 2019-11-22

## 2019-11-22 ENCOUNTER — DOCUMENTATION ONLY (OUTPATIENT)
Dept: FAMILY MEDICINE | Facility: CLINIC | Age: 74
End: 2019-11-22

## 2019-11-22 NOTE — PROGRESS NOTES
To be completed in Nursing note:    Please reference list for forms that require a visit for completion.  Please remind patients that providers are given 3-5 business days to complete and return forms.      Form type:[facility]    Date form received: 19    Date form completed by Physician:19      Date form faxed for patient and sent to HIM for scannin19

## 2020-01-04 ENCOUNTER — MEDICAL CORRESPONDENCE (OUTPATIENT)
Dept: HEALTH INFORMATION MANAGEMENT | Facility: CLINIC | Age: 75
End: 2020-01-04

## 2020-01-16 ENCOUNTER — OFFICE VISIT (OUTPATIENT)
Dept: FAMILY MEDICINE | Facility: CLINIC | Age: 75
End: 2020-01-16
Payer: COMMERCIAL

## 2020-01-16 VITALS
DIASTOLIC BLOOD PRESSURE: 73 MMHG | WEIGHT: 111.6 LBS | HEART RATE: 103 BPM | BODY MASS INDEX: 23.43 KG/M2 | RESPIRATION RATE: 16 BRPM | HEIGHT: 58 IN | TEMPERATURE: 97.5 F | SYSTOLIC BLOOD PRESSURE: 122 MMHG

## 2020-01-16 DIAGNOSIS — E11.65 TYPE 2 DIABETES MELLITUS WITH HYPERGLYCEMIA, WITHOUT LONG-TERM CURRENT USE OF INSULIN (H): Primary | ICD-10-CM

## 2020-01-16 DIAGNOSIS — M54.50 CHRONIC LEFT-SIDED LOW BACK PAIN WITHOUT SCIATICA: ICD-10-CM

## 2020-01-16 DIAGNOSIS — G89.29 CHRONIC PAIN OF LEFT KNEE: ICD-10-CM

## 2020-01-16 DIAGNOSIS — M25.562 CHRONIC PAIN OF LEFT KNEE: ICD-10-CM

## 2020-01-16 DIAGNOSIS — G89.29 CHRONIC LEFT-SIDED LOW BACK PAIN WITHOUT SCIATICA: ICD-10-CM

## 2020-01-16 LAB — HBA1C MFR BLD: 10.3 % (ref 4.1–5.7)

## 2020-01-16 RX ORDER — METFORMIN HCL 500 MG
2000 TABLET, EXTENDED RELEASE 24 HR ORAL
Qty: 360 TABLET | Refills: 3 | Status: SHIPPED | OUTPATIENT
Start: 2020-01-16 | End: 2021-01-19

## 2020-01-16 RX ORDER — ACETAMINOPHEN 500 MG
500-1000 TABLET ORAL EVERY 8 HOURS PRN
Qty: 100 TABLET | Refills: 11 | Status: SHIPPED | OUTPATIENT
Start: 2020-01-16 | End: 2020-06-25

## 2020-01-16 ASSESSMENT — PATIENT HEALTH QUESTIONNAIRE - PHQ9: SUM OF ALL RESPONSES TO PHQ QUESTIONS 1-9: 5

## 2020-01-16 ASSESSMENT — MIFFLIN-ST. JEOR: SCORE: 899.93

## 2020-01-16 NOTE — PATIENT INSTRUCTIONS
Bring meter to your next visit.    Family to help you remember to take medications every day.

## 2020-01-16 NOTE — PROGRESS NOTES
"Nursing Notes:   Stephanie Balderas, [facility]  1/16/2020  9:34 AM  Signed  Due to patient being non-English speaking/uses sign language, an  was used for this visit. Only for face-to-face interpretation by an external agency, date and length of interpretation can be found on the scanned worksheet.       name:  Harjeet Chew  Agency: [employer]  Language: Yasmin  Telephone number:   [phone]  Type of interpretation:  Face-to-face, Spoken  Chief Complaint   Patient presents with     Diabetes     Blood pressure 122/73, pulse 103, temperature 97.5  F (36.4  C), resp. rate 16, height 1.48 m (4' 10.25\"), weight 50.6 kg (111 lb 9.6 oz), not currently breastfeeding.                 KEYUR Vences is a 74 year old  female with a PMH significant for:     Patient Active Problem List   Diagnosis     Health Care Home     Esophageal reflux     Osteoarthrosis, unspecified whether generalized or localized, involving lower leg     Lumbosacral spondylosis without myelopathy     Hyperlipidemia     Type 2 diabetes mellitus with hyperglycemia, without long-term current use of insulin (H)     Seasonal allergies     Screening for depression     Microalbuminuria     She presents with for diabetes and aching joints.    Diabetes is not well controlled today, A1c 10.3%.  She states she misses medication 2-3 times a week.  Nurse sets up the medications so this helps. Nurse visits once a week. No CP, SOB, or weakness, numbness.  Some tingling intermittently.  No hypoglycemia symptoms.  She did not bring her meter or medications today.  Checking blood sugar once every other day.  She states the numbers are 130-150 fasting.  Eating rice and soup and veggies, not much meat. Eating handful of white rice per meal.       Aching joints is chronic and she thinks it from aging.  Off and on pain and not worse overall.  She is doing cream that is helping some.  She is also using tylenol as needed.  She would like a refill of that.  No " "injuries or fall. Continues to have bilateral knee pain. Left knee is the worse.  Has not had Xrays before.    PMH, Medications and Allergies were reviewed and updated as needed.     A Astech  was used for this visit.           Physical Exam:     Vitals:    01/16/20 0932   BP: 122/73   Pulse: 103   Resp: 16   Temp: 97.5  F (36.4  C)   Weight: 50.6 kg (111 lb 9.6 oz)   Height: 1.48 m (4' 10.25\")     Body mass index is 23.12 kg/m .  Wt Readings from Last 4 Encounters:   01/16/20 50.6 kg (111 lb 9.6 oz)   10/28/19 51.9 kg (114 lb 6.4 oz)   08/15/19 54.3 kg (119 lb 9.6 oz)   07/19/19 53.2 kg (117 lb 3.2 oz)        Exam:  Constitutional: healthy, alert and no distress  Cardiovascular:RRR. No murmurs, clicks gallops or rub  Respiratory:  normal respiratory rate and rhythm, lungs clear to auscultation. No wheezes or crackles.  Extremities: No C/C/E in BLE. 2+DP and PT pulses. Foot has normal sensation and skin intact with no ulcers or calluses. Joint exam without erythema, effusion. Bilateral knees tender on joint line bilaterally.  Psychiatric: mentation appears normal and affect normal/bright    PHQ-9 SCORE 4/19/2019 8/19/2019 1/16/2020   PHQ-9 Total Score 11 14 5     Results for orders placed or performed in visit on 01/16/20   Hemoglobin A1c (LabDAQ)     Status: Abnormal   Result Value Ref Range    Hemoglobin A1C 10.3 (H) 4.1 - 5.7 %       Assessment and Plan     Emmanuel was seen today for diabetes.    Diagnoses and all orders for this visit:    Type 2 diabetes mellitus with hyperglycemia, without long-term current use of insulin (H): Worsening control today.  This is secondary to noncompliance with medications.  Son is with her to the visit today and agrees to help her remember to take her medicines every day.  Discussed that if we can get her controlled on oral medications we would have to consider injections such as insulin or GLP-1 inhibitor.    -     Hemoglobin A1c (LabDAQ)  -     metFORMIN (GLUCOPHAGE-XR) " 500 MG 24 hr tablet; Take 4 tablets (2,000 mg) by mouth daily (with dinner)    Chronic pain of left knee  Chronic left-sided low back pain without sciatica: No recent trauma and these pains are unchanged in the past.  She was to continue using capsaicin and Tylenol as needed.  Refilled Tylenol.   Discussed that we can do x-rays of her knees to see if there is arthritis and if so could do joint injections to relieve some of the pain.  She declines this at this time.  I also would be hesitant to do steroid injections until her diabetes is better controlled which we discussed.  -     acetaminophen (TYLENOL) 500 MG tablet; Take 1-2 tablets (500-1,000 mg) by mouth every 8 hours as needed for mild pain        Patient Instructions   Bring meter to your next visit.    Family to help you remember to take medications every day.         Follow-up in 1 month for back pain and diabetes.  Bring her meter and medications to the visit.     Options for treatment and/or follow-up care were reviewed with the patient. Emmanuel Vences was engaged and actively involved in the decision making process. She verbalized understanding of the options discussed and was satisfied with the final plan.    Clarissa Rodriguez MD

## 2020-01-16 NOTE — NURSING NOTE
Due to patient being non-English speaking/uses sign language, an  was used for this visit. Only for face-to-face interpretation by an external agency, date and length of interpretation can be found on the scanned worksheet.       name:  Harjeet Chew  Agency: [employer]  Language: Yasmin  Telephone number:   [phone]  Type of interpretation:  Face-to-face, Spoken

## 2020-02-06 DIAGNOSIS — E11.65 TYPE 2 DIABETES MELLITUS WITH HYPERGLYCEMIA, WITHOUT LONG-TERM CURRENT USE OF INSULIN (H): ICD-10-CM

## 2020-02-06 RX ORDER — GLYBURIDE 5 MG/1
5 TABLET ORAL
Qty: 90 TABLET | Refills: 3 | Status: SHIPPED | OUTPATIENT
Start: 2020-02-06 | End: 2020-02-20

## 2020-02-06 RX ORDER — ASPIRIN 81 MG/1
TABLET, COATED ORAL
Qty: 90 TABLET | Refills: 3 | Status: SHIPPED | OUTPATIENT
Start: 2020-02-06 | End: 2021-02-12

## 2020-02-06 RX ORDER — SITAGLIPTIN 100 MG/1
TABLET, FILM COATED ORAL
Qty: 90 TABLET | Refills: 3 | Status: SHIPPED | OUTPATIENT
Start: 2020-02-06 | End: 2021-02-12

## 2020-02-20 ENCOUNTER — OFFICE VISIT (OUTPATIENT)
Dept: FAMILY MEDICINE | Facility: CLINIC | Age: 75
End: 2020-02-20
Payer: COMMERCIAL

## 2020-02-20 ENCOUNTER — OFFICE VISIT (OUTPATIENT)
Dept: PHARMACY | Facility: PHYSICIAN GROUP | Age: 75
End: 2020-02-20
Payer: COMMERCIAL

## 2020-02-20 VITALS
HEART RATE: 84 BPM | TEMPERATURE: 97.5 F | HEIGHT: 58 IN | SYSTOLIC BLOOD PRESSURE: 115 MMHG | BODY MASS INDEX: 23.63 KG/M2 | RESPIRATION RATE: 16 BRPM | WEIGHT: 112.6 LBS | DIASTOLIC BLOOD PRESSURE: 72 MMHG

## 2020-02-20 DIAGNOSIS — E11.65 TYPE 2 DIABETES MELLITUS WITH HYPERGLYCEMIA, WITHOUT LONG-TERM CURRENT USE OF INSULIN (H): Primary | ICD-10-CM

## 2020-02-20 DIAGNOSIS — E11.65 TYPE 2 DIABETES MELLITUS WITH HYPERGLYCEMIA, WITHOUT LONG-TERM CURRENT USE OF INSULIN (H): ICD-10-CM

## 2020-02-20 PROCEDURE — 99207 ZZC NO CHARGE LOS: CPT | Performed by: PHARMACIST

## 2020-02-20 RX ORDER — GLIPIZIDE 10 MG/1
10 TABLET, FILM COATED, EXTENDED RELEASE ORAL DAILY
Qty: 90 TABLET | Refills: 4 | Status: SHIPPED | OUTPATIENT
Start: 2020-02-20 | End: 2020-05-01

## 2020-02-20 RX ORDER — GLYBURIDE 5 MG/1
TABLET ORAL
Qty: 0.1 TABLET | Refills: 0 | Status: SHIPPED | OUTPATIENT
Start: 2020-02-20 | End: 2020-02-20

## 2020-02-20 ASSESSMENT — MIFFLIN-ST. JEOR: SCORE: 900.5

## 2020-02-20 NOTE — PROGRESS NOTES
Clinical Pharmacy Consult:                                                    Emmanuel Vences is a 74 year old female seen for a clinical pharmacist consult.  She was referred to me from Dr Rodriguez.     Reason for Consult: Accu-Chek Belle Education.    Discussion: Set the date and time on glucometer for this patient.  Provided education regarding how to use her new glucometer including how to poke her finger with the lancing device as well as how to check her blood sugars.  Patient was able to demonstrate how to use the monitor in front of me and her blood sugar was 167.  She tells me that she did not eat this morning.  I gave her blood glucose goals.  She reports that she had no additional questions.    Plan:  1. Check blood sugars twice daily as directed.    Michelle Marshall, Pharm.D.

## 2020-02-20 NOTE — NURSING NOTE
Due to patient being non-English speaking/uses sign language, an  was used for this visit. Only for face-to-face interpretation by an external agency, date and length of interpretation can be found on the scanned worksheet.       name: Blane Gallego  Language: Yasmin  Agency:  [employer]  Telephone number: [phone]  Type of interpretation:  Face-to-face, spoken

## 2020-02-20 NOTE — PROGRESS NOTES
"Nursing Notes:   Stephanie Balderas, [facility]  2/20/2020 10:08 AM  Signed  Due to patient being non-English speaking/uses sign language, an  was used for this visit. Only for face-to-face interpretation by an external agency, date and length of interpretation can be found on the scanned worksheet.       name: Blane Gallego  Language: Yasmin  Agency:  [employer]  Telephone number: [phone]  Type of interpretation:  Face-to-face, spoken  Chief Complaint   Patient presents with     Diabetes     DM check     Blood pressure 115/72, pulse 84, temperature 97.5  F (36.4  C), resp. rate 16, height 1.473 m (4' 10\"), weight 51.1 kg (112 lb 9.6 oz), not currently breastfeeding.                 KEYUR Vences is a 74 year old  female with a PMH significant for:     Patient Active Problem List   Diagnosis     Health Care Home     Esophageal reflux     Osteoarthrosis, unspecified whether generalized or localized, involving lower leg     Lumbosacral spondylosis without myelopathy     Hyperlipidemia     Type 2 diabetes mellitus with hyperglycemia, without long-term current use of insulin (H)     Seasonal allergies     Screening for depression     Microalbuminuria     She presents with diabetes follow up.  Only checked blood sugar  3 times once on the sixth and twice on the 17th.  Sugars were in the morning fasting and were 109-1 30.  She has a new meter which she does not know how to use.  I think this is part of the problem with checking her sugars.  She also forgets to check.    She is remembering her medications better than in the past.  Her son is with her today and is helping remind her.  She still thinks she may be forgets them once a week.  She really does not want to do injection medications.  We talked about Invokana and how it could help her control.    Discussed medications with Pharm.D. and they recommended some changes based on her creatinine clearance from May 2019 was 42.3.  Recommended she switch from " "glyburide to glipizide.  Reviewed chart and in April we had stopped glipizide due to possible itching from that.  She had been on it for a long time before however.  Patient is okay with restarting it.    PMH, Medications and Allergies were reviewed and updated as needed.     A [employer]ana Mobile  was used for this visit.           Physical Exam:     Vitals:    02/20/20 1009   BP: 115/72   Pulse: 84   Resp: 16   Temp: 97.5  F (36.4  C)   Weight: 51.1 kg (112 lb 9.6 oz)   Height: 1.473 m (4' 10\")     Body mass index is 23.53 kg/m .  Wt Readings from Last 4 Encounters:   02/20/20 51.1 kg (112 lb 9.6 oz)   01/16/20 50.6 kg (111 lb 9.6 oz)   10/28/19 51.9 kg (114 lb 6.4 oz)   08/15/19 54.3 kg (119 lb 9.6 oz)        Exam:  Constitutional: healthy, alert and no distress  Cardiovascular:RRR. No murmurs, clicks gallops or rub  Respiratory:  normal respiratory rate and rhythm, lungs clear to auscultation. No wheezes or crackles.  Extremities: No C/C/E in BLE.   Skin: no rashes.  Psychiatric: mentation appears normal and affect normal/bright      PHQ-9 SCORE 4/19/2019 8/19/2019 1/16/2020   PHQ-9 Total Score 11 14 5         Assessment and Plan     Emmanuel was seen today for diabetes.    Diagnoses and all orders for this visit:    Type 2 diabetes mellitus with hyperglycemia, without long-term current use of insulin (H): Has been poorly controlled.  Was noncompliant with medications at last visit but is improved her in compliance now.  We will also add canagliflozin. Switching back to glipizide from glyburide due to her age and creatinine clearance.  Will watch for itching.    Consider decreasing metformin in the future if creatinine clearance continues to be low based on let next visit's BMP.  If continues to have microalbuminuria consider small dose of lisinopril.  Decided to not change those today to keep regimen simple.  -     canagliflozin 100 MG PO tablet; Take 1 tablet (100 mg) by mouth every morning (before breakfast)  -     " glipiZIDE 10 MG PO 24 hr tablet; Take 1 tablet (10 mg) by mouth daily  -     Discontinue: glyBURIDE 5 MG PO tablet; Stop medication and all refills, switching to glipizide.    Plan to check A1c BMP lipids microalbumin at next visit   In 1 month.     Options for treatment and/or follow-up care were reviewed with the patient. Emmanuel Vences was engaged and actively involved in the decision making process. She verbalized understanding of the options discussed and was satisfied with the final plan.    Clarissa Rodriguez MD

## 2020-03-04 ENCOUNTER — DOCUMENTATION ONLY (OUTPATIENT)
Dept: FAMILY MEDICINE | Facility: CLINIC | Age: 75
End: 2020-03-04

## 2020-03-04 ENCOUNTER — MEDICAL CORRESPONDENCE (OUTPATIENT)
Dept: HEALTH INFORMATION MANAGEMENT | Facility: CLINIC | Age: 75
End: 2020-03-04

## 2020-03-04 NOTE — PROGRESS NOTES
To be completed in Nursing note:    Please reference list for forms that require a visit for completion.  Please remind patients that providers are given 3-5 business days to complete and return forms.      Form type:Home Health Certification and plan of care (2020-2020)    Date form received: 2020    Date form completed by Physician:2020    How was form returned to patient (mailed, faxed, or at  for patient to ):[phone]    Date form mailed/faxed/left at  for patient and sent to HIM for [email or web address]      Once form is left for patient, faxed, or mailed PCS will then close the documentation only encounter.

## 2020-03-12 ENCOUNTER — MEDICAL CORRESPONDENCE (OUTPATIENT)
Dept: HEALTH INFORMATION MANAGEMENT | Facility: CLINIC | Age: 75
End: 2020-03-12

## 2020-03-17 ENCOUNTER — DOCUMENTATION ONLY (OUTPATIENT)
Dept: FAMILY MEDICINE | Facility: CLINIC | Age: 75
End: 2020-03-17

## 2020-03-19 ENCOUNTER — VIRTUAL VISIT (OUTPATIENT)
Dept: FAMILY MEDICINE | Facility: CLINIC | Age: 75
End: 2020-03-19
Payer: COMMERCIAL

## 2020-03-19 DIAGNOSIS — E11.65 TYPE 2 DIABETES MELLITUS WITH HYPERGLYCEMIA, WITHOUT LONG-TERM CURRENT USE OF INSULIN (H): Primary | ICD-10-CM

## 2020-03-19 DIAGNOSIS — E11.65 TYPE 2 DIABETES MELLITUS WITH HYPERGLYCEMIA, WITHOUT LONG-TERM CURRENT USE OF INSULIN (H): ICD-10-CM

## 2020-03-19 LAB
BUN SERPL-MCNC: 11.4 MG/DL (ref 7–19)
CALCIUM SERPL-MCNC: 10 MG/DL (ref 8.5–10.1)
CHLORIDE SERPLBLD-SCNC: 102.3 MMOL/L (ref 98–110)
CHOLEST SERPL-MCNC: 328.4 MG/DL (ref 0–200)
CHOLEST/HDLC SERPL: 5.1 {RATIO} (ref 0–5)
CO2 SERPL-SCNC: 25.6 MMOL/L (ref 20–32)
CREAT SERPL-MCNC: 0.7 MG/DL (ref 0.5–1)
CREAT UR-MCNC: 59.7 MG/DL
GFR SERPL CREATININE-BSD FRML MDRD: >90 ML/MIN/1.7 M2
GLUCOSE SERPL-MCNC: 222.6 MG'DL (ref 70–99)
HBA1C MFR BLD: 11.1 % (ref 4.1–5.7)
HDLC SERPL-MCNC: 64.2 MG/DL
LDLC SERPL CALC-MCNC: 217 MG/DL (ref 0–129)
MICROALBUMIN UR-MCNC: 7.92 MG/DL (ref 0–1.99)
MICROALBUMIN/CREAT UR: 132.7 MG/G
POTASSIUM SERPL-SCNC: 4.2 MMOL/L (ref 3.2–4.6)
SODIUM SERPL-SCNC: 135.8 MMOL/L (ref 132–142)
TRIGL SERPL-MCNC: 236.6 MG/DL (ref 0–150)
VLDL CHOLESTEROL: 47.3 MG/DL (ref 7–32)

## 2020-03-19 NOTE — PROGRESS NOTES
"Family Medicine Telephone Visit Note               Telephone Visit Consent   Patient was verbally read the following and verbal consent was obtained.  \"I understand that I may revoke this request for a phone visit at any time.  This consent will automatically  3 months from the signed date and time.\"    Name person giving consent:  Patient   Date verbal consent given:  3/19/2020  Time verbal consent given:  9:37 AM      Chief Complaint   Patient presents with     Diabetes     her numbers go up and down, sometiems at 200, 100, 90, 70 they are not steady, when below 100 she does not have headaches or dizziness but sometiems she does feel shakey      Current Outpatient Medications   Medication Sig Dispense Refill     acetaminophen (TYLENOL) 500 MG tablet Take 1-2 tablets (500-1,000 mg) by mouth every 8 hours as needed for mild pain 100 tablet 11     ASPIRIN LOW DOSE 81 MG EC tablet TAKE 1 TABLET (81 MG) BY MOUTH DAILY 90 tablet 3     atorvastatin (LIPITOR) 40 MG tablet Take 1 tablet (40 mg) by mouth daily 90 tablet 4     blood glucose monitoring (NO BRAND SPECIFIED) meter device kit Use to test blood sugar 2 times daily or as directed. 1 kit 0     blood glucose VI test strip Use to test blood sugar 2 times daily or as directed. 100 strip 11     blood glucose XX lancets standard Use to test blood sugar 2 times daily or as directed. 100 each 11     canagliflozin 100 MG PO tablet Take 1 tablet (100 mg) by mouth every morning (before breakfast) 90 tablet 4     capsaicin (ZOSTRIX) 0.025 % external cream Apply to painful joints three times a day as needed 120 g 11     cetirizine (ZYRTEC) 10 MG tablet Take 1 tablet (10 mg) by mouth daily 90 tablet 3     glipiZIDE 10 MG PO 24 hr tablet Take 1 tablet (10 mg) by mouth daily 90 tablet 4     JANUVIA 100 MG tablet TAKE 1 TABLET (100 MG) BY MOUTH DAILY 90 tablet 3     metFORMIN (GLUCOPHAGE-XR) 500 MG 24 hr tablet Take 4 tablets (2,000 mg) by mouth daily (with dinner) 360 " "tablet 3     Multiple Vitamins-Minerals (WOMENS 50+ MULTI VITAMIN/MIN) TABS Take 1 tablet by mouth daily 90 tablet 4     order for DME Equipment being ordered: Adult pull ups.  Use one every night. 90 Device 3     Skin Protectants, Misc. (EUCERIN) cream Apply topically as needed for dry skin 240 g 3     Allergies   Allergen Reactions     Ibuprofen GI Disturbance                   HPI   Patients name: Emmanuel  Appointment start time:  9:38 AM     used via Language Line or similar service.  Chief Complaint   Patient presents with     Diabetes     her numbers go up and down, sometiems at 200, 100, 90, 70 they are not steady, when below 100 she does not have headaches or dizziness but sometiems she does feel shakey    She checked her BG this morning and it was 210. Sometimes it's higher and sometimes it's lower. In the morning when she checks it it's between 130 and 210. It gets as low as 70. She says that they don't stay in the normal range. She eats \"normally\" or as \"usual\" at 4 or 5pm and before she goes to bed she takes her medication. Then she checks her blood sugar in the morning. She doesn't have headache or dizziness. She says that sometimes she just feels tired. She hasn't associated her tired feeling with high or low blood sugar. At her last visit her medications were changed. She is taking the new medications and a nurse arranges her medications every 2 weeks. She is doing okay with the medication, but at times she feels tired. She says that she takes her medication and feels tired and shaky. She takes different medications throughout the day, but is unsure which medications she takes at various times. She doesn't check her BG when she feels shaky. She says this usually happens when she needs to eat. These days \"as I get older\" I don't feel hungry. She says that eating improves these symptoms.     She is due for labs and has had changes in her medications so would recommend having her electrolytes and " creatinine checked. She would like to come in today and have them checked.          Assessment and Plan   Emmanuel was seen today for diabetes.    Diagnoses and all orders for this visit:    Type 2 diabetes mellitus with hyperglycemia, without long-term current use of insulin (H): discussed current medication regimen with patient which she was unsure which medications she took when. Given that home health sets up her medications, I would assume that she is taking her medications as prescribed. Discussed checking her BG when she has episodes of shaking to see if these are true hypoglycemia episodes and how low she goes. Of course, stated that eating is the most important, but to check if she is able. Given her co morbilities, her target A1c is 8%.   -Follow up for lab only visit  -Consider changing glypizide to metformin if GFR is above 40.      Follow up in 1 month for diabetes check, likely over the phone given current pandemic.    After Visit Information:  Patient declined AVS     Appointment end time: 9:58 AM  This is a telephone visit that took 20 minutes.  Clinician location:  [facility]    ELIEZER ASHLEY MD PGY2  I precepted today with Florentino Schmidt MD.       Due to patient being non-English speaking/uses sign language, an  was used for this visit. Only for face-to-face interpretation by an external agency, date and length of interpretation can be found on the scanned worksheet.     name: ID# [id number]  Agency: [employer] - telephone  Language: Yasmin   Telephone number:   Type of interpretation: Telephone, spoken

## 2020-03-20 NOTE — PROGRESS NOTES
Preceptor Attestation:  I discussed the patient with the resident. I have verified the content of the note, which accurately reflects my assessment of the patient and the plan of care.   Supervising Physician:  Florentino Schmidt MD.

## 2020-03-23 NOTE — RESULT ENCOUNTER NOTE
Please call the patient to schedule a follow up phone visit to discuss diabetes and cholesterol management.  Her diabetes is very poorly controlled and her cholesterol is extremely high.

## 2020-04-02 ENCOUNTER — DOCUMENTATION ONLY (OUTPATIENT)
Dept: PHARMACY | Facility: PHYSICIAN GROUP | Age: 75
End: 2020-04-02

## 2020-04-02 NOTE — PROGRESS NOTES
Reviewed patient's diabetes medications. Please note that based on her renal function canaglifozin can be increased to 300mg daily and glipizide can be increased to 20mg. Please considered at next visit.      Michelle Marshall, Pharm.D.

## 2020-04-10 ENCOUNTER — TELEPHONE (OUTPATIENT)
Dept: FAMILY MEDICINE | Facility: CLINIC | Age: 75
End: 2020-04-10

## 2020-04-10 NOTE — TELEPHONE ENCOUNTER
Reached out to patient during COVID19 Clinic outreach. Reassured patient that [facility] is still open and has started implementing phone and video appointments to help patient remain safe at home.     Patient reports the following concerns: None.    I discussed that I think she needs another diabetes visit on the phone due to being so poorly controlled.    Per patient request, patient is scheduled for a visit to address their concerns on the following date: 1-2 weeks for diabetes.   transferred to the Speak With Me desk to schedule in the next couple weeks.     Offered MyChart. Patient declined.    Clarissa Rodriguez MD

## 2020-04-13 NOTE — PROGRESS NOTES
Telephone call to the client's home for annual health risk assessment and PCA assessment due to the COVID - 19 restrictions at this time.  An  was present.    Current situation/living environment  Client is now living with her son in a single family home.  She reports that her room is on the main level.  No longer has to do stairs per previous housing.  Client report things are going well living at her son's home and likes it.  Her son is the caregiver who helps with money management, MA paperwork.      Activities of daily living (ADL)/instrumental activities of daily living (IADL) and functional issues  Client needs help with the following ADL's: dressing, grooming, bathing, transfers and toileting  Client needs help with the following IADL's: shopping, cooking, housekeeping, laundry, managing finances/bills and transportation  Client states she is unable to perform the above due to osteoarthrosis of bilateral knees and joint pain.      Declines vision and dental exams at this time.  She reports hearing is fine.  Her pull ups have stopped so CC will call and have them restarted and change address.       Health concerns for today  Client reports her main health concerns are joint and bilateral knee pain.  She reports the nurse is still coming to set up medications.  She reports that she takes medications as prescribed.  Reminded her the importance of wellness visit, and preventive care.  Suggested she speak to the doctor about mammogram and colon screen.  Reports no ED/hospitalizations.    Has patient fallen 2 or more times in the last year? No  Has patient fallen with injury in the last year? No    Cognition/mental health  Emmanuel reports that she does have some memory problems and needs daily reminders for medications.  She talks highly of going to adult day care and she enjoys the socialization and exercise they offer.  Also, she has made very good friends.      STARS/Med Adherence  Client is non-compliant  with the following quality measures: Breast cancer screening, Colon cancer screening and Medication adherence: diabetes  Comments: education efforts    Client's Plan of Care consists of:  Adult day care (3 days per week), Homemaker services ( 7 hours per week), Personal care assistance (PCA) (3.5 hours per day), SNV (every other week) and Specialized supplies and equipment (monthly pull ups)

## 2020-04-23 DIAGNOSIS — E78.00 PURE HYPERCHOLESTEROLEMIA: ICD-10-CM

## 2020-04-23 RX ORDER — ATORVASTATIN CALCIUM 40 MG/1
40 TABLET, FILM COATED ORAL DAILY
Qty: 90 TABLET | Refills: 4 | Status: SHIPPED | OUTPATIENT
Start: 2020-04-23 | End: 2020-05-01

## 2020-05-01 ENCOUNTER — VIRTUAL VISIT (OUTPATIENT)
Dept: FAMILY MEDICINE | Facility: CLINIC | Age: 75
End: 2020-05-01
Payer: COMMERCIAL

## 2020-05-01 DIAGNOSIS — G89.29 CHRONIC LEFT-SIDED LOW BACK PAIN WITHOUT SCIATICA: ICD-10-CM

## 2020-05-01 DIAGNOSIS — E78.00 PURE HYPERCHOLESTEROLEMIA: ICD-10-CM

## 2020-05-01 DIAGNOSIS — M54.50 CHRONIC LEFT-SIDED LOW BACK PAIN WITHOUT SCIATICA: ICD-10-CM

## 2020-05-01 DIAGNOSIS — E11.65 TYPE 2 DIABETES MELLITUS WITH HYPERGLYCEMIA, WITHOUT LONG-TERM CURRENT USE OF INSULIN (H): Primary | ICD-10-CM

## 2020-05-01 RX ORDER — GLIPIZIDE 10 MG/1
20 TABLET, FILM COATED, EXTENDED RELEASE ORAL DAILY
Qty: 90 TABLET | Refills: 4 | Status: SHIPPED | OUTPATIENT
Start: 2020-05-01 | End: 2020-10-26

## 2020-05-01 RX ORDER — ATORVASTATIN CALCIUM 80 MG/1
80 TABLET, FILM COATED ORAL DAILY
Qty: 90 TABLET | Refills: 4 | Status: SHIPPED | OUTPATIENT
Start: 2020-05-01 | End: 2021-06-07

## 2020-05-01 NOTE — Clinical Note
Please contact n with the med changes as noted in the AVS. Please ask them to call or fax us the patient blood sugars after each visit.

## 2020-05-01 NOTE — PATIENT INSTRUCTIONS
Increase invokana to 300mg daily.  Increase glipizide to 20mg daily.  Increase atorvastatin to 80mg daily.    Home nurse to send us by fax or to call  her blood sugar log to us after every home visit.

## 2020-05-01 NOTE — PROGRESS NOTES
"Family Medicine Telephone Visit Note               Telephone Visit Consent   Patient was verbally read the following and verbal consent was obtained.    \"Telephone visits are billed at different rates depending on your insurance coverage. During this emergency period, for some insurers they may be billed the same as an in-person visit.  Please reach out to your insurance provider with any questions.  If during the course of the call the physician/provider feels a telephone visit is not appropriate, you will not be charged for this service.\"    Name person giving consent:  Patient   Date verbal consent given:  5/1/2020  Time verbal consent given:  4:15 PM           Chief Complaint   Patient presents with     Diabetes              Due to patient being non-English speaking/uses sign language, an  was used for this visit. Only for face-to-face interpretation by an external agency, date and length of interpretation can be found on the scanned worksheet.     name: Evaristo Finn  Agency: [employer]  Language: Yasmin   Telephone number: [phone]  Type of interpretation: Telephone, spoken         HPI   Patients name: Emmanuel  Appointment start time:  4:17 PM    She is having aching pain in her body and back today.  She is going with the flow with the pain.  Medications not really helping her pain. Cold makes pain worse.  Using icy hot cream and that is helping some.       For diabetes, reports that sugars are anywhere from 110 to 200. She checks her sugars once a day in the morning.  Discussed last visit A1c was 11.1%.  Discussed that this means her average sugar is around 300. Discussed that this could make her feel very sick.   Recommended increasing canaglifozin to 300mg daily and glipizide to 20mg per Pharmd recommendations. I also discussed compliance with statin as her LDL was 217. Discussed that she states she is taking the medication regularly.  Discussed that we should increase atorvastatin to 80mg.  " "Patient discussed that she does not feel good with the new diabetes medicine.      I have also tried to encourage her to start insulin but I think this would be risky and difficult in her due to poor understanding of diabetes, poor compliance and language barrier.  She declines insulin.               Review of Systems:     ROS COMP: Constitutional, HEENT, cardiovascular, pulmonary, gi and gu systems are negative, except as otherwise noted.  Does have trouble with pain as above and sleeping.          Physical Exam:     There were no vitals taken for this visit.  Estimated body mass index is 23.53 kg/m  as calculated from the following:    Height as of 2/20/20: 1.473 m (4' 10\").    Weight as of 2/20/20: 51.1 kg (112 lb 9.6 oz).    Exam:  Constitutional: alert and no distress  Psychiatric: mentation appears normal and affect normal/bright    Results from last visit:  Orders Only on 03/19/2020   Component Date Value Ref Range Status     Cholesterol 03/19/2020 328.4* 0.0 - 200.0 mg/dL Final     Cholesterol/HDL Ratio 03/19/2020 5.1* 0.0 - 5.0 Final     HDL Cholesterol 03/19/2020 64.2  >40.0 mg/dL Final     LDL Cholesterol Calculated 03/19/2020 217* 0 - 129 mg/dL Final     Triglycerides 03/19/2020 236.6* 0.0 - 150.0 mg/dL Final     VLDL Cholesterol 03/19/2020 47.3* 7.0 - 32.0 mg/dL Final     Hemoglobin A1C 03/19/2020 11.1* 4.1 - 5.7 % Final     Urea Nitrogen 03/19/2020 11.4  7.0 - 19.0 mg/dL Final     Calcium 03/19/2020 10.0  8.5 - 10.1 mg/dL Final     Chloride 03/19/2020 102.3  98.0 - 110.0 mmol/L Final     Carbon Dioxide 03/19/2020 25.6  20.0 - 32.0 mmol/L Final     Creatinine 03/19/2020 0.7  0.5 - 1.0 mg/dL Final     Glucose 03/19/2020 222.6* 70.0 - 99.0 mg'dL Final     Potassium 03/19/2020 4.2  3.2 - 4.6 mmol/L Final     Sodium 03/19/2020 135.8  132.0 - 142.0 mmol/L Final     GFR Estimate 03/19/2020 >90  >60.0 mL/min/1.7 m2 Final     GFR Estimate If Black 03/19/2020 >90  >60.0 mL/min/1.7 m2 Final     Microalbumin, " Urine 03/19/2020 7.92* 0.00 - 1.99 mg/dL Final     Creatinine, Urine 03/19/2020 59.7  mg/dL Final     Albumin Urine mg/g Cr 03/19/2020 132.7* <=19.9 mg/g Final           Assessment and Plan   Emmanuel was seen today for diabetes.    Diagnoses and all orders for this visit:    Type 2 diabetes mellitus with hyperglycemia, without long-term current use of insulin (H): Very poor control.  Patient declined insulin of injections multiple times.  We are increasing her Invokana and glipizide.  I do think that compliance is an issue for her as well.  She does have home health nurse that sets her bedside for her.  States she misses her meds once or twice a week.  -     canagliflozin (INVOKANA) 300 MG tablet; Take 1 tablet (300 mg) by mouth every morning (before breakfast)  -     glipiZIDE (GLUCOTROL XL) 10 MG 24 hr tablet; Take 2 tablets (20 mg) by mouth daily    Pure hypercholesterolemia: .  Increasing the Lipitor to 80 mg.  I think that complaint is an issue.  If LDL continues to be high at next check in 8 weeks or so.  I would send her to cardiology for alternative agents for lipid-lowering.  -     atorvastatin (LIPITOR) 80 MG tablet; Take 1 tablet (80 mg) by mouth daily    Chronic left-sided low back pain without sciatica: Chronic back pain and body aches.  I do think her pain control diabetes is making her pain worse.  No neurologic symptoms at this time.  She does state this is her chronic pain.  We discussed that more at her next visit in 2 weeks.  She is also having sleeping problems which seems related to the pain.        After Visit Information:  Will print and mail AVS     Return in about 2 weeks (around 5/15/2020) for Phone Visit, sleep and pain, 4 weeks Diabetes.    Appointment end time: 4:49 PM Patient disconnected for 5 minutes or so in the middle of the visit.   This is a telephone visit that took 27 minutes.      Clinician location:  [facility]    Clarissa Rodriguez MD

## 2020-05-03 ENCOUNTER — MEDICAL CORRESPONDENCE (OUTPATIENT)
Dept: HEALTH INFORMATION MANAGEMENT | Facility: CLINIC | Age: 75
End: 2020-05-03

## 2020-05-04 ENCOUNTER — DOCUMENTATION ONLY (OUTPATIENT)
Dept: FAMILY MEDICINE | Facility: CLINIC | Age: 75
End: 2020-05-04

## 2020-05-04 NOTE — PROGRESS NOTES
Increase invokana to 300mg daily.  Increase glipizide to 20mg daily.  Increase atorvastatin to 80mg daily.     Home nurse to send us by fax or to call  her blood sugar log to us after every home visit.           Above directions relayed to N who recommends trying Jacquie freestyle 14day testing because patient refuses to test daily will usually test once a week  She does not like needles.    Note routed to Dr.Wicks Montoya RN

## 2020-05-06 ENCOUNTER — DOCUMENTATION ONLY (OUTPATIENT)
Dept: PHARMACY | Facility: PHYSICIAN GROUP | Age: 75
End: 2020-05-06

## 2020-05-07 ENCOUNTER — TELEPHONE (OUTPATIENT)
Dept: FAMILY MEDICINE | Facility: CLINIC | Age: 75
End: 2020-05-07

## 2020-05-07 NOTE — TELEPHONE ENCOUNTER
Central Prior Authorization Team  Phone: [phone]    PA Initiation    Medication: Glipizide ER 10mg  Insurance Company: [employer] - Phone [phone] Fax [phone]  Pharmacy Filling the Rx: [place], [place] - [address] [place] [address] [id number]  Filling Pharmacy Phone: [phone]  Filling Pharmacy Fax:    Start Date: 5/7/2020

## 2020-05-07 NOTE — TELEPHONE ENCOUNTER
Prior Authorization Retail Medication Request    Medication/Dose: Glipizide ER 10mg  ICD code (if different than what is on RX): Type 2 diabetes mellitus with hyperglycemia, without long-term current use of insulin (H)   Previously Tried and Failed:  This was an increase  Rationale:     Insurance Name:  Yazan RIBEIRO  Insurance ID: [id number]        Pharmacy Information (if different than what is on RX)  Name:  [facility]  Phone:  [phone]

## 2020-05-07 NOTE — PROGRESS NOTES
Per Dr. Rodriguez, reached out to patient to discuss starting continuous glucose monitoring. She tells me she doesn't know her medications and her HHN comes every two weeks to set up her pill box.    We discussed potentially starting FreeStyle Tate to monitor her blood sugars which will replace her fingersticks.     Discussed that the FreeStyle Tate sensor looks like a small disc that has a thin filament on it. The sensor sticks on your skin like a sticker and the filament is inserted in the skin to monitor your blood sugars. When the sensor is inserted, you might feel a little pinch but that only last for a second and you should have no pain while wearing the sensor. Your HHN would change this sensor every two weeks.    Discussed with patient that based on her insurance Yazan RIBEIRO, insurance will only cover her FreeStyle Tate if she is on at least one insulin injection.    At first patient was in agreement to start the FreeStyle Tate however, after we discussed that her insurance will only cover FreeStyle Tate if she starts insulin, she changed her mind.     Patient reports she cannot afford to pay for the device and that she is scared of needle injections. She would rather go without the FreeStyle Tate and insulin.     Discussed with patient I respect her decision and I will hold off on sending in the prescription. Additionally, I will be routing a note to Dr. Rodriguez to discuss this further at the next appointment.     Felicia Monroe, [facility]  Pharmacy Resident       
I have verified the content of the note, which accurately reflects my assessment of the patient and the plan of care.   Michelle Marshall, [facility], PharmD  
.

## 2020-05-08 NOTE — TELEPHONE ENCOUNTER
Prior Authorization Approval    Authorization Effective Date: 4/23/2020  Authorization Expiration Date: 5/7/2021  Medication: Glipizide ER 10mg- APPROVED   Approved Dose/Quantity:  Reference #:     Insurance Company: [employer] - Phone [phone] Fax [phone]  Expected CoPay:       CoPay Card Available:      Foundation Assistance Needed:    Which Pharmacy is filling the prescription (Not needed for infusion/clinic administered): [place], [place] - [address] [place] [address] [address]  Pharmacy Notified: Yes  Patient Notified: Comment:  **Instructed pharmacy to notify patient when script is ready to /ship.**

## 2020-05-11 ENCOUNTER — TELEPHONE (OUTPATIENT)
Dept: FAMILY MEDICINE | Facility: CLINIC | Age: 75
End: 2020-05-11

## 2020-05-11 DIAGNOSIS — E11.65 TYPE 2 DIABETES MELLITUS WITH HYPERGLYCEMIA, WITHOUT LONG-TERM CURRENT USE OF INSULIN (H): Primary | ICD-10-CM

## 2020-05-11 NOTE — TELEPHONE ENCOUNTER
[facility] phone call message- medication clarification/question:    Full Medication Name:     Freestyle Tate    Question:     Discussed with pt about freestyle tate and pt is currently checking her blood sugars 2x daily. Wondering if Dr. Rodriguez would prescribed that to pt.     Pharmacy confirmed as      Armida will get pharmacy before returned call.     OK to leave a message on voice mail? Yes    Primary language: Yasmin      needed? Yes    Call taken on May 11, 2020 at 2:11 PM by WADE Bustillos, CMA

## 2020-05-12 RX ORDER — FLASH GLUCOSE SENSOR
KIT MISCELLANEOUS
Qty: 6 EACH | Refills: 3 | Status: SHIPPED | OUTPATIENT
Start: 2020-05-12 | End: 2022-02-11

## 2020-05-12 RX ORDER — FLASH GLUCOSE SCANNING READER
EACH MISCELLANEOUS
Qty: 1 EACH | Refills: 0 | Status: SHIPPED | OUTPATIENT
Start: 2020-05-12 | End: 2022-02-15

## 2020-05-12 NOTE — TELEPHONE ENCOUNTER
Pharmacy Note    Sent Rx for Freestyle Tate to her pharmacy just to see if it was covered, as coverage has recently changed. They said it was covered with a $0 copay. I then called DEJON Huffman, to inform her. She will next be out there May 25 and will place the sensor and do teaching with the patient then.  She goes out every 2 weeks and will change the sensor every 2 weeks and call the clinic to report her Freestyle Tate readings to a triage nurse.  Armida also said that the med changes were put in her pillbox yesterday so the new doses of DM meds just started yesterday.    Donna Zhao, Pharm.D.

## 2020-05-21 ENCOUNTER — RECORDS - HEALTHEAST (OUTPATIENT)
Dept: ADMINISTRATIVE | Facility: OTHER | Age: 75
End: 2020-05-21

## 2020-05-21 ENCOUNTER — VIRTUAL VISIT (OUTPATIENT)
Dept: FAMILY MEDICINE | Facility: CLINIC | Age: 75
End: 2020-05-21

## 2020-05-21 DIAGNOSIS — Z12.31 ENCOUNTER FOR SCREENING MAMMOGRAM FOR BREAST CANCER: ICD-10-CM

## 2020-05-21 DIAGNOSIS — E11.65 TYPE 2 DIABETES MELLITUS WITH HYPERGLYCEMIA, WITHOUT LONG-TERM CURRENT USE OF INSULIN (H): Primary | ICD-10-CM

## 2020-05-21 DIAGNOSIS — Z12.11 COLON CANCER SCREENING: ICD-10-CM

## 2020-05-21 NOTE — PATIENT INSTRUCTIONS
Evaristo Finn will help you schedule your eye exam.    I have ordered a Mammogram for you.    Continue current diabetes medicines.    Get Free style beinta placed by home nurse next week.  Home nurse will help change the sensor and call us with your blood sugars every 2 weeks after that.    20   [facility]   [phone]  Fax Orders to [phone]     Order faxed to [phone], they will contact patient to schedule.     Laurie Ortiz

## 2020-05-21 NOTE — PROGRESS NOTES
"Family Medicine Telephone Visit Note               Telephone Visit Consent   Patient was verbally read the following and verbal consent was obtained.    \"Telephone visits are billed at different rates depending on your insurance coverage. During this emergency period, for some insurers they may be billed the same as an in-person visit.  Please reach out to your insurance provider with any questions.  If during the course of the call the physician/provider feels a telephone visit is not appropriate, you will not be charged for this service.\"    Name person giving consent:  Patient   Date verbal consent given:  5/21/2020  Time verbal consent given:  10:08 AM           Chief Complaint   Patient presents with     RECHECK     Followup            Due to patient being non-English speaking/uses sign language, an  was used for this visit. Only for face-to-face interpretation by an external agency, date and length of interpretation can be found on the scanned worksheet.     name: Evaristo Finn  Agency: [employer]  Language: Yasmin   Telephone number: [phone]  Type of interpretation: Face-to-face, spoken         HPI   Patients name: Emmanuel  Appointment start time:  10:35 AM    Diabetes is about the same.  No hypoglycemia.  Sugars are around 200 to 210. New meds started on 5/11.  Home health nurse will place free style benita at her next visit and every 2 weeks after that.  She will call us with the blood sugars after her visits every two weeks. She is willing to try and she has the supplies already. The nurse is going to come next week on May 25 to place the first sensor. No CP or SOB. No side effects from medications.    HCM: due for eye exam, mammo, FIT test.  She is willing to do eye exam but not the mammogram. After discussion she agreed to the mammogram and the FIT testing again.           Review of Systems:     Constitutional, HEENT, cardiovascular, pulmonary, gi and gu systems are negative, except as " "otherwise noted.         Physical Exam:     There were no vitals taken for this visit.  Estimated body mass index is 23.53 kg/m  as calculated from the following:    Height as of 2/20/20: 1.473 m (4' 10\").    Weight as of 2/20/20: 51.1 kg (112 lb 9.6 oz).    Exam:  Constitutional: healthy, alert and no distress  Psychiatric: mentation appears normal and affect normal/bright            Assessment and Plan   Emmanuel was seen today for recheck.    Diagnoses and all orders for this visit:    Type 2 diabetes mellitus with hyperglycemia, without long-term current use of insulin (H): poor control. Plan below for Free style benita.  Need labs before next visit for monitoring.  -     Hemoglobin A1c ([facility]); Future  -     Basic Metabolic Panel ([place]); Future    Colon cancer screening: Realized after the visit that she had a previously positive FIT test and needs a colonoscopy. Cancelled FIT test and will discuss this in more detail at the next visit.  -     Cancel: Fecal Occult Blood - FIT, iFOB ([facility]); Future    Encounter for screening mammogram for breast cancer  -     PCS Use: Screening Digital Bilateral Mammogram; Future    Patient Instructions   Evaristo Finn will help you schedule your eye exam.    I have ordered a Mammogram for you.    Continue current diabetes medicines.    Get Free style benita placed by home nurse next week.  Home nurse will help change the sensor and call us with your blood sugars every 2 weeks after that.        After Visit Information:  Will print and mail AVS     Return in about 3 weeks (around 6/11/2020) for Lab Work, Phone Visit, Lab a few days before diabetes visit.    Appointment end time: 10:58 AM  This is a telephone visit that took 33 minutes.      Clinician location:  [facility]    Clarissa Rodriguez MD    "

## 2020-06-11 ENCOUNTER — TELEPHONE (OUTPATIENT)
Dept: FAMILY MEDICINE | Facility: CLINIC | Age: 75
End: 2020-06-11

## 2020-06-11 NOTE — TELEPHONE ENCOUNTER
[facility] Family Medicine phone call message- general phone call:    Reason for call: Armida is calling to speak with nurse about pts blood sugars and non compliance.     Return call needed: Yes    OK to leave a message on voice mail? Yes    Primary language: Yasmin      needed? Yes    Call taken on June 11, 2020 at 1:55 PM by Grisel Flores-Cardona

## 2020-06-15 ENCOUNTER — VIRTUAL VISIT (OUTPATIENT)
Dept: FAMILY MEDICINE | Facility: CLINIC | Age: 75
End: 2020-06-15

## 2020-06-15 DIAGNOSIS — E11.65 TYPE 2 DIABETES MELLITUS WITH HYPERGLYCEMIA, WITHOUT LONG-TERM CURRENT USE OF INSULIN (H): Primary | ICD-10-CM

## 2020-06-15 DIAGNOSIS — M54.50 CHRONIC LEFT-SIDED LOW BACK PAIN WITHOUT SCIATICA: ICD-10-CM

## 2020-06-15 DIAGNOSIS — G89.29 CHRONIC LEFT-SIDED LOW BACK PAIN WITHOUT SCIATICA: ICD-10-CM

## 2020-06-15 DIAGNOSIS — G89.29 CHRONIC PAIN OF LEFT KNEE: ICD-10-CM

## 2020-06-15 DIAGNOSIS — M25.562 CHRONIC PAIN OF LEFT KNEE: ICD-10-CM

## 2020-06-15 NOTE — PATIENT INSTRUCTIONS
Bring your meter and medicines to your appointment.    Wear your mask.    Continue acetaminophen as needed.

## 2020-06-15 NOTE — PROGRESS NOTES
"Family Medicine Telephone Visit Note               Telephone Visit Consent   Patient was verbally read the following and verbal consent was obtained.    \"Telephone visits are billed at different rates depending on your insurance coverage. During this emergency period, for some insurers they may be billed the same as an in-person visit.  Please reach out to your insurance provider with any questions.  If during the course of the call the physician/provider feels a telephone visit is not appropriate, you will not be charged for this service.\"    Name person giving consent:  Patient   Date verbal consent given:  6/15/2020  Time verbal consent given:  8:29 AM           Chief Complaint   Patient presents with     Diabetes        Due to patient being non-English speaking/uses sign language, an  was used for this visit. Only for face-to-face interpretation by an external agency, date and length of interpretation can be found on the scanned worksheet.     name: Evaristo Finn  Agency: [employer]  Language: Yasmin   Telephone number: [phone]  Type of interpretation: Face-to-face, spoken         HPI   Patients name: Emmanuel  Appointment start time:  8:34 AM    Diabetes: No blood sugars available to me.  We tried to connect with home nurse last week on 6/11, but never were able to.  She has rescheduled lab visit several times due to feeling sick last week.     Pain: Left Side pain that has now moved to the chest under the breast. She thinks it has to do with aging process.  This body pain has been described in the back before as well and it is a chronic pain that comes and goes. She knew what to do for it in [place] with herbal and traditional medicines, but does not know how to help it here. 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  Pain is only happening when reaching and bending.  Not with walking, no shortness of breath.  Knee pain is also bothering her. Has " "acetaminophen and capsaicin cream on her list. Icy hot cream is helping knee.            Review of Systems:     Constitutional, HEENT, cardiovascular, pulmonary, gi and gu systems are negative, except as otherwise noted.         Physical Exam:     There were no vitals taken for this visit.  Estimated body mass index is 23.53 kg/m  as calculated from the following:    Height as of 2/20/20: 1.473 m (4' 10\").    Weight as of 2/20/20: 51.1 kg (112 lb 9.6 oz).    Exam:  Constitutional: alert and no distress  Psychiatric: mentation appears normal and affect normal/bright            Assessment and Plan   Emmanuel was seen today for diabetes.    Diagnoses and all orders for this visit:    Type 2 diabetes mellitus with hyperglycemia, without long-term current use of insulin (H): Unable to make any assessment of diabetes today without any labs or glucose levels.  We will have her continue her medication and have her come for an in person visit with lab work next week.  Since her diabetes has been so uncontrolled recently I think it is worth the risk of COVID.    Chronic left-sided low back pain without sciatica  Chronic pain of left knee: These pains are chronic.  Continue Tylenol and IcyHot as needed.  Will reassess in person next week.  Will discuss possible joint injection for knee arthritis at that time as well.      After Visit Information:  Patient declined AVS     Return in about 10 days (around 6/25/2020) for In-Clinic Visit labs and uncontrolled diabetes..    Appointment end time: 8:58 AM  This is a telephone visit that took 29 minutes.      Clinician location:  [facility]    Clarissa Rodriguez MD  "

## 2020-06-16 NOTE — TELEPHONE ENCOUNTER
HHN reports patient has not been checking her blood glucose even thought she has the benita 14 day monitor pt states the machine makes her dizzy.    Nurse states the few readings that was recorded were in the 400 to 500's    Patient states she has been taking her medication as ordered but nurse has not been able to find any of her meds when she visits.     Note routed to Dr.Wicks Montoya RN

## 2020-06-25 ENCOUNTER — ALLIED HEALTH/NURSE VISIT (OUTPATIENT)
Dept: PHARMACY | Facility: PHYSICIAN GROUP | Age: 75
End: 2020-06-25
Payer: COMMERCIAL

## 2020-06-25 ENCOUNTER — OFFICE VISIT (OUTPATIENT)
Dept: FAMILY MEDICINE | Facility: CLINIC | Age: 75
End: 2020-06-25

## 2020-06-25 VITALS
DIASTOLIC BLOOD PRESSURE: 69 MMHG | WEIGHT: 109.6 LBS | HEART RATE: 83 BPM | BODY MASS INDEX: 22.91 KG/M2 | TEMPERATURE: 98.2 F | RESPIRATION RATE: 16 BRPM | SYSTOLIC BLOOD PRESSURE: 104 MMHG

## 2020-06-25 DIAGNOSIS — E11.65 TYPE 2 DIABETES MELLITUS WITH HYPERGLYCEMIA, WITHOUT LONG-TERM CURRENT USE OF INSULIN (H): ICD-10-CM

## 2020-06-25 DIAGNOSIS — J30.2 SEASONAL ALLERGIES: ICD-10-CM

## 2020-06-25 DIAGNOSIS — E11.65 TYPE 2 DIABETES MELLITUS WITH HYPERGLYCEMIA, WITHOUT LONG-TERM CURRENT USE OF INSULIN (H): Primary | ICD-10-CM

## 2020-06-25 LAB
BUN SERPL-MCNC: 16.4 MG/DL (ref 7–19)
CALCIUM SERPL-MCNC: 10.3 MG/DL (ref 8.5–10.1)
CHLORIDE SERPLBLD-SCNC: 94.6 MMOL/L (ref 98–110)
CO2 SERPL-SCNC: 28.1 MMOL/L (ref 20–32)
CREAT SERPL-MCNC: 0.6 MG/DL (ref 0.5–1)
GFR SERPL CREATININE-BSD FRML MDRD: >90 ML/MIN/1.7 M2
GLUCOSE SERPL-MCNC: 353 MG'DL (ref 70–99)
HBA1C MFR BLD: 13.5 % (ref 4.1–5.7)
POTASSIUM SERPL-SCNC: 4.1 MMOL/L (ref 3.2–4.6)
SODIUM SERPL-SCNC: 130.9 MMOL/L (ref 132–142)

## 2020-06-25 PROCEDURE — 99605 MTMS BY PHARM NP 15 MIN: CPT | Performed by: PHARMACIST

## 2020-06-25 RX ORDER — CAPSAICIN 0.025 %
CREAM (GRAM) TOPICAL 3 TIMES DAILY PRN
COMMUNITY
Start: 2020-06-25 | End: 2021-10-14

## 2020-06-25 NOTE — PROGRESS NOTES
"Nursing Notes:   Stephanie Balderas, [facility]  6/25/2020  4:24 PM  Signed  Due to patient being non-English speaking/uses sign language, an  was used for this visit. Only for face-to-face interpretation by an external agency, date and length of interpretation can be found on the scanned worksheet.    IInterpreter name: Kennedy Arambula  Language: Yasmin  Agency: [employer]  Phone number: [phone]  Chief Complaint   Patient presents with     Diabetes     Pain     followup, pain is gone on left side.     Blood pressure 104/69, pulse 83, temperature 98.2  F (36.8  C), resp. rate 16, weight 49.7 kg (109 lb 9.6 oz), not currently breastfeeding.                 KEYUR Vences is a 74 year old  female with a PMH significant for:     Patient Active Problem List   Diagnosis     Health Care Home     Esophageal reflux     Osteoarthrosis, unspecified whether generalized or localized, involving lower leg     Lumbosacral spondylosis without myelopathy     Hyperlipidemia     Type 2 diabetes mellitus with hyperglycemia, without long-term current use of insulin (H)     Seasonal allergies     Screening for depression     Microalbuminuria     She presents with diabetes follow up. Since last visit, received information from home nurse:  \"HHN reports patient has not been checking her blood glucose even thought she has the benita 14 day monitor pt states the machine makes her dizzy.    Nurse states the few readings that was recorded were in the 400 to 500's    Patient states she has been taking her medication as ordered but nurse has not been able to find any of her meds when she visits. \"    Reviewed her meter with Pharm.D. as well.  Does have a lot of blood sugars into the 300s.  None over 400 that we saw.  She is not using her freestyle lite as she thinks it makes her tired and dizzy.  Discussed that the patch simply is a monitoring device and has no medication in it that should affect the way she feels.  She is willing to try this again as " to not have to poke her fingers more.  Overall she states she is feeling well and does not think she has any problems with current medication regimen.  Her son is also present and it sounds like they both are stating that she usually is remembering her medicine.  She is okay with taking all of her medicines once a day as to not forget the second time a day.    Left side pain is better. It is gone. Knee pain is still there.  She thinks this is from becoming old and is not too concerned about doing more about it.    PMH, Medications and Allergies were reviewed and updated as needed.     A [place] Sunglass  was used for this visit.           Physical Exam:     Vitals:    06/25/20 1620   BP: 104/69   Pulse: 83   Resp: 16   Temp: 98.2  F (36.8  C)   Weight: 49.7 kg (109 lb 9.6 oz)     Body mass index is 22.91 kg/m .  Wt Readings from Last 4 Encounters:   06/25/20 49.7 kg (109 lb 9.6 oz)   02/20/20 51.1 kg (112 lb 9.6 oz)   01/16/20 50.6 kg (111 lb 9.6 oz)   10/28/19 51.9 kg (114 lb 6.4 oz)        Exam:  Constitutional: healthy, alert and no distress  Neck: Neck supple. No adenopathy. Thyroid symmetric, normal size,  Eyes: conjunctiva are clear.  Cardiovascular:RRR. No murmurs, clicks gallops or rub  Respiratory:  normal respiratory rate and rhythm, lungs clear to auscultation. No wheezes or crackles.  Extremities: No C/C/E in BLE. 2+DP pulses.  Joint exam without erythema, effusion and full ROM throughout.  Skin: no rashes.  Psychiatric: mentation appears normal and affect normal/bright    No flowsheet data found.  PHQ-9 SCORE 4/19/2019 8/19/2019 1/16/2020   PHQ-9 Total Score 11 14 5     Results for orders placed or performed in visit on 06/25/20   Basic Metabolic Panel ([place])     Status: Abnormal   Result Value Ref Range    Urea Nitrogen 16.4 7.0 - 19.0 mg/dL    Calcium 10.3 (H) 8.5 - 10.1 mg/dL    Chloride 94.6 (L) 98.0 - 110.0 mmol/L    Carbon Dioxide 28.1 20.0 - 32.0 mmol/L    Creatinine 0.6 0.5 - 1.0 mg/dL     Glucose 353.0 (H) 70.0 - 99.0 mg'dL    Potassium 4.1 3.2 - 4.6 mmol/L    Sodium 130.9 (L) 132.0 - 142.0 mmol/L    GFR Estimate >90 >60.0 mL/min/1.7 m2    GFR Estimate If Black >90 >60.0 mL/min/1.7 m2   Hemoglobin A1c ([facility])     Status: Abnormal   Result Value Ref Range    Hemoglobin A1C 13.5 (H) 4.1 - 5.7 %   Lipid Panel ([facility])     Status: Abnormal   Result Value Ref Range    Cholesterol 310.7 (H) 0.0 - 200.0 mg/dL    Cholesterol/HDL Ratio 5.0 (H) 0.0 - 5.0    HDL Cholesterol 61.8 >40.0 mg/dL    LDL Cholesterol Calculated 193 (H) 0 - 129 mg/dL    Triglycerides 279.6 (H) 0.0 - 150.0 mg/dL    VLDL Cholesterol 55.9 (H) 7.0 - 32.0 mg/dL       Assessment and Plan     Emmanuel was seen today for diabetes and pain.    Diagnoses and all orders for this visit:    Type 2 diabetes mellitus with hyperglycemia, without long-term current use of insulin (H): Discussed the fact that her diabetes continues to be very poorly controlled this evaluate many oral medications.  Discussed again the complications that can result from uncontrolled diabetes.  She states she is feeling fine.  She is declining insulin.  We discussed this in detail.  She is choosing to have her current quality of life over quantity and is excepting of the possible complications of uncontrolled diabetes.  Changing medications to all once a day to not forget the second time a day.  She is going to try freestyle lite meter again.  She does not really agree that there is anything wrong with her and is not motivated to make changes to control her diabetes.  She states she is feeling well and is just getting old.  -     Basic Metabolic Panel ([place])  -     Hemoglobin A1c ([facility])  -     Lipid Panel ([facility])  I recommended an in person follow-up to see how things were going in about 3 or 4 weeks.  She is declining in person due to COVID and would like a phone visit instead.    Return in about 4 weeks (around 7/23/2020) for Phone Visit diabetes.     Options for  treatment and/or follow-up care were reviewed with the patient. Emmanuel Vences was engaged and actively involved in the decision making process. She verbalized understanding of the options discussed and was satisfied with the final plan.    Clarissa Rodriguez MD

## 2020-06-25 NOTE — PROGRESS NOTES
"MTM ENCOUNTER  SUBJECTIVE/OBJECTIVE:                           Emmanuel Vences is a 74 year old female seen for a follow-up visit. She was referred to me from Dr Rodriguez. Patient was accompanied by Son.  was present during today's visit. Patient is seeing provider after our visit today. Today's visit is a follow-up MTM visit from 2/20/20     Chief Complaint: diabetes follow up.    Allergies/ADRs: Reviewed in EHR  Tobacco:  reports that she quit smoking about 14 months ago. Her smoking use included cigarettes. She quit after 50.00 years of use. She has never used smokeless tobacco.  Alcohol: not currently using  Caffeine: no caffeine  Activity: not addressed  PMH: Reviewed in EHR    Medication Adherence/Access: Patient uses pill box(es).  Has HHN that sets up meds.  Patient takes medications 2 time(s) per day.   Per patient, misses medication 7 times per week. Patient states that she regularly misses one dosing time per day (so either all of her AM meds or all of her PM meds).     Type 2 Diabetes:  Pt currently taking canagliflozin 300 mg daily, glipizide XL 20 mg daily, metformin 2000 mg daily, and sitagliptin 100 mg daily.. Pt is not experiencing side effects.  SMBG: Patient brings 3 glucometers with her today.  1 of them is a freestyle benita which she used for about 2 weeks but stated she did not like the sensor in her arm and that made her \"more tired\".  She has an Accu-Chek and a One Touch glucometer with her today and says that she prefers the Accu-Chek glucometer.  Blood sugars on that meter are fasting in the morning and range from 250-365.  Symptoms of low blood sugar? none  Symptoms of high blood sugar? none  Diet/Exercise: up to date  Aspirin: Taking 81mg daily and denies side effects  Statin: Yes: Taking atorvastatin 80 mg daily and denies side effects.  ACEi/ARB: No.   Urine Albumin:   Lab Results   Component Value Date    UMALCR 132.7 (H) 03/19/2020      Seasonal allergic rhinitis: On cetirizine 10 mg " "daily and denies side effects.  States that this is adequate treatment for her.    Today's Vitals:   BP Readings from Last 1 Encounters:   06/25/20 104/69     Pulse Readings from Last 1 Encounters:   06/25/20 83     Wt Readings from Last 1 Encounters:   06/25/20 109 lb 9.6 oz (49.7 kg)     Ht Readings from Last 1 Encounters:   02/20/20 4' 10\" (1.473 m)     Estimated body mass index is 22.91 kg/m  as calculated from the following:    Height as of 2/20/20: 4' 10\" (1.473 m).    Weight as of an earlier encounter on 6/25/20: 109 lb 9.6 oz (49.7 kg).    Temp Readings from Last 1 Encounters:   06/25/20 98.2  F (36.8  C)     ASSESSMENT:                            Medication Adherence: poor, patient could benefit from increased adherence.  Stated that she would be better able to take her medications every day if they were only once per day.  All of her medications can be taken at this I will call the home health nurse tomorrow and see if she can set them up for only 1 time a day.    Type 2 Diabetes: Needs Improvement. Patient is not meeting A1c goal of < 8%. Pt would benefit from increased adherence to current medications; based on current hemoglobin A1c will likely need insulin therapy in the near future. Microalbumin is not at goal < 30 mg/g.  She is currently not on an ACE inhibitor or ARB however I believe that this would be inappropriate at this time because of her low blood pressure.  We will continue to monitor microalbuminuria and blood pressure. Aspirin therapy is safe and appropriate.    Seasonal allergic rhinitis: Stable.  Continue current medications.    PLAN:                            I will call your home health nurse and have her set up medications only once daily    I spent 20 minutes with this patient today. All changes were made via collaborative practice agreement with Dr Rodriguez. Patient was discussed in person today so a copy of the visit note was NOT provided to the patient's primary care " provider.    Will follow up in 1 month.    The patient was given a summary of these recommendations. See Provider note/AVS from today.     Michelle Marshall Pharm.D.

## 2020-06-25 NOTE — NURSING NOTE
Due to patient being non-English speaking/uses sign language, an  was used for this visit. Only for face-to-face interpretation by an external agency, date and length of interpretation can be found on the scanned worksheet.    IInterpreter name: Kennedy Arambula  Language: Yasmin  Agency: [employer]  Phone number: [phone]

## 2020-06-26 ENCOUNTER — TELEPHONE (OUTPATIENT)
Dept: PHARMACY | Facility: PHYSICIAN GROUP | Age: 75
End: 2020-06-26

## 2020-06-26 LAB
CHOLEST SERPL-MCNC: 310.7 MG/DL (ref 0–200)
CHOLEST/HDLC SERPL: 5 {RATIO} (ref 0–5)
HDLC SERPL-MCNC: 61.8 MG/DL
LDLC SERPL CALC-MCNC: 193 MG/DL (ref 0–129)
TRIGL SERPL-MCNC: 279.6 MG/DL (ref 0–150)
VLDL CHOLESTEROL: 55.9 MG/DL (ref 7–32)

## 2020-06-26 NOTE — TELEPHONE ENCOUNTER
Called the home health nurse, Armida, and spoke to her about patient visit from yesterday.  Primary discussion was about whether or not we can move her doses to only once a day.  She said she can however she stated that because some of her medications are large in size she is still going to have to put them into pill compartments.  Told her that this was fine.    We did discuss a little bit about what Dr. Rodriguez discussed with the patient yesterday and that is that she is choosing quality of life over quantity of life which means that sometimes she will not take her medications.  Also discussed that Dr. Rodriguez convinced patient to try the freestyle benita again because the device does not have any medication minutes and cannot cause her to be tired or dizzy.  Encouraged home health nurse to use this verbiage if patient wants to stop using it again.    Also asked home health nurse about message that was taken by 1 of our RNs a couple weeks ago about not being able to find her medications and Armida stated that that was not accurate.  States that she knows that patient keeps medications in a plastic container and knows exactly where to find them.    Michelle Marshall, Pharm.D.

## 2020-07-02 ENCOUNTER — MEDICAL CORRESPONDENCE (OUTPATIENT)
Dept: HEALTH INFORMATION MANAGEMENT | Facility: CLINIC | Age: 75
End: 2020-07-02

## 2020-07-27 ENCOUNTER — VIRTUAL VISIT (OUTPATIENT)
Dept: FAMILY MEDICINE | Facility: CLINIC | Age: 75
End: 2020-07-27

## 2020-07-27 DIAGNOSIS — E11.65 TYPE 2 DIABETES MELLITUS WITH HYPERGLYCEMIA, WITHOUT LONG-TERM CURRENT USE OF INSULIN (H): Primary | ICD-10-CM

## 2020-07-27 NOTE — PROGRESS NOTES
"Family Medicine Telephone Visit Note               Telephone Visit Consent   Patient was verbally read the following and verbal consent was obtained.    \"Telephone visits are billed at different rates depending on your insurance coverage. During this emergency period, for some insurers they may be billed the same as an in-person visit.  Please reach out to your insurance provider with any questions.  If during the course of the call the physician/provider feels a telephone visit is not appropriate, you will not be charged for this service.\"    Name person giving consent:  Patient   Date verbal consent given:  7/27/2020  Time verbal consent given:  2:47 PM           Chief Complaint   Patient presents with     Diabetes     DM              .       HPI   Patients name: Debi  Appointment start time:  2:56 PM    Diabetes follow up. No other concerns.     States sugars are the same.  States that she did not retry the free style lite meter.  She is checking sugars with her finger. 220 this am, this is typical.  She is checking once a day fasting in the AM.   She is taking medications now and feels better when she takes them.  No hypoglycemia.  States she does have 130-150 at times and does not have sxs with this.  Denies CP, SOB, leg or foot pain.She just feels tired with activity which she thinks is from aging.            Review of Systems:     Constitutional, HEENT, cardiovascular, pulmonary, gi and gu systems are negative, except as otherwise noted. Does have chronic knee pain.         Physical Exam:     There were no vitals taken for this visit.  Estimated body mass index is 22.91 kg/m  as calculated from the following:    Height as of 2/20/20: 1.473 m (4' 10\").    Weight as of 6/25/20: 49.7 kg (109 lb 9.6 oz).    Exam:  Constitutional: healthy, alert and no distress  Psychiatric: mentation appears normal and affect normal/bright          Assessment and Plan   Emmanuel was seen today for diabetes.    Diagnoses and all orders " for this visit:    Type 2 diabetes mellitus with hyperglycemia, without long-term current use of insulin (H): still poor control. On maximum oral agents.  Declining insulin.  Continue meds and continue to discuss insulin.  Will see her in one month for repeat A1c.    Osteoarthrosis, unspecified whether generalized or localized, involving lower leg: chronic but stable. Declines any further interventions at this time.        After Visit Information:  Patient declined AVS     Return in about 4 weeks (around 8/24/2020) for In-Clinic Visit, Lab Work and diabetes.    Appointment end time: 3:10 PM  This is a telephone visit that took 14 minutes.      Clinician location:  [facility]     Clarissa Rodriguez MD

## 2020-07-28 ENCOUNTER — DOCUMENTATION ONLY (OUTPATIENT)
Dept: FAMILY MEDICINE | Facility: CLINIC | Age: 75
End: 2020-07-28

## 2020-07-28 NOTE — PROGRESS NOTES
"Interprofessional Team Consultation Note     Requesting Provider: Dr. Rodriguez    Consultants:  Behavioral Health: Dr. Martinez  Care Coordination: Lindsay Christopher  PharmD: Dr. Zhao  Family Medicine Physicians: Dr. Tapia    IDENTIFYING DATA/REASON FOR REFERRAL:  Emmanuel Say is 74 year old female who is cared for by Dr. Rodriguez.? Dr. Rodriguez is requesting consultation related to input on care plan for patient with diabetes who is declining insulin. ?Relevant clinical information obtained from requesting PCP, interprofessional team members noted above and review of the medical record.     Patient Active Problem List   Diagnosis     Health Care Home     Esophageal reflux     Osteoarthrosis, unspecified whether generalized or localized, involving lower leg     Lumbosacral spondylosis without myelopathy     Hyperlipidemia     Type 2 diabetes mellitus with hyperglycemia, without long-term current use of insulin (H)     Seasonal allergies     Screening for depression     Microalbuminuria     Current Outpatient Medications   Medication     ASPIRIN LOW DOSE 81 MG EC tablet     atorvastatin (LIPITOR) 80 MG tablet     blood glucose VI test strip     blood glucose XX lancets standard     canagliflozin (INVOKANA) 300 MG tablet     capsaicin (ZOSTRIX) 0.025 % external cream     cetirizine (ZYRTEC) 10 MG tablet     Continuous Blood Gluc  (FREESTYLE PRECIOUS 14 DAY READER) RIDGE     Continuous Blood Gluc Sensor (FREESTYLE PRECIOUS 14 DAY SENSOR) MISC     glipiZIDE (GLUCOTROL XL) 10 MG 24 hr tablet     JANUVIA 100 MG tablet     metFORMIN (GLUCOPHAGE-XR) 500 MG 24 hr tablet     Multiple Vitamins-Minerals (WOMENS 50+ MULTI VITAMIN/MIN) TABS     No current facility-administered medications for this visit.        Topics Discussed:  Declining insulin.  Just thinks she's getting \"old\" and that current health is fine.  Dr. Rodriguez is uncertain what else to do to increase adherence.  Does recall her saying that she \"doesn't feel good\" when she " doesn't take her medicines.  Reflected on this as an opportunity to tie adherence to better health.  Discussed potential benefit of looking for other opportunities to make this connection.  Does have adult children, but DrTiera Rodriguez is uncertain if they would have influence over her health care decision making.  History of depression in the past, but currently denies most symptoms.      PHQ 4/19/2019 8/19/2019 1/16/2020   PHQ-9 Total Score 11 14 5   Q9: Thoughts of better off dead/self-harm past 2 weeks Several days Several days Not at all       Recommendations/Action Items:  1.  Elicit family observations/concerns when possible.  Explore whether greater family engagement could help improve adherence.  2.  Elicit patient perspective on how she feels when she takes medications vs. Doesn't take her medication and look for potential motivations/barriers to change.  3.  Update PHQ9 at next visit.    Jennifer Martinez, PhD, LP     Disclaimer  The above treatment recommendations are based on consultation with the patient's primary care provider and a review of relevant information in EPIC.? I have not personally examined the patient.? All recommendations should be implemented with considerations of the patient's relevant prior history and current clinical status.  Please contact me with any questions about the care of this patient.

## 2020-08-31 ENCOUNTER — MEDICAL CORRESPONDENCE (OUTPATIENT)
Dept: HEALTH INFORMATION MANAGEMENT | Facility: CLINIC | Age: 75
End: 2020-08-31

## 2020-08-31 ENCOUNTER — OFFICE VISIT (OUTPATIENT)
Dept: FAMILY MEDICINE | Facility: CLINIC | Age: 75
End: 2020-08-31

## 2020-08-31 VITALS
HEART RATE: 99 BPM | RESPIRATION RATE: 14 BRPM | SYSTOLIC BLOOD PRESSURE: 98 MMHG | DIASTOLIC BLOOD PRESSURE: 67 MMHG | TEMPERATURE: 98.9 F | BODY MASS INDEX: 22.49 KG/M2 | WEIGHT: 107.6 LBS

## 2020-08-31 DIAGNOSIS — E11.65 TYPE 2 DIABETES MELLITUS WITH HYPERGLYCEMIA, WITHOUT LONG-TERM CURRENT USE OF INSULIN (H): Primary | ICD-10-CM

## 2020-08-31 DIAGNOSIS — Z12.11 COLON CANCER SCREENING: ICD-10-CM

## 2020-08-31 DIAGNOSIS — L29.9 PRURITIC DISORDER: ICD-10-CM

## 2020-08-31 DIAGNOSIS — Z12.31 VISIT FOR SCREENING MAMMOGRAM: ICD-10-CM

## 2020-08-31 DIAGNOSIS — M81.0 AGE-RELATED OSTEOPOROSIS WITHOUT CURRENT PATHOLOGICAL FRACTURE: ICD-10-CM

## 2020-08-31 LAB
BUN SERPL-MCNC: 9.3 MG/DL (ref 7–19)
CALCIUM SERPL-MCNC: 10 MG/DL (ref 8.5–10.1)
CHLORIDE SERPLBLD-SCNC: 97.7 MMOL/L (ref 98–110)
CHOLEST SERPL-MCNC: 311.2 MG/DL (ref 0–200)
CHOLEST/HDLC SERPL: 5.7 {RATIO} (ref 0–5)
CO2 SERPL-SCNC: 30.1 MMOL/L (ref 20–32)
CREAT SERPL-MCNC: 0.5 MG/DL (ref 0.5–1)
GFR SERPL CREATININE-BSD FRML MDRD: >90 ML/MIN/1.7 M2
GLUCOSE SERPL-MCNC: 270.9 MG'DL (ref 70–99)
HBA1C MFR BLD: 12 % (ref 4.1–5.7)
HDLC SERPL-MCNC: 54.9 MG/DL
LDLC SERPL CALC-MCNC: ABNORMAL MG/DL (ref 0–129)
LDLC SERPL DIRECT ASSAY-MCNC: 199 MG/DL
POTASSIUM SERPL-SCNC: 4 MMOL/L (ref 3.2–4.6)
SODIUM SERPL-SCNC: 134.4 MMOL/L (ref 132–142)
TRIGL SERPL-MCNC: 407.8 MG/DL (ref 0–150)
VLDL CHOLESTEROL: 81.6 MG/DL (ref 7–32)

## 2020-08-31 RX ORDER — CETIRIZINE HYDROCHLORIDE 10 MG/1
10 TABLET ORAL DAILY
Qty: 90 TABLET | Refills: 3 | Status: SHIPPED | OUTPATIENT
Start: 2020-08-31 | End: 2021-09-14

## 2020-08-31 NOTE — LETTER
September 3, 2020      Emmanuel Say  [address] [place] [address]        Dear MsTieraSay,    We are writing to inform you of your test results.    Your cholesterol is unchanged and is still very high.  Please take your medicine everyday to work on lowering this.   Your diabetes is also very poorly controlled.  Take your medicines every day and watch your diet for high glucose foods.   Your electrolytes and kidney tests were normal.     Resulted Orders   Lipid Panel ([place])   Result Value Ref Range    Cholesterol 311.2 (H) 0.0 - 200.0 mg/dL    Cholesterol/HDL Ratio 5.7 (H) 0.0 - 5.0    HDL Cholesterol 54.9 >40.0 mg/dL    LDL Cholesterol Calculated Unable to calculate Trig >=400 0 - 129 mg/dL    Triglycerides 407.8 (H) 0.0 - 150.0 mg/dL    VLDL Cholesterol 81.6 (H) 7.0 - 32.0 mg/dL    Narrative    Triglycerides were >400 mg/dL, unable to calculate the LDL   Basic Metabolic Panel ([facility])   Result Value Ref Range    Urea Nitrogen 9.3 7.0 - 19.0 mg/dL    Calcium 10.0 8.5 - 10.1 mg/dL    Chloride 97.7 (L) 98.0 - 110.0 mmol/L    Carbon Dioxide 30.1 20.0 - 32.0 mmol/L    Creatinine 0.5 0.5 - 1.0 mg/dL    Glucose 270.9 (H) 70.0 - 99.0 mg'dL    Potassium 4.0 3.2 - 4.6 mmol/L    Sodium 134.4 132.0 - 142.0 mmol/L    GFR Estimate >90 >60.0 mL/min/1.7 m2    GFR Estimate If Black >90 >60.0 mL/min/1.7 m2   Hemoglobin A1c ([facility])   Result Value Ref Range    Hemoglobin A1C 12.0 (H) 4.1 - 5.7 %   LDL Cholesterol  Direct ([facility])   Result Value Ref Range    LDL Cholesterol Direct 199 (H) <=129 mg/dl    Narrative    Test performed by:  [facility]  [address] [place] [address]       If you have any questions or concerns, please call the clinic at the number listed above.       Sincerely,        Clarissa Rodriguez MD

## 2020-08-31 NOTE — PROGRESS NOTES
Nursing Notes:   Stephanie Balderas, [facility]  8/31/2020  3:33 PM  Signed  Due to patient being non-English speaking/uses sign language, an  was used for this visit. Only for face-to-face interpretation by an external agency, date and length of interpretation can be found on the scanned worksheet.     name: Evaristo Finn  Agency: [employer]  Language: Yasmin   Telephone number: [phone]  Type of interpretation: Face-to-face, spoken  Chief Complaint   Patient presents with     Diabetes     Blood pressure 98/67, pulse 99, temperature 98.9  F (37.2  C), resp. rate 14, weight 48.8 kg (107 lb 9.6 oz), not currently breastfeeding.                 KEYUR Vences is a 75 year old  female with a PMH significant for:     Patient Active Problem List   Diagnosis     Health Care Home     Esophageal reflux     Osteoarthrosis, unspecified whether generalized or localized, involving lower leg     Lumbosacral spondylosis without myelopathy     Hyperlipidemia     Type 2 diabetes mellitus with hyperglycemia, without long-term current use of insulin (H)     Seasonal allergies     Screening for depression     Microalbuminuria     Age-related osteoporosis without current pathological fracture     She presents with follow-up of uncontrolled diabetes.  Despite A1c of 13 at the last visit she has declined insulin or other injectable medications.  She also does not check her sugars regularly.  She is currently out of lancets which I will refill today.  We did get her the freestyle lite continuous glucose monitor so she would not have to prick her fingers.  However she declined to use this as it made her dizzy.  She did not want to try it again.  We had many discussions about the benefits  of insulin and diabetes control and the risks of poor control.  Her son, Fili Longoria,  is been with her with these visits.  She is choosing quality of life and not to poke herself.  She is her own decision maker.    She states she is feeling  well.  She has no complaints today.  She brought her medications which we reviewed.  She states she still is missing medication 1-2 times a week.  Her son agrees to try to help her remember every day.  She is declining insulin again today.  I did bring up that this was my recommendation with an A1c of 12.    No CP, SOB, eating well, weakness, numbness or tingling.    Eye exam is scheduled later this week. They will send the notes.     She is agreeing to mammogram DEXA scan and fit test today for screening.  She previously had a positive fit test 5 years ago but never had colonoscopy.  Got a fit test last year which was negative.  She agrees to colonoscopy if it is positive.    PMH, Medications and Allergies were reviewed and updated as needed.     A Ombu  was used for this visit.           Physical Exam:     Vitals:    08/31/20 1528   BP: 98/67   Pulse: 99   Resp: 14   Temp: 98.9  F (37.2  C)   Weight: 48.8 kg (107 lb 9.6 oz)     Body mass index is 22.49 kg/m .  Wt Readings from Last 4 Encounters:   08/31/20 48.8 kg (107 lb 9.6 oz)   06/25/20 49.7 kg (109 lb 9.6 oz)   02/20/20 51.1 kg (112 lb 9.6 oz)   01/16/20 50.6 kg (111 lb 9.6 oz)        Exam:  Constitutional: healthy, alert and no distress  Eyes: conjunctiva are clear..  Cardiovascular:RRR. No murmurs, clicks gallops or rub  Respiratory:  normal respiratory rate and rhythm, lungs clear to auscultation. No wheezes or crackles.  Abdomen: +BS, soft, nontender, nondistended.  Extremities: No C/C/E in BLE. 2+DP pulses.    Skin: no rashes.  Psychiatric: mentation appears normal and affect normal/bright        Results for orders placed or performed in visit on 08/31/20   Lipid Panel ([place])     Status: Abnormal   Result Value Ref Range    Cholesterol 311.2 (H) 0.0 - 200.0 mg/dL    Cholesterol/HDL Ratio 5.7 (H) 0.0 - 5.0    HDL Cholesterol 54.9 >40.0 mg/dL    LDL Cholesterol Calculated Unable to calculate Trig >=400 0 - 129 mg/dL    Triglycerides 407.8  (H) 0.0 - 150.0 mg/dL    VLDL Cholesterol 81.6 (H) 7.0 - 32.0 mg/dL    Narrative    Triglycerides were >400 mg/dL, unable to calculate the LDL   Basic Metabolic Panel ([facility])     Status: Abnormal   Result Value Ref Range    Urea Nitrogen 9.3 7.0 - 19.0 mg/dL    Calcium 10.0 8.5 - 10.1 mg/dL    Chloride 97.7 (L) 98.0 - 110.0 mmol/L    Carbon Dioxide 30.1 20.0 - 32.0 mmol/L    Creatinine 0.5 0.5 - 1.0 mg/dL    Glucose 270.9 (H) 70.0 - 99.0 mg'dL    Potassium 4.0 3.2 - 4.6 mmol/L    Sodium 134.4 132.0 - 142.0 mmol/L    GFR Estimate >90 >60.0 mL/min/1.7 m2    GFR Estimate If Black >90 >60.0 mL/min/1.7 m2   Hemoglobin A1c ([facility])     Status: Abnormal   Result Value Ref Range    Hemoglobin A1C 12.0 (H) 4.1 - 5.7 %       Assessment and Plan     Emmanuel was seen today for diabetes.    Diagnoses and all orders for this visit:    Type 2 diabetes mellitus with hyperglycemia, without long-term current use of insulin (H): Very poor control.  She is declining insulin or other injectable medications.  She is also not checking her sugars regularly.  Which would make these dangerous.  We have tried CGM and she is declined to use it.  She is maximized on current oral medications.  Reinforced that the only thing to improve her control at this point is to follow diabetes diet and take her medications every day.  She agrees to try to do this more diligently.   Eye exam is scheduled later this week.  -     Lipid Panel ([place])  -     Basic Metabolic Panel ([facility])  -     Hemoglobin A1c ([facility])  -     LDL Cholesterol  Direct ([facility])  -     Multiple Vitamins-Minerals (WOMENS 50+ MULTI VITAMIN/MIN) TABS; Take 1 tablet by mouth daily  -     blood glucose (NO BRAND SPECIFIED) lancets standard; Use to test blood sugar 2 times daily or as directed.    Visit for screening mammogram  -     PCS Use: Screening Digital Bilateral Mammogram; Future    Pruritic disorder: Refilled medication for itching.  -     cetirizine (ZYRTEC) 10 MG tablet;  Take 1 tablet (10 mg) by mouth daily    Age-related osteoporosis without current pathological fracture: History of osteoporosis on DEXA scan in 2018.  The plan was to start a  bisphosphonate at the next visit.  However the next visit was many months after this and was a travel visit after which she left the country for many months.  It seems the osteoporosis has not been treated to this point.  Will repeat DEXA scan since it is been 2 years.  She is agreeable to start of weekly alendronate if still has osteoporosis.  She is on calcium and vitamin D.  -     Dexa hip/pelvis/spine*; Future    Colon cancer screening: Fit test given.    Return in about 2 months (around 10/31/2020) for In-Clinic Visit, Lab Work, Flu shot.     Options for treatment and/or follow-up care were reviewed with the patient. Emmanuel Vences was engaged and actively involved in the decision making process. She verbalized understanding of the options discussed and was satisfied with the final plan.    Clarissa Rodriguez MD

## 2020-08-31 NOTE — NURSING NOTE
Due to patient being non-English speaking/uses sign language, an  was used for this visit. Only for face-to-face interpretation by an external agency, date and length of interpretation can be found on the scanned worksheet.     name: Evaristo Finn  Agency: [employer]  Language: Yasmin   Telephone number: [phone]  Type of interpretation: Face-to-face, spoken

## 2020-09-01 ENCOUNTER — RECORDS - HEALTHEAST (OUTPATIENT)
Dept: ADMINISTRATIVE | Facility: OTHER | Age: 75
End: 2020-09-01

## 2020-09-01 ENCOUNTER — RECORDS - HEALTHEAST (OUTPATIENT)
Dept: SCHEDULING | Facility: CLINIC | Age: 75
End: 2020-09-01

## 2020-09-01 DIAGNOSIS — Z12.11 COLON CANCER SCREENING: ICD-10-CM

## 2020-09-01 DIAGNOSIS — M81.0 AGE-RELATED OSTEOPOROSIS WITHOUT CURRENT PATHOLOGICAL FRACTURE: ICD-10-CM

## 2020-09-01 LAB — HEMOCCULT STL QL IA: POSITIVE

## 2020-09-01 NOTE — LETTER
September 8, 2020      Emmanuel Say  [address] [place] [address]        Dear Ms.Say,    We are writing to inform you of your test results.    Your colon cancer test was positive. I recommend that you do a colonoscopy. Please make a visit with me to discuss this soon.     Resulted Orders   Fecal Occult Blood - FIT, iFOB ([facility])   Result Value Ref Range    Occult Blood Scn FIT Positive (A) Negative       If you have any questions or concerns, please call the clinic at the number listed above.       Sincerely,        Clarissa Rodriguez MD

## 2020-09-01 NOTE — PATIENT INSTRUCTIONS
20   MAMMO SCREENING  & [facility]   [phone]  Fax Orders to [phone]     Order faxed to [phone], they will contact patient to schedule.     Laurie Ortiz

## 2020-09-01 NOTE — RESULT ENCOUNTER NOTE
Your cholesterol is unchanged and is still very high.  Please take your medicine everyday to work on lowering this.  Your diabetes is also very poorly controlled.  Take your medicines every day and watch your diet for high glucose foods.   Your electrolytes and kidney tests were normal.

## 2020-09-03 ENCOUNTER — TRANSFERRED RECORDS (OUTPATIENT)
Dept: HEALTH INFORMATION MANAGEMENT | Facility: CLINIC | Age: 75
End: 2020-09-03

## 2020-09-04 NOTE — RESULT ENCOUNTER NOTE
I called , Evaristo Finn. Left him a message about abnormal result and recommendation for colonoscopy.  Would like her to come sooner for a visit to discuss this in more detail.    Send the following letter as well:  I also tried to call you with this result.  Your colon cancer test was positive. I recommend that you do a colonoscopy. Please make a visit with me to discuss this soon.  Clarissa Rodriguez MD

## 2020-09-04 NOTE — RESULT ENCOUNTER NOTE
Called patient with with Yasmin .  Both her number and son's number were busy. Will call with another  at another time.  Clarissa Rodriguez MD

## 2020-09-21 ENCOUNTER — MEDICAL CORRESPONDENCE (OUTPATIENT)
Dept: HEALTH INFORMATION MANAGEMENT | Facility: CLINIC | Age: 75
End: 2020-09-21

## 2020-10-26 DIAGNOSIS — E11.65 TYPE 2 DIABETES MELLITUS WITH HYPERGLYCEMIA, WITHOUT LONG-TERM CURRENT USE OF INSULIN (H): ICD-10-CM

## 2020-10-26 RX ORDER — GLIPIZIDE 10 MG/1
20 TABLET, FILM COATED, EXTENDED RELEASE ORAL DAILY
Qty: 180 TABLET | Refills: 4 | Status: SHIPPED | OUTPATIENT
Start: 2020-10-26 | End: 2021-11-05

## 2020-10-30 ENCOUNTER — MEDICAL CORRESPONDENCE (OUTPATIENT)
Dept: HEALTH INFORMATION MANAGEMENT | Facility: CLINIC | Age: 75
End: 2020-10-30

## 2020-12-11 DIAGNOSIS — E11.65 TYPE 2 DIABETES MELLITUS WITH HYPERGLYCEMIA, WITHOUT LONG-TERM CURRENT USE OF INSULIN (H): ICD-10-CM

## 2020-12-11 RX ORDER — MULTIVIT-MIN/FA/LYCOPEN/LUTEIN .4-300-25
TABLET ORAL
Qty: 30 TABLET | Refills: 4 | Status: SHIPPED | OUTPATIENT
Start: 2020-12-11 | End: 2021-10-11

## 2020-12-29 ENCOUNTER — MEDICAL CORRESPONDENCE (OUTPATIENT)
Dept: HEALTH INFORMATION MANAGEMENT | Facility: CLINIC | Age: 75
End: 2020-12-29

## 2021-01-18 DIAGNOSIS — E11.65 TYPE 2 DIABETES MELLITUS WITH HYPERGLYCEMIA, WITHOUT LONG-TERM CURRENT USE OF INSULIN (H): ICD-10-CM

## 2021-01-19 RX ORDER — METFORMIN HCL 500 MG
2000 TABLET, EXTENDED RELEASE 24 HR ORAL
Qty: 120 TABLET | Refills: 3 | Status: SHIPPED | OUTPATIENT
Start: 2021-01-19 | End: 2021-04-14

## 2021-02-11 DIAGNOSIS — E11.65 TYPE 2 DIABETES MELLITUS WITH HYPERGLYCEMIA, WITHOUT LONG-TERM CURRENT USE OF INSULIN (H): ICD-10-CM

## 2021-02-12 RX ORDER — ASPIRIN 81 MG/1
TABLET, COATED ORAL
Qty: 30 TABLET | Refills: 1 | Status: SHIPPED | OUTPATIENT
Start: 2021-02-12 | End: 2021-04-12

## 2021-02-12 RX ORDER — SITAGLIPTIN 100 MG/1
TABLET, FILM COATED ORAL
Qty: 30 TABLET | Refills: 1 | Status: SHIPPED | OUTPATIENT
Start: 2021-02-12 | End: 2021-04-12

## 2021-02-16 ENCOUNTER — TELEPHONE (OUTPATIENT)
Dept: FAMILY MEDICINE | Facility: CLINIC | Age: 76
End: 2021-02-16

## 2021-02-16 NOTE — TELEPHONE ENCOUNTER
Called patient regarding COVID-19 Vaccine Event at our clinic and she declined it.  MADIE Conner

## 2021-03-02 DIAGNOSIS — E11.65 TYPE 2 DIABETES MELLITUS WITH HYPERGLYCEMIA, WITHOUT LONG-TERM CURRENT USE OF INSULIN (H): ICD-10-CM

## 2021-03-11 ENCOUNTER — DOCUMENTATION ONLY (OUTPATIENT)
Dept: FAMILY MEDICINE | Facility: CLINIC | Age: 76
End: 2021-03-11

## 2021-03-11 DIAGNOSIS — Z53.9 DIAGNOSIS NOT YET DEFINED: Primary | ICD-10-CM

## 2021-03-11 NOTE — PROGRESS NOTES
To be completed in Nursing note:    Please reference list for forms that require a visit for completion.  Please remind patients that providers are given 3-5 business days to complete and return forms.      Form type: [facility] Plan of Care and Certification for 21 - 21    Date form received: 21    Date form completed by Physician: 21    How was form returned to patient (mailed, faxed, or at  for patient to ): Faxed back to [phone]    Date form mailed/faxed/left at  for patient and sent to HIM for scannin21      Once form is left for patient, faxed, or mailed PCS will then close the documentation only encounter.

## 2021-03-15 ENCOUNTER — DOCUMENTATION ONLY (OUTPATIENT)
Dept: FAMILY MEDICINE | Facility: CLINIC | Age: 76
End: 2021-03-15

## 2021-04-12 DIAGNOSIS — E11.65 TYPE 2 DIABETES MELLITUS WITH HYPERGLYCEMIA, WITHOUT LONG-TERM CURRENT USE OF INSULIN (H): ICD-10-CM

## 2021-04-12 RX ORDER — SITAGLIPTIN 100 MG/1
TABLET, FILM COATED ORAL
Qty: 30 TABLET | Refills: 1 | Status: SHIPPED | OUTPATIENT
Start: 2021-04-12 | End: 2021-06-07

## 2021-04-12 RX ORDER — ASPIRIN 81 MG/1
TABLET, COATED ORAL
Qty: 30 TABLET | Refills: 1 | Status: SHIPPED | OUTPATIENT
Start: 2021-04-12 | End: 2021-06-07

## 2021-04-14 RX ORDER — METFORMIN HCL 500 MG
2000 TABLET, EXTENDED RELEASE 24 HR ORAL
Qty: 120 TABLET | Refills: 3 | Status: SHIPPED | OUTPATIENT
Start: 2021-04-14 | End: 2021-08-19

## 2021-04-15 ENCOUNTER — TELEPHONE (OUTPATIENT)
Dept: FAMILY MEDICINE | Facility: CLINIC | Age: 76
End: 2021-04-15

## 2021-04-15 NOTE — TELEPHONE ENCOUNTER
Patient Quality Outreach      Summary:    Patient has the following on her problem list/HM:   Diabetes    Last A1C:  Lab Results   Component Value Date    A1C 12.0 2020    A1C 13.5 2020       Last LDL:    Lab Results   Component Value Date     2020    LDL Unable to calculate Trig >=400 2020       Is the patient on a Statin? Yes          Is the patient on Aspirin? Yes    Medications     HMG CoA Reductase Inhibitors     atorvastatin (LIPITOR) 80 MG tablet       Salicylates     ASPIRIN LOW DOSE 81 MG EC tablet             Last three blood pressure readings:  BP Readings from Last 3 Encounters:   20 98/67   20 104/69   20 115/72            Tobacco Use      Smoking status: Former Smoker        Years: 50.00        Types: Cigarettes        Quit date: 2019        Years since quittin.0      Smokeless tobacco: Never Used      Tobacco comment: chews betel nut          Patient is due/failing the following:   A1C and Diabetic Follow-Up Visit    Type of outreach:    Phone and appointments scheduled    Questions for provider review:    None Patient scheduled on 21    Patient has no showed and attempted to call several times.                                                                                                                                     Stephanie Balderas, [facility]     Chart routed to Provider Dr. Rodriguez.

## 2021-04-15 NOTE — TELEPHONE ENCOUNTER
Natty set up for 5/13/21 appointment through [facility] with Danyell.  time 12:30-12:45pm. ./LR

## 2021-05-10 DIAGNOSIS — Z53.9 DIAGNOSIS NOT YET DEFINED: Primary | ICD-10-CM

## 2021-05-13 DIAGNOSIS — E11.65 TYPE 2 DIABETES MELLITUS WITH HYPERGLYCEMIA, WITHOUT LONG-TERM CURRENT USE OF INSULIN (H): Primary | ICD-10-CM

## 2021-06-07 DIAGNOSIS — E78.00 PURE HYPERCHOLESTEROLEMIA: ICD-10-CM

## 2021-06-07 DIAGNOSIS — E11.65 TYPE 2 DIABETES MELLITUS WITH HYPERGLYCEMIA, WITHOUT LONG-TERM CURRENT USE OF INSULIN (H): ICD-10-CM

## 2021-06-07 RX ORDER — ASPIRIN 81 MG/1
TABLET, COATED ORAL
Qty: 90 TABLET | Refills: 4 | Status: SHIPPED | OUTPATIENT
Start: 2021-06-07 | End: 2022-02-11

## 2021-06-07 RX ORDER — SITAGLIPTIN 100 MG/1
TABLET, FILM COATED ORAL
Qty: 90 TABLET | Refills: 1 | Status: SHIPPED | OUTPATIENT
Start: 2021-06-07 | End: 2021-12-21

## 2021-06-07 RX ORDER — CANAGLIFLOZIN 300 MG/1
TABLET, FILM COATED ORAL
Qty: 90 TABLET | Refills: 1 | Status: SHIPPED | OUTPATIENT
Start: 2021-06-07 | End: 2021-11-05

## 2021-06-07 RX ORDER — ATORVASTATIN CALCIUM 80 MG/1
80 TABLET, FILM COATED ORAL DAILY
Qty: 90 TABLET | Refills: 4 | Status: SHIPPED | OUTPATIENT
Start: 2021-06-07 | End: 2021-06-08

## 2021-06-08 ENCOUNTER — RECORDS - HEALTHEAST (OUTPATIENT)
Dept: BONE DENSITY | Facility: CLINIC | Age: 76
End: 2021-06-08

## 2021-06-08 DIAGNOSIS — Z78.0 ASYMPTOMATIC MENOPAUSAL STATE: ICD-10-CM

## 2021-06-08 DIAGNOSIS — Z13.820 ENCOUNTER FOR SCREENING FOR OSTEOPOROSIS: ICD-10-CM

## 2021-06-08 RX ORDER — ATORVASTATIN CALCIUM 80 MG/1
80 TABLET, FILM COATED ORAL DAILY
Qty: 90 TABLET | Refills: 4 | Status: SHIPPED | OUTPATIENT
Start: 2021-06-08 | End: 2022-02-11

## 2021-06-11 NOTE — PROGRESS NOTES
Assessment: Emmanuel reports she has had type 2 diabetes since 2014.  She has been prescribed : Glucovance (2.5 glyburide / 500 mg metformin) 2 tablets bid and Januvia 100 mg daily. Alc from May 2017 was 12.3%. She does have home care nurse to do med set up once a week.   She brought her bottles of medication today and she has been moving the pills out of their original containers.    She reports forgetting to take pills 3 days per week.   BG today was 116 mg/dl - 6 hours after eating.  Goes to adult day care 3 days per week.    She has been testing BG:    FBS: 126,  103,  116, 161, 202, 179,  186, 162, 142 mg/dl.  post meals: 301, 238, 272,  142     It is very clear when she forgets her medication.       Plan:  Called her  Babs.  Discussed the medication concern and Babs shared this has been problematic for Emmanuel- she travels to different family member's home for evening meal and pills are left at home.    Today, emphasized need to take the pills;  recommended she ONLY take medication that has been set up by the RN, not to do on her own.    Showed her the insulin pen today, that it may be necessary to go that route if BG continued to be elevated.  She has appointment this Thursday with provider.       Subjective and Objective:      Emmanuel Say is referred by Dr. Rodriguez for Diabetes Education.     Lab Results   Component Value Date    HGBA1C 8.8 (H) 12/01/2014           Goals       Monitor            Patient agreed to go to pharmacy and  new prescription for glucose strips.            Follow up:   Primary care visit      Education:     Monitoring   Meter (per above goals): Discussed  Monitoring: Discussed  BG goals: Discussed    Nutrition Management  Nutrition Management: Discussed  Injected Medications: Discussed     Chronic Complications  Foot Care:Literature provided  Skin Care: Discussed  Eye: Literature provided  ABC: Literature provided  Teeth:Literature provided  Goal Setting and Problem Solving:  Discussed  Barriers: Discussed  Psychosocial Adjustments: Discussed      Time spent with the patient: 60 minutes for diabetes education and counseling.   Previous Education: yes  Visit Type:ANTONY Townsend  7/10/2017

## 2021-06-11 NOTE — PROGRESS NOTES
Assessment: Emmanuel reports she has had type 2 diabetes since .  She has been prescribed : Glucovance (2.5 glyburide / 500 mg metformin) 2 tablets bid and Januvia 100 mg daily.   She reports forgetting to take the evening doses about 2-3 times per week.   Alc from May 2017 was 12.3%. She does have home care nurse to do med set up once a week.   She has been testing BG:  FBS:  125, 121, 131, 139, 94 mgj/dl  post meals:  280, 244, 299 mg/dl  Today's BG was 399 mg/dl 2 hours after lunch.   I checked her supplies and she has been using  () glucose strips.   She does go to [facility] day care and eats lunch there 3 days per week.  She reports drinking fruit juice and condensed milk.    Plan: Advised going to pharmacy to get new strips.  Recommended to keep food and do more post meal readings.   Reviewed nutrition guidelines and discussed carbohydrates, food portions and recommended she stop drinking the sweetened beverages.      Subjective and Objective:      Emmanuel Vences is referred by Dr.. Rodriguez for Diabetes Education.     Lab Results   Component Value Date    HGBA1C 8.8 (H) 2014           Goals       Monitor            Patient agreed to go to pharmacy and  new prescription for glucose strips.            Follow up:   Diabetes classes for 2 weeks      Education:     Monitoring   Meter (per above goals): Discussed and Literature provided  Monitoring: Discussed  BG goals: Discussed and Literature provided    Nutrition Management  Nutrition Management: Discussed and Literature provided  Portions/Balance: Discussed  Carb ID/Count: Discussed   Insulin:  discussed, demoed pen    Diabetes Disease Process: Discussed    Acute Complications: Prevent, Detect, Treat:  Hypoglycemia: Discussed  Hyperglycemia: Discussed  Goal Setting and Problem Solving: Discussed  Barriers: Discussed  Psychosocial Adjustments: Discussed      Time spent with the patient: 60 minutes for diabetes education and counseling.   Previous  Education: no  Visit Type:MNT  Hours Remaining: DSMT 10 and MNT 2      Melly Townsend  6/20/2017

## 2021-07-20 DIAGNOSIS — E11.65 TYPE 2 DIABETES MELLITUS WITH HYPERGLYCEMIA, WITHOUT LONG-TERM CURRENT USE OF INSULIN (H): ICD-10-CM

## 2021-07-21 ENCOUNTER — RECORDS - HEALTHEAST (OUTPATIENT)
Dept: ADMINISTRATIVE | Facility: CLINIC | Age: 76
End: 2021-07-21

## 2021-07-26 DIAGNOSIS — Z53.9 DIAGNOSIS NOT YET DEFINED: Primary | ICD-10-CM

## 2021-08-16 DIAGNOSIS — E11.65 TYPE 2 DIABETES MELLITUS WITH HYPERGLYCEMIA, WITHOUT LONG-TERM CURRENT USE OF INSULIN (H): ICD-10-CM

## 2021-08-19 RX ORDER — METFORMIN HCL 500 MG
2000 TABLET, EXTENDED RELEASE 24 HR ORAL
Qty: 120 TABLET | Refills: 0 | Status: SHIPPED | OUTPATIENT
Start: 2021-08-19 | End: 2021-10-11

## 2021-08-20 NOTE — TELEPHONE ENCOUNTER
Patient contacted and is agreeable to scheduling an appt to f/u on her DM patient and  transferred to call center to schedule her appointment patient will need transportation scheduled also   #[id number]    Note routed to Juana IYER for help with transportation    Lindsay SAMANIEGO

## 2021-08-25 ENCOUNTER — TELEPHONE (OUTPATIENT)
Dept: FAMILY MEDICINE | Facility: CLINIC | Age: 76
End: 2021-08-25

## 2021-08-25 NOTE — TELEPHONE ENCOUNTER
This SW will set up a ride for 09/09 appointment on 09/01.     Routing to self as a reminder to set up the ride.     DANIELLE Naylor

## 2021-09-01 NOTE — TELEPHONE ENCOUNTER
DELIA called [facility] [employer] to arranged transportation for the Sept 9th visit. They inform that ride has already been scheduled via [employer] and  will be 30 minutes to an hour before appointment time.     DANIELLE Naylor

## 2021-09-07 DIAGNOSIS — M81.0 AGE-RELATED OSTEOPOROSIS WITHOUT CURRENT PATHOLOGICAL FRACTURE: Primary | ICD-10-CM

## 2021-09-07 NOTE — PROGRESS NOTES
"Sent the following message to care Coordinator from [facility].  Arter sending did see that she has a visit on 9/9/21 with me.  I think she is at risk for no show unless increase care coordination efforts.      \"We have not been able to engage this patient in follow up visits despite multiple outreach attempts.  She is overdo for many health maintenance items and also has diabetes.  She has not been seen for over a year despite recommendation to be seen every 3 months.    I am ordering a Dexa scan now.  She will need help in making sure she is scheduled for this and has a ride to go there. This is for monitoring of her osteoporosis and was ordered a year ago but never done.    Since you are her insurance care coordinator, I was hoping you could help engage her in regular care.  Please assess for barriers to care.    Thank you for partnering with me to help improve outcomes and health for our shared patient.  Clarissa Rodriguez MD\"  "

## 2021-09-09 ENCOUNTER — OFFICE VISIT (OUTPATIENT)
Dept: FAMILY MEDICINE | Facility: CLINIC | Age: 76
End: 2021-09-09
Payer: COMMERCIAL

## 2021-09-09 VITALS
DIASTOLIC BLOOD PRESSURE: 82 MMHG | OXYGEN SATURATION: 99 % | SYSTOLIC BLOOD PRESSURE: 123 MMHG | TEMPERATURE: 98.3 F | BODY MASS INDEX: 22.15 KG/M2 | HEART RATE: 93 BPM | WEIGHT: 106 LBS | RESPIRATION RATE: 16 BRPM

## 2021-09-09 DIAGNOSIS — E11.65 TYPE 2 DIABETES MELLITUS WITH HYPERGLYCEMIA, WITHOUT LONG-TERM CURRENT USE OF INSULIN (H): Primary | ICD-10-CM

## 2021-09-09 LAB
ALBUMIN SERPL-MCNC: 4.1 G/DL (ref 3.5–5)
ALP SERPL-CCNC: 176 U/L (ref 45–120)
ALT SERPL W P-5'-P-CCNC: 14 U/L (ref 0–45)
ANION GAP SERPL CALCULATED.3IONS-SCNC: 11 MMOL/L (ref 5–18)
AST SERPL W P-5'-P-CCNC: 13 U/L (ref 0–40)
BILIRUB SERPL-MCNC: 0.2 MG/DL (ref 0–1)
BUN SERPL-MCNC: 10 MG/DL (ref 8–28)
CALCIUM SERPL-MCNC: 9.9 MG/DL (ref 8.5–10.5)
CHLORIDE BLD-SCNC: 100 MMOL/L (ref 98–107)
CHOLEST SERPL-MCNC: 348 MG/DL
CO2 SERPL-SCNC: 26 MMOL/L (ref 22–31)
CREAT SERPL-MCNC: 0.89 MG/DL (ref 0.6–1.1)
CREAT UR-MCNC: 37 MG/DL
FASTING STATUS PATIENT QL REPORTED: NO
GFR SERPL CREATININE-BSD FRML MDRD: 63 ML/MIN/1.73M2
GLUCOSE BLD-MCNC: 269 MG/DL (ref 70–125)
HBA1C MFR BLD: 11.4 % (ref 0–5.6)
HDLC SERPL-MCNC: 69 MG/DL
LDLC SERPL CALC-MCNC: ABNORMAL MG/DL
MICROALBUMIN UR-MCNC: 6.72 MG/DL (ref 0–1.99)
MICROALBUMIN/CREAT UR: 181.6 MG/G CR
POTASSIUM BLD-SCNC: 4.3 MMOL/L (ref 3.5–5)
PROT SERPL-MCNC: 8.8 G/DL (ref 6–8)
SODIUM SERPL-SCNC: 137 MMOL/L (ref 136–145)
TRIGL SERPL-MCNC: 462 MG/DL

## 2021-09-09 PROCEDURE — 82043 UR ALBUMIN QUANTITATIVE: CPT | Performed by: STUDENT IN AN ORGANIZED HEALTH CARE EDUCATION/TRAINING PROGRAM

## 2021-09-09 PROCEDURE — 36415 COLL VENOUS BLD VENIPUNCTURE: CPT | Performed by: STUDENT IN AN ORGANIZED HEALTH CARE EDUCATION/TRAINING PROGRAM

## 2021-09-09 PROCEDURE — 83036 HEMOGLOBIN GLYCOSYLATED A1C: CPT | Performed by: STUDENT IN AN ORGANIZED HEALTH CARE EDUCATION/TRAINING PROGRAM

## 2021-09-09 PROCEDURE — 80061 LIPID PANEL: CPT | Performed by: STUDENT IN AN ORGANIZED HEALTH CARE EDUCATION/TRAINING PROGRAM

## 2021-09-09 PROCEDURE — 99214 OFFICE O/P EST MOD 30 MIN: CPT | Mod: GC | Performed by: STUDENT IN AN ORGANIZED HEALTH CARE EDUCATION/TRAINING PROGRAM

## 2021-09-09 PROCEDURE — 80053 COMPREHEN METABOLIC PANEL: CPT | Performed by: STUDENT IN AN ORGANIZED HEALTH CARE EDUCATION/TRAINING PROGRAM

## 2021-09-09 NOTE — NURSING NOTE
Due to patient being non-English speaking/uses sign language, an  was used for this visit. Only for face-to-face interpretation by an external agency, date and length of interpretation can be found on the scanned worksheet.     name: Allie Higgins  Agency: [employer]  Language: Yasmin   Telephone number: [phone]  Type of interpretation: Face-to-face, spoken

## 2021-09-09 NOTE — PATIENT INSTRUCTIONS
Plan:  1. It was a pleasure seeing you in clinic today.  2. Please get labs today.   3. We will make sure you are getting all of your medications. Your medication box is missing one medication that you should be on for diabetes.  4. We will call with your results.   5. Please make an eye appointment.  6. Please come back to see Dr. Rodriguez for a well person exam.     [facility]  Phone: [phone]  Address: [address] [place] [address]

## 2021-09-09 NOTE — PROGRESS NOTES
Assessment & Plan     Type 2 diabetes mellitus with hyperglycemia, without long-term current use of insulin (H)  Emmanuel Vences is a 76-year-old female with history of type II diabetes presenting today for follow-up.  Patient's current regimen is Glipizide 20 mg daily, invokana 300 mg daily, Metformin 2000 mg daily, and Januvia 100 mg daily, though Januvia was not present in pillbox or with corresponding pill bottles.  Pharmacy was called, who stated there was insurance issues, but is able to fill and send Januvia now.  Patient has had well-documented discussions with PCP declining injectable medications, though her diabetes has previously been uncontrolled with maximum oral options.  She again declines injectable medications today. No symptoms of hyperglycemia or hypoglycemia today.  Will check labs today.  Recommended repeat eye exam and following up with PCP for well exam.  Will call with lab results.  - Lipid Panel  - Hemoglobin A1c  - Albumin Random Urine Quantitative with Creat Ratio  - Comprehensive metabolic panel  - Lipid Panel  - Hemoglobin A1c  - Albumin Random Urine Quantitative with Creat Ratio  - Comprehensive metabolic panel      Diagnosis or treatment significantly limited by social determinants of health - Language barrier, low health literacy, limited income    Patient was discussed with attending physician, Dr. Gio Davidson MD, who agrees with the assessment and plan.    Thais Alejandra MD, PGY-2  [facility]  9/9/2021      Manoj Vences is a 76 year old female who presents for the following health issues     Diabetes check    Her current diabetes regimen is:   Glipizide 20 mg daily  invokana 300 mg daily  Metformin 2000 mg daily  These 3 medications are present in her pillbox with corresponding pill bottles today.    She is also supposed to be on Januvia 100 mg daily, but this is not present in her pillbox today.  Her medications are set up by a home health nurse,  "so she is unclear what she is taking.    She checks her blood sugars at home, early in the morning. Her numbers are 14 day average 193, highest value 448, lowest value 174 by review of meter. Usually high 100s-low 200s by meter. Patient states that the numbers are above 200 \"all the time.\" No excessive thirst or urinary frequency.  No shaking or sweating episodes concerning for low blood sugars.    Review of Systems   10 point review of systems negative other than listed above.      Objective    Vitals:    09/09/21 1434   BP: 123/82   BP Location: Left arm   Patient Position: Sitting   Cuff Size: Adult Regular   Pulse: 93   Resp: 16   Temp: 98.3  F (36.8  C)   TempSrc: Oral   SpO2: 99%   Weight: 48.1 kg (106 lb)     Body mass index is 22.15 kg/m .  Physical Exam   General: alert, appears comfortable, no acute distress  HEENT: atraumatic, conjunctiva clear without erythema, EOM's intact, mask in place  Neck: supple  Cardiac: normal rate and rhythm with no murmurs or extra sounds  Resp: lungs clear to auscultation bilaterally with no crackles or wheezes, no increased work of breathing  Abdomen: soft, non-tender to palpation, no masses  Foot: Strong DP pulses bilaterally, sharp and dull sensation intact bilaterally, no hair noted on either foot  Skin: no rashes or suspicious legions on exposed skin  Neuro: CN's grossly intact  Psych: affect congruent with mood    Results for orders placed or performed in visit on 09/09/21   Hemoglobin A1c     Status: Abnormal   Result Value Ref Range    Hemoglobin A1C 11.4 (H) 0.0 - 5.6 %     "

## 2021-09-13 DIAGNOSIS — L29.9 PRURITIC DISORDER: ICD-10-CM

## 2021-09-14 ENCOUNTER — MEDICAL CORRESPONDENCE (OUTPATIENT)
Dept: HEALTH INFORMATION MANAGEMENT | Facility: CLINIC | Age: 76
End: 2021-09-14

## 2021-09-14 ENCOUNTER — ANCILLARY PROCEDURE (OUTPATIENT)
Dept: GENERAL RADIOLOGY | Facility: CLINIC | Age: 76
End: 2021-09-14
Attending: FAMILY MEDICINE
Payer: COMMERCIAL

## 2021-09-14 ENCOUNTER — OFFICE VISIT (OUTPATIENT)
Dept: FAMILY MEDICINE | Facility: CLINIC | Age: 76
End: 2021-09-14
Payer: COMMERCIAL

## 2021-09-14 VITALS
WEIGHT: 108.2 LBS | BODY MASS INDEX: 22.71 KG/M2 | HEART RATE: 102 BPM | SYSTOLIC BLOOD PRESSURE: 105 MMHG | RESPIRATION RATE: 14 BRPM | HEIGHT: 58 IN | TEMPERATURE: 98.4 F | DIASTOLIC BLOOD PRESSURE: 70 MMHG

## 2021-09-14 DIAGNOSIS — M81.0 AGE-RELATED OSTEOPOROSIS WITHOUT CURRENT PATHOLOGICAL FRACTURE: ICD-10-CM

## 2021-09-14 DIAGNOSIS — Z53.9 DIAGNOSIS NOT YET DEFINED: Primary | ICD-10-CM

## 2021-09-14 DIAGNOSIS — Z23 NEED FOR PROPHYLACTIC VACCINATION AND INOCULATION AGAINST INFLUENZA: ICD-10-CM

## 2021-09-14 DIAGNOSIS — E11.65 TYPE 2 DIABETES MELLITUS WITH HYPERGLYCEMIA, WITHOUT LONG-TERM CURRENT USE OF INSULIN (H): ICD-10-CM

## 2021-09-14 DIAGNOSIS — Z00.00 ENCOUNTER FOR MEDICARE ANNUAL WELLNESS EXAM: Primary | ICD-10-CM

## 2021-09-14 PROCEDURE — 90471 IMMUNIZATION ADMIN: CPT | Performed by: FAMILY MEDICINE

## 2021-09-14 PROCEDURE — 73562 X-RAY EXAM OF KNEE 3: CPT | Mod: FY | Performed by: RADIOLOGY

## 2021-09-14 PROCEDURE — 99213 OFFICE O/P EST LOW 20 MIN: CPT | Mod: 25 | Performed by: FAMILY MEDICINE

## 2021-09-14 PROCEDURE — 90662 IIV NO PRSV INCREASED AG IM: CPT | Performed by: FAMILY MEDICINE

## 2021-09-14 PROCEDURE — 99207 PR NO CHARGE LOS: CPT

## 2021-09-14 PROCEDURE — 99397 PER PM REEVAL EST PAT 65+ YR: CPT | Mod: 25 | Performed by: FAMILY MEDICINE

## 2021-09-14 RX ORDER — ALENDRONATE SODIUM 70 MG/1
70 TABLET ORAL
Qty: 12 TABLET | Refills: 4 | Status: SHIPPED | OUTPATIENT
Start: 2021-09-14 | End: 2022-02-11

## 2021-09-14 RX ORDER — CETIRIZINE HYDROCHLORIDE 10 MG/1
10 TABLET ORAL DAILY
Qty: 90 TABLET | Refills: 4 | Status: SHIPPED | OUTPATIENT
Start: 2021-09-14 | End: 2022-02-11

## 2021-09-14 ASSESSMENT — MIFFLIN-ST. JEOR: SCORE: 874.51

## 2021-09-14 NOTE — NURSING NOTE
Due to patient being non-English speaking/uses sign language, an  was used for this visit. Only for face-to-face interpretation by an external agency, date and length of interpretation can be found on the scanned worksheet.     name: Jamia Hunter  Agency: [employer]  Language: Yasmin   Telephone number: [phone]  Type of interpretation: Telephone, spoken

## 2021-09-14 NOTE — NURSING NOTE
Due to patient being non-English speaking/uses sign language, an  was used for this visit. Only for face-to-face interpretation by an external agency, date and length of interpretation can be found on the scanned worksheet.     name: Martir Briceno  Agency: [employer]  Language: Yasmin   Telephone number: [phone]  Type of interpretation: Face-to-face, spoken

## 2021-09-14 NOTE — LETTER
September 16, 2021      Emmanuel Say  [address] [place] [address]        Dear Ms.Say,    We are writing to inform you of your test results.    Your Xray shows arthritis. We can discuss this at your next visit and come up with a plan to help you manage that pain better.    Resulted Orders   XR Knee Bilateral 3 Views    Narrative    EXAM: XR KNEE BILATERAL 3 VIEWS  LOCATION: [facility]  DATE/TIME: 9/14/2021 11:49 AM    INDICATION: Bilateral chronic knee pain. History consistent with osteoarthritis.  COMPARISON: None.      Impression    IMPRESSION:   Both knees are negative for fracture. There is degenerative narrowing of medial and lateral compartments of both knees and patellofemoral compartments of both knees. Patella francisco javier bilaterally. No evidence for effusion.       If you have any questions or concerns, please call the clinic at the number listed above.       Sincerely,      Clarissa Rodriguez MD

## 2021-09-14 NOTE — PROGRESS NOTES
65+ Annual Wellness Visit         HPI     This 76 year old female presents as an established patient  Clarissa Rodriguez who presents for a Subsequent Medicare Wellness Visit    Other issues patient wants to be addressed today:    Chief Complaint   Patient presents with     Medicare Visit     65 + Wellness     Imm/Inj     Flu Shot         Patient Active Problem List   Diagnosis     Health Care Home     Esophageal reflux     Osteoarthrosis, unspecified whether generalized or localized, involving lower leg     Lumbosacral spondylosis without myelopathy     Hyperlipidemia     Type 2 diabetes mellitus with hyperglycemia, without long-term current use of insulin (H)     Seasonal allergies     Screening for depression     Microalbuminuria     Age-related osteoporosis without current pathological fracture       Past Medical History:   Diagnosis Date     Aspirin contraindicated 11/8/13    allergy to ibuprofen/amp     Diabetes (H)      Gastroesophageal reflux disease      Hyperlipidemia      Hypertension      Osteoporosis         Family History   Problem Relation Age of Onset     No Known Problems Mother      No Known Problems Father      No Known Problems Maternal Grandmother      No Known Problems Maternal Grandfather      No Known Problems Paternal Grandmother      No Known Problems Paternal Grandfather      No Known Problems Brother      No Known Problems Sister      No Known Problems Son      No Known Problems Daughter      No Known Problems Maternal Half-Brother      No Known Problems Maternal Half-Sister      No Known Problems Paternal Half-Brother      No Known Problems Paternal Half-Sister      No Known Problems Niece      No Known Problems Nephew      No Known Problems Cousin      No Known Problems Other      Diabetes No family hx of      Hypertension No family hx of      Coronary Artery Disease No family hx of      Hyperlipidemia No family hx of      Cerebrovascular Disease No family hx of      Breast Cancer No  family hx of      Colon Cancer No family hx of      Prostate Cancer No family hx of      Other Cancer No family hx of      Depression No family hx of      Anxiety Disorder No family hx of      Mental Illness No family hx of      Substance Abuse No family hx of      Anesthesia Reaction No family hx of      Asthma No family hx of      Osteoporosis No family hx of      Genetic Disorder No family hx of      Thyroid Disease No family hx of      Obesity No family hx of      Unknown/Adopted No family hx of      Heart Disease No family hx of      Cancer No family hx of      Kidney Disease No family hx of      Thrombosis No family hx of      Arthritis No family hx of      Cystic Fibrosis No family hx of      Early Death No family hx of      Coronary Artery Disease Early Onset No family hx of      Heart Failure No family hx of      Bleeding Diathesis No family hx of      Dementia No family hx of      Ovarian Cancer No family hx of      Uterine Cancer No family hx of      Colorectal Cancer No family hx of      Pancreatic Cancer No family hx of      Lung Cancer No family hx of      Melanoma No family hx of      Autoimmune Disease No family hx of          Problem List, Family History and past Medical History reviewed and unchanged/updated.    Visual Acuity:  Right Eye: 10/12.5   Left Eye: 10/12.5  Both Eyes: 10/12.5 with glasses        FALL RISK ASSESSMENT 9/14/2021 5/31/2019 5/1/2019 4/5/2019 9/21/2018 4/16/2018 3/16/2018   Fallen 2 or more times in the past year? Yes No No No No No No   Any fall with injury in the past year? Yes No No No No No No   Timed Up and Go Test/Seconds (13.5 is a fall risk; contact physician) 3 - - - - - -            Health Risk Assessment / Review of Systems     Constitutional: Any fevers or night sweats? Occasionally    Eyes:  Vision problems    YES Sometimes watery eyes cause vision prob, Sees [facility] eye [facility].    Hearing Do you feel you have hearing loss?   YES Sometimes, rare issues, not  chronic    Cardiovascular: Any chest pain, fast or irregular heart beat, calf pain with walking?      YES Sometimes chest pain, calf pain.  Has chset tightness only short time and has not had for a long time.          Respiratory:   Any breathing problems or cough?    YES Sometimes if tired    Gastrointestinal: Any stomach or stool problems?  Yes      Genitourinary: Do you have difficulty controlling urination?    YES Sometimes, wears depends    Muscles and Joints: Any joint stiffness or soreness?    YES Knees especially chronic for many years, no swelling or redness. Pain is deep inside.    Skin: Any concerning lesions or moles?   No     Nervous System: Any loss of strength or feeling, numbness or tingling, shaking, dizziness, or headache?   YES     Mental Health: Any depression, anxiety or problems sleeping?     YES Sleeping sometimes    Cognition: Do you have any problems with your memory?   YES Sometimes    PHQ-2 Score:   PHQ-2 ( 1999 Pfizer) 9/14/2021 8/31/2020   Q1: Little interest or pleasure in doing things 1 0   Q2: Feeling down, depressed or hopeless 1 0   PHQ-2 Score 2 0       PHQ-9 Score:   PHQ 4/19/2019 8/19/2019 1/16/2020   PHQ-9 Total Score 11 14 5   Q9: Thoughts of better off dead/self-harm past 2 weeks Several days Several days Not at all              Medical Care     What other specialists or organizations are involved in your medical care?    Patient Care Team       Relationship Specialty Notifications Start End    Clarissa Rodriguez MD PCP - General Family Practice  9/26/16     Phone: [phone] Fax: [phone]         [address] [place] [address]    Mere Lainez [facility] Coordinator  Abnormal results only, Admissions 5/14/13     [facility]  Care Manager:  :  [phone]    Don Law Other (see comments)   1/22/15         Phone: [phone]         Armida SAMANIEGO    10/20/16     N w/ [employer]  [phone]    Phone: [phone]         Home Health  [facility]   11/15/16     Nicole Harper    Phone: [phone] [address] [place], [place]    [employer]    19     [employer]    Phone: [phone]         Michelle Marshall [facility] MD Pharmacist  20     Phone: [phone] Fax: [phone]         [facility] [address] [place] [address]    Felicia Monroe [facility] Pharmacist Pharmacist  20     Phone: [phone] Fax: [phone]          [address] [place] [address]    Clarissa Rodriguez MD Assigned PCP   21     Phone: [phone] Fax: [phone]         [address] [place] [id number]                 Social History / Home Safety     Social History     Tobacco Use     Smoking status: Former Smoker     Years: 50.00     Types: Cigarettes     Quit date: 2019     Years since quittin.4     Smokeless tobacco: Never Used     Tobacco comment: chews betel nut   Substance Use Topics     Alcohol use: No     Marital Status:  Who lives in your household? Son and family    Does your home have any of the following safety concerns? Loose rugs in the hallway, no grab bars in the bathroom, no handrails on the stairs or have poorly lit areas?   YES No grab bars toilet    Do you feel threatened or controlled by a partner, ex-partner or anyone in your life? No     Has anyone hurt you physically, for example by pushing, hitting, slapping or kicking you   or forcing you to have sex? No          Functional Status     Do you need help with dressing yourself, bathing, or walking? YES Sometimes    Do you need help with the phone, transportation, shopping, preparing meals, housework, laundry, medications or managing money? YES Son helps      Risk Behaviors and Healthy Habits     History   Smoking Status     Former Smoker     Years: 50.00     Types: Cigarettes     Quit date: 2019   Smokeless Tobacco     Never Used     Comment: chews betel nut     How many  "servings of fruits and vegetables do you eat a day? 2-3    Exercise: 6-7 days/week for an average of 15-30 minutes Stretching     Do you frequently drive without a seatbelt? No     Do you use any other drugs? No         Do you use alcohol?No      Frailty Assessment            1. By yourself and note using aids, do you have difficulty walking up 10 steps without resting?   YES Only when having knee pain (1 for Yes, 0 for No)    2. By yourself and not using mobility aids, do you have any difficulty walking several hundred yards?  YES  (1 for Yes, 0 for No)    3. Have you lost 10 or more pounds unintentionally in the previous year? No  (If \"Yes\" and >5% weight loss, then score 1.  Score 0, if <5% weight loss or \"No\" weight loss)    4. How much of the times during the past 3 weeks did you feel tired? 3. Some of the time (\"1\" or \"2\" are scored 1, others 0)    5.  A doctor told the patient they had the following illnesses:  Diabetes (0-4 = score 0, 5-11= score 1)1              Frailty Assessment              1. (1 for Yes, 0 for No)1    2. (1 for Yes, 0 for No)1    3. (If \"Yes\" and >5% weight loss, then score 1.  Score 0, if <5% weight loss or \"No\" weight loss)0    4. (\"1\" or \"2\" are scored 1, others 0)0    5. Count number of illnesses. (0-4 = score 0, 5-11= score 1)1    Total score: 3    Frailty screen score (3-5 = frail; consider interdisciplinary assessment and care.  1-2 = prefrail; at risk for adverse health events; 0 = robust)      EVALUATION OF COGNITIVE FUNCTION     Mood/affect:Normal  Appearance:Normal   Family member/caregiver input: Normal    Cognitive screen is:Negative      Other Assessments     CV Risk based on Pooled Cohort Risk (consider assessing every 4-6 years; consider statin in patients with 10-yr risk > 7.5%):   The ASCVD Risk score (Lenardvikram PAREDES Jr., et al., 2013) failed to calculate for the following reasons:    The valid total cholesterol range is 130 to 320 mg/dL    Advance Directives: Discussed with " "patient and family as appropriate.  Has patient completed advance directives and/or a living will?  no     Given English Honoring Choices. Will ask home nurse to help complete at future.      Immunization History   Administered Date(s) Administered     COVID-19,PF,Pfizer 07/20/2021     Flu, Unspecified 03/10/2008, 09/23/2009, 01/20/2011, 10/14/2011     HEPA 06/22/2015     HepA, Unspecified 04/15/2009     HepA-Adult 10/15/2018     HepB 05/15/2007, 03/10/2008, 10/14/2011     HepB-Adult 05/15/2007, 04/15/2009     Influenza (High Dose) 3 valent vaccine 09/26/2016, 10/15/2018     Influenza (IIV3) PF 10/22/2008, 09/23/2009, 01/20/2011, 10/14/2011, 09/10/2013, 10/09/2013     Influenza Vaccine IM > 6 months Valent IIV4 (Alfuria,Fluzone) 12/01/2014     Influenza Vaccine, 6+MO IM (QUADRIVALENT W/PRESERVATIVES) 12/01/2014, 10/28/2019     Influenza, Quad, High Dose, Pf, 65yr+ (Fluzone HD) 09/14/2021     MMR 05/05/2007     Mantoux Tuberculin Skin Test 03/03/2008     Pneumo Conj 13-V (2010&after) 11/29/2016     Pneumococcal 23 valent 10/14/2011     TD (ADULT, 7+) 05/15/2007, 10/14/2011     TDAP Vaccine (Boostrix) 03/10/2008, 06/22/2015     Td (Adult), Adsorbed 05/15/2007, 04/15/2009, 10/14/2011     Tdap (Adacel,Boostrix) 03/10/2008     Tdap (Adult) Unspecified Formulation 05/15/2007, 04/15/2009     Typhoid IM 06/22/2015, 10/15/2018     Yellow Fever 06/22/2015     Zoster vaccine, live 05/12/2009     Reviewed Immunization Record Today         Physical Exam     Vitals: /70   Pulse 102   Temp 98.4  F (36.9  C)   Resp 14   Ht 1.48 m (4' 10.25\")   Wt 49.1 kg (108 lb 3.2 oz)   BMI 22.42 kg/m    BMI= Body mass index is 22.42 kg/m .  EXAM:  Constitutional: healthy, alert and no distress   Cardiovascular: negative, PMI normal. No lifts, heaves, or thrills. RRR. No murmurs, clicks gallops or rub  Respiratory: negative, Percussion normal. Good diaphragmatic excursion. Lungs clear  MS: bilateral knees are tender along the joint " line. No erythema or effusions. Crepitus on flexion.    Psychiatric: mentation appears normal and affect normal/bright        Assessment and Plan       Reviewed Preventive Services and Plan form with patient as specified in Patient Instructions.      Positive findings on assessment:  Pain and fall risk.  Emmanuel was seen today for medicare visit and imm/inj.    Diagnoses and all orders for this visit:    Encounter for Medicare annual wellness exam    Age-related osteoporosis without current pathological fracture: DEXa scan ordered and needs to get a recheck. NEver was started on osteoporosis treatment but agrees to this today.  Discussed appropriate dosing and alendronate.  -     alendronate (FOSAMAX) 70 MG tablet; Take 1 tablet (70 mg) by mouth every 7 days  -     calcium carb-cholecalciferol (OS-MAME) 500-200 MG-UNIT tablet; Take 1 tablet by mouth 2 times daily (with meals)    Type 2 diabetes mellitus with hyperglycemia, without long-term current use of insulin (H):  Has been poorly controlled. Discussed results from last week in detail. Patient declines an further medicine besides the oral medications that she is already taking.  She is on max doses of all of these. Declines injection medicines.    Osteoarthrosis, unspecified whether generalized or localized, involving lower leg: Chronic knee pain is likely OA. Obtaining Xray today. Considering injection. Pain control could help with fall risk as well.  Has already had home safety eval and has home nurses follow her for fall risk.  -     XR Knee Bilateral 3 Views; Future    Need for prophylactic vaccination and inoculation against influenza  -     INFLUENZA, QUAD, HIGH DOSE, PF, 65YR + (FLUZONE HD)        Options for treatment and follow-up care were reviewed with the Emmanuel Say and/or guardian engaged in the decision making process and verbalized understanding of the options discussed and agreed with the final plan.    Clarissa Rodriguez MD

## 2021-09-14 NOTE — PATIENT INSTRUCTIONS
MEDICARE PERSONAL PREVENTIVE SERVICES PLAN - IMMUNIZATIONS     Here are your recommended immunizations.  Take this home for your reference.                                                    IMMUNIZATIONS Description Recommend today?     Influenza (Flu shot) Prevents flu; should get every year Yes; Recommended and ordered.   PCV 13 Pneumonia vaccination; you get it once No; is up to date.   PPSV 23 Second pneumonia vaccination; usually get it 1 year after PCV 13 No; is up to date.   Zoster (Shingles) Prevents shingles; you get it once  (Check with Part D insurance for coverage, must receive at a pharmacy, not clinic) No; is up to date.   Tetanus Prevents tetanus; once every 10 years No; is up to date.       Patient Education   Personalized Prevention Plan  You are due for the preventive services outlined below.  Your care team is available to assist you in scheduling these services.  If you have already completed any of these items, please share that information with your care team to update in your medical record.  Health Maintenance Due   Topic Date Due     Discuss Advance Care Planning  Never done     Eye Exam  04/15/2020     Osteoporosis Screening  04/30/2020, ordered     FALL RISK ASSESSMENT  Done today           Flu Vaccine (1) Done today     Colorectal Cancer Screening  09/01/2021, discussed and declined       Preventing Falls at Home  A person can fall for many reasons. Older adults may fall because reaction time slows down as we age. Your muscles and joints may get stiff, weak, or less flexible because of illness, medicines, or a physical condition.   Other health problems that make falls more likely include:     Arthritis    Dizziness or lightheadedness when you stand up (orthostatic hypotension)    History of a stroke    Dizziness    Anemia    Certain medicines taken for mental illness or to control blood pressure.    Problems with balance or gait    Bladder or urinary problems    History of  falling    Changes in vision (vision impairment)    Changes in thinking skills and memory (cognitive impairment)  Injuries from a fall can include serious injuries such as broken bones, dislocated joints, internal bleeding and cuts. Injuries like these can limit your independence.   Prevention tips  To help prevent falls and fall-related injuries, follow the tips below.    Floors  To make floors safer:     Put nonskid pads under area rugs.    Remove small rugs.    Replace worn floor coverings.    Tack carpets firmly to each step on carpeted stairs. Put nonskid strips on the edges of uncarpeted stairs.    Keep floors and stairs free of clutter and cords.    Arrange furniture so there are clear pathways.    Clean up any spills right away.  Bathrooms    To make bathrooms safer:     Install grab bars in the tub or shower.    Apply nonskid strips or put a nonskid rubber mat in the tub or shower.    Sit on a bath chair to bathe.    Use bathmats with nonskid backing.  Lighting  To improve visibility in your home:      Keep a flashlight in each room. Or put a lamp next to the bed within easy reach.    Put nightlights in the bedrooms, hallways, kitchen, and bathrooms.    Make sure all stairways have good lighting.    Take your time when going up and down stairs.    Put handrails on both sides of stairs and in walkways for more support. To prevent injury to your wrist or arm, don t use handrails to pull yourself up.    Install grab bars to pull yourself up.    Move or rearrange items that you use often. This will make them easier to find or reach.    Look at your home to find any safety hazards. Especially look at doorways, walkways, and the driveway. Remove or repair any safety problems that you find.  Other changes to make    Look around to find any safety hazards. Look closely at doorways, walkways, and the driveway. Remove or repair any safety problems that you find.    Wear shoes that fit well.    Take your time when  going up and down stairs.    Put handrails on both sides of stairs and in walkways for more support. To prevent injury to your wrist or arm, don t use handrails to pull yourself up.    Install grab bars wherever needed to pull yourself up.    Arrange items that you use often. This will make them easier to find or reach.    [employer] last reviewed this educational content on 3/1/2020    3229-6560 [employer]. All rights reserved. This information is not intended as a substitute for professional medical care. Always follow your healthcare professional's instructions.

## 2021-09-15 NOTE — RESULT ENCOUNTER NOTE
Your Xray shows arthritis. We can discuss this at your next visit and come up with a plan to help you manage that pain better.

## 2021-10-07 ENCOUNTER — TRANSFERRED RECORDS (OUTPATIENT)
Dept: HEALTH INFORMATION MANAGEMENT | Facility: CLINIC | Age: 76
End: 2021-10-07
Payer: COMMERCIAL

## 2021-10-07 LAB
RETINOPATHY: NEGATIVE
RETINOPATHY: NEGATIVE

## 2021-10-08 DIAGNOSIS — E11.65 TYPE 2 DIABETES MELLITUS WITH HYPERGLYCEMIA, WITHOUT LONG-TERM CURRENT USE OF INSULIN (H): ICD-10-CM

## 2021-10-11 RX ORDER — MULTIVIT-MIN/FA/LYCOPEN/LUTEIN .4-300-25
TABLET ORAL
Qty: 90 TABLET | Refills: 4 | Status: SHIPPED | OUTPATIENT
Start: 2021-10-11 | End: 2022-02-11

## 2021-10-11 RX ORDER — METFORMIN HCL 500 MG
2000 TABLET, EXTENDED RELEASE 24 HR ORAL
Qty: 120 TABLET | Refills: 4 | Status: SHIPPED | OUTPATIENT
Start: 2021-10-11 | End: 2021-10-26

## 2021-10-14 ENCOUNTER — OFFICE VISIT (OUTPATIENT)
Dept: FAMILY MEDICINE | Facility: CLINIC | Age: 76
End: 2021-10-14
Payer: COMMERCIAL

## 2021-10-14 VITALS
TEMPERATURE: 98.2 F | RESPIRATION RATE: 20 BRPM | BODY MASS INDEX: 22.63 KG/M2 | HEART RATE: 89 BPM | SYSTOLIC BLOOD PRESSURE: 113 MMHG | WEIGHT: 109.2 LBS | DIASTOLIC BLOOD PRESSURE: 69 MMHG | OXYGEN SATURATION: 98 %

## 2021-10-14 DIAGNOSIS — M54.50 ACUTE BILATERAL LOW BACK PAIN WITHOUT SCIATICA: ICD-10-CM

## 2021-10-14 DIAGNOSIS — M17.0 PRIMARY OSTEOARTHRITIS OF BOTH KNEES: Primary | ICD-10-CM

## 2021-10-14 DIAGNOSIS — M81.0 AGE-RELATED OSTEOPOROSIS WITHOUT CURRENT PATHOLOGICAL FRACTURE: ICD-10-CM

## 2021-10-14 PROCEDURE — 99214 OFFICE O/P EST MOD 30 MIN: CPT | Performed by: FAMILY MEDICINE

## 2021-10-14 RX ORDER — CAPSAICIN 0.025 %
1 CREAM (GRAM) TOPICAL 3 TIMES DAILY PRN
Qty: 120 G | Refills: 11 | Status: SHIPPED | OUTPATIENT
Start: 2021-10-14 | End: 2022-02-11

## 2021-10-14 RX ORDER — ACETAMINOPHEN 500 MG
500-1000 TABLET ORAL EVERY 8 HOURS PRN
Qty: 100 TABLET | Refills: 11 | Status: SHIPPED | OUTPATIENT
Start: 2021-10-14 | End: 2022-02-11

## 2021-10-14 NOTE — Clinical Note
Can you please help Emmanuel Say schedule and get to her Dexa scan?  I referred her last month but it is not scheduled yet. Likely she is missing calls about this.Thank you for your help!  Also I am leaving this clinic 12/3. So Dr. Joe will be her new doctor. She knows this and is scheduled with her on 12/14 for a diabetes follow up visit.  Can you also help remind her about this?  Thanks again for your help!

## 2021-10-14 NOTE — NURSING NOTE
Due to patient being non-English speaking/uses sign language, an  was used for this visit. Only for face-to-face interpretation by an external agency, date and length of interpretation can be found on the scanned worksheet.     name: Martir Briceno  Agency: [employer]  Language: Yasmin   Telephone number: [phone]  Type of interpretation: Face-to-face, spoken

## 2021-10-14 NOTE — PROGRESS NOTES
Assessment & Plan     Primary osteoarthritis of both knees  Xray confirms OA.  Discussed options including joint injection and that is the pain worsens and medicine is not help then joint replacement would be the last option.  She is happy with using the topical medications. Refilled capsaicin and added diclofenac gel for prn use.   - capsaicin (ZOSTRIX) 0.025 % external cream  Dispense: 120 g; Refill: 11  - diclofenac (VOLTAREN) 1 % topical gel  Dispense: 350 g; Refill: 11  - acetaminophen (TYLENOL) 500 MG tablet  Dispense: 100 tablet; Refill: 11    Acute bilateral low back pain without sciatica  This is intermittent and not an issue today. Discussed that she can use topicals and tylenol as needed. If it becomes more chronic would reevaluate.  - acetaminophen (TYLENOL) 500 MG tablet  Dispense: 100 tablet; Refill: 11    Age-related osteoporosis without current pathological fracture  Has not scheduled Dexa yet.  Plan is to have [facility]  help her schedule and get to this.  Routed chart to Lore Lainez to help with this.                   2 months for Diabetes and pain.    Clarissa Rodriguez MD  [facility]    Manoj Benitez is a 76 year old who presents for the following health issues     HPI         Some low back burning pain two times, not all the time.  No pain down the legs, no trouble with stool or urination.  No new injury.    Chronic bilateral knee pain.  Reviewed Xray results.  Consistent with bilateral osteoarthritis.  Cream is helping.  She had been prescribed capsaicin last year and it is helping.  Does not want injection.      Does not have appointment for Dexa scan yet.  They are okay if I work with [facility] care coordinator.    She did get her dm eye exam with Dr. Plasencia last week and XAVIER filled out to get the records.    Review of Systems         Objective    /69   Pulse 89   Temp 98.2  F (36.8  C) (Oral)   Resp 20   Wt 49.5 kg (109 lb 3.2 oz)   SpO2 98%   BMI  22.63 kg/m    Body mass index is 22.63 kg/m .  Physical Exam   GENERAL: healthy, alert and no distress  RESP: lungs clear to auscultation - no rales, rhonchi or wheezes  CV: regular rate and rhythm, normal S1 S2, no S3 or S4, no murmur, click or rub, no peripheral edema and peripheral pulses strong  ABDOMEN: soft, nontender, no hepatosplenomegaly, no masses and bowel sounds normal  MS: no gross musculoskeletal defects noted, no edema.  Back has no pain on palpation.

## 2021-10-25 DIAGNOSIS — E11.65 TYPE 2 DIABETES MELLITUS WITH HYPERGLYCEMIA, WITHOUT LONG-TERM CURRENT USE OF INSULIN (H): ICD-10-CM

## 2021-10-26 RX ORDER — METFORMIN HCL 500 MG
2000 TABLET, EXTENDED RELEASE 24 HR ORAL
Qty: 120 TABLET | Refills: 3 | Status: SHIPPED | OUTPATIENT
Start: 2021-10-26 | End: 2022-02-11

## 2021-11-05 DIAGNOSIS — E11.65 TYPE 2 DIABETES MELLITUS WITH HYPERGLYCEMIA, WITHOUT LONG-TERM CURRENT USE OF INSULIN (H): ICD-10-CM

## 2021-11-05 RX ORDER — CANAGLIFLOZIN 300 MG/1
TABLET, FILM COATED ORAL
Qty: 30 TABLET | Refills: 1 | Status: SHIPPED | OUTPATIENT
Start: 2021-11-05 | End: 2022-01-03

## 2021-11-05 RX ORDER — GLIPIZIDE 10 MG/1
20 TABLET, FILM COATED, EXTENDED RELEASE ORAL DAILY
Qty: 180 TABLET | Refills: 4 | Status: SHIPPED | OUTPATIENT
Start: 2021-11-05 | End: 2022-02-11

## 2021-12-14 ENCOUNTER — OFFICE VISIT (OUTPATIENT)
Dept: FAMILY MEDICINE | Facility: CLINIC | Age: 76
End: 2021-12-14
Payer: COMMERCIAL

## 2021-12-14 VITALS
TEMPERATURE: 98.5 F | SYSTOLIC BLOOD PRESSURE: 123 MMHG | HEART RATE: 96 BPM | WEIGHT: 109.2 LBS | RESPIRATION RATE: 18 BRPM | OXYGEN SATURATION: 98 % | BODY MASS INDEX: 22.63 KG/M2 | DIASTOLIC BLOOD PRESSURE: 68 MMHG

## 2021-12-14 DIAGNOSIS — M81.0 AGE-RELATED OSTEOPOROSIS WITHOUT CURRENT PATHOLOGICAL FRACTURE: ICD-10-CM

## 2021-12-14 DIAGNOSIS — E11.65 TYPE 2 DIABETES MELLITUS WITH HYPERGLYCEMIA, WITHOUT LONG-TERM CURRENT USE OF INSULIN (H): Primary | ICD-10-CM

## 2021-12-14 DIAGNOSIS — R80.9 MICROALBUMINURIA: ICD-10-CM

## 2021-12-14 DIAGNOSIS — E78.5 HYPERLIPIDEMIA LDL GOAL <70: ICD-10-CM

## 2021-12-14 DIAGNOSIS — N18.1 CHRONIC KIDNEY DISEASE, STAGE 1: ICD-10-CM

## 2021-12-14 LAB
ANION GAP SERPL CALCULATED.3IONS-SCNC: 13 MMOL/L (ref 5–18)
BUN SERPL-MCNC: 10 MG/DL (ref 8–28)
CALCIUM SERPL-MCNC: 9.5 MG/DL (ref 8.5–10.5)
CHLORIDE BLD-SCNC: 100 MMOL/L (ref 98–107)
CHOLEST SERPL-MCNC: 306 MG/DL
CO2 SERPL-SCNC: 23 MMOL/L (ref 22–31)
CREAT SERPL-MCNC: 0.87 MG/DL (ref 0.6–1.1)
FASTING STATUS PATIENT QL REPORTED: ABNORMAL
GFR SERPL CREATININE-BSD FRML MDRD: 65 ML/MIN/1.73M2
GLUCOSE BLD-MCNC: 299 MG/DL (ref 70–125)
HBA1C MFR BLD: 10.7 % (ref 0–5.6)
HDLC SERPL-MCNC: 57 MG/DL
LDLC SERPL CALC-MCNC: ABNORMAL MG/DL
POTASSIUM BLD-SCNC: 3.9 MMOL/L (ref 3.5–5)
SODIUM SERPL-SCNC: 136 MMOL/L (ref 136–145)
TRIGL SERPL-MCNC: 475 MG/DL

## 2021-12-14 PROCEDURE — 80048 BASIC METABOLIC PNL TOTAL CA: CPT | Performed by: STUDENT IN AN ORGANIZED HEALTH CARE EDUCATION/TRAINING PROGRAM

## 2021-12-14 PROCEDURE — 36415 COLL VENOUS BLD VENIPUNCTURE: CPT | Performed by: STUDENT IN AN ORGANIZED HEALTH CARE EDUCATION/TRAINING PROGRAM

## 2021-12-14 PROCEDURE — 99214 OFFICE O/P EST MOD 30 MIN: CPT | Performed by: STUDENT IN AN ORGANIZED HEALTH CARE EDUCATION/TRAINING PROGRAM

## 2021-12-14 PROCEDURE — 83036 HEMOGLOBIN GLYCOSYLATED A1C: CPT | Performed by: STUDENT IN AN ORGANIZED HEALTH CARE EDUCATION/TRAINING PROGRAM

## 2021-12-14 PROCEDURE — 80061 LIPID PANEL: CPT | Performed by: STUDENT IN AN ORGANIZED HEALTH CARE EDUCATION/TRAINING PROGRAM

## 2021-12-14 NOTE — PROGRESS NOTES
"Chief Complaint   Patient presents with     Diabetes     DM follow up     Knee Pain     both knees pain        There are no exam notes on file for this visit.        Assessment/Plan:  Emmanuel Vences is a 76 year old female here for follow up of poorly controlled DM, BL knee pain.   Previously a patient of Dr. Rodriguez.  The patient's diabetes management was reviewed with her today.  She made it clear during today's visit that her strongest priorities are avoiding injectable medications for her diabetes.  She understands that her diabetes is poorly controlled and that control will be much improved with the use of an injectable but she declines intensification of her treatment regimen and is minimally interested in suggestions for diet and exercise changes, stating that \"we only die once.\"      # osteoarthritis: Reviewed further options incuding PT, injections. Pt declines. Continue current tx.     # DM:  Tried to reiterate measures for BG control, glucose control.   -Continue current medication management of diabetes  -Reviewed that the patient's current level of peripheral neuropathy is related to glucose control and that worsening might be prevented with improved control  -We will investigate whether an oral GLP-1 1 agonist may be covered with use of a prior authorization      Emmanuel was seen today for diabetes and knee pain.    Diagnoses and all orders for this visit:    Type 2 diabetes mellitus with hyperglycemia, without long-term current use of insulin (H)  -     Hemoglobin A1c; Future  -     Hemoglobin A1c    Chronic kidney disease, stage 1  -     Basic metabolic panel; Future  -     Basic metabolic panel    Age-related osteoporosis without current pathological fracture    Microalbuminuria    Hyperlipidemia LDL goal <70  -     Lipid Profile; Future  -     Lipid Profile      Follow-up with Kaylin Joe in 3 months for follow-up diabetes mellitus, sooner depending upon lab results.      No future " "appointments.    Kaylin Joe MD      There are no Patient Instructions on file for this visit.    Subjective:  Emmanuel Vences is a 76 year old female here for initial visit after Dr. Rodriguez' departure.  Concerns particularly for knee pain and DM.   States that she has pain in her knees when she goes up and down stairs.    Pt has been rx'd capsaicin and voltaren, and feels that when she was young had to carry a lot of heavy things, hard on knees, now getting old.   Medication helps, does not want to go further than current treatment.   She has some friends with similar with knee pain, feels current tx enough fo rher.    Regarding DM -   States BGs are not changing, \"at appropriate levels.\"  She sometimes forgets to do BG checks. Checks 2-3x/week. Sometimes 200s, sometimes 120, fasting.    Pt pokes her finger to check BG. This morning 204.  She has some difficulty remembering to take medications.   Thinks she forgets to take medication 1-2x/week.   14 day average 188. Values largely 165-220 fasting.      Takes medication after meals.   She has a nurse set up boxes    Pt reports she has been having some adverse effects including polyuria.   Vision wears glasses.    Pt has not been having chest pain or new shortness of breath.  CP once in a while, did hard work when she was young.   Has no numbness/tinging in toes but feels like toes are stuck to each other, but looks and can see they are not.   Would be willing to repeat Dm control blood work today.     Patient Active Problem List   Diagnosis     Health Care Home     Esophageal reflux     Osteoarthrosis, unspecified whether generalized or localized, involving lower leg     Lumbosacral spondylosis without myelopathy     Hyperlipidemia     Type 2 diabetes mellitus with hyperglycemia, without long-term current use of insulin (H)     Seasonal allergies     Screening for depression     Microalbuminuria     Age-related osteoporosis without current pathological fracture     " Chronic kidney disease, stage 1       Current Outpatient Medications   Medication     acetaminophen (TYLENOL) 500 MG tablet     alendronate (FOSAMAX) 70 MG tablet     ASPIRIN LOW DOSE 81 MG EC tablet     atorvastatin (LIPITOR) 80 MG tablet     blood glucose (NO BRAND SPECIFIED) lancets standard     blood glucose (NO BRAND SPECIFIED) test strip     calcium carb-cholecalciferol (OS-MAME) 500-200 MG-UNIT tablet     capsaicin (ZOSTRIX) 0.025 % external cream     cetirizine (ZYRTEC) 10 MG tablet     Continuous Blood Gluc  (FREESTYLE PRECIOUS 14 DAY READER) RIDGE     Continuous Blood Gluc Sensor (FREESTYLE PRECIOUS 14 DAY SENSOR) MISC     diclofenac (VOLTAREN) 1 % topical gel     glipiZIDE (GLUCOTROL XL) 10 MG 24 hr tablet     INVOKANA 300 MG tablet     JANUVIA 100 MG tablet     metFORMIN (GLUCOPHAGE-XR) 500 MG 24 hr tablet     Multiple Vitamins-Minerals (CEROVITE SENIOR) TABS     No current facility-administered medications for this visit.       Objective:  /68   Pulse 96   Temp 98.5  F (36.9  C) (Oral)   Resp 18   Wt 49.5 kg (109 lb 3.2 oz)   SpO2 98%   BMI 22.63 kg/m    Body mass index is 22.63 kg/m .  Gen: A/O x3, in NAD.  Cardio: S1, S2, no MRG. RRR.  Resp: CTAB, no WRR.  Ext: Left lower extremity with corn underlying the fifth meta tarsal.  No breaks in the skin.

## 2021-12-21 DIAGNOSIS — E11.65 TYPE 2 DIABETES MELLITUS WITH HYPERGLYCEMIA, WITHOUT LONG-TERM CURRENT USE OF INSULIN (H): ICD-10-CM

## 2021-12-21 RX ORDER — SITAGLIPTIN 100 MG/1
TABLET, FILM COATED ORAL
Qty: 30 TABLET | Refills: 1 | Status: SHIPPED | OUTPATIENT
Start: 2021-12-21 | End: 2022-02-11

## 2022-01-11 ENCOUNTER — DOCUMENTATION ONLY (OUTPATIENT)
Dept: FAMILY MEDICINE | Facility: CLINIC | Age: 77
End: 2022-01-11
Payer: COMMERCIAL

## 2022-01-11 DIAGNOSIS — Z53.9 DIAGNOSIS NOT YET DEFINED: Primary | ICD-10-CM

## 2022-01-11 NOTE — PROGRESS NOTES
To be completed in Nursing note:    Please reference list for forms that require a visit for completion.  Please remind patients that providers are given 3-5 business days to complete and return forms.      Form type: [facility] HOME HEALTH CERTIFICATION AND PLAN OF CARE 12/24/2021 TO     Date form received: 12/29/2021    Date form completed by Physician: 1/11/2022    How was form returned to patient (mailed, faxed, or at  for patient to ):    Fax to [phone]    Date form mailed/faxed/left at  for patient and sent to HIM for scanning:    Fax on 1/11/2022    Once form is left for patient, faxed, or mailed PCS will then close the documentation only encounter.

## 2022-02-01 ENCOUNTER — TELEPHONE (OUTPATIENT)
Dept: FAMILY MEDICINE | Facility: CLINIC | Age: 77
End: 2022-02-01

## 2022-02-01 NOTE — TELEPHONE ENCOUNTER
[facility] phone call message- patient requesting a refill:    Full Medication Name: all medications      Dose:       Pharmacy confirmed as     : No: [facility]      Additional Comments: her previous pharmacy is no longer working with [facility] only with nursing homes and she needs to change to [facility]       OK to leave a message on voice mail? Yes    Primary language: Yasmin      needed? Yes    Call taken on February 1, 2022 at 2:30 PM by Edmond Buchanan'

## 2022-02-01 NOTE — TELEPHONE ENCOUNTER
Patient's previous pharmacy ([facility]) no longer working with patient. She need to change to [facility]. She also need all medications refill to her new pharmacy.     Please advise.    FILEMON MeiA

## 2022-02-11 DIAGNOSIS — L29.9 PRURITIC DISORDER: ICD-10-CM

## 2022-02-11 DIAGNOSIS — M17.0 PRIMARY OSTEOARTHRITIS OF BOTH KNEES: ICD-10-CM

## 2022-02-11 DIAGNOSIS — E78.00 PURE HYPERCHOLESTEROLEMIA: ICD-10-CM

## 2022-02-11 DIAGNOSIS — M54.50 ACUTE BILATERAL LOW BACK PAIN WITHOUT SCIATICA: ICD-10-CM

## 2022-02-11 DIAGNOSIS — E11.65 TYPE 2 DIABETES MELLITUS WITH HYPERGLYCEMIA, WITHOUT LONG-TERM CURRENT USE OF INSULIN (H): ICD-10-CM

## 2022-02-11 DIAGNOSIS — M81.0 AGE-RELATED OSTEOPOROSIS WITHOUT CURRENT PATHOLOGICAL FRACTURE: ICD-10-CM

## 2022-02-11 RX ORDER — FLASH GLUCOSE SENSOR
KIT MISCELLANEOUS
Qty: 6 EACH | Refills: 3 | Status: SHIPPED | OUTPATIENT
Start: 2022-02-11 | End: 2022-02-15

## 2022-02-11 RX ORDER — METFORMIN HCL 500 MG
2000 TABLET, EXTENDED RELEASE 24 HR ORAL
Qty: 120 TABLET | Refills: 3 | Status: SHIPPED | OUTPATIENT
Start: 2022-02-11 | End: 2022-05-31

## 2022-02-11 RX ORDER — ALENDRONATE SODIUM 70 MG/1
70 TABLET ORAL
Qty: 12 TABLET | Refills: 4 | Status: SHIPPED | OUTPATIENT
Start: 2022-02-11 | End: 2022-05-15

## 2022-02-11 RX ORDER — ACETAMINOPHEN 500 MG
500-1000 TABLET ORAL EVERY 8 HOURS PRN
Qty: 100 TABLET | Refills: 11 | Status: SHIPPED | OUTPATIENT
Start: 2022-02-11 | End: 2023-12-13

## 2022-02-11 RX ORDER — ATORVASTATIN CALCIUM 80 MG/1
80 TABLET, FILM COATED ORAL DAILY
Qty: 90 TABLET | Refills: 4 | Status: SHIPPED | OUTPATIENT
Start: 2022-02-11 | End: 2022-09-26

## 2022-02-11 RX ORDER — CAPSAICIN 0.025 %
1 CREAM (GRAM) TOPICAL 3 TIMES DAILY PRN
Qty: 120 G | Refills: 11 | Status: SHIPPED | OUTPATIENT
Start: 2022-02-11 | End: 2022-05-20

## 2022-02-11 RX ORDER — MULTIVIT-MIN/FA/LYCOPEN/LUTEIN .4-300-25
1 TABLET ORAL DAILY
Qty: 90 TABLET | Refills: 4 | Status: SHIPPED | OUTPATIENT
Start: 2022-02-11 | End: 2023-02-27

## 2022-02-11 RX ORDER — CETIRIZINE HYDROCHLORIDE 10 MG/1
10 TABLET ORAL DAILY
Qty: 90 TABLET | Refills: 4 | Status: SHIPPED | OUTPATIENT
Start: 2022-02-11 | End: 2023-02-27

## 2022-02-11 RX ORDER — GLIPIZIDE 10 MG/1
20 TABLET, FILM COATED, EXTENDED RELEASE ORAL DAILY
Qty: 180 TABLET | Refills: 4 | Status: ON HOLD | OUTPATIENT
Start: 2022-02-11 | End: 2022-12-29

## 2022-02-15 ENCOUNTER — OFFICE VISIT (OUTPATIENT)
Dept: FAMILY MEDICINE | Facility: CLINIC | Age: 77
End: 2022-02-15
Payer: COMMERCIAL

## 2022-02-15 VITALS
WEIGHT: 104.2 LBS | RESPIRATION RATE: 20 BRPM | SYSTOLIC BLOOD PRESSURE: 125 MMHG | OXYGEN SATURATION: 100 % | DIASTOLIC BLOOD PRESSURE: 81 MMHG | BODY MASS INDEX: 21.59 KG/M2 | HEART RATE: 94 BPM | TEMPERATURE: 97.8 F

## 2022-02-15 DIAGNOSIS — E11.65 TYPE 2 DIABETES MELLITUS WITH HYPERGLYCEMIA, WITHOUT LONG-TERM CURRENT USE OF INSULIN (H): ICD-10-CM

## 2022-02-15 DIAGNOSIS — E78.1 PURE HYPERTRIGLYCERIDEMIA: ICD-10-CM

## 2022-02-15 DIAGNOSIS — F33.0 MAJOR DEPRESSIVE DISORDER, RECURRENT EPISODE, MILD (H): Primary | ICD-10-CM

## 2022-02-15 DIAGNOSIS — M81.0 AGE-RELATED OSTEOPOROSIS WITHOUT CURRENT PATHOLOGICAL FRACTURE: ICD-10-CM

## 2022-02-15 PROCEDURE — 99214 OFFICE O/P EST MOD 30 MIN: CPT | Performed by: STUDENT IN AN ORGANIZED HEALTH CARE EDUCATION/TRAINING PROGRAM

## 2022-02-15 NOTE — PROGRESS NOTES
Chief Complaint   Patient presents with     Follow Up     follow up lab result        Nursing Notes:   Mitchell Warner Mai  2/15/2022 11:11 AM  Signed  Due to patient being non-English speaking/uses sign language, an  was used for this visit. Only for face-to-face interpretation by an external agency, date and length of interpretation can be found on the scanned worksheet.     name: Matthew Matthew  Agency: [employer]  Language: Yasmin   Telephone number: [phone]  Type of interpretation: Face-to-face, spoken        Assessment/Plan:  Emmanuel Vences is a 76 year old female here for follow-up of diabetes mellitus.  She is accompanied by her son and an in person  in order to discuss with all involved her disease process and treatment options.  We discussed in depth that she is having multiple symptoms secondary to hyperglycemia that could be ameliorated with the use of insulin.  Despite acknowledging that we could improve her urinary incontinence, thirst, blurred vision, neuropathy through more intensive glucose control and acknowledging the risk of hyperglycemia for stroke and heart attack the patient strongly declines consideration of insulin therapy.  She understands also that her triglyceride elevation is related to carbohydrate intake and that it also increases her risk of heart attack and stroke.  She desires to continue with her current oral medication therapy.  She states that when she eats better her blood sugar is better controlled; she declines referral to medical nutrition therapy.    Given patient's uncontrolled diabetes and resultant polyuria as well as visual and neurologic deficits, she cannot safely and promptly access the toilet in the bathroom and requires a bedside commode.    -Declines COVID-19 booster  -Declines repeat DEXA scan    Emmanuel was seen today for follow up.    Diagnoses and all orders for this visit:    Major depressive disorder, recurrent episode, mild (H)    Type 2  diabetes mellitus with hyperglycemia, without long-term current use of insulin (H)  -     blood glucose (NO BRAND SPECIFIED) test strip; Use to test blood sugar daily fasting or as directed.  -     blood glucose (NO BRAND SPECIFIED) lancets standard; Use to test blood sugar fasting daily or as directed.    Pure hypertriglyceridemia    Age-related osteoporosis without current pathological fracture        Follow-up with Kaylin Joe in 3 to 6 months for diabetes recheck, diabetic foot exam.    No future appointments.    Kaylin Joe MD      There are no Patient Instructions on file for this visit.    Subjective:  Emmanuel Vences is a 76 year old female here for follow up for diabetes and hypertriglyceridemia.    She states she is doing well.  She did bring her BG meter -prefers finger poke to continuous glucose meter.   Does not check every day - worried she will run out of needles  Does not like the CGM meter - doesn't wear it because it is uncomfortable.  Her glucometer - last BG check was this morning 267 fasting.   But it is almost out of battery so won't stay on to review values today.  BGs are most often in the 200s fasting  Never lows.    Review of symptoms:  Gets up 1-2 times per night to go to the bathroom.  She does have accidents where she can't get to the bathroom fast enough.  Feels thirsty a lot, has been drinking water a lot every day and at nighttime.  If she turns fast she gets dizzy.  Also if she stands up quickly.  Pt has been having some blurred vision - much more than usual.  She also has been having burning/pins and needles sensation in her feet.    She thinks her diet has to do with a lot of this.   She is adamant that she does not want to use any insulin or injectables  -States that she takes too many medications and does not want to add another, even if it means that she could stop some of her current medications  -Does not want referral to a diabetes educator for nutrition therapy  -Does  not want to start insulin even if we could reverse many of the symptoms of hyperglycemia that she is currently experiencing    Patient Active Problem List   Diagnosis     Health Care Home     Esophageal reflux     Osteoarthrosis, unspecified whether generalized or localized, involving lower leg     Lumbosacral spondylosis without myelopathy     Hyperlipidemia     Type 2 diabetes mellitus with hyperglycemia, without long-term current use of insulin (H)     Seasonal allergies     Screening for depression     Microalbuminuria     Age-related osteoporosis without current pathological fracture     Chronic kidney disease, stage 1     Major depressive disorder, recurrent episode, mild (H)       Current Outpatient Medications   Medication     blood glucose (NO BRAND SPECIFIED) lancets standard     blood glucose (NO BRAND SPECIFIED) test strip     acetaminophen (TYLENOL) 500 MG tablet     alendronate (FOSAMAX) 70 MG tablet     aspirin (ASPIRIN LOW DOSE) 81 MG EC tablet     atorvastatin (LIPITOR) 80 MG tablet     calcium carb-cholecalciferol (OS-MAME) 500-200 MG-UNIT tablet     canagliflozin (INVOKANA) 300 MG tablet     capsaicin (ZOSTRIX) 0.025 % external cream     cetirizine (ZYRTEC) 10 MG tablet     diclofenac (VOLTAREN) 1 % topical gel     glipiZIDE (GLUCOTROL XL) 10 MG 24 hr tablet     metFORMIN (GLUCOPHAGE-XR) 500 MG 24 hr tablet     Multiple Vitamins-Minerals (CEROVITE SENIOR) TABS     sitagliptin (JANUVIA) 100 MG tablet     No current facility-administered medications for this visit.       Objective:  /81   Pulse 94   Temp 97.8  F (36.6  C) (Oral)   Resp 20   Wt 47.3 kg (104 lb 3.2 oz)   SpO2 100%   BMI 21.59 kg/m    Body mass index is 21.59 kg/m .  Gen: A/O x3, in NAD.  Appears mildly dehydrated.  Cardio: S1, S2, no MRG. RRR.  Resp: CTAB, no WRR.  Neuro: Grossly intact.

## 2022-02-15 NOTE — NURSING NOTE
Due to patient being non-English speaking/uses sign language, an  was used for this visit. Only for face-to-face interpretation by an external agency, date and length of interpretation can be found on the scanned worksheet.     name: Matthew Castro  Agency: [employer]  Language: Yasmin   Telephone number: [phone]  Type of interpretation: Face-to-face, spoken

## 2022-02-16 PROBLEM — F33.0 MAJOR DEPRESSIVE DISORDER, RECURRENT EPISODE, MILD (H): Status: ACTIVE | Noted: 2022-02-16

## 2022-02-25 ENCOUNTER — DOCUMENTATION ONLY (OUTPATIENT)
Dept: FAMILY MEDICINE | Facility: CLINIC | Age: 77
End: 2022-02-25
Payer: COMMERCIAL

## 2022-02-25 NOTE — PROGRESS NOTES
To be completed in Nursing note:    Please reference list for forms that require a visit for completion.  Please remind patients that providers are given 3-5 business days to complete and return forms.      Form type: [facility] HOME HEALTH CERTIFICATION AND PLAN OF CARE     Date form received: 2/24/2022    Date form completed by Physician: 2/25/2022    How was form returned to patient (mailed, faxed, or at  for patient to ):    Fax to [phone]    Date form mailed/faxed/left at  for patient and sent to HIM for scanning:    Fax on 2/25/2022    Once form is left for patient, faxed, or mailed PCS will then close the documentation only encounter.

## 2022-03-10 ENCOUNTER — DOCUMENTATION ONLY (OUTPATIENT)
Dept: FAMILY MEDICINE | Facility: CLINIC | Age: 77
End: 2022-03-10
Payer: COMMERCIAL

## 2022-03-11 NOTE — PROGRESS NOTES
Telephone call to the client's home for annual health risk assessment due to COVID-19.  An  was present.    Current situation/living environment  Emmanuel continues to live with her son.  She is very happy with this home environment.  Her son is the informal caregiver.  He assists with finances due to language barrier.  Her son is Fili Longoria, [phone].    Activities of daily living (ADL)/instrumental activities of daily living (IADL) and functional issues  Client needs help with the following ADL's: dressing, grooming, bathing, transfers, mobility,  and toileting  Client needs help with the following IADL's: shopping, cooking, housekeeping, laundry, managing finances/bills and transportation  Client states she is unable to perform the above due to weakness, bilateral knee pain and when bend over having dizziness.        Emmanuel has PCA and Homemaking in place to assist with her IADL's.  Emmanuel receives monthly incontinent products.  She is requesting a bedside commode because it is to difficlut to ambulate to the bathroom at night.  She is complaining of her bilateral knee pain and would like a shower chair and HHS.  She is having diarrhea and the sheets are getting soiled and requesting chux to protect the bedding.  No concerns or interested in hearing, vision or dental exams.    Health concerns for today    Emmanuel reports her medical concern is her diarrhea that has been going on for two months.  She reports having issues with her stomach.  She is unsure if it is food or weather related. Encouraged her to make an appt with the doctor to discuss her stomach/diahrea.   She still complaining of bilateral knee pain.  Discussed in detail her uncontrolled DM2. She is afraid of needles and refuses taking insulin.  Will reach out to home care nurse and see if can do more education on this topic.  Also, expressed that she could have injections for her knee pain.  Again, refused due to needles.  Explained there is treatment  options to help make her feel better if she would be open to them.  Not at this time.  She expresses very weak and hard to get around.  She had a flu shot.  She had two COVID shots but not interested in the booster.   No ED/hospitalizations.  Reminded her that she needs an annual wellness visit.      Importance of safe proper disposal of controlled substances discussed with client and resources for med disposals sites provided.    Has patient fallen 2 or more times in the last year? No  Has patient fallen with injury in the last year? No    Cognition/mental health  Emmanuel reports that she has some forgetfulness.  Such as where she misplaces objects.  She remembers her kids names.  She feels safe to be left alone for short periods of time.  Emmanuel reports she has not been going to the adult day care due to weakness.  She reports that she will be ok only going one or two times a week.  Will look at budget and adjust as needed.  She loves attending adult day care so for her to not want to go as often is a change.     STARS/Med Adherence  Client is non-compliant with the following quality measures: aged out    Client's Plan of Care consists of:  Adult day care (2 days per week), Homemaker services (7 hours per week), Personal care assistance (PCA) (5.75 hours per day), SNV (every other week) and Specialized supplies and equipment (bedside commode, shower chair, HHS, chux, and monthly incontinent products)

## 2022-03-18 ENCOUNTER — MEDICAL CORRESPONDENCE (OUTPATIENT)
Dept: HEALTH INFORMATION MANAGEMENT | Facility: CLINIC | Age: 77
End: 2022-03-18
Payer: COMMERCIAL

## 2022-03-24 ENCOUNTER — DOCUMENTATION ONLY (OUTPATIENT)
Dept: FAMILY MEDICINE | Facility: CLINIC | Age: 77
End: 2022-03-24
Payer: COMMERCIAL

## 2022-03-24 NOTE — PROGRESS NOTES
To be completed in Nursing note:    Please reference list for forms that require a visit for completion.  Please remind patients that providers are given 3-5 business days to complete and return forms.      Form type: [employer] ~ STANDARD WRITTEN ORDER     Date form received: 3/17/2022    Date form completed by Physician: 3/23/2022    How was form returned to patient (mailed, faxed, or at  for patient to ):    Fax to [phone]    Date form mailed/faxed/left at  for patient and sent to HIM for scanning:    Fax on 3/23/2022    Once form is left for patient, faxed, or mailed PCS will then close the documentation only encounter.

## 2022-03-28 ENCOUNTER — DOCUMENTATION ONLY (OUTPATIENT)
Dept: FAMILY MEDICINE | Facility: CLINIC | Age: 77
End: 2022-03-28
Payer: COMMERCIAL

## 2022-03-28 ENCOUNTER — MEDICAL CORRESPONDENCE (OUTPATIENT)
Dept: HEALTH INFORMATION MANAGEMENT | Facility: CLINIC | Age: 77
End: 2022-03-28
Payer: COMMERCIAL

## 2022-03-28 NOTE — PROGRESS NOTES
To be completed in Nursing note:    Please reference list for forms that require a visit for completion.  Please remind patients that providers are given 3-5 business days to complete and return forms.      Form type: [employer] ~ STANDARD WRITTEN ORDER: INCONTINENCE #    Date form received: 3/25/2022    Date form completed by Physician: 3/28/2022    How was form returned to patient (mailed, faxed, or at  for patient to ):    Fax to [phone]    Date form mailed/faxed/left at  for patient and sent to HIM for scanning:    Fax on 3/28/2022    Once form is left for patient, faxed, or mailed PCS will then close the documentation only encounter.

## 2022-03-30 ENCOUNTER — MEDICAL CORRESPONDENCE (OUTPATIENT)
Dept: HEALTH INFORMATION MANAGEMENT | Facility: CLINIC | Age: 77
End: 2022-03-30
Payer: COMMERCIAL

## 2022-04-01 ENCOUNTER — MEDICAL CORRESPONDENCE (OUTPATIENT)
Dept: HEALTH INFORMATION MANAGEMENT | Facility: CLINIC | Age: 77
End: 2022-04-01
Payer: COMMERCIAL

## 2022-04-08 ENCOUNTER — MEDICAL CORRESPONDENCE (OUTPATIENT)
Dept: HEALTH INFORMATION MANAGEMENT | Facility: CLINIC | Age: 77
End: 2022-04-08
Payer: COMMERCIAL

## 2022-05-04 ENCOUNTER — DOCUMENTATION ONLY (OUTPATIENT)
Dept: FAMILY MEDICINE | Facility: CLINIC | Age: 77
End: 2022-05-04
Payer: COMMERCIAL

## 2022-05-04 NOTE — PROGRESS NOTES
To be completed in Nursing note:    Please reference list for forms that require a visit for completion.  Please remind patients that providers are given 3-5 business days to complete and return forms.      Form type: [facility] HOME HEALTH CERTIFICATION AND PLAN OF CARE     Date form received: 4/27/2022    Date form completed by Physician: 5/3/2022    How was form returned to patient (mailed, faxed, or at  for patient to ):    Fax to [phone]    Date form mailed/faxed/left at  for patient and sent to HIM for scanning:    Fax on 5/4/2022    Once form is left for patient, faxed, or mailed PCS will then close the documentation only encounter.

## 2022-05-10 ENCOUNTER — TELEPHONE (OUTPATIENT)
Dept: FAMILY MEDICINE | Facility: CLINIC | Age: 77
End: 2022-05-10
Payer: COMMERCIAL

## 2022-05-10 ENCOUNTER — TRANSFERRED RECORDS (OUTPATIENT)
Dept: HEALTH INFORMATION MANAGEMENT | Facility: CLINIC | Age: 77
End: 2022-05-10
Payer: COMMERCIAL

## 2022-05-10 NOTE — TELEPHONE ENCOUNTER
[facility] phone call message- general phone call:    Reason for call: the home care nurse called to let the Dr know the patient has had elevated blood sugar levels and was advised to make an appointment as soon as possible     Return call needed: No    OK to leave a message on voice mail? Yes    Primary language: Yasmin      needed? Yes    Call taken on May 10, 2022 at 3:41 PM by Edmond Buchanan

## 2022-05-11 NOTE — TELEPHONE ENCOUNTER
N reports pt's bs averages 130-170    Readings down loaded  2 wks ago at [facility]  294  356  268  395  378  411  Readings from last 2 weeks  369  314  413  399  260  5/10  B/p 124/66  p     82  Afebrile  Unable to tell if changes in urination d/t incontinence   Only c/o from pt was she felt tired  No changes reported from son patient has poor appetite which is not new. Son was advised to schedule an appt for pt and diabetic teaching was given relating to elevated bs while pt is not eating much by the [facility]    Note routed to   /BTRN

## 2022-05-11 NOTE — TELEPHONE ENCOUNTER
Message left for [facility] to return call with bs readings    Message left for pt/family to return call to schedule an appt   #[phone]    btrn

## 2022-05-13 DIAGNOSIS — E11.65 UNCONTROLLED TYPE 2 DIABETES MELLITUS WITH HYPERGLYCEMIA (H): Primary | ICD-10-CM

## 2022-05-13 RX ORDER — PIOGLITAZONEHYDROCHLORIDE 15 MG/1
15 TABLET ORAL DAILY
Qty: 30 TABLET | Refills: 1 | Status: ON HOLD | OUTPATIENT
Start: 2022-05-13 | End: 2022-10-12

## 2022-05-13 NOTE — TELEPHONE ENCOUNTER
Requested information given to son who voiced understanding. Son requesting med to be delivered   Writer informed pharmacy and med will be delivered on Monday 5/16    Message left for HHN informing her that Actos 15 mg daily has be added to pt meds    BTRN

## 2022-05-15 ENCOUNTER — HOSPITAL ENCOUNTER (INPATIENT)
Facility: HOSPITAL | Age: 77
LOS: 2 days | Discharge: HOME HEALTH | End: 2022-05-17
Attending: EMERGENCY MEDICINE | Admitting: FAMILY MEDICINE
Payer: COMMERCIAL

## 2022-05-15 ENCOUNTER — APPOINTMENT (OUTPATIENT)
Dept: CT IMAGING | Facility: HOSPITAL | Age: 77
End: 2022-05-15
Attending: EMERGENCY MEDICINE
Payer: COMMERCIAL

## 2022-05-15 DIAGNOSIS — N30.00 ACUTE CYSTITIS WITHOUT HEMATURIA: ICD-10-CM

## 2022-05-15 DIAGNOSIS — N28.9 ACUTE KIDNEY INSUFFICIENCY: ICD-10-CM

## 2022-05-15 DIAGNOSIS — J02.0 ACUTE STREPTOCOCCAL PHARYNGITIS: Primary | ICD-10-CM

## 2022-05-15 DIAGNOSIS — E87.1 HYPONATREMIA: ICD-10-CM

## 2022-05-15 DIAGNOSIS — N30.01 ACUTE CYSTITIS WITH HEMATURIA: ICD-10-CM

## 2022-05-15 DIAGNOSIS — E86.0 DEHYDRATION: ICD-10-CM

## 2022-05-15 DIAGNOSIS — A41.9 SEPSIS, DUE TO UNSPECIFIED ORGANISM, UNSPECIFIED WHETHER ACUTE ORGAN DYSFUNCTION PRESENT (H): ICD-10-CM

## 2022-05-15 DIAGNOSIS — E11.65 TYPE 2 DIABETES MELLITUS WITH HYPERGLYCEMIA, WITHOUT LONG-TERM CURRENT USE OF INSULIN (H): ICD-10-CM

## 2022-05-15 LAB
ALBUMIN SERPL-MCNC: 2.6 G/DL (ref 3.5–5)
ALBUMIN UR-MCNC: 30 MG/DL
ALP SERPL-CCNC: 516 U/L (ref 45–120)
ALT SERPL W P-5'-P-CCNC: 44 U/L (ref 0–45)
ANION GAP SERPL CALCULATED.3IONS-SCNC: 11 MMOL/L (ref 5–18)
APPEARANCE UR: ABNORMAL
AST SERPL W P-5'-P-CCNC: 35 U/L (ref 0–40)
ATRIAL RATE - MUSE: 89 BPM
BACTERIA #/AREA URNS HPF: ABNORMAL /HPF
BASOPHILS # BLD AUTO: 0.1 10E3/UL (ref 0–0.2)
BASOPHILS NFR BLD AUTO: 0 %
BILIRUB DIRECT SERPL-MCNC: 0.2 MG/DL
BILIRUB SERPL-MCNC: 0.4 MG/DL (ref 0–1)
BILIRUB UR QL STRIP: NEGATIVE
BNP SERPL-MCNC: 14 PG/ML (ref 0–140)
BUN SERPL-MCNC: 18 MG/DL (ref 8–28)
C REACTIVE PROTEIN LHE: 21.9 MG/DL (ref 0–0.8)
CALCIUM SERPL-MCNC: 11.3 MG/DL (ref 8.5–10.5)
CHLORIDE BLD-SCNC: 92 MMOL/L (ref 98–107)
CO2 SERPL-SCNC: 24 MMOL/L (ref 22–31)
COLOR UR AUTO: YELLOW
CREAT SERPL-MCNC: 1.32 MG/DL (ref 0.6–1.1)
DEPRECATED S PYO AG THROAT QL EIA: NEGATIVE
DEPRECATED S PYO AG THROAT QL EIA: POSITIVE
DIASTOLIC BLOOD PRESSURE - MUSE: NORMAL MMHG
EOSINOPHIL # BLD AUTO: 0.3 10E3/UL (ref 0–0.7)
EOSINOPHIL NFR BLD AUTO: 2 %
ERYTHROCYTE [DISTWIDTH] IN BLOOD BY AUTOMATED COUNT: 13.2 % (ref 10–15)
GFR SERPL CREATININE-BSD FRML MDRD: 42 ML/MIN/1.73M2
GLUCOSE BLD-MCNC: 200 MG/DL (ref 70–125)
GLUCOSE BLDC GLUCOMTR-MCNC: 89 MG/DL (ref 70–99)
GLUCOSE BLDC GLUCOMTR-MCNC: 91 MG/DL (ref 70–99)
GLUCOSE UR STRIP-MCNC: >1000 MG/DL
GROUP A STREP BY PCR: NOT DETECTED
HBA1C MFR BLD: 11.5 %
HCT VFR BLD AUTO: 30.2 % (ref 35–47)
HGB BLD-MCNC: 10.2 G/DL (ref 11.7–15.7)
HGB UR QL STRIP: ABNORMAL
HOLD SPECIMEN: NORMAL
IMM GRANULOCYTES # BLD: 0.1 10E3/UL
IMM GRANULOCYTES NFR BLD: 1 %
INTERPRETATION ECG - MUSE: NORMAL
KETONES UR STRIP-MCNC: NEGATIVE MG/DL
LACTATE SERPL-SCNC: 1.8 MMOL/L (ref 0.7–2)
LEUKOCYTE ESTERASE UR QL STRIP: ABNORMAL
LYMPHOCYTES # BLD AUTO: 2.5 10E3/UL (ref 0.8–5.3)
LYMPHOCYTES NFR BLD AUTO: 15 %
MCH RBC QN AUTO: 29.3 PG (ref 26.5–33)
MCHC RBC AUTO-ENTMCNC: 33.8 G/DL (ref 31.5–36.5)
MCV RBC AUTO: 87 FL (ref 78–100)
MONOCYTES # BLD AUTO: 0.5 10E3/UL (ref 0–1.3)
MONOCYTES NFR BLD AUTO: 3 %
NEUTROPHILS # BLD AUTO: 12.9 10E3/UL (ref 1.6–8.3)
NEUTROPHILS NFR BLD AUTO: 79 %
NITRATE UR QL: NEGATIVE
NRBC # BLD AUTO: 0 10E3/UL
NRBC BLD AUTO-RTO: 0 /100
P AXIS - MUSE: 73 DEGREES
PH UR STRIP: 6 [PH] (ref 5–7)
PLATELET # BLD AUTO: 533 10E3/UL (ref 150–450)
POTASSIUM BLD-SCNC: 4.8 MMOL/L (ref 3.5–5)
PR INTERVAL - MUSE: 146 MS
PROT SERPL-MCNC: 9.2 G/DL (ref 6–8)
QRS DURATION - MUSE: 70 MS
QT - MUSE: 348 MS
QTC - MUSE: 423 MS
R AXIS - MUSE: 23 DEGREES
RBC # BLD AUTO: 3.48 10E6/UL (ref 3.8–5.2)
RBC URINE: 16 /HPF
SARS-COV-2 RNA RESP QL NAA+PROBE: NEGATIVE
SODIUM SERPL-SCNC: 127 MMOL/L (ref 136–145)
SP GR UR STRIP: 1.02 (ref 1–1.03)
SQUAMOUS EPITHELIAL: 1 /HPF
SYSTOLIC BLOOD PRESSURE - MUSE: NORMAL MMHG
T AXIS - MUSE: 40 DEGREES
TROPONIN I SERPL-MCNC: <0.01 NG/ML (ref 0–0.29)
UROBILINOGEN UR STRIP-MCNC: <2 MG/DL
VENTRICULAR RATE- MUSE: 89 BPM
WBC # BLD AUTO: 16.3 10E3/UL (ref 4–11)
WBC CLUMPS #/AREA URNS HPF: PRESENT /HPF
WBC URINE: >182 /HPF

## 2022-05-15 PROCEDURE — 87880 STREP A ASSAY W/OPTIC: CPT | Performed by: EMERGENCY MEDICINE

## 2022-05-15 PROCEDURE — 250N000013 HC RX MED GY IP 250 OP 250 PS 637: Performed by: STUDENT IN AN ORGANIZED HEALTH CARE EDUCATION/TRAINING PROGRAM

## 2022-05-15 PROCEDURE — 99223 1ST HOSP IP/OBS HIGH 75: CPT | Mod: AI | Performed by: STUDENT IN AN ORGANIZED HEALTH CARE EDUCATION/TRAINING PROGRAM

## 2022-05-15 PROCEDURE — 70491 CT SOFT TISSUE NECK W/DYE: CPT

## 2022-05-15 PROCEDURE — 87086 URINE CULTURE/COLONY COUNT: CPT | Performed by: EMERGENCY MEDICINE

## 2022-05-15 PROCEDURE — 86140 C-REACTIVE PROTEIN: CPT | Performed by: EMERGENCY MEDICINE

## 2022-05-15 PROCEDURE — 72125 CT NECK SPINE W/O DYE: CPT

## 2022-05-15 PROCEDURE — 87635 SARS-COV-2 COVID-19 AMP PRB: CPT | Performed by: EMERGENCY MEDICINE

## 2022-05-15 PROCEDURE — 258N000003 HC RX IP 258 OP 636: Performed by: EMERGENCY MEDICINE

## 2022-05-15 PROCEDURE — 83036 HEMOGLOBIN GLYCOSYLATED A1C: CPT | Performed by: STUDENT IN AN ORGANIZED HEALTH CARE EDUCATION/TRAINING PROGRAM

## 2022-05-15 PROCEDURE — 87651 STREP A DNA AMP PROBE: CPT | Performed by: EMERGENCY MEDICINE

## 2022-05-15 PROCEDURE — 96366 THER/PROPH/DIAG IV INF ADDON: CPT

## 2022-05-15 PROCEDURE — 84484 ASSAY OF TROPONIN QUANT: CPT | Performed by: EMERGENCY MEDICINE

## 2022-05-15 PROCEDURE — 80053 COMPREHEN METABOLIC PANEL: CPT | Performed by: EMERGENCY MEDICINE

## 2022-05-15 PROCEDURE — 82248 BILIRUBIN DIRECT: CPT | Performed by: EMERGENCY MEDICINE

## 2022-05-15 PROCEDURE — 96365 THER/PROPH/DIAG IV INF INIT: CPT | Mod: 59

## 2022-05-15 PROCEDURE — 93005 ELECTROCARDIOGRAM TRACING: CPT | Performed by: EMERGENCY MEDICINE

## 2022-05-15 PROCEDURE — 250N000011 HC RX IP 250 OP 636: Performed by: EMERGENCY MEDICINE

## 2022-05-15 PROCEDURE — C9803 HOPD COVID-19 SPEC COLLECT: HCPCS

## 2022-05-15 PROCEDURE — 120N000001 HC R&B MED SURG/OB

## 2022-05-15 PROCEDURE — 83605 ASSAY OF LACTIC ACID: CPT | Performed by: EMERGENCY MEDICINE

## 2022-05-15 PROCEDURE — 36415 COLL VENOUS BLD VENIPUNCTURE: CPT | Performed by: STUDENT IN AN ORGANIZED HEALTH CARE EDUCATION/TRAINING PROGRAM

## 2022-05-15 PROCEDURE — 81001 URINALYSIS AUTO W/SCOPE: CPT | Performed by: EMERGENCY MEDICINE

## 2022-05-15 PROCEDURE — 85025 COMPLETE CBC W/AUTO DIFF WBC: CPT | Performed by: EMERGENCY MEDICINE

## 2022-05-15 PROCEDURE — 36415 COLL VENOUS BLD VENIPUNCTURE: CPT | Performed by: EMERGENCY MEDICINE

## 2022-05-15 PROCEDURE — 85025 COMPLETE CBC W/AUTO DIFF WBC: CPT | Performed by: STUDENT IN AN ORGANIZED HEALTH CARE EDUCATION/TRAINING PROGRAM

## 2022-05-15 PROCEDURE — 258N000003 HC RX IP 258 OP 636: Performed by: STUDENT IN AN ORGANIZED HEALTH CARE EDUCATION/TRAINING PROGRAM

## 2022-05-15 PROCEDURE — 83880 ASSAY OF NATRIURETIC PEPTIDE: CPT | Performed by: EMERGENCY MEDICINE

## 2022-05-15 PROCEDURE — 99285 EMERGENCY DEPT VISIT HI MDM: CPT | Mod: 25

## 2022-05-15 RX ORDER — ONDANSETRON 4 MG/1
4 TABLET, ORALLY DISINTEGRATING ORAL EVERY 6 HOURS PRN
Status: DISCONTINUED | OUTPATIENT
Start: 2022-05-15 | End: 2022-05-17 | Stop reason: HOSPADM

## 2022-05-15 RX ORDER — PROCHLORPERAZINE 25 MG
12.5 SUPPOSITORY, RECTAL RECTAL EVERY 12 HOURS PRN
Status: DISCONTINUED | OUTPATIENT
Start: 2022-05-15 | End: 2022-05-17 | Stop reason: HOSPADM

## 2022-05-15 RX ORDER — IOPAMIDOL 755 MG/ML
75 INJECTION, SOLUTION INTRAVASCULAR ONCE
Status: COMPLETED | OUTPATIENT
Start: 2022-05-15 | End: 2022-05-15

## 2022-05-15 RX ORDER — DEXTROSE MONOHYDRATE 25 G/50ML
25-50 INJECTION, SOLUTION INTRAVENOUS
Status: DISCONTINUED | OUTPATIENT
Start: 2022-05-15 | End: 2022-05-17 | Stop reason: HOSPADM

## 2022-05-15 RX ORDER — ATORVASTATIN CALCIUM 40 MG/1
80 TABLET, FILM COATED ORAL DAILY
Status: DISCONTINUED | OUTPATIENT
Start: 2022-05-15 | End: 2022-05-17 | Stop reason: HOSPADM

## 2022-05-15 RX ORDER — CEFTRIAXONE 1 G/1
1 INJECTION, POWDER, FOR SOLUTION INTRAMUSCULAR; INTRAVENOUS EVERY 24 HOURS
Status: DISCONTINUED | OUTPATIENT
Start: 2022-05-16 | End: 2022-05-17

## 2022-05-15 RX ORDER — ACETAMINOPHEN 650 MG/1
650 SUPPOSITORY RECTAL EVERY 4 HOURS PRN
Status: DISCONTINUED | OUTPATIENT
Start: 2022-05-15 | End: 2022-05-17 | Stop reason: HOSPADM

## 2022-05-15 RX ORDER — ACETAMINOPHEN 325 MG/1
650 TABLET ORAL ONCE
Status: DISCONTINUED | OUTPATIENT
Start: 2022-05-15 | End: 2022-05-15

## 2022-05-15 RX ORDER — ONDANSETRON 2 MG/ML
4 INJECTION INTRAMUSCULAR; INTRAVENOUS EVERY 6 HOURS PRN
Status: DISCONTINUED | OUTPATIENT
Start: 2022-05-15 | End: 2022-05-17 | Stop reason: HOSPADM

## 2022-05-15 RX ORDER — ACETAMINOPHEN 325 MG/1
650 TABLET ORAL EVERY 4 HOURS PRN
Status: DISCONTINUED | OUTPATIENT
Start: 2022-05-15 | End: 2022-05-17 | Stop reason: HOSPADM

## 2022-05-15 RX ORDER — PROCHLORPERAZINE MALEATE 5 MG
5 TABLET ORAL EVERY 6 HOURS PRN
Status: DISCONTINUED | OUTPATIENT
Start: 2022-05-15 | End: 2022-05-17 | Stop reason: HOSPADM

## 2022-05-15 RX ORDER — CEFTRIAXONE 1 G/1
1 INJECTION, POWDER, FOR SOLUTION INTRAMUSCULAR; INTRAVENOUS ONCE
Status: COMPLETED | OUTPATIENT
Start: 2022-05-15 | End: 2022-05-15

## 2022-05-15 RX ORDER — LIDOCAINE 40 MG/G
CREAM TOPICAL
Status: DISCONTINUED | OUTPATIENT
Start: 2022-05-15 | End: 2022-05-17 | Stop reason: HOSPADM

## 2022-05-15 RX ORDER — ASPIRIN 81 MG/1
81 TABLET ORAL DAILY
Status: DISCONTINUED | OUTPATIENT
Start: 2022-05-15 | End: 2022-05-17 | Stop reason: HOSPADM

## 2022-05-15 RX ORDER — SODIUM CHLORIDE 9 MG/ML
INJECTION, SOLUTION INTRAVENOUS CONTINUOUS
Status: DISCONTINUED | OUTPATIENT
Start: 2022-05-15 | End: 2022-05-17 | Stop reason: HOSPADM

## 2022-05-15 RX ORDER — NICOTINE POLACRILEX 4 MG
15-30 LOZENGE BUCCAL
Status: DISCONTINUED | OUTPATIENT
Start: 2022-05-15 | End: 2022-05-17 | Stop reason: HOSPADM

## 2022-05-15 RX ADMIN — SODIUM CHLORIDE: 9 INJECTION, SOLUTION INTRAVENOUS at 16:41

## 2022-05-15 RX ADMIN — CEFTRIAXONE 1 G: 1 INJECTION, POWDER, FOR SOLUTION INTRAMUSCULAR; INTRAVENOUS at 13:17

## 2022-05-15 RX ADMIN — SODIUM CHLORIDE 500 ML: 9 INJECTION, SOLUTION INTRAVENOUS at 12:51

## 2022-05-15 RX ADMIN — ATORVASTATIN CALCIUM 80 MG: 40 TABLET, FILM COATED ORAL at 16:40

## 2022-05-15 RX ADMIN — IOPAMIDOL 75 ML: 755 INJECTION, SOLUTION INTRAVENOUS at 11:31

## 2022-05-15 RX ADMIN — ASPIRIN 81 MG: 81 TABLET, COATED ORAL at 17:26

## 2022-05-15 ASSESSMENT — ENCOUNTER SYMPTOMS
GASTROINTESTINAL NEGATIVE: 1
FATIGUE: 1
NECK PAIN: 1
BACK PAIN: 1
COUGH: 0
FEVER: 1

## 2022-05-15 ASSESSMENT — ACTIVITIES OF DAILY LIVING (ADL)
ADLS_ACUITY_SCORE: 35
ADLS_ACUITY_SCORE: 37
DEPENDENT_IADLS:: CLEANING;COOKING;LAUNDRY;SHOPPING;MEAL PREPARATION;MEDICATION MANAGEMENT;MONEY MANAGEMENT;TRANSPORTATION
ADLS_ACUITY_SCORE: 37
ADLS_ACUITY_SCORE: 37
ADLS_ACUITY_SCORE: 35

## 2022-05-15 NOTE — H&P
[facility]    History and Physical - Hospitalist Service       Date of Admission:  5/15/2022    Assessment & Plan      Emmanuel Vences is a 76 year old female admitted on 5/15/2022. She has a PMH significant for T2DM and presents with neck pain, sore throat, decreased appetite and fatigue, found to have urosepsis.    Sepsis 2/2 UTI  Fatigue  No abdominal pain or symptoms of UTI, however UA significant for LE, bacteria, WBC. Tachy to low 100s. WBC 16.3, CRP elevated at 21.9. Alk Phos elevated. Lactate WNL. Likely fatigue 2/2 infection. High inflammatory markers so will obtain CXR as well.  - CBC in AM  - Ceftriaxone   - F/U UCx  - CXR     Sore throat  3-4 days of sore throat. No other URI symptoms or dyspnea. Found to be GAS+, COVID negative.  - Ceftriaxone as above will cover GAS    Neck pain  1 week of neck pain. Neck pain limiting ROM. Endorses C spine tenderness. No red flag symptoms such as major neck trauma, neurologic deficits to suggest spinal cord issue. No bowel or bladder dysfunction, extremity weakness or gait difficulty. No unexplained weight loss or known malignancy.. CT C spine and soft tissue negative for pathology. Likely cervical strain from poor posture or sleeping habits. Given acute nature and reassuring history and imaging findings, will manage conservatively for now.  - PT/OT, with emphasis on ROM/strengthening excercises  - Tylenol PRN, hot pack to area.    Fatigue  Poor appetite  TIERNEY  Hyponatremia/hypochloremia  About 1-2 weeks of fatigue. Patient states that she's just getting older and tired however is also eating small meals. Now presenting with TIERNEY likely pre-renal due to poor appetite. Electrolyte derangements as above also reflect this. Multiple infections, especially GAS pharyngitis, likely contributing to poor appetite and subsequent fatigue.  - NS @ 75 ml/hr after boluses in ED  - BMP in AM  - Carb consistent diet, encourage PO    T2DM  Per clinic notes, patient  has been resistant to insulin and is managed with oral hypoglycemic agents despite elevated A1C (10.7 on 12/14/21) and recommendation to start insulin. Current regimen is Metformin 2000mg, Invokanna, Actos, Glipizide, Januvia. Noted to decline medical nutrition and DM educator.  - Recheck A1C  - Using recent BGs and weight, estimation of TDD is 15. Will start with 7 U Lantus at bedtime and LDSSI given poor PO intake. Will likely need adjustments based on PO intake and BGs   - DM education        Diet:   CHO consistent  DVT Prophylaxis: Pneumatic Compression Devices  Vargas Catheter: Not present  Fluids: NS @ 75  Central Lines: None  Cardiac Monitoring: None  Code Status:   Full Code    Clinically Significant Risk Factors Present on Admission         # Hyponatremia: Na = 127 mmol/L (Ref range: 136 - 145 mmol/L) on admission, will monitor as appropriate  # Hypercalcemia: Ca = 11.3 mg/dL (Ref range: 8.5 - 10.5 mg/dL) and/or iCa = N/A on admission, will monitor as appropriate    # Hypoalbuminemia: Albumin = 2.6 g/dL (Ref range: 3.5 - 5.0 g/dL) on admission, will monitor as appropriate    # Platelet Defect: home medication list includes an antiplatelet medication       Disposition Plan   Expected Discharge: in 2-3 days  Anticipated discharge location:  Awaiting care coordination huddle   Delays: treatment in progress       The patient's care was discussed with the faculty in AM report.    Diana Pruitt MD  Hospitalist Service  [facility]  Securely message with the ITDatabase Web Console (learn more here)  Text page via [facility] Paging/Directory       ______________________________________________________________________    Chief Complaint   Neck pain  History is obtained from the patient and her son.    History of Present Illness   Emmanuel Vences is a 76 year old female who has a history of T2DM and is admitted for UTI. Patient presents with 3-4 days of neck pain and back pain. No initial injury or trauma to  the area. She states she is unable to move her neck much without causing pain. She denies any numbness or tingling down extremities. She also endorses generalized fatigue recently. States she is getting old and tired. Denies any fever, chills, chest pain, cough, shortness of breath, abdominal pain, nausea, vomiting, dysuria or increased frequency. Reports normal bowel and bladder habits.    Endorses a sore throat for the last couple days. Hasn't been eating much for the last week or so. Usually has small meals and eats little. Tries to drink water and tea throughout the day.    Lives at home with her children and grand children. Needs assist to walk around or family will push her around in a wheelchair.     [facility] at [facility] with Dr. Joe.     Review of Systems    The 10 point Review of Systems is negative other than noted in the HPI or here.     Past Medical History    I have reviewed this patient's medical history and updated it with pertinent information if needed.   Past Medical History:   Diagnosis Date     Aspirin contraindicated 11/8/13    allergy to ibuprofen/amp     Chronic kidney disease, stage 1 12/14/2021     Diabetes (H)      Gastroesophageal reflux disease      Hyperlipidemia      Hypertension      Osteoporosis         Past Surgical History   I have reviewed this patient's surgical history and updated it with pertinent information if needed.  Past Surgical History:   Procedure Laterality Date     CATARACT IOL, RT/LT Bilateral 12/2016        Social History   I have reviewed this patient's social history and updated it with pertinent information if needed. Emmanuel Say  reports that she quit smoking about 3 years ago. Her smoking use included cigarettes. She quit after 50.00 years of use. She has never used smokeless tobacco. She reports that she does not drink alcohol and does not use drugs.    Family History   I have reviewed this patient's family history and updated it with pertinent information  if needed.  Family History   Problem Relation Age of Onset     No Known Problems Mother      No Known Problems Father      No Known Problems Maternal Grandmother      No Known Problems Maternal Grandfather      No Known Problems Paternal Grandmother      No Known Problems Paternal Grandfather      No Known Problems Brother      No Known Problems Sister      No Known Problems Son      No Known Problems Daughter      No Known Problems Maternal Half-Brother      No Known Problems Maternal Half-Sister      No Known Problems Paternal Half-Brother      No Known Problems Paternal Half-Sister      No Known Problems Niece      No Known Problems Nephew      No Known Problems Cousin      No Known Problems Other      Diabetes No family hx of      Hypertension No family hx of      Coronary Artery Disease No family hx of      Hyperlipidemia No family hx of      Cerebrovascular Disease No family hx of      Breast Cancer No family hx of      Colon Cancer No family hx of      Prostate Cancer No family hx of      Other Cancer No family hx of      Depression No family hx of      Anxiety Disorder No family hx of      Mental Illness No family hx of      Substance Abuse No family hx of      Anesthesia Reaction No family hx of      Asthma No family hx of      Osteoporosis No family hx of      Genetic Disorder No family hx of      Thyroid Disease No family hx of      Obesity No family hx of      Unknown/Adopted No family hx of      Heart Disease No family hx of      Cancer No family hx of      Kidney Disease No family hx of      Thrombosis No family hx of      Arthritis No family hx of      Cystic Fibrosis No family hx of      Early Death No family hx of      Coronary Artery Disease Early Onset No family hx of      Heart Failure No family hx of      Bleeding Diathesis No family hx of      Dementia No family hx of      Ovarian Cancer No family hx of      Uterine Cancer No family hx of      Colorectal Cancer No family hx of      Pancreatic  Cancer No family hx of      Lung Cancer No family hx of      Melanoma No family hx of      Autoimmune Disease No family hx of        Prior to Admission Medications   Prior to Admission Medications   Prescriptions Last Dose Informant Patient Reported? Taking?   JANUVIA 100 MG tablet 5/14/2022  No Yes   Sig: TAKE 1 TABLET (100 MG) BY MOUTH DAILY   Multiple Vitamins-Minerals (CEROVITE SENIOR) TABS 5/14/2022  No Yes   Sig: Take 1 tablet by mouth daily   acetaminophen (TYLENOL) 500 MG tablet   No Yes   Sig: Take 1-2 tablets (500-1,000 mg) by mouth every 8 hours as needed for mild pain   aspirin (ASPIRIN LOW DOSE) 81 MG EC tablet 5/14/2022  No Yes   Sig: TAKE 1 TABLET (81 MG) BY MOUTH DAILY   atorvastatin (LIPITOR) 80 MG tablet 5/14/2022  No Yes   Sig: Take 1 tablet (80 mg) by mouth daily   blood glucose (NO BRAND SPECIFIED) lancets standard   No No   Sig: Use to test blood sugar fasting daily or as directed.   blood glucose (NO BRAND SPECIFIED) test strip   No No   Sig: Use to test blood sugar daily fasting or as directed.   calcium carb-cholecalciferol (OS-MAME) 500-200 MG-UNIT tablet 5/14/2022  No Yes   Sig: Take 1 tablet by mouth 2 times daily (with meals)   canagliflozin (INVOKANA) 300 MG tablet 5/14/2022  No Yes   Sig: Take 1 tablet (300 mg) by mouth every morning (before breakfast)   capsaicin (ZOSTRIX) 0.025 % external cream   No Yes   Sig: Apply 1 g topically 3 times daily as needed (to bilateral knees for pain)   cetirizine (ZYRTEC) 10 MG tablet 5/14/2022  No Yes   Sig: Take 1 tablet (10 mg) by mouth daily   diclofenac (VOLTAREN) 1 % topical gel   No Yes   Sig: Apply 2 g topically 4 times daily   Patient taking differently: Apply 2 g topically 4 times daily as needed   glipiZIDE (GLUCOTROL XL) 10 MG 24 hr tablet 5/14/2022  No Yes   Sig: Take 2 tablets (20 mg) by mouth daily   metFORMIN (GLUCOPHAGE-XR) 500 MG 24 hr tablet 5/14/2022  No Yes   Sig: Take 4 tablets (2,000 mg) by mouth daily (with dinner)    pioglitazone (ACTOS) 15 MG tablet 5/14/2022  No Yes   Sig: Take 1 tablet (15 mg) by mouth daily      Facility-Administered Medications: None     Allergies   Allergies   Allergen Reactions     Ibuprofen GI Disturbance       Physical Exam   Vital Signs: Temp: 98.4  F (36.9  C)   BP: 115/72 Pulse: 105   Resp: 17 SpO2: 97 %      Weight: 110 lbs 0 oz    Constitutional: awake, alert, cooperative, no apparent distress, and appears stated age  Eyes: Lids and lashes normal, extra ocular muscles intact, sclera clear, conjunctiva normal  ENT: normocepalic, without obvious abnormality, teeth stained likely due to betel nuts  Hematologic / Lymphatic: no cervical lymphadenopathy  Respiratory: No increased work of breathing, good air exchange, clear to auscultation bilaterally, no crackles or wheezing  Cardiovascular: regular rate and rhythm and normal S1 and S2  GI: soft, non-distended, non-tender  Skin: no rashes and no lesions  Musculoskeletal: no lower extremity pitting edema present  full range of motion noted in extremities. Normal sensation in upper/lower extremities  Decreased neck active and passive ROM in all directions 2/2 pain. TTP along C spine/paraspinal/trapezius  Neurologic: Awake, alert, oriented to name, place and time.   Neuropsychiatric: Affect: normal    Data   Data reviewed today: I reviewed all medications, new labs and imaging results over the last 24 hours.     Recent Labs   Lab 05/15/22  0948   WBC 16.3*   HGB 10.2*   MCV 87   *   *   POTASSIUM 4.8   CHLORIDE 92*   CO2 24   BUN 18   CR 1.32*   ANIONGAP 11   MAME 11.3*   *   ALBUMIN 2.6*   PROTTOTAL 9.2*   BILITOTAL 0.4   ALKPHOS 516*   ALT 44   AST 35     Recent Results (from the past 24 hour(s))   Cervical spine CT w/o contrast    Narrative    EXAM: CT CERVICAL SPINE W/O CONTRAST, CT SOFT TISSUE NECK W CONTRAST  LOCATION: [facility]  DATE/TIME: 5/15/2022 11:19 AM    INDICATION: Jaw pain, neck pain, cervical  spine., Back pain  COMPARISON: None available  CONTRAST: ISOVUE 370 75ML  TECHNIQUE:   Routine CT Soft Tissue Neck with IV contrast. Multiplanar reformats. Dose reduction techniques were used.  Routine CT Cervical Spine without IV contrast. Multiplanar reformats. Dose reduction techniques were used.    FINDINGS:   CT NECK:  MUCOSAL SPACES/SOFT TISSUES: Normal mucosal spaces of the upper aerodigestive tract. No mucosal mass or inflammation identified. Normal vocal cords and infraglottic trachea. Normal parapharyngeal space and subcutaneous soft tissues. Normal oral cavity,    spaces, and floor of mouth structures.    LYMPH NODES: No pathologic lymph nodes by size or morphology criteria.     SALIVARY GLANDS: Normal parotid and submandibular glands.    THYROID: Normal.     VESSELS: Moderate atherosclerotic plaque at the carotid bifurcations. 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 There is associated soft atheromatous plaque.    VISUALIZED INTRACRANIAL/ORBITS/SINUSES: No abnormality of the visualized intracranial compartment or orbits. Visualized paranasal sinuses and mastoid air cells are clear.    OTHER: No destructive osseous lesion. The included lung apices are clear.    CT CERVICAL SPINE:  VERTEBRA: Normal vertebral body heights and alignment. No fracture or posttraumatic subluxation.     CANAL/FORAMINA: No high-grade spinal canal stenosis. At C5-C6, there is moderate right and severe left neural foraminal stenosis.    PARASPINAL: No extraspinal abnormality.      Impression    IMPRESSION:   CT NECK:  1.  No abnormal soft tissue mass. No inflammatory changes.    2.  No enlarged cervical lymph nodes.    CT CERVICAL SPINE:  1. No acute fracture.    2. No high-grade spinal canal stenosis.    3. At C5-C6, there is moderate right and severe left neural foraminal stenosis.   Soft tissue neck CT w contrast    Narrative    EXAM: CT CERVICAL SPINE W/O CONTRAST,  CT SOFT TISSUE NECK W CONTRAST  LOCATION: [facility]  DATE/TIME: 5/15/2022 11:19 AM    INDICATION: Jaw pain, neck pain, cervical spine., Back pain  COMPARISON: None available  CONTRAST: ISOVUE 370 75ML  TECHNIQUE:   Routine CT Soft Tissue Neck with IV contrast. Multiplanar reformats. Dose reduction techniques were used.  Routine CT Cervical Spine without IV contrast. Multiplanar reformats. Dose reduction techniques were used.    FINDINGS:   CT NECK:  MUCOSAL SPACES/SOFT TISSUES: Normal mucosal spaces of the upper aerodigestive tract. No mucosal mass or inflammation identified. Normal vocal cords and infraglottic trachea. Normal parapharyngeal space and subcutaneous soft tissues. Normal oral cavity,    spaces, and floor of mouth structures.    LYMPH NODES: No pathologic lymph nodes by size or morphology criteria.     SALIVARY GLANDS: Normal parotid and submandibular glands.    THYROID: Normal.     VESSELS: Moderate atherosclerotic plaque at the carotid bifurcations. 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 There is associated soft atheromatous plaque.    VISUALIZED INTRACRANIAL/ORBITS/SINUSES: No abnormality of the visualized intracranial compartment or orbits. Visualized paranasal sinuses and mastoid air cells are clear.    OTHER: No destructive osseous lesion. The included lung apices are clear.    CT CERVICAL SPINE:  VERTEBRA: Normal vertebral body heights and alignment. No fracture or posttraumatic subluxation.     CANAL/FORAMINA: No high-grade spinal canal stenosis. At C5-C6, there is moderate right and severe left neural foraminal stenosis.    PARASPINAL: No extraspinal abnormality.      Impression    IMPRESSION:   CT NECK:  1.  No abnormal soft tissue mass. No inflammatory changes.    2.  No enlarged cervical lymph nodes.    CT CERVICAL SPINE:  1. No acute fracture.    2. No high-grade spinal canal stenosis.    3. At C5-C6,  there is moderate right and severe left neural foraminal stenosis.

## 2022-05-15 NOTE — CONSULTS
Care Management Initial Consult    General Information  Assessment completed with: Patient, Children, Other ( JERE), kylah Themarynan Paw and   Type of CM/SW Visit: Initial Assessment    Primary Care Provider verified and updated as needed: Yes   Readmission within the last 30 days: no previous admission in last 30 days   Return Category: Progression of disease  Reason for Consult: discharge planning  Advance Care Planning: Advance Care Planning Reviewed: no concerns identified     General Information Comments: lives w/son Themaryann and he is her PCA    Communication Assessment  Patient's communication style: spoken language (non-English) (Yasmin)             Cognitive  Cognitive/Neuro/Behavioral:                        Living Environment:   People in home: child(lidia), adult, grandchild(lidia)  Theat Paw  Current living Arrangements: house      Able to return to prior arrangements: yes       Family/Social Support:  Care provided by: child(lidia), homecare agency  Provides care for:    Marital Status: Single  Children          Description of Support System: Supportive, Involved    Support Assessment: Adequate family and caregiver support, Adequate social supports    Current Resources:   Patient receiving home care services: No     Community Resources: DME, PCA  Equipment currently used at home: cane, straight, commode chair  Supplies currently used at home: None    Employment/Financial:  Employment Status:          Financial Concerns: No concerns identified   Referral to Financial Worker: No       Lifestyle & Psychosocial Needs:  Social Determinants of Health     Tobacco Use: Medium Risk     Smoking Tobacco Use: Former Smoker     Smokeless Tobacco Use: Never Used   Alcohol Use: Not on file   Financial Resource Strain: Not on file   Food Insecurity: Not on file   Transportation Needs: Not on file   Physical Activity: Not on file   Stress: Not on file   Social Connections: Not on file   Intimate Partner Violence:  Not on file   Depression: Not at risk     PHQ-2 Score: 0   Housing Stability: Not on file       Functional Status:  Prior to admission patient needed assistance:   Dependent ADLs:: Ambulation-cane, Bathing, Dressing, Grooming  Dependent IADLs:: Cleaning, Cooking, Laundry, Shopping, Meal Preparation, Medication Management, Money Management, Transportation  Assesssment of Functional Status: Not at baseline with ADL Functioning, Not at baseline with mobility, Not at  functional baseline    Mental Health Status:  Mental Health Status: No Current Concerns       Chemical Dependency Status:                Values/Beliefs:  Spiritual, Cultural Beliefs, Yarsani Practices, Values that affect care:                 Additional Information:  TERE assessed, spoke to pt w/ and son, lives w/son Fili Longoria and he is also her PCA and he will transport, call grand daughter Milan Turner for discharge planning and transportation.      Jia Majano RN

## 2022-05-15 NOTE — ED TRIAGE NOTES
Patient arrives with multiple complaints.  Reports fatigue and decreased PO intake x 10 days.  Patient also c/o sore throat, neck pain, back pain, and joint pain x 3 days.  Son states has a hx of diabetes- taking medications as prescribed.

## 2022-05-15 NOTE — ED PROVIDER NOTES
EMERGENCY DEPARTMENT ENCOUNTER       ED Course & Medical Decision Making     10:20 AM I met with the patient for the initial interview and physical examination. Discussed plan for treatment and workup in the ED.    1:02 PM I discussed the case with hospitalist, Dr Pruitt with [facility], who accepts the patient for admission.      I saw and examined the patient.  IV was established, diagnostics ordered, she was given Tylenol and IV fluids.    CRP is elevated.  There is leukocytosis.  Mild hyponatremia and acute kidney injury is noted.  Obvious urinary tract infection.  CT scan of the area that is causing her pain up in the jaw, and neck all returned unremarkable.  No masses or signs of soft tissue infection or significant traumatic pathology.    Suspect that her urinary tract infection and subsequent sepsis is her leading problem today.  Plan will be for IV fluids, antibiotics, admission.  Cultures are pending and she is started on Rocephin.  I spoke to the admitting physician who accepted the patient.  Patient is stable and will be admitted at this time    Prior to making a final disposition on this patient the results of patient's tests and other diagnostic studies were discussed with the patient. All questions were answered. Patient expressed understanding of the plan and was amenable to it.    Medications   cefTRIAXone (ROCEPHIN) 1 g vial to attach to  mL bag for ADULTS or NS 50 mL bag for PEDS (has no administration in time range)   iopamidol (ISOVUE-370) solution 75 mL (75 mLs Intravenous Given 5/15/22 1131)   0.9% sodium chloride BOLUS (500 mLs Intravenous New Bag 5/15/22 1251)       Final Impression     No diagnosis found.      Chief Complaint     Chief Complaint   Patient presents with     Fatigue       Patient arrives with multiple complaints.  Reports fatigue and decreased PO intake x 10 days.  Patient also c/o sore throat, neck pain, back pain, and joint pain x 3 days.  Son states has a hx of  "diabetes- taking medications as prescribed.      HPI       Emmanuel Say is a 76 year old female with a pertinent medical history of CKD I, osteoporosis, type II diabetes mellitus, and hypertension who presents for evaluation of neck and jaw pain.     The patient reports 4 days of jaw pain and pain in the back of her neck that radiates down her back. She denies any initial event of injury. Patient also endorses fatigue, stating \"I am getting old and am tired.\" Patient states that she recently measured a fever at home but it has since gone down.     Patient did not take medication prior to arrival. Reports normal bowel and bladder function. Patient denies cough, chest pain, abdominal pain, and any other symptoms or complaints at this time.       I, Ashli Tarango am serving as a scribe to document services personally performed by Ruben Hernandez M.D. based on my observation and the provider's statements to me. I, Ruben Hernandez M.D attest that Ashli Tarango is acting in a scribe capacity, has observed my performance of the services and has documented them in accordance with my direction.      Past Medical History     Past Medical History:   Diagnosis Date     Aspirin contraindicated 11/8/13     Chronic kidney disease, stage 1 12/14/2021     Diabetes (H)      Gastroesophageal reflux disease      Hyperlipidemia      Hypertension      Osteoporosis      Past Surgical History:   Procedure Laterality Date     CATARACT IOL, RT/LT Bilateral 12/2016     Family History   Problem Relation Age of Onset     No Known Problems Mother      No Known Problems Father      No Known Problems Maternal Grandmother      No Known Problems Maternal Grandfather      No Known Problems Paternal Grandmother      No Known Problems Paternal Grandfather      No Known Problems Brother      No Known Problems Sister      No Known Problems Son      No Known Problems Daughter      No Known Problems Maternal Half-Brother      No Known Problems Maternal " Half-Sister      No Known Problems Paternal Half-Brother      No Known Problems Paternal Half-Sister      No Known Problems Niece      No Known Problems Nephew      No Known Problems Cousin      No Known Problems Other      Diabetes No family hx of      Hypertension No family hx of      Coronary Artery Disease No family hx of      Hyperlipidemia No family hx of      Cerebrovascular Disease No family hx of      Breast Cancer No family hx of      Colon Cancer No family hx of      Prostate Cancer No family hx of      Other Cancer No family hx of      Depression No family hx of      Anxiety Disorder No family hx of      Mental Illness No family hx of      Substance Abuse No family hx of      Anesthesia Reaction No family hx of      Asthma No family hx of      Osteoporosis No family hx of      Genetic Disorder No family hx of      Thyroid Disease No family hx of      Obesity No family hx of      Unknown/Adopted No family hx of      Heart Disease No family hx of      Cancer No family hx of      Kidney Disease No family hx of      Thrombosis No family hx of      Arthritis No family hx of      Cystic Fibrosis No family hx of      Early Death No family hx of      Coronary Artery Disease Early Onset No family hx of      Heart Failure No family hx of      Bleeding Diathesis No family hx of      Dementia No family hx of      Ovarian Cancer No family hx of      Uterine Cancer No family hx of      Colorectal Cancer No family hx of      Pancreatic Cancer No family hx of      Lung Cancer No family hx of      Melanoma No family hx of      Autoimmune Disease No family hx of       Social History     Tobacco Use     Smoking status: Former Smoker     Years: 50.00     Types: Cigarettes     Quit date: 4/1/2019     Years since quitting: 3.1     Smokeless tobacco: Never Used     Tobacco comment: chews betel nut   Substance Use Topics     Alcohol use: No     Drug use: No       Relevant past medical, surgical, family and social history as  documented above, has been reviewed and discussed with patient. No changes or additions, unless otherwise noted in the HPI.    Current Medications     acetaminophen (TYLENOL) 500 MG tablet  alendronate (FOSAMAX) 70 MG tablet  aspirin (ASPIRIN LOW DOSE) 81 MG EC tablet  atorvastatin (LIPITOR) 80 MG tablet  blood glucose (NO BRAND SPECIFIED) lancets standard  blood glucose (NO BRAND SPECIFIED) test strip  calcium carb-cholecalciferol (OS-MAME) 500-200 MG-UNIT tablet  canagliflozin (INVOKANA) 300 MG tablet  capsaicin (ZOSTRIX) 0.025 % external cream  cetirizine (ZYRTEC) 10 MG tablet  diclofenac (VOLTAREN) 1 % topical gel  glipiZIDE (GLUCOTROL XL) 10 MG 24 hr tablet  JANUVIA 100 MG tablet  metFORMIN (GLUCOPHAGE-XR) 500 MG 24 hr tablet  Multiple Vitamins-Minerals (CEROVITE SENIOR) TABS  pioglitazone (ACTOS) 15 MG tablet        Allergies     Allergies   Allergen Reactions     Ibuprofen GI Disturbance       Review of Systems     Review of Systems   Constitutional: Positive for fatigue and fever (subjective, resolved).   HENT:        Positive for jaw pain   Respiratory: Negative for cough.    Cardiovascular: Negative for chest pain.   Gastrointestinal: Negative.    Genitourinary: Negative.    Musculoskeletal: Positive for back pain (radiates from neck) and neck pain (back of neck).   All other systems reviewed and are negative.     Remainder of systems reviewed, unless noted in HPI all others negative.    Physical Exam     /72   Pulse 105   Temp 98.4  F (36.9  C)   Resp 17   Ht 1.524 m (5')   Wt 49.9 kg (110 lb)   SpO2 97%   BMI 21.48 kg/m      Physical Exam  Vitals and nursing note reviewed.   Constitutional:       Comments: Frail appearance   HENT:      Head: Normocephalic.      Comments: Oropharynx is stained with presumed beetlenut  No stridor or obvious soft tissue mass     Nose: Nose normal.   Cardiovascular:      Rate and Rhythm: Normal rate.   Pulmonary:      Effort: Pulmonary effort is normal.    Neurological:      Mental Status: She is alert. Mental status is at baseline.   Psychiatric:         Mood and Affect: Mood normal.             Labs & Imaging       Labs Ordered and Resulted from Time of ED Arrival to Time of ED Departure   BASIC METABOLIC PANEL - Abnormal       Result Value    Sodium 127 (*)     Potassium 4.8      Chloride 92 (*)     Carbon Dioxide (CO2) 24      Anion Gap 11      Urea Nitrogen 18      Creatinine 1.32 (*)     Calcium 11.3 (*)     Glucose 200 (*)     GFR Estimate 42 (*)    CBC WITH PLATELETS AND DIFFERENTIAL - Abnormal    WBC Count 16.3 (*)     RBC Count 3.48 (*)     Hemoglobin 10.2 (*)     Hematocrit 30.2 (*)     MCV 87      MCH 29.3      MCHC 33.8      RDW 13.2      Platelet Count 533 (*)     % Neutrophils 79      % Lymphocytes 15      % Monocytes 3      % Eosinophils 2      % Basophils 0      % Immature Granulocytes 1      NRBCs per 100 WBC 0      Absolute Neutrophils 12.9 (*)     Absolute Lymphocytes 2.5      Absolute Monocytes 0.5      Absolute Eosinophils 0.3      Absolute Basophils 0.1      Absolute Immature Granulocytes 0.1      Absolute NRBCs 0.0     CRP INFLAMMATION - Abnormal    CRP 21.9 (*)    ROUTINE UA WITH MICROSCOPIC REFLEX TO CULTURE - Abnormal    Color Urine Yellow      Appearance Urine Turbid (*)     Glucose Urine >1000 (*)     Bilirubin Urine Negative      Ketones Urine Negative      Specific Gravity Urine 1.021      Blood Urine 0.2 mg/dL (*)     pH Urine 6.0      Protein Albumin Urine 30  (*)     Urobilinogen Urine <2.0      Nitrite Urine Negative      Leukocyte Esterase Urine 500 Son/uL (*)     Bacteria Urine Few (*)     WBC Clumps Urine Present (*)     RBC Urine 16 (*)     WBC Urine >182 (*)     Squamous Epithelials Urine 1     HEPATIC FUNCTION PANEL - Abnormal    Bilirubin Total 0.4      Bilirubin Direct 0.2      Protein Total 9.2 (*)     Albumin 2.6 (*)     Alkaline Phosphatase 516 (*)     AST 35      ALT 44     LACTIC ACID WHOLE BLOOD - Normal    Lactic  Acid 1.8     TROPONIN I - Normal    Troponin I <0.01     B-TYPE NATRIURETIC PEPTIDE ([facility] ONLY) - Normal    BNP 14     STREPTOCOCCUS A RAPID SCREEN W REFELX TO PCR - Normal    Group A Strep antigen Negative     GROUP A STREPTOCOCCUS PCR THROAT SWAB - Normal    Group A strep by PCR Not Detected     URINE CULTURE   STREPTOCOCCUS A RAPID SCREEN W REFELX TO PCR       Results for orders placed or performed during the hospital encounter of 05/15/22   Soft tissue neck CT w contrast    Impression    IMPRESSION:   CT NECK:  1.  No abnormal soft tissue mass. No inflammatory changes.    2.  No enlarged cervical lymph nodes.    CT CERVICAL SPINE:  1. No acute fracture.    2. No high-grade spinal canal stenosis.    3. At C5-C6, there is moderate right and severe left neural foraminal stenosis.   Cervical spine CT w/o contrast    Impression    IMPRESSION:   CT NECK:  1.  No abnormal soft tissue mass. No inflammatory changes.    2.  No enlarged cervical lymph nodes.    CT CERVICAL SPINE:  1. No acute fracture.    2. No high-grade spinal canal stenosis.    3. At C5-C6, there is moderate right and severe left neural foraminal stenosis.   Extra Blue Top Tube   Result Value Ref Range    Hold Specimen JIC    Extra Red Top Tube   Result Value Ref Range    Hold Specimen JIC    Extra Green Top (Lithium Heparin) Tube   Result Value Ref Range    Hold Specimen JIC    Extra Purple Top Tube   Result Value Ref Range    Hold Specimen JIC    Extra Green Top (Lithium Heparin) ON ICE   Result Value Ref Range    Hold Specimen JIC    Basic metabolic panel   Result Value Ref Range    Sodium 127 (L) 136 - 145 mmol/L    Potassium 4.8 3.5 - 5.0 mmol/L    Chloride 92 (L) 98 - 107 mmol/L    Carbon Dioxide (CO2) 24 22 - 31 mmol/L    Anion Gap 11 5 - 18 mmol/L    Urea Nitrogen 18 8 - 28 mg/dL    Creatinine 1.32 (H) 0.60 - 1.10 mg/dL    Calcium 11.3 (H) 8.5 - 10.5 mg/dL    Glucose 200 (H) 70 - 125 mg/dL    GFR Estimate 42 (L) >60 mL/min/1.73m2   CBC with  platelets and differential   Result Value Ref Range    WBC Count 16.3 (H) 4.0 - 11.0 10e3/uL    RBC Count 3.48 (L) 3.80 - 5.20 10e6/uL    Hemoglobin 10.2 (L) 11.7 - 15.7 g/dL    Hematocrit 30.2 (L) 35.0 - 47.0 %    MCV 87 78 - 100 fL    MCH 29.3 26.5 - 33.0 pg    MCHC 33.8 31.5 - 36.5 g/dL    RDW 13.2 10.0 - 15.0 %    Platelet Count 533 (H) 150 - 450 10e3/uL    % Neutrophils 79 %    % Lymphocytes 15 %    % Monocytes 3 %    % Eosinophils 2 %    % Basophils 0 %    % Immature Granulocytes 1 %    NRBCs per 100 WBC 0 <1 /100    Absolute Neutrophils 12.9 (H) 1.6 - 8.3 10e3/uL    Absolute Lymphocytes 2.5 0.8 - 5.3 10e3/uL    Absolute Monocytes 0.5 0.0 - 1.3 10e3/uL    Absolute Eosinophils 0.3 0.0 - 0.7 10e3/uL    Absolute Basophils 0.1 0.0 - 0.2 10e3/uL    Absolute Immature Granulocytes 0.1 <=0.4 10e3/uL    Absolute NRBCs 0.0 10e3/uL   Lactic acid whole blood   Result Value Ref Range    Lactic Acid 1.8 0.7 - 2.0 mmol/L   Result Value Ref Range    Troponin I <0.01 0.00 - 0.29 ng/mL   B-Type Natriuretic Peptide ([facility] Only)   Result Value Ref Range    BNP 14 0 - 140 pg/mL   CRP inflammation   Result Value Ref Range    CRP 21.9 (H) 0.0 - 0.8 mg/dL   UA with Microscopic reflex to Culture    Specimen: Urine, Midstream   Result Value Ref Range    Color Urine Yellow Colorless, Straw, Light Yellow, Yellow    Appearance Urine Turbid (A) Clear    Glucose Urine >1000 (A) Negative mg/dL    Bilirubin Urine Negative Negative    Ketones Urine Negative Negative mg/dL    Specific Gravity Urine 1.021 1.001 - 1.030    Blood Urine 0.2 mg/dL (A) Negative    pH Urine 6.0 5.0 - 7.0    Protein Albumin Urine 30  (A) Negative mg/dL    Urobilinogen Urine <2.0 <2.0 mg/dL    Nitrite Urine Negative Negative    Leukocyte Esterase Urine 500 Son/uL (A) Negative    Bacteria Urine Few (A) None Seen /HPF    WBC Clumps Urine Present (A) None Seen /HPF    RBC Urine 16 (H) <=2 /HPF    WBC Urine >182 (H) <=5 /HPF    Squamous Epithelials Urine 1 <=1 /HPF    Hepatic function panel   Result Value Ref Range    Bilirubin Total 0.4 0.0 - 1.0 mg/dL    Bilirubin Direct 0.2 <=0.5 mg/dL    Protein Total 9.2 (H) 6.0 - 8.0 g/dL    Albumin 2.6 (L) 3.5 - 5.0 g/dL    Alkaline Phosphatase 516 (H) 45 - 120 U/L    AST 35 0 - 40 U/L    ALT 44 0 - 45 U/L   Streptococcus A Rapid Screen w/Reflex to PCR    Specimen: Throat; Swab   Result Value Ref Range    Group A Strep antigen Negative Negative   Group A Streptococcus PCR Throat Swab    Specimen: Throat; Swab   Result Value Ref Range    Group A strep by PCR Not Detected Not Detected       Ruben Hernandez MD  Emergency Medicine  [facility] EMERGENCY DEPARTMENT  [facility] [facility] [id number] [phone]  5/15/2022        Ruben Hernandez MD  05/15/22 8597

## 2022-05-15 NOTE — PHARMACY-ADMISSION MEDICATION HISTORY
Pharmacy Note - Admission Medication History    Pertinent Provider Information:   -Pt son says RN sets up pill box and pt takes pills from there. Only takes medications from pharmacy.Took nothing today.     -Pt son states pt uses two creams as needed, one for itchiness and one for pain. Unclear which topicals these are, but fills in February for capsaicin and voltaren gel.     -Removed alendronate from list, pt son states pt does not use anything just once weekly, does not currently take anything for bones, and no fills found from pharmacy plus no prescription vial of alendronate.   ______________________________________________________________________    Prior To Admission (PTA) med list completed and updated in EMR.       PTA Med List   Medication Sig Last Dose     acetaminophen (TYLENOL) 500 MG tablet Take 1-2 tablets (500-1,000 mg) by mouth every 8 hours as needed for mild pain      aspirin (ASPIRIN LOW DOSE) 81 MG EC tablet TAKE 1 TABLET (81 MG) BY MOUTH DAILY 5/14/2022     atorvastatin (LIPITOR) 80 MG tablet Take 1 tablet (80 mg) by mouth daily 5/14/2022     calcium carb-cholecalciferol (OS-MAME) 500-200 MG-UNIT tablet Take 1 tablet by mouth 2 times daily (with meals) 5/14/2022     canagliflozin (INVOKANA) 300 MG tablet Take 1 tablet (300 mg) by mouth every morning (before breakfast) 5/14/2022     capsaicin (ZOSTRIX) 0.025 % external cream Apply 1 g topically 3 times daily as needed (to bilateral knees for pain)      cetirizine (ZYRTEC) 10 MG tablet Take 1 tablet (10 mg) by mouth daily 5/14/2022     diclofenac (VOLTAREN) 1 % topical gel Apply 2 g topically 4 times daily (Patient taking differently: Apply 2 g topically 4 times daily as needed)      glipiZIDE (GLUCOTROL XL) 10 MG 24 hr tablet Take 2 tablets (20 mg) by mouth daily 5/14/2022     JANUVIA 100 MG tablet TAKE 1 TABLET (100 MG) BY MOUTH DAILY 5/14/2022     metFORMIN (GLUCOPHAGE-XR) 500 MG 24 hr tablet Take 4 tablets (2,000 mg) by mouth daily (with  dinner) 5/14/2022     Multiple Vitamins-Minerals (CEROVITE SENIOR) TABS Take 1 tablet by mouth daily 5/14/2022     pioglitazone (ACTOS) 15 MG tablet Take 1 tablet (15 mg) by mouth daily 5/14/2022       Information source(s): Family member, Prescription bottles and CareEverywhere/SureScripts  Method of interview communication: in-person    Summary of Changes to PTA Med List  New: -  Discontinued: alendronate  Changed: -    Patient was asked about OTC/herbal products specifically.  PTA med list reflects this.    In the past week, patient estimated taking medication this percent of the time:  greater than 90%.    Patient did not bring any medications to the hospital and can't retrieve from home. No multi-dose medications are available for use during hospital stay.     The information provided in this note is only as accurate as the sources available at the time of the update(s).    Thank you for the opportunity to participate in the care of this patient.    Joanne Bowman, PharmD, BCPS 05/15/22 2:56 PM

## 2022-05-16 ENCOUNTER — APPOINTMENT (OUTPATIENT)
Dept: OCCUPATIONAL THERAPY | Facility: HOSPITAL | Age: 77
End: 2022-05-16
Attending: STUDENT IN AN ORGANIZED HEALTH CARE EDUCATION/TRAINING PROGRAM
Payer: COMMERCIAL

## 2022-05-16 ENCOUNTER — APPOINTMENT (OUTPATIENT)
Dept: PHYSICAL THERAPY | Facility: HOSPITAL | Age: 77
End: 2022-05-16
Attending: STUDENT IN AN ORGANIZED HEALTH CARE EDUCATION/TRAINING PROGRAM
Payer: COMMERCIAL

## 2022-05-16 ENCOUNTER — APPOINTMENT (OUTPATIENT)
Dept: RADIOLOGY | Facility: HOSPITAL | Age: 77
End: 2022-05-16
Attending: STUDENT IN AN ORGANIZED HEALTH CARE EDUCATION/TRAINING PROGRAM
Payer: COMMERCIAL

## 2022-05-16 PROBLEM — J02.0 ACUTE STREPTOCOCCAL PHARYNGITIS: Status: ACTIVE | Noted: 2022-05-16

## 2022-05-16 LAB
ANION GAP SERPL CALCULATED.3IONS-SCNC: 8 MMOL/L (ref 5–18)
BASOPHILS # BLD AUTO: 0 10E3/UL (ref 0–0.2)
BASOPHILS NFR BLD AUTO: 0 %
BUN SERPL-MCNC: 12 MG/DL (ref 8–28)
C REACTIVE PROTEIN LHE: 12.1 MG/DL (ref 0–0.8)
CALCIUM SERPL-MCNC: 9.3 MG/DL (ref 8.5–10.5)
CHLORIDE BLD-SCNC: 104 MMOL/L (ref 98–107)
CO2 SERPL-SCNC: 24 MMOL/L (ref 22–31)
CREAT SERPL-MCNC: 0.91 MG/DL (ref 0.6–1.1)
EOSINOPHIL # BLD AUTO: 0.4 10E3/UL (ref 0–0.7)
EOSINOPHIL NFR BLD AUTO: 5 %
ERYTHROCYTE [DISTWIDTH] IN BLOOD BY AUTOMATED COUNT: 13.4 % (ref 10–15)
ERYTHROCYTE [DISTWIDTH] IN BLOOD BY AUTOMATED COUNT: 13.4 % (ref 10–15)
GFR SERPL CREATININE-BSD FRML MDRD: 65 ML/MIN/1.73M2
GLUCOSE BLD-MCNC: 99 MG/DL (ref 70–125)
GLUCOSE BLDC GLUCOMTR-MCNC: 128 MG/DL (ref 70–99)
GLUCOSE BLDC GLUCOMTR-MCNC: 138 MG/DL (ref 70–99)
GLUCOSE BLDC GLUCOMTR-MCNC: 140 MG/DL (ref 70–99)
GLUCOSE BLDC GLUCOMTR-MCNC: 89 MG/DL (ref 70–99)
HCT VFR BLD AUTO: 27.9 % (ref 35–47)
HCT VFR BLD AUTO: 27.9 % (ref 35–47)
HGB BLD-MCNC: 9 G/DL (ref 11.7–15.7)
HGB BLD-MCNC: 9 G/DL (ref 11.7–15.7)
IMM GRANULOCYTES # BLD: 0.1 10E3/UL
IMM GRANULOCYTES NFR BLD: 1 %
LYMPHOCYTES # BLD AUTO: 2 10E3/UL (ref 0.8–5.3)
LYMPHOCYTES NFR BLD AUTO: 20 %
MCH RBC QN AUTO: 28.8 PG (ref 26.5–33)
MCH RBC QN AUTO: 28.8 PG (ref 26.5–33)
MCHC RBC AUTO-ENTMCNC: 32.3 G/DL (ref 31.5–36.5)
MCHC RBC AUTO-ENTMCNC: 32.3 G/DL (ref 31.5–36.5)
MCV RBC AUTO: 89 FL (ref 78–100)
MCV RBC AUTO: 89 FL (ref 78–100)
MONOCYTES # BLD AUTO: 0.4 10E3/UL (ref 0–1.3)
MONOCYTES NFR BLD AUTO: 5 %
NEUTROPHILS # BLD AUTO: 6.9 10E3/UL (ref 1.6–8.3)
NEUTROPHILS NFR BLD AUTO: 69 %
NRBC # BLD AUTO: 0 10E3/UL
NRBC BLD AUTO-RTO: 0 /100
PATH REPORT.COMMENTS IMP SPEC: NORMAL
PATH REPORT.COMMENTS IMP SPEC: NORMAL
PATH REPORT.FINAL DX SPEC: NORMAL
PATH REPORT.MICROSCOPIC SPEC OTHER STN: NORMAL
PATH REPORT.RELEVANT HX SPEC: NORMAL
PLATELET # BLD AUTO: 458 10E3/UL (ref 150–450)
PLATELET # BLD AUTO: 458 10E3/UL (ref 150–450)
POTASSIUM BLD-SCNC: 5 MMOL/L (ref 3.5–5)
RBC # BLD AUTO: 3.13 10E6/UL (ref 3.8–5.2)
RBC # BLD AUTO: 3.13 10E6/UL (ref 3.8–5.2)
RETICS # AUTO: 0.08 10E6/UL (ref 0.01–0.11)
RETICS/RBC NFR AUTO: 2.6 % (ref 0.8–2.7)
SODIUM SERPL-SCNC: 136 MMOL/L (ref 136–145)
WBC # BLD AUTO: 9.4 10E3/UL (ref 4–11)
WBC # BLD AUTO: 9.4 10E3/UL (ref 4–11)

## 2022-05-16 PROCEDURE — 71045 X-RAY EXAM CHEST 1 VIEW: CPT

## 2022-05-16 PROCEDURE — 250N000012 HC RX MED GY IP 250 OP 636 PS 637: Performed by: STUDENT IN AN ORGANIZED HEALTH CARE EDUCATION/TRAINING PROGRAM

## 2022-05-16 PROCEDURE — 97165 OT EVAL LOW COMPLEX 30 MIN: CPT | Mod: GO

## 2022-05-16 PROCEDURE — 120N000001 HC R&B MED SURG/OB

## 2022-05-16 PROCEDURE — 250N000013 HC RX MED GY IP 250 OP 250 PS 637: Performed by: STUDENT IN AN ORGANIZED HEALTH CARE EDUCATION/TRAINING PROGRAM

## 2022-05-16 PROCEDURE — 250N000011 HC RX IP 250 OP 636: Performed by: STUDENT IN AN ORGANIZED HEALTH CARE EDUCATION/TRAINING PROGRAM

## 2022-05-16 PROCEDURE — 85045 AUTOMATED RETICULOCYTE COUNT: CPT | Performed by: FAMILY MEDICINE

## 2022-05-16 PROCEDURE — 82310 ASSAY OF CALCIUM: CPT | Performed by: STUDENT IN AN ORGANIZED HEALTH CARE EDUCATION/TRAINING PROGRAM

## 2022-05-16 PROCEDURE — 258N000003 HC RX IP 258 OP 636: Performed by: STUDENT IN AN ORGANIZED HEALTH CARE EDUCATION/TRAINING PROGRAM

## 2022-05-16 PROCEDURE — 97161 PT EVAL LOW COMPLEX 20 MIN: CPT | Mod: GP

## 2022-05-16 PROCEDURE — 86140 C-REACTIVE PROTEIN: CPT | Performed by: STUDENT IN AN ORGANIZED HEALTH CARE EDUCATION/TRAINING PROGRAM

## 2022-05-16 PROCEDURE — 85027 COMPLETE CBC AUTOMATED: CPT | Performed by: STUDENT IN AN ORGANIZED HEALTH CARE EDUCATION/TRAINING PROGRAM

## 2022-05-16 PROCEDURE — 99232 SBSQ HOSP IP/OBS MODERATE 35: CPT | Mod: GC | Performed by: STUDENT IN AN ORGANIZED HEALTH CARE EDUCATION/TRAINING PROGRAM

## 2022-05-16 PROCEDURE — 85025 COMPLETE CBC W/AUTO DIFF WBC: CPT | Performed by: FAMILY MEDICINE

## 2022-05-16 PROCEDURE — 97535 SELF CARE MNGMENT TRAINING: CPT | Mod: GO

## 2022-05-16 PROCEDURE — 85060 BLOOD SMEAR INTERPRETATION: CPT | Performed by: PATHOLOGY

## 2022-05-16 PROCEDURE — 36415 COLL VENOUS BLD VENIPUNCTURE: CPT | Performed by: STUDENT IN AN ORGANIZED HEALTH CARE EDUCATION/TRAINING PROGRAM

## 2022-05-16 PROCEDURE — 97530 THERAPEUTIC ACTIVITIES: CPT | Mod: GP

## 2022-05-16 PROCEDURE — 96367 TX/PROPH/DG ADDL SEQ IV INF: CPT

## 2022-05-16 RX ADMIN — ATORVASTATIN CALCIUM 80 MG: 40 TABLET, FILM COATED ORAL at 09:32

## 2022-05-16 RX ADMIN — INSULIN GLARGINE 7 UNITS: 100 INJECTION, SOLUTION SUBCUTANEOUS at 22:08

## 2022-05-16 RX ADMIN — CEFTRIAXONE SODIUM 1 G: 1 INJECTION, POWDER, FOR SOLUTION INTRAMUSCULAR; INTRAVENOUS at 12:40

## 2022-05-16 RX ADMIN — ASPIRIN 81 MG: 81 TABLET, COATED ORAL at 09:31

## 2022-05-16 RX ADMIN — SODIUM CHLORIDE: 9 INJECTION, SOLUTION INTRAVENOUS at 06:02

## 2022-05-16 RX ADMIN — SODIUM CHLORIDE: 9 INJECTION, SOLUTION INTRAVENOUS at 20:03

## 2022-05-16 RX ADMIN — INSULIN ASPART 1 UNITS: 100 INJECTION, SOLUTION INTRAVENOUS; SUBCUTANEOUS at 12:39

## 2022-05-16 RX ADMIN — ACETAMINOPHEN 650 MG: 325 TABLET ORAL at 18:06

## 2022-05-16 ASSESSMENT — ACTIVITIES OF DAILY LIVING (ADL)
ADLS_ACUITY_SCORE: 37
DRESSING/BATHING: BATHING DIFFICULTY, ASSISTANCE 1 PERSON;DRESSING DIFFICULTY, ASSISTANCE 1 PERSON
ADLS_ACUITY_SCORE: 44
ADLS_ACUITY_SCORE: 50
ADLS_ACUITY_SCORE: 37
DIFFICULTY_EATING/SWALLOWING: YES
ADLS_ACUITY_SCORE: 37
TOILETING_ASSISTANCE: TOILETING DIFFICULTY, ASSISTANCE 1 PERSON
TOILETING_ISSUES: YES
ADLS_ACUITY_SCORE: 37
FALL_HISTORY_WITHIN_LAST_SIX_MONTHS: NO
ADLS_ACUITY_SCORE: 37
WALKING_OR_CLIMBING_STAIRS_DIFFICULTY: YES
ADLS_ACUITY_SCORE: 37
CONCENTRATING,_REMEMBERING_OR_MAKING_DECISIONS_DIFFICULTY: YES
WALKING_OR_CLIMBING_STAIRS: STAIR CLIMBING DIFFICULTY, ASSISTANCE 1 PERSON
WEAR_GLASSES_OR_BLIND: YES
VISION_MANAGEMENT: GLASSES
DOING_ERRANDS_INDEPENDENTLY_DIFFICULTY: YES
ADLS_ACUITY_SCORE: 37
ADLS_ACUITY_SCORE: 37
EATING/SWALLOWING: EATING
DRESSING/BATHING_DIFFICULTY: YES

## 2022-05-16 NOTE — PLAN OF CARE
Afebrile today with stable VS.  Denies pain other than intermittent neck stiffness and discomfort which son says is chronic for her.  Appetite good.  Voiding without difficulty.  Receiving IV Rocephin.  Transferring to room 407 for continued care.  Report called to Luisa Nguyen RN.

## 2022-05-16 NOTE — PROGRESS NOTES
Received call from patients [facility]  Mere Lainez, [phone], inquiring about patients admission to hospital; provided update. If any home equipment is needed Mere is able to assist. Mere reports patient gets 6 hours and 45 minutes daily PCA services, 7 hours per week homemaking services, attends adult  twice weekly, and has weekly RN services through [facility] for medication setup/management. Mere would like to be kept updated on final discharge plan. Care Manager to follow.   3:54 PM Spoke with [facility], they are able to accept back at discharge. If patient is needing PT/OT services they would not be able to accept back as they do not have these services available at this time. Will keep [facility] care updated on discharge plan.     Anastasiya Stiles, RN Care Manager.

## 2022-05-16 NOTE — PROGRESS NOTES
05/16/22 0930   Quick Adds   Type of Visit Initial PT Evaluation   Living Environment   Living Environment Comments see OT note   Self-Care   Activity/Exercise/Self-Care Comment see OT note   General Information   Onset of Illness/Injury or Date of Surgery 05/15/22   Referring Physician Diana Pruitt MD   Patient/Family Therapy Goals Statement (PT) none stated   Pertinent History of Current Problem (include personal factors and/or comorbidities that impact the POC) PMH significant for T2DM and presents with neck pain, sore throat, decreased appetite and fatigue, found to have UTI and Strep throat   Pain Assessment   Patient Currently in Pain No   Range of Motion (ROM)   Range of Motion ROM is WFL   Strength (Manual Muscle Testing)   Strength (Manual Muscle Testing) Deficits observed during functional mobility   Strength Comments generalized weakness   Bed Mobility   Bed Mobility supine-sit   Supine-Sit [place] (Bed Mobility) supervision   Comment, (Bed Mobility) inc assist for BLEs towards EOB   Transfers   Transfers sit-stand transfer   Sit-Stand Transfer   Sit-Stand [place] (Transfers) contact guard   Assistive Device (Sit-Stand Transfers) other (see comments)  (HHA)   Comment, (Sit-Stand Transfer) unsteady, sit <> stand x 2   Clinical Impression   Criteria for Skilled Therapeutic Intervention Yes, treatment indicated   PT Diagnosis (PT) impaired functional mobility, gait abnormality   Influenced by the following impairments decreased strength, decreased endurance, decreased balance   Functional limitations due to impairments bed mobility, transfer, gait   Clinical Presentation (PT Evaluation Complexity) Stable/Uncomplicated   Clinical Presentation Rationale pt presents as medically diagnosed   Clinical Decision Making (Complexity) low complexity   Planned Therapy Interventions (PT) balance training;bed mobility training;gait training;home exercise program;neuromuscular re-education;patient/family  education;stair training;strengthening;transfer training   Anticipated Equipment Needs at Discharge (PT) walker, rolling   Risk & Benefits of therapy have been explained evaluation/treatment results reviewed;patient   PT Discharge Planning   PT Discharge Recommendation (DC Rec) home with assist;home with home care physical therapy   Total Evaluation Time   Total Evaluation Time (Minutes) 10   Physical Therapy Goals   PT Frequency Daily   PT Predicted Duration/Target Date for Goal Attainment 05/23/22   PT Goals Bed Mobility;Transfers;Gait;Stairs   PT: Bed Mobility Supervision/stand-by assist;Supine to/from sit   PT: Transfers Supervision/stand-by assist;Sit to/from stand;Assistive device   PT: Gait Supervision/stand-by assist;Assistive device;Rolling walker;100 feet   PT: Stairs Supervision/stand-by assist;4 stairs;Rail on left

## 2022-05-16 NOTE — PROGRESS NOTES
Nurse assisted to use commode/patient moved well.  Encouraged patient/son to use call light when need to go to bathroom. Alarms in place and used gait belt.  Using  asked patient if any questions/ and what kind of food she likes.  Some preferences on board in room. Patient likes warm water. Will continue to monitor

## 2022-05-16 NOTE — PROGRESS NOTES
[facility] Progress Note    Assessment/Plan  Active Problems:    Dehydration    Hyponatremia    Acute kidney insufficiency    Acute cystitis with hematuria    Sepsis, due to unspecified organism, unspecified whether acute organ dysfunction present (H)    Emmanuel Vences is a 76 year old female admitted on 5/15/2022. She has a PMH significant for T2DM and presents with neck pain, sore throat, decreased appetite and fatigue, found to have UTI and Strep throat.     Strep Throat  Asymptomatic Bacteruria   3-4 days of sore throat. No other URI symptoms or dyspnea. Found to be GAS+, COVID negative. No abdominal pain or symptoms of UTI, however UA significant for LE, bacteria, WBC. Tachy to low 100s. WBC 16.3, CRP elevated at 21.9. Alk Phos elevated. Lactate WNL. Likely fatigue 2/2 infection. High inflammatory markers. CXR unremarkable. WBC and CRP improving.   - Transition to Amoxicillin tomorrow   - Urine Cx; pending     Neck pain  1 week of neck pain. Neck pain limiting ROM. Endorses C spine tenderness. No red flag symptoms such as major neck trauma, neurologic deficits to suggest spinal cord issue. No bowel or bladder dysfunction, extremity weakness or gait difficulty. No unexplained weight loss or known malignancy.. CT C spine and soft tissue negative for pathology. Likely cervical strain from poor posture or sleeping habits. Given acute nature and reassuring history and imaging findings, will manage conservatively for now. Strep throat may be contributing.   - PT/OT, with emphasis on ROM/strengthening excercises  - Tylenol PRN, hot pack to area.     Fatigue  Poor appetite  TIERNEY  Hyponatremia/hypochloremia  About 1-2 weeks of fatigue. Patient states that she's just getting older and tired however is also eating small meals. Now presenting with TIERNEY likely pre-renal due to poor appetite. Electrolyte derangements as above also reflect this. Multiple infections, especially GAS pharyngitis, likely contributing  to poor appetite and subsequent fatigue.  - NS @ 75 ml/hr after boluses in ED  - Carb consistent diet, encourage PO  - PT/OT    Normocytic Anemia:  Hgb 10.2 on admission. Hgb 14.4, 3 years ago. No e/o blood loss. Likely anemia of acute disease.  - Monitor      T2DM  Per clinic notes, patient has been resistant to insulin and is managed with oral hypoglycemic agents despite elevated A1C (10.7 on 12/14/21) and recommendation to start insulin. Current regimen is Metformin 2000mg, Invokanna, Actos, Glipizide, Januvia. Noted to decline medical nutrition and DM educator. A1C 11.5 on admission.   - Continue 7U Lantus  - sliding scale insulin   - Hold PTA meds  - DM education           Diet:   CHO consistent  DVT Prophylaxis: Pneumatic Compression Devices  Vargas Catheter: Not present  Fluids: NS @ 75  Central Lines: None  Cardiac Monitoring: None  Code Status:   Full Code      Disposition/Advanced Care Planning  Discharge Planning discussed with Patient   Anticipated discharge date:5/17  Disposition:Home    Subjective  Patient reports feeling tired and weak. Still having throat pain. No changes in /GI, dysuria.     Yasmin      Objective    Vital signs in last 24 hours Temp:  [98.4  F (36.9  C)-98.7  F (37.1  C)] 98.4  F (36.9  C)  Pulse:  [75-93] 89  Resp:  [17-28] 25  BP: (103-138)/(55-70) 115/68  SpO2:  [95 %-99 %] 98 %   Weight: [unfilled]    Intake/Output last 3 shift No intake/output data recorded.    Intake/Output this shift:No intake/output data recorded.    Physical Exam  General appearance: alert, appears stated age, cooperative and no distress.  Eyes: conjunctivae/corneas clear.  Throat: MMM, teeth black   Neck: No ttp  Lungs: clear to auscultation bilaterally  Heart: regular rate and rhythm, no murmurs.  Abdomen: soft, non-tender, non-distended.  Extremities: extremities normal, atraumatic, no cyanosis or edema, radial and DP pulses palpable bilaterally.  Skin: Skin color, texture, turgor normal. No  rashes or lesions.  Neurologic: Alert and oriented x3  Psychiatric: mood and affect appropriate.    Pertinent Labs and Pertinent Radiology   Lab Results: personally reviewed.     Radiology Results: Personally reviewed image/s and impression/s    Precepted patient with Dr. Rosenstein Luke J. Kuzj, DO, RONAK  [facility], PGY-3  Pager # [phone]

## 2022-05-16 NOTE — CONSULTS
DIABETES CARE  Situation:  Consulted by Provider for Diabetes Education  Emmanuel Vences is a 76 year old female admitted on 5/15/2022. She has a PMH significant for T2DM and presents with neck pain, sore throat, decreased appetite and fatigue, found to have urosepsis    Background:  PCP: Kaylin Joe MD  Social: Lives with son Fili Longoria who is also her PCA    Diabetes History: Dx in 2014  Per clinic notes, patient has been resistant to insulin and is managed with oral hypoglycemic agents despite elevated A1C (10.7 on 12/14/21) and recommendation to start insulin. Current regimen is Metformin 2000mg, Invokana, Actos, Glipizide, Januvia    Meds for BG Management PTA:  Invokana 300 mg daily  Glipizide ER 10 mg daily  Januvia 100 mg daily  Metformin Er 2000 mg  Actos 15 mg    Current Inpatient Meds for BG Management:  7 Lantus hs  Novolog correction scale: 1/50 > 140    Labs:  Hemoglobin A1C: 11.5                GFR: 65   Blood Glucose POC:    Latest Reference Range & Units 05/15/22 16:39 05/15/22 22:40 05/16/22 08:13 05/16/22 12:25   GLUCOSE BY METER POCT 70 - 99 mg/dL 89 91 89 140 (H)   (H): Data is abnormally high    Diet Order:  60 grams CHO   Weight: 49.9 kg    BMI: 21.48    DM EDUCATION/COUNSELING:  Barriers to Learning and/or DM Self-Management: language, used phone   Previous DM Education: has had in past  Current Education and/or visit with Patient and son  Educated/reviewed diabetes basics: hyperglycemia, long term complications, treatment.  Educated/reviewed hypoglycemia: sx, causes, treatment. Hasn't had that she knows of.  Explained normal/goals of blood sugar control, 11.5 (283 average) A1C  Has a home glucose meter, checks once every 2-3 days. Recommended now that she check 3 x per day, before her two meals and before sleep. She was getting readings in the 200-300s.     The insulin pen technique was demonstrated by CDE and son and patient doing steps alone with. However, it was not easy for  "patient, she stated after her kids would do that. The son also will need reinforcement as he wasn't confident.. Also discussed site rotation, proper storage.  Carbohydrates were briefly discussed and their effect on BG. Reinforced healthy eating.  RD to see here or in OP setting.    Pictured written handouts will be given on all education provided when ed completed tomorrow.    Patient eats 2-3 meals per day: rice, meat, veggies; does drink milk. Reinforced consistent rice portions but she states if not well, eats less.    Lantus and Novolog $3 each with her insurance.      Assessment:    Concern with recent start of Actos:with her osteoporosis and med's increased risk of bone fracture in female patients, especially in the distal upper limb (forearm, hand, or wrist) or distal lower limb (foot, ankle, fibula, and tibia)      Also, with Invokana, increased risk of urinary tract infections, may not be the best choice for her.     Insulin might be the best choice for her: Lantus with orals without those just mentioned or Lantus daily, Novolog set dose before each meal    Son will need more training tomorrow    Recommendations:  1. Start mealtime Novolog insulin if eating  2. Assess BG pattern daily and adjust doses as needed.  3. Consider discharge plan, 3 orals plus Lantus or Lantus daily and Novolog for meals  4. I will see patient again tomorrow with son      Refer to \"Guidelines for Insulin Initiation and Care in Hospitalized Adults\"  link in Diabetes Management Order set for dosing guidelines    Hospital BG goals for blood glucose levels are < 180 for improved health outcomes.    DISCHARGE NEEDS:   RX needed at DC (preferred by patient's insurance):  1.  Lantus SoloStar pens, box of 5  2.  Novolog FlexPens, box of 5  3. Pen needles, 4 mm, 32 gauge,  box of 100  Insulin requiring  E11.65  To test 3x daily.      Thank you,     Roshni Shahid RN, Certified Diabetes Care and     [facility]  " [facility]  [address] [place] [address]  [email or web address]  [email or web address]   Office: [phone]  Pager: [phone]    1 pm Worked again with son and  about using the insulin pen. It went better. Had him demo x 2. I will meet with him again tomorrow at 1 pm.

## 2022-05-16 NOTE — PROGRESS NOTES
05/16/22 0840   Quick Adds   Type of Visit Initial Occupational Therapy Evaluation   Living Environment   People in Home other relative(s)   Home Accessibility stairs to enter home;stairs within home   Self-Care   Usual Activity Tolerance moderate   Current Activity Tolerance fair   Instrumental Activities of Daily Living (IADL)   IADL Comments pt states when she feels well she can care for herself, other wise her family can assist as needed   General Information   Onset of Illness/Injury or Date of Surgery 05/15/22   Referring Physician angelina garcia   Cognitive Status Examination   Orientation Status person   Range of Motion Comprehensive   General Range of Motion no range of motion deficits identified   Bed Mobility   Bed Mobility supine-sit;sit-supine   Supine-Sit [place] (Bed Mobility) minimum assist (75% patient effort)   Sit-Supine [place] (Bed Mobility) minimum assist (75% patient effort)   Transfers   Transfers sit-stand transfer   Transfer Comments side step at edge of erney with min a/able to step unto step stool back into bed   Sit-Stand Transfer   Sit-Stand [place] (Transfers) minimum assist (75% patient effort)   Balance   Balance Assessment sitting balance: static   Balance Comments sba   Clinical Impression   Criteria for Skilled Therapeutic Interventions Met (OT) Yes, treatment indicated   OT Diagnosis decreased adl's   OT Problem List-Impairments impacting ADL activity tolerance impaired;mobility;strength   Assessment of Occupational Performance 1-3 Performance Deficits   Planned Therapy Interventions (OT) ADL retraining;strengthening;transfer training   Clinical Decision Making Complexity (OT) low complexity   OT Discharge Planning   OT Discharge Recommendation (DC Rec) home with assist   OT Goals   Therapy Frequency (OT) Daily   OT Predicted Duration/Target Date for Goal Attainment 05/23/22   OT Goals Hygiene/Grooming;Lower Body Dressing;Transfers   OT: Hygiene/Grooming  supervision/stand-by assist;while standing   OT: Lower Body Dressing Supervision/stand-by assist   OT: Transfer Supervision/stand-by assist

## 2022-05-17 ENCOUNTER — APPOINTMENT (OUTPATIENT)
Dept: PHYSICAL THERAPY | Facility: HOSPITAL | Age: 77
End: 2022-05-17
Payer: COMMERCIAL

## 2022-05-17 VITALS
OXYGEN SATURATION: 97 % | HEIGHT: 60 IN | HEART RATE: 81 BPM | DIASTOLIC BLOOD PRESSURE: 61 MMHG | TEMPERATURE: 98.2 F | RESPIRATION RATE: 16 BRPM | WEIGHT: 110 LBS | BODY MASS INDEX: 21.6 KG/M2 | SYSTOLIC BLOOD PRESSURE: 121 MMHG

## 2022-05-17 LAB
BACTERIA UR CULT: ABNORMAL
ERYTHROCYTE [DISTWIDTH] IN BLOOD BY AUTOMATED COUNT: 13.2 % (ref 10–15)
GLUCOSE BLDC GLUCOMTR-MCNC: 109 MG/DL (ref 70–99)
GLUCOSE BLDC GLUCOMTR-MCNC: 115 MG/DL (ref 70–99)
HCT VFR BLD AUTO: 25.8 % (ref 35–47)
HGB BLD-MCNC: 8.3 G/DL (ref 11.7–15.7)
HOLD SPECIMEN: NORMAL
MCH RBC QN AUTO: 28.9 PG (ref 26.5–33)
MCHC RBC AUTO-ENTMCNC: 32.2 G/DL (ref 31.5–36.5)
MCV RBC AUTO: 90 FL (ref 78–100)
PLATELET # BLD AUTO: 443 10E3/UL (ref 150–450)
RBC # BLD AUTO: 2.87 10E6/UL (ref 3.8–5.2)
WBC # BLD AUTO: 8.2 10E3/UL (ref 4–11)

## 2022-05-17 PROCEDURE — 97116 GAIT TRAINING THERAPY: CPT | Mod: GP

## 2022-05-17 PROCEDURE — 250N000013 HC RX MED GY IP 250 OP 250 PS 637: Performed by: STUDENT IN AN ORGANIZED HEALTH CARE EDUCATION/TRAINING PROGRAM

## 2022-05-17 PROCEDURE — 258N000003 HC RX IP 258 OP 636: Performed by: STUDENT IN AN ORGANIZED HEALTH CARE EDUCATION/TRAINING PROGRAM

## 2022-05-17 PROCEDURE — 85014 HEMATOCRIT: CPT | Performed by: STUDENT IN AN ORGANIZED HEALTH CARE EDUCATION/TRAINING PROGRAM

## 2022-05-17 PROCEDURE — 99238 HOSP IP/OBS DSCHRG MGMT 30/<: CPT | Mod: GC | Performed by: STUDENT IN AN ORGANIZED HEALTH CARE EDUCATION/TRAINING PROGRAM

## 2022-05-17 PROCEDURE — 36415 COLL VENOUS BLD VENIPUNCTURE: CPT | Performed by: STUDENT IN AN ORGANIZED HEALTH CARE EDUCATION/TRAINING PROGRAM

## 2022-05-17 RX ORDER — INSULIN GLARGINE 100 [IU]/ML
7 INJECTION, SOLUTION SUBCUTANEOUS AT BEDTIME
Qty: 15 ML | Refills: 3 | Status: SHIPPED | OUTPATIENT
Start: 2022-05-17 | End: 2022-07-26

## 2022-05-17 RX ORDER — PEN NEEDLE, DIABETIC 32GX 5/32"
NEEDLE, DISPOSABLE MISCELLANEOUS
Qty: 100 EACH | Refills: 3 | Status: SHIPPED | OUTPATIENT
Start: 2022-05-17 | End: 2022-09-21

## 2022-05-17 RX ORDER — CEFUROXIME AXETIL 250 MG/1
250 TABLET ORAL EVERY 12 HOURS SCHEDULED
Status: DISCONTINUED | OUTPATIENT
Start: 2022-05-17 | End: 2022-05-17 | Stop reason: HOSPADM

## 2022-05-17 RX ORDER — CEFUROXIME AXETIL 250 MG/1
250 TABLET ORAL 2 TIMES DAILY
Qty: 12 TABLET | Refills: 0 | Status: SHIPPED | OUTPATIENT
Start: 2022-05-17 | End: 2022-06-03

## 2022-05-17 RX ORDER — AMOXICILLIN 875 MG
875 TABLET ORAL EVERY 12 HOURS SCHEDULED
Status: DISCONTINUED | OUTPATIENT
Start: 2022-05-17 | End: 2022-05-17

## 2022-05-17 RX ORDER — GLUCOSAMINE HCL/CHONDROITIN SU 500-400 MG
CAPSULE ORAL
Qty: 100 EACH | Refills: 3 | Status: SHIPPED | OUTPATIENT
Start: 2022-05-17 | End: 2022-09-21

## 2022-05-17 RX ADMIN — ATORVASTATIN CALCIUM 80 MG: 40 TABLET, FILM COATED ORAL at 08:31

## 2022-05-17 RX ADMIN — ASPIRIN 81 MG: 81 TABLET, COATED ORAL at 08:31

## 2022-05-17 RX ADMIN — ACETAMINOPHEN 650 MG: 325 TABLET ORAL at 10:49

## 2022-05-17 RX ADMIN — SODIUM CHLORIDE: 9 INJECTION, SOLUTION INTRAVENOUS at 08:41

## 2022-05-17 RX ADMIN — CEFUROXIME AXETIL 250 MG: 250 TABLET, FILM COATED ORAL at 12:25

## 2022-05-17 ASSESSMENT — ACTIVITIES OF DAILY LIVING (ADL)
ADLS_ACUITY_SCORE: 50
ADLS_ACUITY_SCORE: 49
ADLS_ACUITY_SCORE: 50
ADLS_ACUITY_SCORE: 47
ADLS_ACUITY_SCORE: 50
ADLS_ACUITY_SCORE: 49
ADLS_ACUITY_SCORE: 50

## 2022-05-17 NOTE — PLAN OF CARE
Problem: Plan of Care - These are the overarching goals to be used throughout the patient stay.    Goal: Absence of Hospital-Acquired Illness or Injury  Intervention: Prevent Skin Injury  Recent Flowsheet Documentation  Taken 5/17/2022 0900 by Agatha Dubois RN  Body Position: position changed independently     Problem: Plan of Care - These are the overarching goals to be used throughout the patient stay.    Goal: Absence of Hospital-Acquired Illness or Injury  Intervention: Prevent and Manage VTE (Venous Thromboembolism) Risk  Recent Flowsheet Documentation  Taken 5/17/2022 0939 by Agatha Dubois RN  Activity Management: ambulated to bathroom     Problem: Plan of Care - These are the overarching goals to be used throughout the patient stay.    Goal: Optimal Comfort and Wellbeing  Intervention: Monitor Pain and Promote Comfort  Recent Flowsheet Documentation  Taken 5/17/2022 0932 by Agatha Dubois RN  Pain Management Interventions: quiet environment facilitated   Goal Outcome Evaluation:        Patient is AAO x 4. Patient c/o of pain in her neck and back. Patient intially requested a medication stronger than Tylenol, but then decided on Tylenol. Medication provided relief and patient declined pain upon reassessment. Patient lung sounds are diminished. BG was 107 this a.m so Novolog was not needed. Writer assisted patient to bathroom per patient request, but patient has hx of being impulsive and not asking for toileting assistance. Patient denies difficulty urinating. Patient has PIV in left forearm infusing 0.9 NS at 75 ml/hr, no current complications. Patient in hospital bed with call light in reach.

## 2022-05-17 NOTE — DISCHARGE SUMMARY
[facility]  Discharge Summary - Medicine & Pediatrics       Date of Admission:  5/15/2022  Date of Discharge:  5/17/2022  Discharging Provider: Chace Martínez MD.  Discharge Service: [facility].    Discharge Diagnoses   UTI  Strep Throat  DM2     Follow-ups Needed After Discharge   Follow-up Appointments     Follow-up and recommended labs and tests       Follow up with primary care provider, Kaylin Joe, within 5 days   for hospital follow- up.          - Address diabetes management     Unresulted Labs Ordered in the Past 30 Days of this Admission     No orders found from 4/15/2022 to 5/16/2022.      These results will be followed up by PCP.    Discharge Disposition   Discharged to home  Condition at discharge: Stable    Hospital Course   Emmanuel Vences is a 76 year old female admitted on 5/15/2022. She has a PMH significant for T2DM and presents with neck pain, sore throat, decreased appetite and fatigue, found to have UTI and Strep throat.    Sepsis; resolved   Strep Throat  UTI  3-4 days of fatigue, sore throat and neck pain. No other URI symptoms or dyspnea. Found to be GAS+, COVID negative. No abdominal pain or symptoms of UTI, however UA positive, and UCx positive for Ecoli.  On admission WBC, CRP, alk phos elevated, lactate WNL. CXR unremarkable. CT C spine and soft tissue negative for pathology.  Patient did receive ceftriaxone, and IVF.  Patient's labs improved.  WBC 16.3, CRP elevated at 21.9. Alk Phos elevated. Lactate WNL. Likely fatigue 2/2 infection. High inflammatory markers.    - Cefuroxime BID 6 days (7 days total ABx)  - Tylenol prn  - Follow-up PCP     DM2  Per clinic notes, patient has been resistant to insulin and is managed with oral hypoglycemic agents despite elevated A1C (10.7 on 12/14/21) and recommendation to start insulin.  PTA regimen is Metformin 2000mg, Invokanna, Actos, Glipizide, Januvia.  Concern for medication compliance given the amount  of medications and her A1c 11.5 on admission.  Therefore she was started on 7U Lantus and had a very good blood sugar control with only this, although it was only 2 days.  Did have diabetes education meet with patient and son which was beneficial.  Given that she does have a UTI, and her blood sugars are overall well controlled, we recommend started the following regimen on discharge.  Patient and son were amenable to this.  - 7U Lantus QHS  - Metformin 1000mg BID  - Januvia 100mg Daily  - Follow-up with PCP     Normocytic Anemia:  Hgb 10.2 on admission. Hgb 14.4, 3 years ago. No e/o blood loss. Likely anemia of acute disease.  - Consider repeat Hgb with PCP       Consultations This Hospital Stay   PHYSICAL THERAPY ADULT IP CONSULT  OCCUPATIONAL THERAPY ADULT IP CONSULT  CARE MANAGEMENT / SOCIAL WORK IP CONSULT  CARE MANAGEMENT / SOCIAL WORK IP CONSULT  DIABETES EDUCATION IP CONSULT    Code Status   Full Code       The patient was discussed with Dr. Tessy Blas DO, RONAK  [facility], PGY-3  Pager # [phone]    ______________________________________________________________________    Physical Exam   Vital Signs: Temp: 98.2  F (36.8  C) Temp src: Oral BP: 121/61 Pulse: 81   Resp: 16 SpO2: 97 % O2 Device: None (Room air)    Weight: 110 lbs 0 oz    General appearance: alert, appears stated age, cooperative and no distress.  Eyes: conjunctivae/corneas clear.  Throat: MMM, teeth black   Neck: No ttp  Lungs: clear to auscultation bilaterally  Heart: regular rate and rhythm, no murmurs.  Abdomen: soft, non-tender, non-distended.  Skin: Skin color, texture, turgor normal. No rashes or lesions.  Neurologic: Alert and oriented x3  Psychiatric: mood and affect appropriate.      Primary Care Physician   Kaylin Joe    Discharge Orders      Reason for your hospital stay    Strep throat and UTI     Follow-up and recommended labs and tests     Follow up with primary care  provider, Kaylin Joe, within 5 days for hospital follow- up.     Activity    Your activity upon discharge: activity as tolerated     Diet    Follow this diet upon discharge: Orders Placed This Encounter      Moderate Consistent Carb (60 g CHO per Meal) Diet       Significant Results and Procedures   Most Recent 3 CBC's:  Recent Labs   Lab Test 05/17/22  0919 05/16/22  0929 05/15/22  0948   WBC 8.2 9.4  9.4 16.3*   HGB 8.3* 9.0*  9.0* 10.2*   MCV 90 89  89 87    458*  458* 533*     Most Recent 3 BMP's:  Recent Labs   Lab Test 05/17/22  1151 05/17/22  0801 05/16/22  2134 05/16/22  1225 05/16/22  0929 05/15/22  1639 05/15/22  0948 12/14/21  1404   NA  --   --   --   --  136  --  127* 136   POTASSIUM  --   --   --   --  5.0  --  4.8 3.9   CHLORIDE  --   --   --   --  104  --  92* 100   CO2  --   --   --   --  24  --  24 23   BUN  --   --   --   --  12  --  18 10   CR  --   --   --   --  0.91  --  1.32* 0.87   ANIONGAP  --   --   --   --  8  --  11 13   MAME  --   --   --   --  9.3  --  11.3* 9.5   * 109* 128*   < > 99   < > 200* 299*    < > = values in this interval not displayed.     Most Recent 6 Bacteria Isolates From Any Culture (See EPIC Reports for Culture Details):No lab results found.  Most Recent 6 glucoses:  Recent Labs   Lab Test 05/17/22  1151 05/17/22  0801 05/16/22  2134 05/16/22  1652 05/16/22  1225 05/16/22  0929   * 109* 128* 138* 140* 99   ,   Results for orders placed or performed during the hospital encounter of 05/15/22   Soft tissue neck CT w contrast    Narrative    EXAM: CT CERVICAL SPINE W/O CONTRAST, CT SOFT TISSUE NECK W CONTRAST  LOCATION: [facility]  DATE/TIME: 5/15/2022 11:19 AM    INDICATION: Jaw pain, neck pain, cervical spine., Back pain  COMPARISON: None available  CONTRAST: ISOVUE [id number] 75ML  TECHNIQUE:   Routine CT Soft Tissue Neck with IV contrast. Multiplanar reformats. Dose reduction techniques were used.  Routine CT Cervical  Spine without IV contrast. Multiplanar reformats. Dose reduction techniques were used.    FINDINGS:   CT NECK:  MUCOSAL SPACES/SOFT TISSUES: Normal mucosal spaces of the upper aerodigestive tract. No mucosal mass or inflammation identified. Normal vocal cords and infraglottic trachea. Normal parapharyngeal space and subcutaneous soft tissues. Normal oral cavity,    spaces, and floor of mouth structures.    LYMPH NODES: No pathologic lymph nodes by size or morphology criteria.     SALIVARY GLANDS: Normal parotid and submandibular glands.    THYROID: Normal.     VESSELS: Moderate atherosclerotic plaque at the carotid bifurcations. 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 There is associated soft atheromatous plaque.    VISUALIZED INTRACRANIAL/ORBITS/SINUSES: No abnormality of the visualized intracranial compartment or orbits. Visualized paranasal sinuses and mastoid air cells are clear.    OTHER: No destructive osseous lesion. The included lung apices are clear.    CT CERVICAL SPINE:  VERTEBRA: Normal vertebral body heights and alignment. No fracture or posttraumatic subluxation.     CANAL/FORAMINA: No high-grade spinal canal stenosis. At C5-C6, there is moderate right and severe left neural foraminal stenosis.    PARASPINAL: No extraspinal abnormality.      Impression    IMPRESSION:   CT NECK:  1.  No abnormal soft tissue mass. No inflammatory changes.    2.  No enlarged cervical lymph nodes.    CT CERVICAL SPINE:  1. No acute fracture.    2. No high-grade spinal canal stenosis.    3. At C5-C6, there is moderate right and severe left neural foraminal stenosis.   Cervical spine CT w/o contrast    Narrative    EXAM: CT CERVICAL SPINE W/O CONTRAST, CT SOFT TISSUE NECK W CONTRAST  LOCATION: [facility]  DATE/TIME: 5/15/2022 11:19 AM    INDICATION: Jaw pain, neck pain, cervical spine., Back pain  COMPARISON: None available  CONTRAST:  ISOVUE 370 75ML  TECHNIQUE:   Routine CT Soft Tissue Neck with IV contrast. Multiplanar reformats. Dose reduction techniques were used.  Routine CT Cervical Spine without IV contrast. Multiplanar reformats. Dose reduction techniques were used.    FINDINGS:   CT NECK:  MUCOSAL SPACES/SOFT TISSUES: Normal mucosal spaces of the upper aerodigestive tract. No mucosal mass or inflammation identified. Normal vocal cords and infraglottic trachea. Normal parapharyngeal space and subcutaneous soft tissues. Normal oral cavity,    spaces, and floor of mouth structures.    LYMPH NODES: No pathologic lymph nodes by size or morphology criteria.     SALIVARY GLANDS: Normal parotid and submandibular glands.    THYROID: Normal.     VESSELS: Moderate atherosclerotic plaque at the carotid bifurcations. 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 There is associated soft atheromatous plaque.    VISUALIZED INTRACRANIAL/ORBITS/SINUSES: No abnormality of the visualized intracranial compartment or orbits. Visualized paranasal sinuses and mastoid air cells are clear.    OTHER: No destructive osseous lesion. The included lung apices are clear.    CT CERVICAL SPINE:  VERTEBRA: Normal vertebral body heights and alignment. No fracture or posttraumatic subluxation.     CANAL/FORAMINA: No high-grade spinal canal stenosis. At C5-C6, there is moderate right and severe left neural foraminal stenosis.    PARASPINAL: No extraspinal abnormality.      Impression    IMPRESSION:   CT NECK:  1.  No abnormal soft tissue mass. No inflammatory changes.    2.  No enlarged cervical lymph nodes.    CT CERVICAL SPINE:  1. No acute fracture.    2. No high-grade spinal canal stenosis.    3. At C5-C6, there is moderate right and severe left neural foraminal stenosis.   XR Chest Port 1 View    Narrative    EXAM: XR CHEST PORT 1 VIEW  LOCATION: [facility]  DATE/TIME: 5/16/2022 8:37  AM    INDICATION: 76 y with sepsis.  COMPARISON: None.      Impression    IMPRESSION: Shallow inspiration. Lungs are clear without signs of pneumonia or failure. Heart and pulmonary vascularity are normal.       Discharge Medications   Current Discharge Medication List      START taking these medications    Details   alcohol swab prep pads Use to swab area of injection/amber as directed.  Qty: 100 each, Refills: 3    Associated Diagnoses: Type 2 diabetes mellitus with hyperglycemia, without long-term current use of insulin (H)      cefuroxime (CEFTIN) 250 MG tablet Take 1 tablet (250 mg) by mouth 2 times daily  Qty: 12 tablet, Refills: 0    Associated Diagnoses: Acute streptococcal pharyngitis; Acute cystitis without hematuria      insulin glargine (LANTUS SOLOSTAR) 100 UNIT/ML pen Inject 7 Units Subcutaneous At Bedtime  Qty: 15 mL, Refills: 3    Comments: If Lantus is not covered by insurance, may substitute Basaglar or Semglee or other insulin glargine product per insurance preference at same dose and frequency.    Associated Diagnoses: Type 2 diabetes mellitus with hyperglycemia, without long-term current use of insulin (H)      insulin pen needle (ULTICARE MICRO) 32G X 4 MM miscellaneous Use 1 pen needles daily or as directed.  Qty: 100 each, Refills: 3    Associated Diagnoses: Type 2 diabetes mellitus with hyperglycemia, without long-term current use of insulin (H)         CONTINUE these medications which have NOT CHANGED    Details   acetaminophen (TYLENOL) 500 MG tablet Take 1-2 tablets (500-1,000 mg) by mouth every 8 hours as needed for mild pain  Qty: 100 tablet, Refills: 11    Associated Diagnoses: Primary osteoarthritis of both knees; Acute bilateral low back pain without sciatica      aspirin (ASPIRIN LOW DOSE) 81 MG EC tablet TAKE 1 TABLET (81 MG) BY MOUTH DAILY  Qty: 90 tablet, Refills: 4    Associated Diagnoses: Type 2 diabetes mellitus with hyperglycemia, without long-term current use of insulin (H)       atorvastatin (LIPITOR) 80 MG tablet Take 1 tablet (80 mg) by mouth daily  Qty: 90 tablet, Refills: 4    Associated Diagnoses: Pure hypercholesterolemia      calcium carb-cholecalciferol (OS-MAME) 500-200 MG-UNIT tablet Take 1 tablet by mouth 2 times daily (with meals)  Qty: 120 tablet, Refills: 4    Associated Diagnoses: Age-related osteoporosis without current pathological fracture      capsaicin (ZOSTRIX) 0.025 % external cream Apply 1 g topically 3 times daily as needed (to bilateral knees for pain)  Qty: 120 g, Refills: 11    Associated Diagnoses: Primary osteoarthritis of both knees      cetirizine (ZYRTEC) 10 MG tablet Take 1 tablet (10 mg) by mouth daily  Qty: 90 tablet, Refills: 4    Associated Diagnoses: Pruritic disorder      diclofenac (VOLTAREN) 1 % topical gel Apply 2 g topically 4 times daily  Qty: 350 g, Refills: 11    Associated Diagnoses: Primary osteoarthritis of both knees      JANUVIA 100 MG tablet TAKE 1 TABLET (100 MG) BY MOUTH DAILY  Qty: 90 tablet, Refills: 3    Associated Diagnoses: Type 2 diabetes mellitus with hyperglycemia, without long-term current use of insulin (H)      metFORMIN (GLUCOPHAGE-XR) 500 MG 24 hr tablet Take 4 tablets (2,000 mg) by mouth daily (with dinner)  Qty: 120 tablet, Refills: 3    Associated Diagnoses: Type 2 diabetes mellitus with hyperglycemia, without long-term current use of insulin (H)      Multiple Vitamins-Minerals (CEROVITE SENIOR) TABS Take 1 tablet by mouth daily  Qty: 90 tablet, Refills: 4    Associated Diagnoses: Type 2 diabetes mellitus with hyperglycemia, without long-term current use of insulin (H)      blood glucose (NO BRAND SPECIFIED) lancets standard Use to test blood sugar fasting daily or as directed.  Qty: 100 each, Refills: 11    Associated Diagnoses: Type 2 diabetes mellitus with hyperglycemia, without long-term current use of insulin (H)      blood glucose (NO BRAND SPECIFIED) test strip Use to test blood sugar daily fasting or as  directed.  Qty: 100 strip, Refills: 4    Associated Diagnoses: Type 2 diabetes mellitus with hyperglycemia, without long-term current use of insulin (H)         STOP taking these medications       canagliflozin (INVOKANA) 300 MG tablet Comments:   Reason for Stopping:         glipiZIDE (GLUCOTROL XL) 10 MG 24 hr tablet Comments:   Reason for Stopping:         pioglitazone (ACTOS) 15 MG tablet Comments:   Reason for Stopping:             Allergies   Allergies   Allergen Reactions     Ibuprofen GI Disturbance

## 2022-05-17 NOTE — PLAN OF CARE
Occupational Therapy Discharge Summary    Reason for therapy discharge:    Discharged to home with home therapy.    Progress towards therapy goal(s). See goals on Care Plan in [facility] electronic health record for goal details.  Goals partially met.  Barriers to achieving goals:   discharge from facility.    Therapy recommendation(s):  Return home w/family support.

## 2022-05-17 NOTE — PLAN OF CARE
Problem: Plan of Care - These are the overarching goals to be used throughout the patient stay.    Goal: Plan of Care Review/Shift Note  Description: The Plan of Care Review/Shift note should be completed every shift.  The Outcome Evaluation is a brief statement about your assessment that the patient is improving, declining, or no change.  This information will be displayed automatically on your shift note.  Outcome: Ongoing, Progressing  Flowsheets (Taken 5/17/2022 0905)  Plan of Care Reviewed With: patient  Overall Patient Progress: no change   Goal Outcome Evaluation:    Plan of Care Reviewed With: patient     Overall Patient Progress: no change    No issues overnight. Running continuous IVF NS 75 ml/hr. Impulsive; Calls for bathroom assist but does not wait for help. UTI & Urine Culture positive. IV antibiotic switched to oral antibiotic. Has been voiding without difficulty.

## 2022-05-17 NOTE — PROGRESS NOTES
Diabetes Care:    Reviewed with patient's son and patient regarding previous education provided.    Reviewed the treatment plan for BG monitoring, insulin use, meal plan. Gave patient a pictured chart of the management plan.    Patient has a meter at home, on pictured schedule to check BG before first and last meal and before bedtime snack and Lantus    Reviewed the goals, frequency of testing and when to call doctor.     Reviewed the type of insulin, action, storage, injection sites and use of the pen. Patient's son demo'd pen x 2 last time without all but one cue. Reinforced he use the pictured instructions ongoing.    Reviewed hypoglycemia: sx, causes, treatment    Reviewed the meal plan, consistent rice portions.    Written education materials left on all that was covered (pictured)    Patient encouraged to Follow-up with PCP with BG readings..    RX needed at discharge:  Lantus Solostar insulin pens, 1 box  Pen needles, 4 mm, 32 gauge, 1 box  Has meter/supplies at home  Insulin requiring  E 11.8  To test 3 x per day, before meals and before bedtime snack      Thanks    Roshni Shahid RN, Certified Diabetes Care and     [address] [address]  [email or web address]  [email or web address]   Office: [phone]  Pager: [phone]

## 2022-05-17 NOTE — PLAN OF CARE
"  Problem: Plan of Care - These are the overarching goals to be used throughout the patient stay.    Goal: Plan of Care Review/Shift Note  Description: The Plan of Care Review/Shift note should be completed every shift.  The Outcome Evaluation is a brief statement about your assessment that the patient is improving, declining, or no change.  This information will be displayed automatically on your shift note.  Outcome: Ongoing, Progressing  Goal: Patient-Specific Goal (Individualized)  Description: You can add care plan individualizations to a care plan. Examples of Individualization might be:  \"Parent requests to be called daily at 9am for status\", \"I have a hard time hearing out of my right ear\", or \"Do not touch me to wake me up as it startles me\".  Outcome: Ongoing, Progressing  Goal: Absence of Hospital-Acquired Illness or Injury  Outcome: Ongoing, Progressing  Intervention: Identify and Manage Fall Risk  Recent Flowsheet Documentation  Taken 5/16/2022 1754 by Savita Martinez, RN  Safety Promotion/Fall Prevention:    bed alarm on    nonskid shoes/slippers when out of bed    patient and family education    safety round/check completed    assistive device/personal items within reach    room organization consistent    lighting adjusted    fall prevention program maintained  Goal: Optimal Comfort and Wellbeing  Outcome: Ongoing, Progressing  Intervention: Monitor Pain and Promote Comfort  Recent Flowsheet Documentation  Taken 5/16/2022 1806 by Savita Martinez, RN  Pain Management Interventions: medication (see MAR)  Goal: Readiness for Transition of Care  Outcome: Ongoing, Progressing  Intervention: Mutually Develop Transition Plan  Recent Flowsheet Documentation  Taken 5/16/2022 2200 by Savita Martinez, RN  Equipment Currently Used at Home:    cane, straight    commode chair     Problem: Infection  Goal: Absence of Infection Signs and Symptoms  Outcome: Ongoing, Progressing   Goal Outcome Evaluation:      On IV " "Rocephin, to switch to Amoxicillin tomorrow per MD's note. Patient unable to rate neck pain but states with  \"it is much better than yesterday & I'm able to turn my head.\" Neck pain tolerated with PRN Tylenol given x1. FYI patient reports being unable to read. To have more diabetic education tomorrow.              "

## 2022-05-17 NOTE — PLAN OF CARE
"  Problem: Plan of Care - These are the overarching goals to be used throughout the patient stay.    Goal: Plan of Care Review/Shift Note  Description: The Plan of Care Review/Shift note should be completed every shift.  The Outcome Evaluation is a brief statement about your assessment that the patient is improving, declining, or no change.  This information will be displayed automatically on your shift note.  Outcome: Met  Goal: Patient-Specific Goal (Individualized)  Description: You can add care plan individualizations to a care plan. Examples of Individualization might be:  \"Parent requests to be called daily at 9am for status\", \"I have a hard time hearing out of my right ear\", or \"Do not touch me to wake me up as it startles me\".  Outcome: Met  Goal: Absence of Hospital-Acquired Illness or Injury  Outcome: Met  Goal: Optimal Comfort and Wellbeing  Outcome: Met  Goal: Readiness for Transition of Care  Outcome: Met     Problem: Risk for Delirium  Goal: Optimal Coping  Outcome: Met  Goal: Improved Behavioral Control  Outcome: Met  Goal: Improved Attention and Thought Clarity  Outcome: Met  Goal: Improved Sleep  Outcome: Met     Problem: Infection  Goal: Absence of Infection Signs and Symptoms  Outcome: Met   Goal Outcome Evaluation:                Pt discharged to home with [facility] services.  Discharge paperwork reviewed and signed with pt and pt's son via Yasmin .      "

## 2022-05-17 NOTE — PLAN OF CARE
Physical Therapy Discharge Summary    Reason for therapy discharge:    Discharged to home with home therapy.    Progress towards therapy goal(s). See goals on Care Plan in [facility] electronic health record for goal details.  Goals partially met.  Barriers to achieving goals:   discharge from facility.    Therapy recommendation(s):    Continued therapy is recommended.  Rationale/Recommendations:  PT goals partially met .    Goal Outcome Evaluation:

## 2022-05-18 ENCOUNTER — PATIENT OUTREACH (OUTPATIENT)
Dept: CARE COORDINATION | Facility: CLINIC | Age: 77
End: 2022-05-18
Payer: COMMERCIAL

## 2022-05-18 DIAGNOSIS — Z71.89 OTHER SPECIFIED COUNSELING: ICD-10-CM

## 2022-05-18 NOTE — PROGRESS NOTES
Clinic Care Coordination Contact  [facility]/Voicemail       Clinical Data: Care Coordinator Outreach  Outreach attempted x 1.  Left message on patient's voicemail with call back information and requested return call.  Plan:Care Coordinator will try to reach patient again in 1-2 business days.    Radha Mcdaniels, [facility]  [phone]  [facility]

## 2022-05-19 ENCOUNTER — PATIENT OUTREACH (OUTPATIENT)
Dept: CARE COORDINATION | Facility: CLINIC | Age: 77
End: 2022-05-19
Payer: COMMERCIAL

## 2022-05-19 NOTE — PROGRESS NOTES
Clinic Care Coordination Contact  [facility]/Voicemail       Clinical Data: Care Coordinator Outreach  Outreach attempted x 1.  No answer  Plan:Care Coordinator will try to reach patient again in 1-2 business days.    Radha Mcdaniels, [facility]  [phone]  [facility]

## 2022-05-20 ENCOUNTER — OFFICE VISIT (OUTPATIENT)
Dept: PHARMACY | Facility: CLINIC | Age: 77
End: 2022-05-20
Payer: COMMERCIAL

## 2022-05-20 ENCOUNTER — OFFICE VISIT (OUTPATIENT)
Dept: FAMILY MEDICINE | Facility: CLINIC | Age: 77
End: 2022-05-20
Payer: COMMERCIAL

## 2022-05-20 VITALS
DIASTOLIC BLOOD PRESSURE: 73 MMHG | WEIGHT: 104.4 LBS | SYSTOLIC BLOOD PRESSURE: 125 MMHG | TEMPERATURE: 98.2 F | OXYGEN SATURATION: 99 % | HEART RATE: 99 BPM | RESPIRATION RATE: 16 BRPM | BODY MASS INDEX: 20.39 KG/M2

## 2022-05-20 DIAGNOSIS — M81.0 AGE-RELATED OSTEOPOROSIS WITHOUT CURRENT PATHOLOGICAL FRACTURE: ICD-10-CM

## 2022-05-20 DIAGNOSIS — E11.65 TYPE 2 DIABETES MELLITUS WITH HYPERGLYCEMIA, WITHOUT LONG-TERM CURRENT USE OF INSULIN (H): ICD-10-CM

## 2022-05-20 DIAGNOSIS — E78.2 MIXED HYPERLIPIDEMIA: ICD-10-CM

## 2022-05-20 DIAGNOSIS — N30.01 ACUTE CYSTITIS WITH HEMATURIA: ICD-10-CM

## 2022-05-20 DIAGNOSIS — M54.2 MUSCULOSKELETAL NECK PAIN: Primary | ICD-10-CM

## 2022-05-20 DIAGNOSIS — Z09 HOSPITAL DISCHARGE FOLLOW-UP: ICD-10-CM

## 2022-05-20 DIAGNOSIS — Z78.9 TAKES DIETARY SUPPLEMENTS: ICD-10-CM

## 2022-05-20 DIAGNOSIS — N30.00 ACUTE CYSTITIS WITHOUT HEMATURIA: ICD-10-CM

## 2022-05-20 DIAGNOSIS — E11.65 TYPE 2 DIABETES MELLITUS WITH HYPERGLYCEMIA, WITHOUT LONG-TERM CURRENT USE OF INSULIN (H): Primary | ICD-10-CM

## 2022-05-20 DIAGNOSIS — F33.0 MAJOR DEPRESSIVE DISORDER, RECURRENT EPISODE, MILD (H): ICD-10-CM

## 2022-05-20 DIAGNOSIS — J30.2 SEASONAL ALLERGIES: ICD-10-CM

## 2022-05-20 PROCEDURE — 99214 OFFICE O/P EST MOD 30 MIN: CPT | Performed by: STUDENT IN AN ORGANIZED HEALTH CARE EDUCATION/TRAINING PROGRAM

## 2022-05-20 PROCEDURE — 99605 MTMS BY PHARM NP 15 MIN: CPT

## 2022-05-20 RX ORDER — CAPSAICIN 0.025 %
1 CREAM (GRAM) TOPICAL 3 TIMES DAILY PRN
Qty: 120 G | Refills: 11 | Status: SHIPPED | OUTPATIENT
Start: 2022-05-20 | End: 2023-09-15

## 2022-05-20 RX ORDER — ALENDRONATE SODIUM 70 MG/1
70 TABLET ORAL
Qty: 12 TABLET | Refills: 4 | COMMUNITY
Start: 2022-05-20 | End: 2023-05-09

## 2022-05-20 ASSESSMENT — PATIENT HEALTH QUESTIONNAIRE - PHQ9: SUM OF ALL RESPONSES TO PHQ QUESTIONS 1-9: 11

## 2022-05-20 NOTE — PROGRESS NOTES
Clinic Care Coordination Contact    Background: Care Coordination referral placed from [facility] discharge report for reason of patient meeting criteria for a TCM outreach call by [facility] team.    Assessment: Upon chart review, [facility] Team member will cancel/close the referral for TCM outreach due to reason below:    Patient has a follow up appointment with an appropriate provider today for hospital discharge.    Plan: Care Coordination referral for TCM outreach canceled.    Radha Mcdaniels MA  [facility] [place], [facility]

## 2022-05-20 NOTE — COMMUNITY RESOURCES LIST (ENGLISH)
05/20/2022   [facility] - Outpatient Clinics  [facility]  For questions about this resource list or additional care needs, please contact your primary care clinic or care manager.  Phone: [phone]   Email: N/A   Address: [address] [place] [address]   Hours: N/A        Hotlines and Helplines       Hotline - Crisis help  1  [facility] - Adult Mental Health Urgent Care Distance: 1.51 miles      COVID-19 Status: Phone/Virtual   [address] [place] [address]  Language: English, Hmong, Cymro  Hours: Mon - Sun Open 24 Hours   Phone: [phone]) [phone] Website: https://www.[facility]/health-medical/clinics-services/mental-behavorial-health/adult-mental-health-chemical-health/urgent-care-adult-mental-health     2  [place] Department of Human Services - Crisis Text Line - Crisis Text Line Distance: 1.59 miles      COVID-19 Status: Phone/Virtual   [address] [place] [address]  Language: English  Hours: Mon - Sun Open 24 Hours   Website: https://[place].gov/dhs/partners-and-providers/policies-procedures/adult-mental-health/crisis-text-line/          Mental Health       Individual counseling  3  [facility] Distance: 0.63 miles      COVID-19 Status: Phone/Virtual   [address] [place] [address]  Language: English  Hours: Mon 10:30 AM - 5:00 PM , Tue - Fri 8:30 AM - 5:00 PM  Fees: Free   Phone: (215) 687-8690 Email: info@[employer] Website: http://www.[employer]     4  [facility] Distance: 0.73 miles      COVID-19 Status: Regular Operations, COVID-19 Status: Phone/Virtual   [address] [place] [address]  Language: English, Hmong, Cymro  Hours: Mon 8:00 AM - 8:00 PM , Tue 8:00 AM - 5:00 PM , Wed - Thu 8:00 AM - 8:00 PM , Fri 8:00 AM - 5:00 PM , Sat 8:00 AM - 12:00 PM  Fees: Insurance, Self Pay, Sliding Fee   Phone: (260) 357-9400 Website: https://www.[place]care.org     Mental health crisis care  [address] [place] [address]  Adult Mental Health Urgent Care - Adult Program Distance: 1.51 miles      COVID-19 Status: Regular Operations   [address] [place] [address]  Language: English, Hmong, Greenlandic  Hours: Mon - Fri 8:00 AM - 5:30 PM  Fees: Insurance, Self Pay, Sliding Fee   Phone: (460) 659-5043 Website: https://www.[facility]./[facility]/health-medical/clinics-services/mental-behavorial-health/adult-mental-health-chemical-health/urgent-care-adult-mental-health     6  [employer] - Crisis Stabilization Services ([facility]) Distance: 5.83 miles      COVID-19 Status: Regular Operations   [address] [place] [address]  Language: English, Croatian  Hours: Mon - Sun Open 24 Hours  Fees: Insurance   Phone: (255) 312-4814 Email: info@[employer] Website: https://[employer]org/services-programs/residential-services/bertin-shannen-house/     Mental health support group  7  [facility] Distance: 2.22 miles      COVID-19 Status: Phone/Virtual   [address] [place] [address]  Language: English  Hours: Mon - Fri 8:00 AM - 4:30 PM  Fees: Free   Phone: (979) 297-7104 Website: https://www.[place].gov/find-locations/facility/vc_0416V     8  [employer] [facility] Distance: 4.96 miles      COVID-19 Status: Regular Operations, COVID-19 Status: Phone/Virtual   [address] [place] [address]  Language: English  Hours: Mon - Thu 12:00 PM - 5:00 PM  Fees: Free   Phone: (353) 558-1653 Email: info@[employer] Website: http://[employer]org/          Important Numbers & Websites       Emergency Services   911  City Services   311  Poison Control   (280) 521-5351  Suicide Prevention Lifeline   (342) 680-8995 (TALK)  Child Abuse Hotline   (246) 694-6602 (4-A-Child)  Sexual Assault Hotline   (251) 151-1462 (HOPE)  National Runaway Safeline   (452) 454-7906 (RUNAWAY)  All-Options Talkline   (113) 752-7632  Substance Abuse  Referral   (601) 558-5438 (HELP)

## 2022-05-20 NOTE — NURSING NOTE
Due to patient being non-English speaking/uses sign language, an  was used for this visit. Only for face-to-face interpretation by an external agency, date and length of interpretation can be found on the scanned worksheet.     name: Emir  Agency: [employer]  Language: Yasmin   Telephone number: [phone]  Type of interpretation: Telephone, spoken

## 2022-05-20 NOTE — PROGRESS NOTES
Chief Complaint   Patient presents with     Hospital F/U     And UTI     Pain     Pt states that she has a lot pain started from neck to back.        There are no exam notes on file for this visit.        Assessment/Plan:  Emmanuel Vences is a 76 year old female here for hospital discharge follow-up, following hospitalization for a combination of urinary tract infection and strep throat with resolved TIERNEY.  Patient has a history of uncontrolled type 2 diabetes mellitus due to declining injectable medications.  However, during her hospitalization she was successfully initiated on bedtime Lantus and her blood sugar is now very well controlled on a combination of Lantus, Januvia and metformin.      #Urinary tract infection and strep throat: Symptomatically improving.  Will finish course of antibiotics as prescribed.  Discussed that fatigue is common following acute illness and will follow closely.    #Diabetes mellitus type 2, uncontrolled: I will continue her on her current 7 units of Lantus at bedtime along with metformin 2000 mg daily and Januvia 100 mg daily.    -Discussed that blood sugars may rise as appetite increases, will follow closely  -follow-up in 4 weeks  -If blood pressure remains normal and no return of acute illness will discuss initiation of an ACE or ARB at follow-up.    #Neck pain: At the base of the skull with some headache consistent with muscle tension.  Suspect that recent hospitalization contributes to her current symptoms.  Trial topical capsaicin and Voltaren gel to the affected areas.  Continue Tylenol as needed.    #Healthcare maintenance: Patient is due for lung cancer and DEXA scan.  Will address these issues at her next visit.  [ ] Lung cancer  [ ] dexa    #Depression with suicidal ideation: Patient indicates that her suicidal ideation is passive and thinks that we have misread her answers to her PHQ-9 today.  She denies any intent or plan and generally feels that her mental health is good.  We  will continue to monitor.    Emmanuel was seen today for hospital f/u and pain.    Diagnoses and all orders for this visit:    Musculoskeletal neck pain  -     capsaicin (ZOSTRIX) 0.025 % external cream; Apply 1 g topically 3 times daily as needed (to neck for pain)  -     diclofenac (VOLTAREN) 1 % topical gel; Apply 2 g topically 4 times daily as needed for moderate pain (neck pain)    Type 2 diabetes mellitus with hyperglycemia, without long-term current use of insulin (H)    Major depressive disorder, recurrent episode, mild (H)    Hospital discharge follow-up    Acute cystitis without hematuria  -     ACE/ARB/ARNI NOT PRESCRIBED (INTENTIONAL); Please choose reason not prescribed from choices below.        Follow-up with Kaylin Joe in 4 weeks for follow up DM, insulin titration.    No future appointments.    Kaylin Joe MD      There are no Patient Instructions on file for this visit.    Subjective:  Emmanuel Vences is a 76 year old female here for follow up hospitalization  Took abx so getting better with urine sx.    Her neck is hurting, is in back of neck and is going down her back - has been going on for one week. Different than w Strep throat. Feels muscular. Has some weakness - fatigue down arms. No trauma. Some difficulty with moving her neck - hurts to look down. Some headache - when weather is cold. She has been using tylenol for her pain, which does help.    BG management -   Was started on lantus insulin, januvia and metformin.   Taking insulin in the subcutaneous skin once per day.    today, has been only 100s at first when she went home.  Doing well with insulin injections, agrees to continue    Denies SI, states that she does feel depressed sometimes    PHQ-9 score:    PHQ 5/20/2022   PHQ-9 Total Score 11   Q9: Thoughts of better off dead/self-harm past 2 weeks Several days       Patient Active Problem List   Diagnosis     Health Care Home     Esophageal reflux     Osteoarthrosis,  unspecified whether generalized or localized, involving lower leg     Lumbosacral spondylosis without myelopathy     Hyperlipidemia     Type 2 diabetes mellitus with hyperglycemia, without long-term current use of insulin (H)     Seasonal allergies     Microalbuminuria     Age-related osteoporosis without current pathological fracture     Chronic kidney disease, stage 1     Major depressive disorder, recurrent episode, mild (H)     Dehydration     Hyponatremia     Acute kidney insufficiency     Acute cystitis with hematuria     Sepsis, due to unspecified organism, unspecified whether acute organ dysfunction present (H)     Acute streptococcal pharyngitis       Current Outpatient Medications   Medication     ACE/ARB/ARNI NOT PRESCRIBED (INTENTIONAL)     capsaicin (ZOSTRIX) 0.025 % external cream     diclofenac (VOLTAREN) 1 % topical gel     acetaminophen (TYLENOL) 500 MG tablet     alcohol swab prep pads     alendronate (FOSAMAX) 70 MG tablet     aspirin (ASPIRIN LOW DOSE) 81 MG EC tablet     atorvastatin (LIPITOR) 80 MG tablet     blood glucose (NO BRAND SPECIFIED) lancets standard     blood glucose (NO BRAND SPECIFIED) test strip     calcium carb-cholecalciferol (OS-MAME) 500-200 MG-UNIT tablet     cefuroxime (CEFTIN) 250 MG tablet     cetirizine (ZYRTEC) 10 MG tablet     insulin glargine (LANTUS SOLOSTAR) 100 UNIT/ML pen     insulin pen needle (ULTICARE MICRO) 32G X 4 MM miscellaneous     JANUVIA 100 MG tablet     metFORMIN (GLUCOPHAGE-XR) 500 MG 24 hr tablet     Multiple Vitamins-Minerals (CEROVITE SENIOR) TABS     No current facility-administered medications for this visit.       Objective:  /73 (BP Location: Right arm, Patient Position: Sitting, Cuff Size: Adult Regular)   Pulse 99   Temp 98.2  F (36.8  C) (Oral)   Resp 16   Wt 47.4 kg (104 lb 6.4 oz)   SpO2 99%   BMI 20.39 kg/m    Body mass index is 20.39 kg/m .  Gen: A/O x3, in NAD.  Cardio: S1, S2, no MRG. RRR.  Resp: CTAB, no WRR.  MSK: With  cervical spine paraspinous muscle tenderness appreciated, spasm of muscle present  Neuro: Grossly intact.

## 2022-05-20 NOTE — PROGRESS NOTES
Medication Therapy Management (MTM) Encounter    ASSESSMENT:                            Medication Adherence/Access: See below for considerations    Type 2 Diabetes: A1C Above goal of < 8% (higher goal given age and per chart review). However, after initiation of low dose Lantus and elimination of glipizide, pioglitazone, and Invokana after discharge, patient's FPGs are now stable in the 100s. Per Dr. Joe, continuing current medication regimen at this time is warranted. There was a brief discussion of switching Januvia to Bydureon, but this may be too complicated for the patient and the observed benefit would likely be minimal.     History of clinical microalbuminuria--may benefit from low-dose ACEi or ARB (BP normal last visit) or re-initiation of an SGLT-2. Deferring to next visit.     UTI: Stable post-discharge    Osteoperosis: Stable    Hyperlipidemia: History of LDL > 190, would benefit from recheck of lipids next visit to reassess current regimen.    Seasonal allergies: Stable    Pain/Supplements: Stable    PLAN:                            1. Medication reconciliation complete with medications brought in to clinic today and EHR updated accordingly.  2. No changes to current therapy today.     Follow-up: Return in about 2 weeks (around 6/3/2022).    Medication issues to be addressed at a future visit:      Add small dose of lisinopril or consider adding back SGLT-2 in light of clinical microalbuminuria    Lipid panel next visit    Reassess diabetes control next visit and consider stopping Lantus/replacing with pioglitazone or SGLT-2    SUBJECTIVE/OBJECTIVE:                          Emmanuel Vences is a 76 year old female seen for a transitions of care visit. She was discharged from [facility] on 5/17/22 for UTI. Today's visit is a co-visit with Dr. Joe. Patient saw provider prior to our visit today.     Reason for visit: Medication Therapy Management (MTM).    Allergies/ADRs: Reviewed in chart  Past Medical  History: Reviewed in chart  Social History     Tobacco Use     Smoking status: Former Smoker     Years: 50.00     Types: Cigarettes     Quit date: 2019     Years since quitting: 3.1     Smokeless tobacco: Never Used     Tobacco comment: chews betel nut   Substance Use Topics     Alcohol use: No     Drug use: No    ^Reviewed today    Medication Adherence/Access: Patient takes medications 2 time(s) per day.   Per patient, misses medication 0 times per week. Brought in all medications today. Has N set up pill box.       Type 2 Diabetes:    Patient is currently taking the following medications with no reports of side effects:    Lantus 7 units nightly    Metformin 1000 mg twice daily    Januvia 100 mg daily  PTA was on glipizide, pioglitazone, and Invokana. Stopped upon discharge. Invokana was stopped due to UTI.   Blood sugar monitorin time(s) daily. Ranges (verified on meter): Fasting- 137, 119, 165, 103, 119.  Symptoms of low blood sugar? None  Symptoms of high blood sugar? None  Aspirin: Taking 81mg daily and denies side effects   The 10-year ASCVD risk score (Livingstonvikram PAREDES Jr., et al., 2013) is: 31%    Values used to calculate the score:      Age: 76 years      Sex: Female      Is Non- : No      Diabetic: Yes      Tobacco smoker: No      Systolic Blood Pressure: 125 mmHg      Is BP treated: No      HDL Cholesterol: 57 mg/dL      Total Cholesterol: 306 mg/dL  Statin: Yes: Atorvastatin 80 mg daily   ACEi/ARB: No.   Urine Albumin:     Lab Results   Component Value Date    A1C 11.5 05/15/2022    A1C 10.7 2021    A1C 11.4 2021    A1C 12.0 2020    A1C 13.5 2020    A1C 11.1 2020    A1C 10.3 2020    A1C 8.5 10/28/2019     Most Recent Immunizations   Administered Date(s) Administered     COVID-19,ELISA,Pfizer (12+ Yrs) 08/10/2021     Flu, Unspecified 10/14/2011     HEPA 2015     HepA, Unspecified 04/15/2009     HepA-Adult 10/15/2018     HepB 10/14/2011      HepB-Adult 04/15/2009     Influenza (High Dose) 3 valent vaccine 10/15/2018     Influenza (IIV3) PF 10/09/2013     Influenza Vaccine IM > 6 months Valent IIV4 (Alfuria,Fluzone) 12/01/2014     Influenza Vaccine, 6+MO IM (QUADRIVALENT W/PRESERVATIVES) 10/28/2019     Influenza, Quad, High Dose, Pf, 65yr+ (Fluzone HD) 09/14/2021     MMR 05/05/2007     Mantoux Tuberculin Skin Test 03/03/2008     Pneumo Conj 13-V (2010&after) 11/29/2016     Pneumococcal 23 valent 10/14/2011     TD (ADULT, 7+) 10/14/2011     TDAP Vaccine (Boostrix) 06/22/2015     Td (Adult), Adsorbed 10/14/2011     Tdap (Adacel,Boostrix) 03/10/2008     Tdap (Adult) Unspecified Formulation 04/15/2009     Typhoid IM 10/15/2018     Yellow Fever 06/22/2015     Zoster vaccine recombinant adjuvanted (SHINGRIX) 01/10/2020     Zoster vaccine, live 05/12/2009      UTI:  No abdominal pain or symptoms of UTI, however UA positive, and UCx positive for Ecoli.  On admission WBC, CRP, alk phos elevated, lactate WNL. Patient did receive ceftriaxone, and IVF.  Patient's labs improved.    Prescribed cefuroxime BID 6 days Tylenol prn. Was able to obtain cefuroxime and has a few days left (should be done by Monday)    Osteoperosis: Taking calcium carbonate/Vit D 1000 mg-400 units daily and alendronate 70 mg daily and denies side effects. No recent falls.     2018 DXA:  1. The spine bone density L1-L4 with T-score -3.6.  2. Femoral bone densities show left femoral neck T- score -2.7 and right femoral neck T-score -2.4.  3. Trabecular bone score indicates moderate trabecular bone architecture.       Hyperlipidemia: Taking atorvastatin 80 mg daily and denies side effects.   Recent Labs   Lab Test 12/14/21  1404 09/09/21  1617 08/31/20  1548 08/31/20  1504 06/25/20  1602   CHOL 306* 348*  --  311.2* 310.7*   HDL 57 69  --  54.9 61.8   LDL  --   --  199* Unable to calculate Trig >=400 193*   TRIG 475* 462*  --  407.8* 279.6*   CHOLHDLRATIO  --   --   --  5.7* 5.0*     Seasonal  allergies: Taking cetirizine 10 mg daily and denies side effects. No complaints of seasonal allergies today.     Pain/Supplements: Takes Tylenol 1000 mg at night as needed. Also uses Voltaren gel and capsaicin cream as needed. Taking multivitamin daily. No reports of side effects.    BP Readings from Last 1 Encounters:   05/20/22 125/73     Pulse Readings from Last 1 Encounters:   05/20/22 99     Wt Readings from Last 1 Encounters:   05/20/22 104 lb 6.4 oz (47.4 kg)     Ht Readings from Last 1 Encounters:   05/15/22 5' (1.524 m)     Estimated body mass index is 20.39 kg/m  as calculated from the following:    Height as of 5/15/22: 5' (1.524 m).    Weight as of an earlier encounter on 5/20/22: 104 lb 6.4 oz (47.4 kg).    Temp Readings from Last 1 Encounters:   05/20/22 98.2  F (36.8  C) (Oral)       ----------------  Post Discharge Medication Reconciliation Status: discharge medications reconciled, continue medications without change.    I spent 30 minutes with this patient today. Dr. Joe was provided the recommendations above  in clinic today and she is the authorizing prescriber for this visit through the pharmacist collaborative practice agreement. A copy of the visit note was provided to the patient's provider(s).    The patient was given a summary of these recommendations. See Provider note/AVS from today.     Eduardo Carias, PharmD, [facility]  PGY1 Ambulatory Care Pharmacy Resident     Medication Therapy Recommendations  No medication therapy recommendations to display

## 2022-05-22 NOTE — PATIENT INSTRUCTIONS
Recommendations from today's MTM visit:                                                    MTM (medication therapy management) is a service provided by a clinical pharmacist designed to help you get the most of out of your medicines.   Today we reviewed what your medicines are for, how to know if they are working, that your medicines are safe and how to make your medicine regimen as easy as possible.      No changes to medications today    It was great to speak with you today!  I value your experience and would be very thankful for your time with providing feedback on our clinic survey. You may receive a survey via email or text message in the next few days.     To schedule another MTM appointment, please call the clinic directly or you may call the MTM scheduling line at [phone] or toll-free at [phone].     My Clinical Pharmacist's contact information:                                                      Please feel free to contact me with any questions or concerns you have!     Eduardo Carias, PharmD, [facility]  PGY1 Ambulatory Care Pharmacy Resident    [address] [address]  Office Phone: [phone]

## 2022-05-24 ENCOUNTER — TRANSFERRED RECORDS (OUTPATIENT)
Dept: HEALTH INFORMATION MANAGEMENT | Facility: CLINIC | Age: 77
End: 2022-05-24

## 2022-05-29 DIAGNOSIS — E11.65 TYPE 2 DIABETES MELLITUS WITH HYPERGLYCEMIA, WITHOUT LONG-TERM CURRENT USE OF INSULIN (H): ICD-10-CM

## 2022-05-31 RX ORDER — METFORMIN HCL 500 MG
2000 TABLET, EXTENDED RELEASE 24 HR ORAL
Qty: 120 TABLET | Refills: 3 | Status: SHIPPED | OUTPATIENT
Start: 2022-05-31 | End: 2022-09-13

## 2022-06-03 ENCOUNTER — OFFICE VISIT (OUTPATIENT)
Dept: FAMILY MEDICINE | Facility: CLINIC | Age: 77
End: 2022-06-03
Payer: COMMERCIAL

## 2022-06-03 ENCOUNTER — ANCILLARY PROCEDURE (OUTPATIENT)
Dept: GENERAL RADIOLOGY | Facility: CLINIC | Age: 77
End: 2022-06-03
Attending: FAMILY MEDICINE
Payer: COMMERCIAL

## 2022-06-03 VITALS
SYSTOLIC BLOOD PRESSURE: 109 MMHG | BODY MASS INDEX: 20.31 KG/M2 | RESPIRATION RATE: 16 BRPM | TEMPERATURE: 99 F | DIASTOLIC BLOOD PRESSURE: 73 MMHG | OXYGEN SATURATION: 98 % | HEART RATE: 119 BPM | WEIGHT: 104 LBS

## 2022-06-03 DIAGNOSIS — R50.9 FEVER, UNSPECIFIED FEVER CAUSE: ICD-10-CM

## 2022-06-03 DIAGNOSIS — N30.00 ACUTE CYSTITIS WITHOUT HEMATURIA: Primary | ICD-10-CM

## 2022-06-03 DIAGNOSIS — E11.65 TYPE 2 DIABETES MELLITUS WITH HYPERGLYCEMIA, WITHOUT LONG-TERM CURRENT USE OF INSULIN (H): ICD-10-CM

## 2022-06-03 LAB
ALBUMIN UR-MCNC: >=300 MG/DL
ANION GAP SERPL CALCULATED.3IONS-SCNC: 11 MMOL/L (ref 5–18)
APPEARANCE UR: CLEAR
BILIRUB UR QL STRIP: NEGATIVE
BUN SERPL-MCNC: 23 MG/DL (ref 8–28)
C REACTIVE PROTEIN LHE: 31.8 MG/DL (ref 0–0.8)
CALCIUM SERPL-MCNC: 11.1 MG/DL (ref 8.5–10.5)
CHLORIDE BLD-SCNC: 96 MMOL/L (ref 98–107)
CO2 SERPL-SCNC: 28 MMOL/L (ref 22–31)
COLOR UR AUTO: YELLOW
CREAT SERPL-MCNC: 1.47 MG/DL (ref 0.6–1.1)
ERYTHROCYTE [DISTWIDTH] IN BLOOD BY AUTOMATED COUNT: 14.5 % (ref 10–15)
FLUAV AG SPEC QL IA: NEGATIVE
FLUBV AG SPEC QL IA: NEGATIVE
GFR SERPL CREATININE-BSD FRML MDRD: 37 ML/MIN/1.73M2
GLUCOSE BLD-MCNC: 167 MG/DL (ref 70–125)
GLUCOSE UR STRIP-MCNC: NEGATIVE MG/DL
HCT VFR BLD AUTO: 27.9 % (ref 35–47)
HGB BLD-MCNC: 9.3 G/DL (ref 11.7–15.7)
HGB UR QL STRIP: ABNORMAL
KETONES UR STRIP-MCNC: NEGATIVE MG/DL
LEUKOCYTE ESTERASE UR QL STRIP: ABNORMAL
MCH RBC QN AUTO: 29 PG (ref 26.5–33)
MCHC RBC AUTO-ENTMCNC: 33.3 G/DL (ref 31.5–36.5)
MCV RBC AUTO: 87 FL (ref 78–100)
NITRATE UR QL: NEGATIVE
PH UR STRIP: 5.5 [PH] (ref 5–8)
PLATELET # BLD AUTO: 276 10E3/UL (ref 150–450)
POTASSIUM BLD-SCNC: 4.3 MMOL/L (ref 3.5–5)
RBC # BLD AUTO: 3.21 10E6/UL (ref 3.8–5.2)
SODIUM SERPL-SCNC: 135 MMOL/L (ref 136–145)
SP GR UR STRIP: 1.01 (ref 1–1.03)
UROBILINOGEN UR STRIP-ACNC: 0.2 E.U./DL
WBC # BLD AUTO: 13.9 10E3/UL (ref 4–11)

## 2022-06-03 PROCEDURE — 71046 X-RAY EXAM CHEST 2 VIEWS: CPT | Mod: TC | Performed by: RADIOLOGY

## 2022-06-03 PROCEDURE — U0003 INFECTIOUS AGENT DETECTION BY NUCLEIC ACID (DNA OR RNA); SEVERE ACUTE RESPIRATORY SYNDROME CORONAVIRUS 2 (SARS-COV-2) (CORONAVIRUS DISEASE [COVID-19]), AMPLIFIED PROBE TECHNIQUE, MAKING USE OF HIGH THROUGHPUT TECHNOLOGIES AS DESCRIBED BY CMS-2020-01-R: HCPCS | Performed by: STUDENT IN AN ORGANIZED HEALTH CARE EDUCATION/TRAINING PROGRAM

## 2022-06-03 PROCEDURE — 87804 INFLUENZA ASSAY W/OPTIC: CPT | Performed by: STUDENT IN AN ORGANIZED HEALTH CARE EDUCATION/TRAINING PROGRAM

## 2022-06-03 PROCEDURE — 85027 COMPLETE CBC AUTOMATED: CPT | Performed by: STUDENT IN AN ORGANIZED HEALTH CARE EDUCATION/TRAINING PROGRAM

## 2022-06-03 PROCEDURE — 87086 URINE CULTURE/COLONY COUNT: CPT | Performed by: STUDENT IN AN ORGANIZED HEALTH CARE EDUCATION/TRAINING PROGRAM

## 2022-06-03 PROCEDURE — 81003 URINALYSIS AUTO W/O SCOPE: CPT | Performed by: STUDENT IN AN ORGANIZED HEALTH CARE EDUCATION/TRAINING PROGRAM

## 2022-06-03 PROCEDURE — 86140 C-REACTIVE PROTEIN: CPT | Performed by: STUDENT IN AN ORGANIZED HEALTH CARE EDUCATION/TRAINING PROGRAM

## 2022-06-03 PROCEDURE — U0005 INFEC AGEN DETEC AMPLI PROBE: HCPCS | Performed by: STUDENT IN AN ORGANIZED HEALTH CARE EDUCATION/TRAINING PROGRAM

## 2022-06-03 PROCEDURE — 80048 BASIC METABOLIC PNL TOTAL CA: CPT | Performed by: STUDENT IN AN ORGANIZED HEALTH CARE EDUCATION/TRAINING PROGRAM

## 2022-06-03 PROCEDURE — 96372 THER/PROPH/DIAG INJ SC/IM: CPT | Performed by: FAMILY MEDICINE

## 2022-06-03 PROCEDURE — 87186 SC STD MICRODIL/AGAR DIL: CPT | Performed by: STUDENT IN AN ORGANIZED HEALTH CARE EDUCATION/TRAINING PROGRAM

## 2022-06-03 PROCEDURE — 99214 OFFICE O/P EST MOD 30 MIN: CPT | Mod: 25 | Performed by: STUDENT IN AN ORGANIZED HEALTH CARE EDUCATION/TRAINING PROGRAM

## 2022-06-03 PROCEDURE — 36415 COLL VENOUS BLD VENIPUNCTURE: CPT | Performed by: STUDENT IN AN ORGANIZED HEALTH CARE EDUCATION/TRAINING PROGRAM

## 2022-06-03 RX ORDER — CEFDINIR 300 MG/1
300 CAPSULE ORAL 2 TIMES DAILY
Qty: 10 CAPSULE | Refills: 0 | Status: SHIPPED | OUTPATIENT
Start: 2022-06-04 | End: 2022-06-09

## 2022-06-03 RX ORDER — CEFTRIAXONE SODIUM 1 G
1 VIAL (EA) INJECTION ONCE
Status: COMPLETED | OUTPATIENT
Start: 2022-06-03 | End: 2022-06-03

## 2022-06-03 RX ADMIN — Medication 1 G: at 16:21

## 2022-06-03 NOTE — PATIENT INSTRUCTIONS
Plan for today:  - Ceftriaxone shot (antibiotic) before leaving clinic today  - Start the antibiotic tomorrow (2 times daily for 5 days)  - Follow up with me on Monday or Tuesday  - If worsening fevers, chills, dysuria, poor appetite or weakness go to the ED over the weekend for evaluation.

## 2022-06-03 NOTE — PROGRESS NOTES
[facility] Medicine Clinic Visit    Assessment & Plan     This is a 76-year-old female with history of diabetes, depression and recent hospitalization at [facility] last month for urosepsis who presents today for evaluation of subjective fever.  Upon exam, tachycardic without fever in the exam room.  Otherwise hemodynamically stable.  Exam benign with slight crackle in the right lung base which cleared on repeat and duration.  Laboratory data notable for leukocytosis to nearly 14, UA with protein and leukocyte esterase.  Chest x-ray without evidence of effusion or consolidation.  Due to approaching weekend and history of urosepsis, appropriate to treat for repeat acute cystitis today.  She responded well to cephalosporins off on her hospital admission and reviewing that urine culture she grew E. coli that was resistant to ampicillin and Bactrim.  We will administer a dose of Rocephin today and start a 5-day course of cefdinir tomorrow.  I would like to see her back early next week to ensure that her symptoms resolved.  Discussed with patient and son at length reasons to represent to the ER over the weekend including worsening fevers, chills, poor appetite, worsening nausea or vomiting or any additional concern.    1. Acute cystitis without hematuria  - cefTRIAXone (ROCEPHIN) injection 1 g  - cefdinir (OMNICEF) 300 MG capsule; Take 1 capsule (300 mg) by mouth 2 times daily for 5 days  Dispense: 10 capsule; Refill: 0    2. Fever, unspecified fever cause  - Symptomatic; Unknown COVID-19 Virus (Coronavirus) by PCR Nose  - Influenza A & B Antigen - Clinic Collect  - UA reflex to Microscopic; Future  - Urine Culture; Future  - CBC with platelets; Future  - CRP inflammation; Future  - Basic metabolic panel; Future  - XR Chest 2 Views; Future  - Urine Culture  - UA reflex to Microscopic  - CBC with platelets  - CRP inflammation  - Basic metabolic panel    3. Type 2 diabetes mellitus with hyperglycemia, without  long-term current use of insulin (H)  Expresses desire to transition off of insulin therapy.  Discussed that she should continue her current regimen until she follows up with her PCP for her next diabetes check.      Options for treatment and follow-up care were reviewed with the patient who was engaged and actively involved in the decision making process, verbalized understanding of the options discussed, and satisfied with the final plan.    Patient was staffed with supervising physician, Dr. Romero, who agrees with the assessment and plan.    Kip Gamboa MD, PGY3  [facility] Medicine    Patient Instructions   Plan for today:  - Ceftriaxone shot (antibiotic) before leaving clinic today  - Start the antibiotic tomorrow (2 times daily for 5 days)  - Follow up with me on Monday or Tuesday  - If worsening fevers, chills, dysuria, poor appetite or weakness go to the ED over the weekend for evaluation.            Manoj Vences is a 76 year old female with a history including type 2 diabetes, depression and recent hospitalization for urosepsis who presents today for evaluation of fever.    Chief Complaints and History of Present Illnesses   Patient presents with     Fever     For the past 3 days with fatigue      Per patient report it noted a subjective fever about 2 to 3 days ago.  She has had 1-2 fevers every day since this time, she reports that she takes Tylenol which helps resolve her symptoms.  She does have chills when she becomes febrile, notes that her fingertips and toes are quite cold.  She does not have a thermometer at home and does not have an objective temperature data point.  Other than fatigue which comes with the fever, she has had no corresponding symptoms such as ear pain, throat pain, cough, chest pain, rash, vomiting or diarrhea.  She does report intermittent nausea.  She is still tolerating oral intake.    She was hospitalized at [facility] in the middle of May for  urosepsis, was discharged on a oral cephalosporin which she reports taking.  Her urine grew out ampicillin and bactrim resistant E. Coli.  She was also treated for strep pharyngitis at this time.    She reports that she is tired of taking her injectable medication for her diabetes.  She is still taking this medicine.    She attributes her fever to repeat urine infection.     ROS: 10 point ROS neg other than the symptoms noted above in the HPI.    Patient Active Problem List    Diagnosis Date Noted     Acute streptococcal pharyngitis 05/16/2022     Priority: Medium     Dehydration 05/15/2022     Priority: Medium     Hyponatremia 05/15/2022     Priority: Medium     Acute kidney insufficiency 05/15/2022     Priority: Medium     Acute cystitis with hematuria 05/15/2022     Priority: Medium     Sepsis, due to unspecified organism, unspecified whether acute organ dysfunction present (H) 05/15/2022     Priority: Medium     Major depressive disorder, recurrent episode, mild (H) 02/16/2022     Priority: Medium     Chronic kidney disease, stage 1 12/14/2021     Priority: Medium     Age-related osteoporosis without current pathological fracture 08/31/2020     Priority: Medium     Microalbuminuria 09/26/2016     Priority: Medium     Seasonal allergies 05/06/2014     Priority: Medium     Type 2 diabetes mellitus with hyperglycemia, without long-term current use of insulin (H) 05/22/2013     Priority: Medium     Hyperlipidemia 02/21/2013     Priority: Medium     Health Care Home 02/19/2013     Priority: Medium     Tier Level: 1    DX V65.8 REPLACED WITH 05538 HEALTH CARE HOME (04/08/2013)       Esophageal reflux 02/19/2013     Priority: Medium     Osteoarthrosis, unspecified whether generalized or localized, involving lower leg 02/19/2013     Priority: Medium     Problem list name updated by automated process. Provider to review  Replacing diagnoses that were inactivated after the 10/1/2021 regulatory import.       Lumbosacral  spondylosis without myelopathy 02/19/2013     Priority: Medium       Current Outpatient Medications   Medication Instructions     ACE/ARB/ARNI NOT PRESCRIBED (INTENTIONAL) Please choose reason not prescribed from choices below.     acetaminophen (TYLENOL) 500-1,000 mg, Oral, EVERY 8 HOURS PRN     alcohol swab prep pads Use to swab area of injection/amber as directed.     alendronate (FOSAMAX) 70 mg, Oral, EVERY 7 DAYS     aspirin (ASPIRIN LOW DOSE) 81 MG EC tablet TAKE 1 TABLET (81 MG) BY MOUTH DAILY     atorvastatin (LIPITOR) 80 mg, Oral, DAILY     blood glucose (NO BRAND SPECIFIED) lancets standard Use to test blood sugar fasting daily or as directed.     blood glucose (NO BRAND SPECIFIED) test strip Use to test blood sugar daily fasting or as directed.     calcium carb-cholecalciferol (OS-MAME) 500-200 MG-UNIT tablet 1 tablet, Oral, 2 TIMES DAILY WITH MEALS     capsaicin (ZOSTRIX) 1 g, Topical, 3 TIMES DAILY PRN     cetirizine (ZYRTEC) 10 mg, Oral, DAILY     diclofenac (VOLTAREN) 2 g, Topical, 4 TIMES DAILY PRN     insulin pen needle (ULTICARE MICRO) 32G X 4 MM miscellaneous Use 1 pen needles daily or as directed.     JANUVIA 100 MG tablet TAKE 1 TABLET (100 MG) BY MOUTH DAILY     Lantus SoloStar 7 Units, Subcutaneous, AT BEDTIME     metFORMIN (GLUCOPHAGE XR) 2,000 mg, Oral, DAILY WITH SUPPER     Multiple Vitamins-Minerals (CEROVITE SENIOR) TABS 1 tablet, Oral, DAILY       Social: She reports that she quit smoking about 3 years ago. Her smoking use included cigarettes. She quit after 50.00 years of use. She has never used smokeless tobacco. She reports that she does not drink alcohol and does not use drugs.    Nursing Notes:   Mayi Calvillo CMA  6/3/2022  3:06 PM  Signed  Due to patient being non-English speaking/uses sign language, an  was used for this visit. Only for face-to-face interpretation by an external agency, date and length of interpretation can be found on the scanned  worksheet.     name: Aryan  Agency: [employer]  Language: Yasmin   Telephone number: [phone]  Type of interpretation: Face-to-face, spoken      Objective     Vitals:    06/03/22 1504   BP: 109/73   BP Location: Left arm   Patient Position: Sitting   Cuff Size: Adult Regular   Pulse: 119   Resp: 16   Temp: 99  F (37.2  C)   TempSrc: Oral   SpO2: 98%   Weight: 47.2 kg (104 lb)     Body mass index is 20.31 kg/m .    GEN: NAD, appears fatigued and  HEENT: NC/AT, EOMI, normal conjunctivae/sclerae, clear oropharynx, poor dentition, MMM  RESP: Normal work of breathing. Faint crackles noted in R lung base, cleared on repeat inspiration. Left lung CTA. No wheeze  CV: RRR, nl S1/S2, no m/r/g, no peripheral edema  ABD: soft, NT/ND, +BS throughout  MSK: no MSK defects noted, walking with cane  SKIN: no suspicious lesions or rashes  NEURO: no obvious focal deficits    CXR: Personally reviewed, no evidence of effusion     Formal read below:    EXAM: XR CHEST 2 VW  LOCATION: [facility]  DATE/TIME: 6/3/2022 3:37 PM     INDICATION:  Fever  COMPARISON: 05/16/2022                                                                  IMPRESSION: Shallow inspiration again. Lungs are clear. No effusions. Heart and pulmonary vascularity are normal. No signs of acute disease.

## 2022-06-03 NOTE — PROGRESS NOTES
Preceptor Attestation:    I discussed the patient with the resident and evaluated the patient in person. I have verified the content of the note, which accurately reflects my assessment of the patient and the plan of care.   Supervising Physician:  Edmond Romero MD.

## 2022-06-03 NOTE — NURSING NOTE
Clinic Administered Medication Documentation    Administrations This Visit     cefTRIAXone (ROCEPHIN) injection 1 g     Admin Date  06/03/2022 Action  Given Dose  1 g Route  Intramuscular Site  Right Thigh Administered By  Mayi Calvillo CMA    Ordering Provider: Edmond Romero MD    NDC: 96861-353-95    Lot#: [id number]    : [employer]    Patient Supplied?: No                  Injectable Medication Documentation    Patient was given Ceftriaxone Sodium (Rocephin). Prior to medication administration, verified patients identity using patient s name and date of birth. Please see MAR and medication order for additional information. Patient instructed to remain in clinic for 15 minutes.      Was entire vial of medication used? Yes  Vial/Syringe: Single dose vial  Expiration Date:  04/30/2024  Was this medication supplied by the patient? No    Name of provider who requested the medication administration: Dr. Gamboa  Name of provider on site (faculty or community preceptor) at the time of performing the medication administration: Dr. Romero    Date of next administration: n/a  Date of next office visit with provider to renew medication plan (must be seen annually): n/a

## 2022-06-03 NOTE — NURSING NOTE
Due to patient being non-English speaking/uses sign language, an  was used for this visit. Only for face-to-face interpretation by an external agency, date and length of interpretation can be found on the scanned worksheet.     name: Aryan  Agency: [employer]  Language: Yasmin   Telephone number: [phone]  Type of interpretation: Face-to-face, spoken

## 2022-06-04 LAB — SARS-COV-2 RNA RESP QL NAA+PROBE: NEGATIVE

## 2022-06-05 LAB — BACTERIA UR CULT: ABNORMAL

## 2022-06-06 ENCOUNTER — OFFICE VISIT (OUTPATIENT)
Dept: FAMILY MEDICINE | Facility: CLINIC | Age: 77
End: 2022-06-06
Payer: COMMERCIAL

## 2022-06-06 VITALS
BODY MASS INDEX: 20.88 KG/M2 | OXYGEN SATURATION: 97 % | WEIGHT: 103.6 LBS | DIASTOLIC BLOOD PRESSURE: 66 MMHG | TEMPERATURE: 98.1 F | SYSTOLIC BLOOD PRESSURE: 107 MMHG | HEIGHT: 59 IN | HEART RATE: 96 BPM | RESPIRATION RATE: 20 BRPM

## 2022-06-06 DIAGNOSIS — R79.82 ELEVATED C-REACTIVE PROTEIN: ICD-10-CM

## 2022-06-06 DIAGNOSIS — M54.2 MUSCULOSKELETAL NECK PAIN: ICD-10-CM

## 2022-06-06 DIAGNOSIS — N30.00 ACUTE CYSTITIS WITHOUT HEMATURIA: ICD-10-CM

## 2022-06-06 DIAGNOSIS — N17.9 ACUTE KIDNEY INJURY (H): Primary | ICD-10-CM

## 2022-06-06 LAB
ANION GAP SERPL CALCULATED.3IONS-SCNC: 11 MMOL/L (ref 5–18)
BUN SERPL-MCNC: 15 MG/DL (ref 8–28)
C REACTIVE PROTEIN LHE: 7.2 MG/DL (ref 0–0.8)
CALCIUM SERPL-MCNC: 9.8 MG/DL (ref 8.5–10.5)
CHLORIDE BLD-SCNC: 97 MMOL/L (ref 98–107)
CO2 SERPL-SCNC: 25 MMOL/L (ref 22–31)
CREAT SERPL-MCNC: 1.24 MG/DL (ref 0.6–1.1)
ERYTHROCYTE [DISTWIDTH] IN BLOOD BY AUTOMATED COUNT: 14 % (ref 10–15)
GFR SERPL CREATININE-BSD FRML MDRD: 45 ML/MIN/1.73M2
GLUCOSE BLD-MCNC: 377 MG/DL (ref 70–125)
HCT VFR BLD AUTO: 27.8 % (ref 35–47)
HGB BLD-MCNC: 9.2 G/DL (ref 11.7–15.7)
MCH RBC QN AUTO: 28.8 PG (ref 26.5–33)
MCHC RBC AUTO-ENTMCNC: 33.1 G/DL (ref 31.5–36.5)
MCV RBC AUTO: 87 FL (ref 78–100)
PLATELET # BLD AUTO: 312 10E3/UL (ref 150–450)
POTASSIUM BLD-SCNC: 5 MMOL/L (ref 3.5–5)
RBC # BLD AUTO: 3.19 10E6/UL (ref 3.8–5.2)
SODIUM SERPL-SCNC: 133 MMOL/L (ref 136–145)
WBC # BLD AUTO: 8 10E3/UL (ref 4–11)

## 2022-06-06 PROCEDURE — 86140 C-REACTIVE PROTEIN: CPT | Performed by: STUDENT IN AN ORGANIZED HEALTH CARE EDUCATION/TRAINING PROGRAM

## 2022-06-06 PROCEDURE — 80048 BASIC METABOLIC PNL TOTAL CA: CPT | Performed by: STUDENT IN AN ORGANIZED HEALTH CARE EDUCATION/TRAINING PROGRAM

## 2022-06-06 PROCEDURE — 85027 COMPLETE CBC AUTOMATED: CPT | Performed by: STUDENT IN AN ORGANIZED HEALTH CARE EDUCATION/TRAINING PROGRAM

## 2022-06-06 PROCEDURE — 36415 COLL VENOUS BLD VENIPUNCTURE: CPT | Performed by: STUDENT IN AN ORGANIZED HEALTH CARE EDUCATION/TRAINING PROGRAM

## 2022-06-06 PROCEDURE — 99214 OFFICE O/P EST MOD 30 MIN: CPT | Mod: GC | Performed by: STUDENT IN AN ORGANIZED HEALTH CARE EDUCATION/TRAINING PROGRAM

## 2022-06-06 NOTE — PATIENT INSTRUCTIONS
Plan for the day:  - Labs today. I will call you with lab results  - Finish the antibiotics  - Come back to clinic if new fever or weakness  - Use a hot rice pack and ice pack alternating for the neck pain  - Keep taking tylenol and using gel as needed

## 2022-06-06 NOTE — NURSING NOTE
Due to patient being non-English speaking/uses sign language, an  was used for this visit. Only for face-to-face interpretation by an external agency, date and length of interpretation can be found on the scanned worksheet.     name: Ousmane Browning46  Agency: [employer] - telephone  Language: Yasmin   Telephone number: [phone]  Type of interpretation: Telephone, spoken

## 2022-06-06 NOTE — PROGRESS NOTES
[facility] Clinic Visit    Assessment & Plan     1. Acute kidney injury (H)  Elevated creatinine to 1.47 at visit 3 days ago. Suspect secondary to dehydration and active infection (see below). Recheck BMP and encourage PO intake. If worsening, consider renal US or further urine studies.  - Basic metabolic panel; Future  - Basic metabolic panel    2. Acute cystitis without hematuria  3. Elevated C-reactive protein  On exam 3 days ago, UA with E. coli resistant to ampicillin and Bactrim but sensitive to cephalosporins.  Received 1 g of Rocephin on Kacey 3, is now on a 5-day course of cefdinir.  She reports that her fevers have resolved.  She is not having any dysuria or pain.  Of note, leukocytosis to 14 and CRP of 30, we will recheck these today to ensure adequate treatment although she clinically appears very well today.  - CBC with platelets; Future  - CRP inflammation; Future  - CRP inflammation  - CBC with platelets    4. Musculoskeletal neck pain  Ongoing issue, suspect secondary to position and increased risk for findings of infection.  Discussed Tylenol and Voltaren gel.  In addition, recommended alternating heat and ice with gentle massage.  No red flag symptoms today concerning for radiculopathy or nerve impingement.      Options for treatment and follow-up care were reviewed with the patient who was engaged and actively involved in the decision making process, verbalized understanding of the options discussed, and satisfied with the final plan.    Patient was staffed with supervising physician, Dr. Parish, who agrees with the assessment and plan.    Kip Gamboa MD, PGY3  [facility]    Patient Instructions   Plan for the day:  - Labs today. I will call you with lab results  - Finish the antibiotics  - Come back to clinic if new fever or weakness  - Use a hot rice pack and ice pack alternating for the neck pain  - Keep taking tylenol and using gel as  needed        Subjective   Debisusie Say is a 76 year old female returns to clinic today for follow-up.    Chief Complaints and History of Present Illnesses   Patient presents with     Other     Follow-up from friday     Urine symptoms and fever:  Was seen for evaluation by me on Kacey 3 after experiencing  2-3 days of fever. Prior to this visit, had recent hospital discharge for urosepsis.  Have been treated with a third-generation cephalosporin for a ampicillin resistant E. coli.  Upon exam 3 days ago, was fatigued but was otherwise hemodynamically stable.  Chest x-ray without evidence of consolidation but laboratory data notable for TIERNEY with creatinine to 1.47, leukocytosis to 14 and CRP of 30.  Her urine grew E. coli that was again resistant to ampicillin.  She received a gram of Rocephin here at clinic along with a 5-day course of cefdinir.    She presents today to follow-up.  She reports that she is having no more fevers and feels well from her fatigue.  She is not having any dysuria, abdominal pain, flank pain.  She does endorse poor p.o. intake but has been having rice and milk consistently.  Working on increasing her water intake.  No other joint aches or pains.  No cough or chest pain.  No new rashes.    Reviewed her laboratory work with her in detail.    Neck pain:  Has had pain in the back of her neck that started last night.  She thinks that it was exacerbated by resting on a pillow.  She has been sleeping well due to the infections and being hospitalized as above.  She did see her PCP for this about 3 weeks ago, reports that it got better after taking Tylenol and using Voltaren gel, however she woke up with the pain again.  Gentle massage does not help.  She does think that the pain waxes and wanes.  She does not want to use a warm cloth because she does not like being wet.  She is interested in trying ice.  She has no numbness or tingling in her extremities, she does feel generally weak all the time.  She  has no pain with head tilt or moving her arms.     ROS: 10 point ROS neg other than the symptoms noted above in the HPI.    Patient Active Problem List    Diagnosis Date Noted     Acute streptococcal pharyngitis 05/16/2022     Priority: Medium     Dehydration 05/15/2022     Priority: Medium     Hyponatremia 05/15/2022     Priority: Medium     Acute kidney insufficiency 05/15/2022     Priority: Medium     Acute cystitis with hematuria 05/15/2022     Priority: Medium     Sepsis, due to unspecified organism, unspecified whether acute organ dysfunction present (H) 05/15/2022     Priority: Medium     Major depressive disorder, recurrent episode, mild (H) 02/16/2022     Priority: Medium     Chronic kidney disease, stage 1 12/14/2021     Priority: Medium     Age-related osteoporosis without current pathological fracture 08/31/2020     Priority: Medium     Microalbuminuria 09/26/2016     Priority: Medium     Seasonal allergies 05/06/2014     Priority: Medium     Type 2 diabetes mellitus with hyperglycemia, without long-term current use of insulin (H) 05/22/2013     Priority: Medium     Hyperlipidemia 02/21/2013     Priority: Medium     Health Care Home 02/19/2013     Priority: Medium     Tier Level: 1    DX V65.8 REPLACED WITH 66469 HEALTH CARE HOME (04/08/2013)       Esophageal reflux 02/19/2013     Priority: Medium     Osteoarthrosis, unspecified whether generalized or localized, involving lower leg 02/19/2013     Priority: Medium     Problem list name updated by automated process. Provider to review  Replacing diagnoses that were inactivated after the 10/1/2021 regulatory import.       Lumbosacral spondylosis without myelopathy 02/19/2013     Priority: Medium     Current Outpatient Medications   Medication Instructions     ACE/ARB/ARNI NOT PRESCRIBED (INTENTIONAL) Please choose reason not prescribed from choices below.     acetaminophen (TYLENOL) 500-1,000 mg, Oral, EVERY 8 HOURS PRN     alcohol swab prep pads Use to  swab area of injection/amber as directed.     alendronate (FOSAMAX) 70 mg, Oral, EVERY 7 DAYS     aspirin (ASPIRIN LOW DOSE) 81 MG EC tablet TAKE 1 TABLET (81 MG) BY MOUTH DAILY     atorvastatin (LIPITOR) 80 mg, Oral, DAILY     blood glucose (NO BRAND SPECIFIED) lancets standard Use to test blood sugar fasting daily or as directed.     blood glucose (NO BRAND SPECIFIED) test strip Use to test blood sugar daily fasting or as directed.     calcium carb-cholecalciferol (OS-MAME) 500-200 MG-UNIT tablet 1 tablet, Oral, 2 TIMES DAILY WITH MEALS     capsaicin (ZOSTRIX) 1 g, Topical, 3 TIMES DAILY PRN     cefdinir (OMNICEF) 300 mg, Oral, 2 TIMES DAILY     cetirizine (ZYRTEC) 10 mg, Oral, DAILY     diclofenac (VOLTAREN) 2 g, Topical, 4 TIMES DAILY PRN     insulin pen needle (ULTICARE MICRO) 32G X 4 MM miscellaneous Use 1 pen needles daily or as directed.     JANUVIA 100 MG tablet TAKE 1 TABLET (100 MG) BY MOUTH DAILY     Lantus SoloStar 7 Units, Subcutaneous, AT BEDTIME     metFORMIN (GLUCOPHAGE XR) 2,000 mg, Oral, DAILY WITH SUPPER     Multiple Vitamins-Minerals (CEROVITE SENIOR) TABS 1 tablet, Oral, DAILY       Social: She reports that she quit smoking about 3 years ago. Her smoking use included cigarettes. She quit after 50.00 years of use. She has never used smokeless tobacco. She reports that she does not drink alcohol and does not use drugs.    Nursing Notes:   TISH BENOIT  6/6/2022 10:36 AM  Signed  Due to patient being non-English speaking/uses sign language, an  was used for this visit. Only for face-to-face interpretation by an external agency, date and length of interpretation can be found on the scanned worksheet.     name: Ousmane [id number]  Agency: [employer] - telephone  Language: Yasmin   Telephone number: [phone]  Type of interpretation: Telephone, spoken    Objective     Vitals:    06/06/22 1033   BP: 107/66   Pulse: 96   Resp: 20   Temp: 98.1  F (36.7  C)   TempSrc: Oral   SpO2:  "97%   Weight: 47 kg (103 lb 9.6 oz)   Height: 1.499 m (4' 11\")     Body mass index is 20.92 kg/m .    GEN: NAD, appears tired but pleasant and conversant   HEENT: NC/AT, EOMI, normal conjunctivae/sclerae, clear oropharynx, MMM  NECK: Tenderness at the base of the R occiput radiating down the paraspinous muscle into the trapezius with notable muscle tension. No spinous process tenderness or gross deformity. Neck is supple  RESP: CTAB, no w/r/r  CV: RRR, nl S1/S2, no m/r/g, no peripheral edema  ABD: soft, NT/ND, +BS throughout  MSK: no MSK defects noted, walking with cane  SKIN: no suspicious lesions, ? Lipoma on left side of neck.   NEURO: no obvious focal deficits, strength 5/5 in bilateral UE.     Labs:  Office Visit on 06/03/2022   Component Date Value Ref Range Status     SARS CoV2 PCR 06/03/2022 Negative  Negative Final    NEGATIVE: SARS-CoV-2 (COVID-19) RNA not detected, presumed negative.     Influenza A antigen 06/03/2022 Negative  Negative Final     Influenza B antigen 06/03/2022 Negative  Negative Final     Culture 06/03/2022 >100,000 CFU/mL Escherichia coli (A)  Final     Color Urine 06/03/2022 Yellow  Colorless, Straw, Light Yellow, Yellow Final     Appearance Urine 06/03/2022 Clear  Clear Final     Glucose Urine 06/03/2022 Negative  Negative mg/dL Final     Bilirubin Urine 06/03/2022 Negative  Negative Final     Ketones Urine 06/03/2022 Negative  Negative mg/dL Final     Specific Gravity Urine 06/03/2022 1.015  1.005 - 1.030 Final     Blood Urine 06/03/2022 Small (A) Negative Final     pH Urine 06/03/2022 5.5  5.0 - 8.0 Final     Protein Albumin Urine 06/03/2022 >=300 (A) Negative mg/dL Final     Urobilinogen Urine 06/03/2022 0.2  0.2, 1.0 E.U./dL Final     Nitrite Urine 06/03/2022 Negative  Negative Final     Leukocyte Esterase Urine 06/03/2022 Small (A) Negative Final     WBC Count 06/03/2022 13.9 (A) 4.0 - 11.0 10e3/uL Final     RBC Count 06/03/2022 3.21 (A) 3.80 - 5.20 10e6/uL Final     Hemoglobin " 06/03/2022 9.3 (A) 11.7 - 15.7 g/dL Final     Hematocrit 06/03/2022 27.9 (A) 35.0 - 47.0 % Final     MCV 06/03/2022 87  78 - 100 fL Final     MCH 06/03/2022 29.0  26.5 - 33.0 pg Final     MCHC 06/03/2022 33.3  31.5 - 36.5 g/dL Final     RDW 06/03/2022 14.5  10.0 - 15.0 % Final     Platelet Count 06/03/2022 276  150 - 450 10e3/uL Final     CRP 06/03/2022 31.8 (A) 0.0 - 0.8 mg/dL Final     Sodium 06/03/2022 135 (A) 136 - 145 mmol/L Final     Potassium 06/03/2022 4.3  3.5 - 5.0 mmol/L Final     Chloride 06/03/2022 96 (A) 98 - 107 mmol/L Final     Carbon Dioxide (CO2) 06/03/2022 28  22 - 31 mmol/L Final     Anion Gap 06/03/2022 11  5 - 18 mmol/L Final     Urea Nitrogen 06/03/2022 23  8 - 28 mg/dL Final     Creatinine 06/03/2022 1.47 (A) 0.60 - 1.10 mg/dL Final     Calcium 06/03/2022 11.1 (A) 8.5 - 10.5 mg/dL Final     Glucose 06/03/2022 167 (A) 70 - 125 mg/dL Final     GFR Estimate 06/03/2022 37 (A) >60 mL/min/1.73m2 Final    Effective December 21, 2021 eGFRcr in adults is calculated using the 2021 CKD-EPI creatinine equation which includes age and gender (Amy et al., NEJM, DOI: 10.1056/GQWJxr1302585)

## 2022-06-06 NOTE — RESULT ENCOUNTER NOTE
Discussed w/ patient in clinic. Will follow up 6/6. Treatment adequate at this time.  Kip Gamboa MD

## 2022-06-06 NOTE — PROGRESS NOTES
Preceptor Attestation:  I discussed the patient with the resident and evaluated the patient in person. I have verified the content of the note, which accurately reflects my assessment of the patient and the plan of care.  Supervising Physician:  Brynn Parish MD.

## 2022-06-08 NOTE — RESULT ENCOUNTER NOTE
Called patient using language line .     Laboratory data assuring, WBC and CRP downtrending nicely. Continue abx therapy and make sure taking insulin as blood glucose is high. Recommend recheck next week as scheduled.     Kpi Gamboa MD

## 2022-06-13 ENCOUNTER — OFFICE VISIT (OUTPATIENT)
Dept: FAMILY MEDICINE | Facility: CLINIC | Age: 77
End: 2022-06-13
Payer: COMMERCIAL

## 2022-06-13 VITALS
BODY MASS INDEX: 22.5 KG/M2 | RESPIRATION RATE: 16 BRPM | SYSTOLIC BLOOD PRESSURE: 93 MMHG | OXYGEN SATURATION: 97 % | DIASTOLIC BLOOD PRESSURE: 60 MMHG | HEIGHT: 58 IN | WEIGHT: 107.2 LBS | HEART RATE: 95 BPM | TEMPERATURE: 98.1 F

## 2022-06-13 DIAGNOSIS — N30.00 ACUTE CYSTITIS WITHOUT HEMATURIA: Primary | ICD-10-CM

## 2022-06-13 DIAGNOSIS — E11.65 UNCONTROLLED TYPE 2 DIABETES MELLITUS WITH HYPERGLYCEMIA (H): ICD-10-CM

## 2022-06-13 DIAGNOSIS — H93.13 TINNITUS, BILATERAL: ICD-10-CM

## 2022-06-13 DIAGNOSIS — N17.9 ACUTE KIDNEY INJURY (H): ICD-10-CM

## 2022-06-13 DIAGNOSIS — R79.82 ELEVATED C-REACTIVE PROTEIN: ICD-10-CM

## 2022-06-13 LAB — C REACTIVE PROTEIN LHE: 0.5 MG/DL (ref 0–0.8)

## 2022-06-13 PROCEDURE — 99214 OFFICE O/P EST MOD 30 MIN: CPT | Mod: GC | Performed by: STUDENT IN AN ORGANIZED HEALTH CARE EDUCATION/TRAINING PROGRAM

## 2022-06-13 PROCEDURE — 86140 C-REACTIVE PROTEIN: CPT | Performed by: STUDENT IN AN ORGANIZED HEALTH CARE EDUCATION/TRAINING PROGRAM

## 2022-06-13 PROCEDURE — 36415 COLL VENOUS BLD VENIPUNCTURE: CPT | Performed by: STUDENT IN AN ORGANIZED HEALTH CARE EDUCATION/TRAINING PROGRAM

## 2022-06-13 NOTE — PROGRESS NOTES
Preceptor Attestation:    I discussed the patient with the resident and evaluated the patient in person. I have verified the content of the note, which accurately reflects my assessment of the patient and the plan of care.   Supervising Physician:  Loida Peralta MD.

## 2022-06-13 NOTE — PROGRESS NOTES
[facility] Medicine Clinic Visit    Assessment & Plan     1. Acute cystitis without hematuria  2. Acute kidney injury (H)  3. Elevated C-reactive protein  Improving today, reports no dysuria/frequency/abdominal pain. No new fevers. Finished abx course without difficulty. Plan on rechecking CRP today as still > 7 last week, though assuring that it downtrended from 31 quickly.   - CRP inflammation; Future    4. Uncontrolled type 2 diabetes mellitus with hyperglycemia (H)  Reports good compliance with regimen today, fasting AM glucose 138. Of note, significantly elevated upon lab recheck last week. Does not know other numbers and forgot glucometer at home. Recheck scheduled next week with Dr. Joe.     5. Tinnitus, bilateral  Unclear how long this has been present but worsening over past week. Declines ENT/audiology referral today. May consider trial off ASA but will defer to PCP.  - Continue to monitor.    RTC in 1-2 (as scheduled) for follow up of diabetes or sooner if develops new or worsening symptoms.    Options for treatment and follow-up care were reviewed with the patient who was engaged and actively involved in the decision making process, verbalized understanding of the options discussed, and satisfied with the final plan.    Patient was staffed with supervising physician, Dr. Peralta, who agrees with the assessment and plan.    Kip Gamboa MD, PGY3  [facility]    Patient Instructions   Good to see you today! I am glad your urine symptoms have improved.    Diabetes check with Dr. Joe on 6/28 at 3:10 PM    Consider an audiology or ENT referral if the hearing gets worse.         Subjective   Khu Say is a 76 year old female who presents today for follow up.     Chief Complaints and History of Present Illnesses   Patient presents with     RECHECK     Follow up UTI- pt states that she finished the antibiotics and is feeling better       UTI follow up:  Presents to clinic today  for recheck and follow-up of urinary tract infection.  Was seen by me 2 weeks ago, at this time had acute kidney injury, elevated CRP and urinalysis with culture positive for E. coli.  She was treated with Rocephin and cefdinir.  She reports good improvement in compliance with this antibiotic regimen.  Her laboratory data down trended last week assuredly.  Since this time, she has had no further urinary urgency, frequency, pain.  She is not had any recurrent fevers or fatigue.    Tinnitus:  Has bilateral tinnitus, has been worsening over the past week.  She reports that she does have hearing loss at baseline people need to speak quite loud to her.  She has never had an audiogram or seen a ear doctor, though declines a referral today.    Diabetes:  Noted to have transitioned to subcutaneous insulin after hospitalization in 5/2022. She reports that her glucose was 138 this AM. She has been taking 7 units of Lantus at night and having no compliance issues. Monitors with home glucometer but did not bring to clinic today, unsure of other numbers. Has Follow-up with PCP on 6/28.     ROS: 10 point ROS neg other than the symptoms noted above in the HPI.      Patient Active Problem List    Diagnosis Date Noted     Acute streptococcal pharyngitis 05/16/2022     Priority: Medium     Dehydration 05/15/2022     Priority: Medium     Hyponatremia 05/15/2022     Priority: Medium     Acute kidney insufficiency 05/15/2022     Priority: Medium     Acute cystitis with hematuria 05/15/2022     Priority: Medium     Sepsis, due to unspecified organism, unspecified whether acute organ dysfunction present (H) 05/15/2022     Priority: Medium     Major depressive disorder, recurrent episode, mild (H) 02/16/2022     Priority: Medium     Chronic kidney disease, stage 1 12/14/2021     Priority: Medium     Age-related osteoporosis without current pathological fracture 08/31/2020     Priority: Medium     Microalbuminuria 09/26/2016     Priority:  Medium     Seasonal allergies 05/06/2014     Priority: Medium     Type 2 diabetes mellitus with hyperglycemia, without long-term current use of insulin (H) 05/22/2013     Priority: Medium     Hyperlipidemia 02/21/2013     Priority: Medium     Health Care Home 02/19/2013     Priority: Medium     Tier Level: 1    DX V65.8 REPLACED WITH 67561 HEALTH CARE HOME (04/08/2013)       Esophageal reflux 02/19/2013     Priority: Medium     Osteoarthrosis, unspecified whether generalized or localized, involving lower leg 02/19/2013     Priority: Medium     Problem list name updated by automated process. Provider to review  Replacing diagnoses that were inactivated after the 10/1/2021 regulatory import.       Lumbosacral spondylosis without myelopathy 02/19/2013     Priority: Medium     Current Outpatient Medications   Medication Instructions     ACE/ARB/ARNI NOT PRESCRIBED (INTENTIONAL) Please choose reason not prescribed from choices below.     acetaminophen (TYLENOL) 500-1,000 mg, Oral, EVERY 8 HOURS PRN     alcohol swab prep pads Use to swab area of injection/amber as directed.     alendronate (FOSAMAX) 70 mg, Oral, EVERY 7 DAYS     aspirin (ASPIRIN LOW DOSE) 81 MG EC tablet TAKE 1 TABLET (81 MG) BY MOUTH DAILY     atorvastatin (LIPITOR) 80 mg, Oral, DAILY     blood glucose (NO BRAND SPECIFIED) lancets standard Use to test blood sugar fasting daily or as directed.     blood glucose (NO BRAND SPECIFIED) test strip Use to test blood sugar daily fasting or as directed.     calcium carb-cholecalciferol (OS-MAME) 500-200 MG-UNIT tablet 1 tablet, Oral, 2 TIMES DAILY WITH MEALS     capsaicin (ZOSTRIX) 1 g, Topical, 3 TIMES DAILY PRN     cetirizine (ZYRTEC) 10 mg, Oral, DAILY     diclofenac (VOLTAREN) 2 g, Topical, 4 TIMES DAILY PRN     insulin pen needle (ULTICARE MICRO) 32G X 4 MM miscellaneous Use 1 pen needles daily or as directed.     JANUVIA 100 MG tablet TAKE 1 TABLET (100 MG) BY MOUTH DAILY     Lantus SoloStar 7 Units,  "Subcutaneous, AT BEDTIME     metFORMIN (GLUCOPHAGE XR) 2,000 mg, Oral, DAILY WITH SUPPER     Multiple Vitamins-Minerals (CEROVITE SENIOR) TABS 1 tablet, Oral, DAILY     Social: She reports that she quit smoking about 3 years ago. Her smoking use included cigarettes. She quit after 50.00 years of use. She has never used smokeless tobacco. She reports that she does not drink alcohol and does not use drugs.    Nursing Notes:   Juancarlos Warner, CMA  6/13/2022  8:49 AM  Signed  Due to patient being non-English speaking/uses sign language, an  was used for this visit. Only for face-to-face interpretation by an external agency, date and length of interpretation can be found on the scanned worksheet.     name: Kennedy Arambula  Agency: [employer]  Language: Yasmin   Telephone number: [phone]  Type of interpretation: Face-to-face, spoken      Objective     Vitals:    06/13/22 0847   BP: 93/60   Pulse: 95   Resp: 16   Temp: 98.1  F (36.7  C)   TempSrc: Oral   SpO2: 97%   Weight: 48.6 kg (107 lb 3.2 oz)   Height: 1.48 m (4' 10.25\")     Body mass index is 22.21 kg/m .    GEN: NAD, healthy, alert. Pleasant and conversant.  Color appears better today.   HEENT: NC/AT, EOMI, normal conjunctivae/sclerae, clear oropharynx, MMM  RESP: CTAB, no w/r/r  CV: RRR, nl S1/S2, no m/r/g, no peripheral edema  ABD: soft, NT/ND, +BS throughout  NEURO: no obvious focal deficits  PSYCH: mentation appears normal, affect normal/bright         "

## 2022-06-13 NOTE — PATIENT INSTRUCTIONS
Good to see you today! I am glad your urine symptoms have improved.    Diabetes check with Dr. Joe on 6/28 at 3:10 PM    Consider an audiology or ENT referral if the hearing gets worse.

## 2022-06-13 NOTE — NURSING NOTE
Due to patient being non-English speaking/uses sign language, an  was used for this visit. Only for face-to-face interpretation by an external agency, date and length of interpretation can be found on the scanned worksheet.     name: Kennedy Arambula  Agency: [employer]  Language: Yasmin   Telephone number: [phone]  Type of interpretation: Face-to-face, spoken

## 2022-06-14 ENCOUNTER — DOCUMENTATION ONLY (OUTPATIENT)
Dept: FAMILY MEDICINE | Facility: CLINIC | Age: 77
End: 2022-06-14
Payer: COMMERCIAL

## 2022-06-14 ENCOUNTER — TRANSFERRED RECORDS (OUTPATIENT)
Dept: HEALTH INFORMATION MANAGEMENT | Facility: CLINIC | Age: 77
End: 2022-06-14
Payer: COMMERCIAL

## 2022-06-15 NOTE — PROGRESS NOTES
To be completed in Nursing note:    Please reference list for forms that require a visit for completion.  Please remind patients that providers are given 3-5 business days to complete and return forms.      Form type: [facility] ~ PLAN OF CARE UPDATE   ORDER NO: [id number]    Date form received: 6/13/2022    Date form completed by Physician: 6/14/2022    How was form returned to patient (mailed, faxed, or at  for patient to ):    Fax to [phone]    Date form mailed/faxed/left at  for patient and sent to HIM for scanning:    Fax on 6/14/2022    Once form is left for patient, faxed, or mailed PCS will then close the documentation only encounter.

## 2022-06-15 NOTE — PROGRESS NOTES
To be completed in Nursing note:    Please reference list for forms that require a visit for completion.  Please remind patients that providers are given 3-5 business days to complete and return forms.      Form type: [facility] ~ HOME HEALTH CERTIFICATION AND PLAN OF CARE 4/23/2022 TO 6/21/2022    Date form received: 6/13/2022    Date form completed by Physician: 6/14/2022    How was form returned to patient (mailed, faxed, or at  for patient to ):    Fax to [phone]    Date form mailed/faxed/left at  for patient and sent to HIM for scanning:    Fax on 6/14/2022    Once form is left for patient, faxed, or mailed PCS will then close the documentation only encounter.

## 2022-06-16 ENCOUNTER — DOCUMENTATION ONLY (OUTPATIENT)
Dept: FAMILY MEDICINE | Facility: CLINIC | Age: 77
End: 2022-06-16
Payer: COMMERCIAL

## 2022-06-16 NOTE — RESULT ENCOUNTER NOTE
Called using language line . Labs have normalized and assuring. As long as she is feeling well, no need for further workup. Plan to follow up with PCP on 6/28 as already scheduled.    Kip Gamboa MD

## 2022-06-16 NOTE — PROGRESS NOTES
To be completed in Nursing note:    Please reference list for forms that require a visit for completion.  Please remind patients that providers are given 3-5 business days to complete and return forms.      Form type: [facility] HOME HEALTH CERTIFICATION AND PLAN OF CARE 4/23/2022 TO 6/21/2022      Date form received: 5/26/2022    Date form completed by Physician: 6/15/2022    How was form returned to patient (mailed, faxed, or at  for patient to ):    Fax to [phone]    Date form mailed/faxed/left at  for patient and sent to HIM for scanning:    Fax on 6/15/2022    Once form is left for patient, faxed, or mailed PCS will then close the documentation only encounter.

## 2022-06-16 NOTE — PROGRESS NOTES
To be completed in Nursing note:    Please reference list for forms that require a visit for completion.  Please remind patients that providers are given 3-5 business days to complete and return forms.      Form type: [facility] ~ PLAN OF CARE UPDATES   ORDER NO: [id number]    Date form received: 6/1/2022    Date form completed by Physician: 6/16/2022    How was form returned to patient (mailed, faxed, or at  for patient to ):    Fax to [phone]    Date form mailed/faxed/left at  for patient and sent to HIM for scanning:    Fax on 6/16/2022    Once form is left for patient, faxed, or mailed PCS will then close the documentation only encounter.

## 2022-06-28 ENCOUNTER — OFFICE VISIT (OUTPATIENT)
Dept: FAMILY MEDICINE | Facility: CLINIC | Age: 77
End: 2022-06-28
Payer: COMMERCIAL

## 2022-06-28 ENCOUNTER — OFFICE VISIT (OUTPATIENT)
Dept: PHARMACY | Facility: CLINIC | Age: 77
End: 2022-06-28
Payer: COMMERCIAL

## 2022-06-28 VITALS
HEART RATE: 95 BPM | OXYGEN SATURATION: 99 % | RESPIRATION RATE: 24 BRPM | WEIGHT: 107.4 LBS | TEMPERATURE: 97.2 F | SYSTOLIC BLOOD PRESSURE: 111 MMHG | BODY MASS INDEX: 22.25 KG/M2 | DIASTOLIC BLOOD PRESSURE: 75 MMHG

## 2022-06-28 DIAGNOSIS — E11.65 TYPE 2 DIABETES MELLITUS WITH HYPERGLYCEMIA, WITHOUT LONG-TERM CURRENT USE OF INSULIN (H): ICD-10-CM

## 2022-06-28 DIAGNOSIS — R80.9 MICROALBUMINURIA: ICD-10-CM

## 2022-06-28 DIAGNOSIS — R30.0 DYSURIA: Primary | ICD-10-CM

## 2022-06-28 DIAGNOSIS — E11.65 TYPE 2 DIABETES MELLITUS WITH HYPERGLYCEMIA, WITHOUT LONG-TERM CURRENT USE OF INSULIN (H): Primary | ICD-10-CM

## 2022-06-28 LAB
ALBUMIN UR-MCNC: 30 MG/DL
APPEARANCE UR: ABNORMAL
BACTERIA #/AREA URNS HPF: ABNORMAL /HPF
BILIRUB UR QL STRIP: NEGATIVE
COLOR UR AUTO: YELLOW
GLUCOSE UR STRIP-MCNC: 100 MG/DL
HGB UR QL STRIP: ABNORMAL
KETONES UR STRIP-MCNC: NEGATIVE MG/DL
LEUKOCYTE ESTERASE UR QL STRIP: ABNORMAL
NITRATE UR QL: NEGATIVE
PH UR STRIP: 7 [PH] (ref 5–8)
RBC #/AREA URNS AUTO: ABNORMAL /HPF
SP GR UR STRIP: 1.01 (ref 1–1.03)
SQUAMOUS #/AREA URNS AUTO: ABNORMAL /LPF
UROBILINOGEN UR STRIP-ACNC: 0.2 E.U./DL
WBC #/AREA URNS AUTO: ABNORMAL /HPF

## 2022-06-28 PROCEDURE — 87186 SC STD MICRODIL/AGAR DIL: CPT | Performed by: STUDENT IN AN ORGANIZED HEALTH CARE EDUCATION/TRAINING PROGRAM

## 2022-06-28 PROCEDURE — 87086 URINE CULTURE/COLONY COUNT: CPT | Performed by: STUDENT IN AN ORGANIZED HEALTH CARE EDUCATION/TRAINING PROGRAM

## 2022-06-28 PROCEDURE — 99606 MTMS BY PHARM EST 15 MIN: CPT | Performed by: PHARMACIST

## 2022-06-28 PROCEDURE — 99214 OFFICE O/P EST MOD 30 MIN: CPT | Performed by: STUDENT IN AN ORGANIZED HEALTH CARE EDUCATION/TRAINING PROGRAM

## 2022-06-28 PROCEDURE — 81001 URINALYSIS AUTO W/SCOPE: CPT | Performed by: STUDENT IN AN ORGANIZED HEALTH CARE EDUCATION/TRAINING PROGRAM

## 2022-06-28 PROCEDURE — 99607 MTMS BY PHARM ADDL 15 MIN: CPT | Performed by: PHARMACIST

## 2022-06-28 RX ORDER — LISINOPRIL 2.5 MG/1
2.5 TABLET ORAL DAILY
Qty: 30 TABLET | Refills: 3 | Status: SHIPPED | OUTPATIENT
Start: 2022-06-28 | End: 2022-10-11

## 2022-06-28 NOTE — Clinical Note
Aaliyah- I can't remember if Kitty Ling said she found you to talk to you about your patient- she was on board for Benita and starting low dose lisinopril 2.5mg- I sent Rxs for both. She made an appt with me next week to learn how to use it. I will then see her in about 3-4 weeks to assess her BS on the benita. She has appt with you 8/23. With her recent TIERNEY, probably best to check a BMP soon. Next week may be too early but could do at my next visit with her. When you get a minute, can you please put in a future order for a BMP? Thanks!

## 2022-06-28 NOTE — PROGRESS NOTES
Assessment & Plan     Emmanuel was seen today for follow up.    Diagnoses and all orders for this visit:    Recent UTI: With improved symptoms. However given complicated UTI x2 in the past month will repeat urinalysis to assess for cure.   -     Cancel: UA reflex to Microscopic; Future  -     UA reflex to Microscopic  -     Urine Microscopic Exam  -     Urine Culture; Future  -     Urine Culture    Type 2 diabetes mellitus with hyperglycemia, without long-term current use of insulin (H): Pt reports FBGs doing well in the 120-130s daily, highest 160 on combination lantus 7 units/metformin 2000mg/januvia 100mg. Pt met with pharmacy and while she was previously uninterested in CGM she did express interest in this modality today.   -     Continuous Blood Gluc  (FREESTYLE PRECIOUS 2 READER) RIDGE; 1 each once for 1 dose Use to read blood sugars per 's instructions.  -     Continuous Blood Gluc Sensor (FREESTYLE PRECIOUS 2 SENSOR) MISC; 1 each every 14 days Use 1 sensor every 14 days. Use to read blood sugars per 's instructions.    Microalbuminuria: Will start lisinopril 2.5mg daily for renal protection. No SGLT-2 inhibitor due to hx recent recurrent UTI.   -     lisinopril (ZESTRIL) 2.5 MG tablet; Take 1 tablet (2.5 mg) by mouth daily       Return in about 7 weeks (around 8/16/2022) for diabetes follow up.    Kaylin Joe MD  [facility]   Emmanuel is a 76 year old accompanied by her son and Yasmin ., presenting for the following health issues:  Follow Up (Follow up DM and urine infection too. )      HPI     Diabetes Follow-up    How often are you checking your blood sugar? One time daily  What time of day are you checking your blood sugars (select all that apply)?  Before meals  Have you had any blood sugars above 200?  Not since hospitalization  Have you had any blood sugars below 70?  No    What symptoms do you notice when your blood sugar is low?   None    What concerns do you have today about your diabetes? None     Do you have any of these symptoms? (Select all that apply)  Burning in feet and Blurry vision - Does not have excessive thirst or faintness anymore.  Burning feet are not particularly bothersome.     Highest 160, usually 120-130.     BP Readings from Last 2 Encounters:   06/28/22 111/75   06/13/22 93/60     Hemoglobin A1C POCT (%)   Date Value   08/31/2020 12.0 (H)   06/25/2020 13.5 (H)     Hemoglobin A1C (%)   Date Value   05/15/2022 11.5 (H)   12/14/2021 10.7 (H)     LDL Cholesterol Calculated   Date Value   12/14/2021      Comment:     Cannot estimate LDL when triglyceride exceeds 400 mg/dL   09/09/2021      Comment:     Cannot estimate LDL when triglyceride exceeds 400 mg/dL   08/31/2020 Unable to calculate Trig >=400 mg/dL   06/25/2020 193 mg/dL (H)     LDL Cholesterol Direct (mg/dl)   Date Value   08/31/2020 199 (H)                 How many days per week do you miss taking your medication? 0    UTI Followup:    Facility:  [facility]  Date of visit: 6/3, 6/6, 6/13  Reason for visit: UTI  Current Status: Feeling a little better. She wants to leave urine today but is not having any new symptoms.     Genitourinary - Female  Description:   Painful urination (Dysuria): no           Frequency: no  Blood in urine (Hematuria): no  Delay in urine (Hesitency): no  Progression of Symptoms:  improving  Accompanying Signs & Symptoms:  Fever/chills: no  Flank pain: no  Nausea and vomiting: no  Vaginal symptoms: No vaginal itching but has some in the skin on her back at times. Does not have any now.         Objective    /75   Pulse 95   Temp 97.2  F (36.2  C) (Tympanic)   Resp 24   Wt 48.7 kg (107 lb 6.4 oz)   SpO2 99%   BMI 22.25 kg/m    Body mass index is 22.25 kg/m .  Physical Exam   GENERAL: healthy, alert and no distress  EYES: Eyes grossly normal to inspection, PERRL and conjunctivae and sclerae normal  NECK: no adenopathy, no asymmetry,  masses, or scars and thyroid normal to palpation  RESP: lungs clear to auscultation - no rales, rhonchi or wheezes  CV: regular rate and rhythm, normal S1 S2, no S3 or S4, no murmur, click or rub, no peripheral edema and peripheral pulses strong  MS: no gross musculoskeletal defects noted, no edema  BACK: no CVA tenderness, no paralumbar tenderness  Diabetic foot exam: normal DP and PT pulses and no trophic changes or ulcerative lesions    Results for orders placed or performed in visit on 06/28/22 (from the past 24 hour(s))   UA reflex to Microscopic   Result Value Ref Range    Color Urine Yellow Colorless, Straw, Light Yellow, Yellow    Appearance Urine Cloudy (A) Clear    Glucose Urine 100  (A) Negative mg/dL    Bilirubin Urine Negative Negative    Ketones Urine Negative Negative mg/dL    Specific Gravity Urine 1.015 1.005 - 1.030    Blood Urine Small (A) Negative    pH Urine 7.0 5.0 - 8.0    Protein Albumin Urine 30  (A) Negative mg/dL    Urobilinogen Urine 0.2 0.2, 1.0 E.U./dL    Nitrite Urine Negative Negative    Leukocyte Esterase Urine Large (A) Negative   Urine Microscopic Exam   Result Value Ref Range    Bacteria Urine Moderate (A) None Seen /HPF    RBC Urine None Seen 0-2 /HPF /HPF    WBC Urine  (A) 0-5 /HPF /HPF    Squamous Epithelials Urine Few (A) None Seen /LPF           .  ..

## 2022-06-28 NOTE — NURSING NOTE
Due to patient being non-English speaking/uses sign language, an  was used for this visit. Only for face-to-face interpretation by an external agency, date and length of interpretation can be found on the scanned worksheet.     name: Allie Gonzalez   Agency: [employer]  Language: Yasmin   Telephone number: [phone]  Type of interpretation: Face-to-face, spoken

## 2022-06-28 NOTE — PROGRESS NOTES
Medication Therapy Management (MTM) Encounter    ASSESSMENT:                            Medication Adherence/Access: See below for considerations    Type 2 Diabetes: A1C Above goal of < 8% (higher goal given age and per chart review). However, after initiation of low dose Lantus and elimination of glipizide, pioglitazone, and Invokana after discharge, patient's FPGs are now stable in the 100s. Per Dr. Joe, continuing current medication regimen at this time is warranted. In the past there was a brief discussion of switching Januvia to Bydureon, but this may be too complicated for the patient and the observed benefit would likely be minimal.     History of clinical microalbuminuria-- would benefit from low-dose ACEi or ARB. Will not pursue SGLT-2 at this time as she has a history of recurrent UTIs.      PLAN:                            1. START lisinopril 2.5 mg daily   2. Prescribed Freestyle Tate 2    Follow-up: 7/5 with PharmD to learn how to use Tate 2    Medication issues to be addressed at a future visit:      Freestyle Tate 2 teaching     Monitor blood pressure with starting lisinopril and BMP, as pt had recent TIERNEY    Lipid panel next visit    SUBJECTIVE/OBJECTIVE:                          Emmanuel Vences is a 76 year old female seen for MTM follow up; follow up visit from 5/20/22 with Dr. Carias.  Today's visit is a co-visit with Dr. Joe. Patient saw provider prior to our visit today.     Reason for visit: Medication Therapy Management (MTM) followup.    Allergies/ADRs: Reviewed in chart  Past Medical History: Reviewed in chart  Social History     Tobacco Use     Smoking status: Former Smoker     Years: 50.00     Types: Cigarettes     Quit date: 4/1/2019     Years since quitting: 3.2     Smokeless tobacco: Never Used     Tobacco comment: chews betel nut   Substance Use Topics     Alcohol use: No     Drug use: No    ^Reviewed today    Medication Adherence/Access: Has HHN set up pill box. Patient's son helps  administer insulin and check blood sugars.       Type 2 Diabetes:    Patient is currently taking the following medications with no reports of side effects:    Lantus 7 units nightly    Metformin 1000 mg twice daily    Januvia 100 mg daily    Blood sugar monitorin time(s) daily. Ranges (verified on meter): Fasting- 112, 185, 163, 155, 157, 146, 142, 133, 171, 191  Symptoms of low blood sugar? None  Symptoms of high blood sugar? None  Aspirin: Taking 81mg daily and denies side effects   The 10-year ASCVD risk score (Castaliavikram PAREDES Jr., et al., 2013) is: 25.5%    Values used to calculate the score:      Age: 76 years      Sex: Female      Is Non- : No      Diabetic: Yes      Tobacco smoker: No      Systolic Blood Pressure: 111 mmHg      Is BP treated: No      HDL Cholesterol: 57 mg/dL      Total Cholesterol: 306 mg/dL    Statin: Yes: Atorvastatin 80 mg daily   ACEi/ARB: Started today  Urine Albumin: Elevated when last checked    Lab Results   Component Value Date    A1C 11.5 05/15/2022    A1C 10.7 2021    A1C 11.4 2021    A1C 12.0 2020    A1C 13.5 2020    A1C 11.1 2020    A1C 10.3 2020    A1C 8.5 10/28/2019     Most Recent Immunizations   Administered Date(s) Administered     COVID-19ELISAPfizer (12+ Yrs) 08/10/2021     Flu, Unspecified 10/14/2011     HEPA 2015     HepA, Unspecified 04/15/2009     HepA-Adult 10/15/2018     HepB 10/14/2011     HepB-Adult 04/15/2009     Influenza (High Dose) 3 valent vaccine 10/15/2018     Influenza (IIV3) PF 10/09/2013     Influenza Vaccine IM > 6 months Valent IIV4 (Alfuria,Fluzone) 2014     Influenza Vaccine, 6+MO IM (QUADRIVALENT W/PRESERVATIVES) 10/28/2019     Influenza, Quad, High Dose, Pf, 65yr+ (Fluzone HD) 2021     MMR 2007     Mantoux Tuberculin Skin Test 2008     Pneumo Conj 13-V (2010&after) 2016     Pneumococcal 23 valent 10/14/2011     TD (ADULT, 7+) 10/14/2011     TDAP Vaccine  "(Boostrix) 06/22/2015     Td (Adult), Adsorbed 10/14/2011     Tdap (Adacel,Boostrix) 03/10/2008     Tdap (Adult) Unspecified Formulation 04/15/2009     Typhoid IM 10/15/2018     Yellow Fever 06/22/2015     Zoster vaccine recombinant adjuvanted (SHINGRIX) 01/10/2020     Zoster vaccine, live 05/12/2009          BP Readings from Last 1 Encounters:   06/28/22 111/75     Pulse Readings from Last 1 Encounters:   06/28/22 95     Wt Readings from Last 1 Encounters:   06/28/22 107 lb 6.4 oz (48.7 kg)     Ht Readings from Last 1 Encounters:   06/13/22 4' 10.25\" (1.48 m)     Estimated body mass index is 22.25 kg/m  as calculated from the following:    Height as of 6/13/22: 4' 10.25\" (1.48 m).    Weight as of an earlier encounter on 6/28/22: 107 lb 6.4 oz (48.7 kg).    Temp Readings from Last 1 Encounters:   06/28/22 97.2  F (36.2  C) (Tympanic)       ----------------  I spent 15 minutes with this patient today. Dr. Joe was provided the recommendations above  in clinic today and she is the authorizing prescriber for this visit through the pharmacist collaborative practice agreement. A copy of the visit note was provided to the patient's provider(s).    The patient was given a summary of these recommendations. See Provider note/AVS from today.     Anastasiya Ling MD PGY3  [facility]  6/28/2022, 4:10 PM    I have verified the content of the note, which accurately reflects my assessment of the patient and the plan of care.   Donna Zhao, [facility], PharmD       Medication Therapy Recommendations  Microalbuminuria    Current Medication: lisinopril (ZESTRIL) 2.5 MG tablet   Rationale: Untreated condition - Needs additional medication therapy - Indication   Recommendation: Start Medication   Status: Accepted per CPA                         "

## 2022-06-28 NOTE — PATIENT INSTRUCTIONS
START lisinopril 2.5mg once daily for kidneys  Fill prescription for Freestyle Tate 2 glucose meter and sensors and make appointment with pharmacist to learn how to use it

## 2022-07-01 ENCOUNTER — DOCUMENTATION ONLY (OUTPATIENT)
Dept: FAMILY MEDICINE | Facility: CLINIC | Age: 77
End: 2022-07-01

## 2022-07-01 LAB
BACTERIA UR CULT: ABNORMAL
BACTERIA UR CULT: ABNORMAL

## 2022-07-01 NOTE — PROGRESS NOTES
To be completed in Nursing note:    Please reference list for forms that require a visit for completion.  Please remind patients that providers are given 3-5 business days to complete and return forms.      Form type: [facility] HOME HEALTH CERTIFICATION AND PLAN OF CARE 6/22/2022 TO 8/20/2022    Date form received: 6/29/2022    Date form completed by Physician: 7/1/2022    How was form returned to patient (mailed, faxed, or at  for patient to ):    Fax to [phone]    Date form mailed/faxed/left at  for patient and sent to HIM for scanning:    Fax on 7/1/2022    Once form is left for patient, faxed, or mailed PCS will then close the documentation only encounter.

## 2022-07-03 ENCOUNTER — TELEPHONE (OUTPATIENT)
Dept: FAMILY MEDICINE | Facility: CLINIC | Age: 77
End: 2022-07-03

## 2022-07-03 DIAGNOSIS — N39.0 COMPLICATED UTI (URINARY TRACT INFECTION): Primary | ICD-10-CM

## 2022-07-03 RX ORDER — CIPROFLOXACIN 500 MG/1
500 TABLET, FILM COATED ORAL 2 TIMES DAILY
Qty: 10 TABLET | Refills: 0 | Status: SHIPPED | OUTPATIENT
Start: 2022-07-03 | End: 2022-07-08

## 2022-07-03 NOTE — TELEPHONE ENCOUNTER
Called pt with Yasmin  #[id number] to go over her urine culture results.      After several days her urine culture grew back positive for 10-50,000 CFU's of E. coli, same resistance pattern as previously isolated.  She is asymptomatic but given the severity of her recurrent urinary tract infection last month we will retreat her to attempt to completely eradicate the bacteria that is growing.      The resistance pattern shows susceptibility to fluoroquinolones, nitrofurantoin, cephalosporins.  She has good renal function at baseline but did have borderline GFR on last check in the setting of TIERNEY last month.  She is not on chronic steroids nor has she received recent steroid injections.      We will treat with ciprofloxacin 500 mg twice daily for 5 days.  Patient was counseled on symptoms of recurrent urinary tract infection.  She was reminded of her upcoming visit with pharmacy on 7/5.  I would recommend that she obtain the ordered basic metabolic panel at that time to make sure her renal function has returned to baseline.    Kaylin Joe MD

## 2022-07-05 ENCOUNTER — LAB (OUTPATIENT)
Dept: LAB | Facility: CLINIC | Age: 77
End: 2022-07-05
Payer: COMMERCIAL

## 2022-07-05 ENCOUNTER — OFFICE VISIT (OUTPATIENT)
Dept: PHARMACY | Facility: CLINIC | Age: 77
End: 2022-07-05
Payer: COMMERCIAL

## 2022-07-05 VITALS
DIASTOLIC BLOOD PRESSURE: 76 MMHG | RESPIRATION RATE: 18 BRPM | SYSTOLIC BLOOD PRESSURE: 118 MMHG | HEART RATE: 90 BPM | OXYGEN SATURATION: 100 %

## 2022-07-05 DIAGNOSIS — R80.9 MICROALBUMINURIA: ICD-10-CM

## 2022-07-05 DIAGNOSIS — E11.65 TYPE 2 DIABETES MELLITUS WITH HYPERGLYCEMIA, WITHOUT LONG-TERM CURRENT USE OF INSULIN (H): Primary | ICD-10-CM

## 2022-07-05 DIAGNOSIS — E11.65 TYPE 2 DIABETES MELLITUS WITH HYPERGLYCEMIA, WITHOUT LONG-TERM CURRENT USE OF INSULIN (H): ICD-10-CM

## 2022-07-05 LAB
ANION GAP SERPL CALCULATED.3IONS-SCNC: 11 MMOL/L (ref 7–15)
BUN SERPL-MCNC: 12.4 MG/DL (ref 8–23)
CALCIUM SERPL-MCNC: 9.7 MG/DL (ref 8.8–10.2)
CHLORIDE SERPL-SCNC: 101 MMOL/L (ref 98–107)
CREAT SERPL-MCNC: 1.05 MG/DL (ref 0.51–0.95)
DEPRECATED HCO3 PLAS-SCNC: 25 MMOL/L (ref 22–29)
GFR SERPL CREATININE-BSD FRML MDRD: 55 ML/MIN/1.73M2
GLUCOSE SERPL-MCNC: 187 MG/DL (ref 70–99)
POTASSIUM SERPL-SCNC: 4.4 MMOL/L (ref 3.4–5.3)
SODIUM SERPL-SCNC: 137 MMOL/L (ref 136–145)

## 2022-07-05 PROCEDURE — 36415 COLL VENOUS BLD VENIPUNCTURE: CPT

## 2022-07-05 PROCEDURE — 99606 MTMS BY PHARM EST 15 MIN: CPT | Performed by: PHARMACIST

## 2022-07-05 PROCEDURE — 99607 MTMS BY PHARM ADDL 15 MIN: CPT | Performed by: PHARMACIST

## 2022-07-05 PROCEDURE — 80048 BASIC METABOLIC PNL TOTAL CA: CPT

## 2022-07-05 NOTE — Clinical Note
JOE- saw her today and taught/place first benita sensor. She started the lisinopril last week and BMP was checked today. Results not back before I left today. Since you ordered them they will probably come to you so you will see them.

## 2022-07-05 NOTE — PATIENT INSTRUCTIONS
"    Recommendations from today's MTM visit:                                                    MTM (medication therapy management) is a service provided by a clinical pharmacist designed to help you get the most of out of your medicines.   Today we reviewed what your medicines are for, how to know if they are working, that your medicines are safe and how to make your medicine regimen as easy as possible.          Scan sensor with reader at least every 8 hours.    Change sensor every 14 days.    If the sensor falls off, call [phone]. Follow the prompts to the main menu and select option 2 (Technical Product Support, Training, and Replacements)    Bring your  into every visit so we can go over the results with you!        Follow-up: July 26 at 3:40pm, Aug 23 with Dr. Joe    It was great speaking with you today!  I value your experience and would be very thankful for your time in providing feedback in our clinic survey. In the next few days, you may receive an email or text message from [employer] with a link to a survey related to your  clinical pharmacist.\"    To schedule another MTM appointment, please call the clinic directly at [phone] and ask to make an appointment with one of our pharacists.     My Clinical Pharmacist's contact information:                                                      Please feel free to contact me with any questions or concerns you have!     Donna Zhao, Pharm.D.  Clinical Pharmacist  [facility] [address] [address]  Office Phone: [phone]   "

## 2022-07-05 NOTE — PROGRESS NOTES
Medication Therapy Management (MTM) Encounter    ASSESSMENT:                            Medication Adherence/Access: See below for considerations    Type 2 Diabetes: Starting Tate 2 today so will assess diabetes control at next visit. Tolerating new low dose lisinopril and BP is fine. Needs BMP.    PLAN:                            1. Check BMP today due to recent start of ACEI and recent history of TIERNEY   2. Educated patient and son on how to apply and use the Freestyle Taet (see below)    Follow-up: 7/26 with Dr. Zhao    Medication issues to be addressed at a future visit:      Lipid panel next visit    Assess BS with Freestyle Tate and adjust diabetes meds if needed    Tate 2 education:  1. Instructed the patient and son on proper sensor placement (back of upper arm) and cleaning with an alcohol wipe before application.     2. Guided patient and son on the proper steps to apply the sensor and observed the son while he appropriately applied the sensor on her arm.    3. Instructed the patient and son on how to sync a new sensor to the Freestyle Tate [place] and set the  BG range of .     4. Instructed the patient and son on how to check blood sugar with Freestyle Tate, including how to interpret trend arrows. Emphasized to swipe at least every 8 hours.    5. Discussed alarm set if BS goes lower than 70 and what to do. I disabled high BS alarm.    6. Instructed patient and son to change sensor every 14 days.     7. Instructed patient and son to bring back [place] to every visit.    By the end of this education session, patient was able to verbalize understanding of this information and was able to appropriately self-apply the sensor under my supervision.      SUBJECTIVE/OBJECTIVE:                          Emmanuel Vences is a 76 year old female seen for MTM follow up; follow up visit from 6/28/22. Son was present for the visit.    Reason for visit: Medication Therapy Management (MTM) followup.    Allergies/ADRs:  Reviewed in chart  Past Medical History: Reviewed in chart  Social History     Tobacco Use     Smoking status: Former Smoker     Years: 50.00     Types: Cigarettes     Quit date: 4/1/2019     Years since quitting: 3.2     Smokeless tobacco: Never Used     Tobacco comment: chews betel nut   Substance Use Topics     Alcohol use: No     Drug use: No    ^Reviewed today    Medication Adherence/Access: Has HHN set up pill box. Patient's son helps administer insulin and check blood sugars.       Type 2 Diabetes:    Patient is currently taking the following medications with no reports of side effects:    Lantus 7 units nightly    Metformin 1000 mg twice daily    Januvia 100 mg daily  Brought in [employer] 2 today to learn how to use it.  Symptoms of low blood sugar? None  Symptoms of high blood sugar? None  Aspirin: Taking 81mg daily and denies side effects   The 10-year ASCVD risk score (Lenard PAREDES Jr., et al., 2013) is: 28.2%    Values used to calculate the score:      Age: 76 years      Sex: Female      Is Non- : No      Diabetic: Yes      Tobacco smoker: No      Systolic Blood Pressure: 118 mmHg      Is BP treated: No      HDL Cholesterol: 57 mg/dL      Total Cholesterol: 306 mg/dL    Statin: Yes: Atorvastatin 80 mg daily   ACEi/ARB: Lisinopril 2.5mg was started at last visit for microalbuminuria. Verified that she did fill and start this medication. She is tolerating it well with no adverse effects.  Urine Albumin: Elevated when last checked    Lab Results   Component Value Date    A1C 11.5 05/15/2022    A1C 10.7 12/14/2021    A1C 11.4 09/09/2021    A1C 12.0 08/31/2020    A1C 13.5 06/25/2020    A1C 11.1 03/19/2020    A1C 10.3 01/16/2020    A1C 8.5 10/28/2019        BP Readings from Last 1 Encounters:   07/05/22 118/76     Pulse Readings from Last 1 Encounters:   07/05/22 90     Wt Readings from Last 1 Encounters:   06/28/22 107 lb 6.4 oz (48.7 kg)     Ht Readings from Last 1 Encounters:  "  06/13/22 4' 10.25\" (1.48 m)     Estimated body mass index is 22.25 kg/m  as calculated from the following:    Height as of 6/13/22: 4' 10.25\" (1.48 m).    Weight as of 6/28/22: 107 lb 6.4 oz (48.7 kg).    Temp Readings from Last 1 Encounters:   06/28/22 97.2  F (36.2  C) (Tympanic)       ----------------  I spent 30 minutes with this patient today. Dr. Joe was provided the recommendations above via routed note and she is the authorizing prescriber for this visit through the pharmacist collaborative practice agreement. A copy of the visit note was provided to the patient's provider(s).    The patient was given a summary of these recommendations.     Donna Zhao, Pharm.D.         Medication Therapy Recommendations  Microalbuminuria    Current Medication: lisinopril (ZESTRIL) 2.5 MG tablet   Rationale: Medication requires monitoring - Needs additional monitoring   Recommendation: Order Lab   Status: Accepted per Provider         Type 2 diabetes mellitus with hyperglycemia, without long-term current use of insulin (H)    Current Medication: Continuous Blood Gluc Sensor (FREESTYLE PRECIOUS 2 SENSOR) MISC   Rationale: Does not understand instructions - Adherence - Adherence   Recommendation: Provide Education   Status: Patient Agreed - Adherence/Education                       "

## 2022-07-26 ENCOUNTER — OFFICE VISIT (OUTPATIENT)
Dept: PHARMACY | Facility: CLINIC | Age: 77
End: 2022-07-26
Payer: COMMERCIAL

## 2022-07-26 VITALS
HEART RATE: 84 BPM | OXYGEN SATURATION: 100 % | RESPIRATION RATE: 18 BRPM | TEMPERATURE: 97.4 F | DIASTOLIC BLOOD PRESSURE: 75 MMHG | SYSTOLIC BLOOD PRESSURE: 136 MMHG

## 2022-07-26 DIAGNOSIS — E11.65 TYPE 2 DIABETES MELLITUS WITH HYPERGLYCEMIA, WITHOUT LONG-TERM CURRENT USE OF INSULIN (H): ICD-10-CM

## 2022-07-26 DIAGNOSIS — R80.9 MICROALBUMINURIA: Primary | ICD-10-CM

## 2022-07-26 PROCEDURE — 99607 MTMS BY PHARM ADDL 15 MIN: CPT | Performed by: PHARMACIST

## 2022-07-26 PROCEDURE — 99606 MTMS BY PHARM EST 15 MIN: CPT | Performed by: PHARMACIST

## 2022-07-26 RX ORDER — INSULIN GLARGINE 100 [IU]/ML
10 INJECTION, SOLUTION SUBCUTANEOUS AT BEDTIME
Qty: 15 ML | Refills: 3 | Status: SHIPPED | OUTPATIENT
Start: 2022-07-26 | End: 2022-08-23

## 2022-07-26 NOTE — PATIENT INSTRUCTIONS
INCREASE to 10 units daily    Scan the Tate sensor more frequently- at least once every 8 hours    Followup: Sept 6 2:10pm with Dr. Zhao

## 2022-07-26 NOTE — PROGRESS NOTES
Medication Therapy Management (MTM) Encounter    ASSESSMENT:                            Medication Adherence/Access: no issues identified.    Type 2 Diabetes: Overall, blood sugars are elevated. Tate sensor is only active less than 50% of the time so insufficient scanning. Also, she has both a Tate 14 day meter/sensors (box of sensors is from 2/2022) and a Tate 2 meter/sensors, and  her son is confused about which goes with which and what she should be using. Discussed with son that Tate 2 is the correct one to be using and to put the 14 day meter/sensors away. SGLT2i is not currently a good choice for her due to her frequent UTIs. At this point, increasing Lantus is the most simple option.    Microalbuminuria: On lisinopril 2.5mg, recently started.     PLAN:                            1. Med rec completed and EHR med list updated.  2. Increase Lantus to 10 units daily  3. Scan with Tate more frequently- at least once every 8 hours  4. Use only Tate 2 reader/sensors, NOT Tate 14-day.    See PharmD in 1 month. Sept 6 at 2:10pm with Dr. Zhao      Medication issues to be addressed at a future visit:      Lipid panel next visit    If BS continue to be elevated, could consider switching Januvia to weekly GLP-1 as her HHN could administer it (previously it was thought to be too complicated for patient). Would recommend prescribing Trulicity and doing a PA, as Bydureon (covered weekly GLP-1) is not recommended for GFR <45, and her GFR is decreased and has recently fluctuated down to a GFR of 37-42. Additionally, the option of a higher dose will be helpful for her. Due to renal function, PA should be approved.      SUBJECTIVE/OBJECTIVE:                          Emmaneul Vences is a 76 year old female seen for MTM follow up; follow up visit from 7/5/22. Son was present for the visit.    Reason for visit: Medication Therapy Management (MTM) followup.    Allergies/ADRs: Reviewed in chart  Past Medical History: Reviewed in  "chart  Social History     Tobacco Use     Smoking status: Former Smoker     Years: 50.00     Types: Cigarettes     Quit date: 4/1/2019     Years since quitting: 3.3     Smokeless tobacco: Never Used     Tobacco comment: chews betel nut   Substance Use Topics     Alcohol use: No     Drug use: No    ^Reviewed today    Medication Adherence/Access: Has HHN set up pill box. Patient's son helps administer insulin and check blood sugars.       Type 2 Diabetes:    Patient is currently taking the following medications with no reports of side effects:    Lantus 7 units nightly    Metformin 1000 mg twice daily    Januvia 100 mg daily  At our last visit, she and her son were taught how to use the Freestyle Tate 2 and placed her first sensor 7/5/22. Was due to change sensor 1 week ago. This was done and it went well- HHN did it for her and will be able to change it every 2 weeks. However, she has both a 14 day and tate 2 reader and sensors (14 day was from Feb- old). Current sensor is 14 day (HHN applied this one) so that is what I downloaded.  Symptoms of low blood sugar? no  Symptoms of high blood sugar? no         Microalbuminuria:  ACEi/ARB: Lisinopril 2.5mg was started for microalbuminuria approximately 1 month ago. She is tolerating it well with no adverse effects.  Urine Albumin: Elevated when last checked    Lab Results   Component Value Date    A1C 11.5 05/15/2022    A1C 10.7 12/14/2021    A1C 11.4 09/09/2021    A1C 12.0 08/31/2020    A1C 13.5 06/25/2020    A1C 11.1 03/19/2020    A1C 10.3 01/16/2020    A1C 8.5 10/28/2019        BP Readings from Last 1 Encounters:   07/26/22 136/75     Pulse Readings from Last 1 Encounters:   07/26/22 84     Wt Readings from Last 1 Encounters:   06/28/22 107 lb 6.4 oz (48.7 kg)     Ht Readings from Last 1 Encounters:   06/13/22 4' 10.25\" (1.48 m)     Estimated body mass index is 22.25 kg/m  as calculated from the following:    Height as of 6/13/22: 4' 10.25\" (1.48 m).    Weight as of " 6/28/22: 107 lb 6.4 oz (48.7 kg).    Temp Readings from Last 1 Encounters:   07/26/22 97.4  F (36.3  C)       Due to patient being non-English speaking/uses sign language, an  was used for this visit. Only for face-to-face interpretation by an external agency, date and length of interpretation can be found on the scanned worksheet.     name: ID # [id number]  Agency: [employer]  Language: Yasmin   Type of interpretation: Telephone, spoken    ----------------  I spent 25 minutes with this patient today. Dr. Joe was provided the recommendations above via routed note and she is the authorizing prescriber for this visit through the pharmacist collaborative practice agreement. A copy of the visit note was provided to the patient's provider(s).    The patient was given a summary of these recommendations.     Donna Zhao, Pharm.D.         Medication Therapy Recommendations  Type 2 diabetes mellitus with hyperglycemia, without long-term current use of insulin (H)    Current Medication: Continuous Blood Gluc Sensor (FREESTYLE PRECIOUS 2 SENSOR) MISC   Rationale: Does not understand instructions - Adherence - Adherence   Recommendation: Provide Education   Status: Patient Agreed - Adherence/Education          Current Medication: insulin glargine (LANTUS SOLOSTAR) 100 UNIT/ML pen   Rationale: Dose too low - Dosage too low - Effectiveness   Recommendation: Increase Dose   Status: Accepted per CPA

## 2022-07-26 NOTE — Clinical Note
Sid Fischer, Saw this pt with her son yesterday. Her BS are now more elevated so I increased her Lantus a little. If BS continue to be elevated, could consider a switch to weekly GLP-1 agonist as HHN could administer (she is placing the Tate sensors too). I have details in my note about using Trulicity (instead of Bydureon) due to renal function- PA should be approved b/c of renal contraindication with Bydureon. She is seeing you Aug 23, and I am on vacation that day so can't see her also that day. I am having her see me on Sept 6. Of course, if the small increase in Lantus improves her BS, then no change would be needed! Donna

## 2022-08-23 ENCOUNTER — OFFICE VISIT (OUTPATIENT)
Dept: FAMILY MEDICINE | Facility: CLINIC | Age: 77
End: 2022-08-23
Payer: COMMERCIAL

## 2022-08-23 VITALS
OXYGEN SATURATION: 95 % | SYSTOLIC BLOOD PRESSURE: 100 MMHG | TEMPERATURE: 98.3 F | RESPIRATION RATE: 24 BRPM | HEART RATE: 93 BPM | BODY MASS INDEX: 22.92 KG/M2 | WEIGHT: 110.6 LBS | DIASTOLIC BLOOD PRESSURE: 62 MMHG

## 2022-08-23 DIAGNOSIS — E11.65 TYPE 2 DIABETES MELLITUS WITH HYPERGLYCEMIA, WITHOUT LONG-TERM CURRENT USE OF INSULIN (H): Primary | ICD-10-CM

## 2022-08-23 LAB
CREAT UR-MCNC: 13.2 MG/DL
MICROALBUMIN UR-MCNC: 55.5 MG/L
MICROALBUMIN/CREAT UR: 420.45 MG/G CR (ref 0–25)

## 2022-08-23 PROCEDURE — 0054A COVID-19,PF,PFIZER (12+ YRS): CPT | Performed by: STUDENT IN AN ORGANIZED HEALTH CARE EDUCATION/TRAINING PROGRAM

## 2022-08-23 PROCEDURE — 99214 OFFICE O/P EST MOD 30 MIN: CPT | Mod: 25 | Performed by: STUDENT IN AN ORGANIZED HEALTH CARE EDUCATION/TRAINING PROGRAM

## 2022-08-23 PROCEDURE — 91305 COVID-19,PF,PFIZER (12+ YRS): CPT | Performed by: STUDENT IN AN ORGANIZED HEALTH CARE EDUCATION/TRAINING PROGRAM

## 2022-08-23 PROCEDURE — 82043 UR ALBUMIN QUANTITATIVE: CPT | Performed by: STUDENT IN AN ORGANIZED HEALTH CARE EDUCATION/TRAINING PROGRAM

## 2022-08-23 RX ORDER — INSULIN GLARGINE 100 [IU]/ML
14 INJECTION, SOLUTION SUBCUTANEOUS AT BEDTIME
Qty: 15 ML | Refills: 3 | Status: SHIPPED | OUTPATIENT
Start: 2022-08-23 | End: 2022-08-26

## 2022-08-23 NOTE — NURSING NOTE
Due to patient being non-English speaking/uses sign language, an  was used for this visit. Only for face-to-face interpretation by an external agency, date and length of interpretation can be found on the scanned worksheet.     name: Allie Gonzalez   Agency: [employer]  Language: Yasmin   Telephone number: [phone]  Type of interpretation: Face-to-face, spoken

## 2022-08-23 NOTE — PROGRESS NOTES
Chief Complaint   Patient presents with     Diabetes       There are no exam notes on file for this visit.        Assessment/Plan:  Emmanuel Vences is a 77 year old female here for follow-up type 2 diabetes mellitus, currently on Lantus insulin 10 units Every evening along with Januvia 100 mg daily and metformin 2000 mg with dinner.  An SGLT2 inhibitor is not being used due to history of urosepsis this year, and her son has been helping her with daily insulin injections to improve her blood glucose values.  Despite medication compliance she remains hyperglycemic.  Her son is able to understand instructions regarding how to self titrate insulin so we reviewed how to adjust based upon fasting blood sugars.    -Plan to increase Lantus by 4 units every 3 days until the fasting blood sugars are 130-140  -Continue same dose of Lantus insulin once blood sugars are 130-140  -Follow-up with me in 3 to 4 weeks  -Continue use of freestyle benita  -Mealtime insulin might be necessary based upon freestyle data but we will consider this at a later time and first focus on fixing fasting sugars.    Emmanuel was seen today for diabetes.    Diagnoses and all orders for this visit:    Type 2 diabetes mellitus with hyperglycemia, without long-term current use of insulin (H)  -     Albumin Random Urine Quantitative with Creat Ratio; Future  -     Lipid Profile; Future  -     insulin glargine (LANTUS SOLOSTAR) 100 UNIT/ML pen; Inject 14 Units Subcutaneous At Bedtime  -     Hemoglobin A1c; Future  -     Albumin Random Urine Quantitative with Creat Ratio    Other orders  -     COVID-19,PF,PFIZER (12+ YRS)        Follow-up with Kaylin Joe in 4 weeks for f/u BG.    Future Appointments   Date Time Provider Department Center   9/6/2022  2:10 PM Donna Zhao [facility]   9/21/2022  3:10 PM Kaylin Joe MD [facility]       Kaylin MADDEN. MD [facility]      Patient Instructions   We are targeting fasting blood sugars to target fasting blood  sugars of 130-140.    Please check a fasting blood sugar every day.     Please increase the lantus injection by 4 units every 3 days until the fasting blood sugars are between 130-140.    Once the fasting blood sugars are about 130, we will continue the same dose of insulin.                Subjective:  Emmanuel Vences is a 77 year old female accompanied by her son, here for follow up diabetes.   She is injecting her mediations  She sometimes forgets to take her medications but is receiving all insulin injections.    She is doing well with her injections. Son administers them for her.    She states she is tolerating her medications well.  She sometimes is having sx of hyperglycemia - frequency, thirst  Sometime urinates up to 4x per day.  Sometimes can't make it to the bathroom and has to wear a diaper.   She denies symptoms of urinary tract infection such as abdominal pain or dysuria.    If pt is to choose between up titrating insulin and adding a GLP-1, she would like to work on raising insulin doses.      - Has been using consistently for the past week. All BGs high - FBGs might be coming down to 180-200.  No lows.      Patient Active Problem List   Diagnosis     Health Care Home     Esophageal reflux     Osteoarthrosis, unspecified whether generalized or localized, involving lower leg     Lumbosacral spondylosis without myelopathy     Hyperlipidemia     Type 2 diabetes mellitus with hyperglycemia, without long-term current use of insulin (H)     Seasonal allergies     Microalbuminuria     Age-related osteoporosis without current pathological fracture     Chronic kidney disease, stage 1     Major depressive disorder, recurrent episode, mild (H)     Dehydration     Hyponatremia     Acute kidney insufficiency     Acute cystitis with hematuria     Sepsis, due to unspecified organism, unspecified whether acute organ dysfunction present (H)     Acute streptococcal pharyngitis       Current Outpatient Medications   Medication      acetaminophen (TYLENOL) 500 MG tablet     alcohol swab prep pads     aspirin (ASPIRIN LOW DOSE) 81 MG EC tablet     atorvastatin (LIPITOR) 80 MG tablet     blood glucose (NO BRAND SPECIFIED) lancets standard     blood glucose (NO BRAND SPECIFIED) test strip     calcium carb-cholecalciferol (OS-MAME) 500-200 MG-UNIT tablet     cetirizine (ZYRTEC) 10 MG tablet     Continuous Blood Gluc Sensor (FREESTYLE PRECIOUS 2 SENSOR) MISC     insulin glargine (LANTUS SOLOSTAR) 100 UNIT/ML pen     insulin pen needle ([employer]ARE MICRO) 32G X 4 MM miscellaneous     JANUVIA 100 MG tablet     lisinopril (ZESTRIL) 2.5 MG tablet     metFORMIN (GLUCOPHAGE XR) 500 MG 24 hr tablet     Multiple Vitamins-Minerals (CEROVITE SENIOR) TABS     alendronate (FOSAMAX) 70 MG tablet     capsaicin (ZOSTRIX) 0.025 % external cream     diclofenac (VOLTAREN) 1 % topical gel     No current facility-administered medications for this visit.       Objective:  /62   Pulse 93   Temp 98.3  F (36.8  C) (Oral)   Resp 24   Wt 50.2 kg (110 lb 9.6 oz)   SpO2 95%   BMI 22.92 kg/m    Body mass index is 22.92 kg/m .  Gen: A/O x3, in NAD.  Cardio: S1, S2, no MRG. RRR.  Resp: CTAB, no WRR.  Neuro: Grossly intact.

## 2022-08-24 NOTE — RESULT ENCOUNTER NOTE
Reviewed labs - with proteinuria due to diabetes. Just started lisinopril 2.5mg daily so will recheck in 3 mos to monitor for improvement.

## 2022-08-26 ENCOUNTER — DOCUMENTATION ONLY (OUTPATIENT)
Dept: FAMILY MEDICINE | Facility: CLINIC | Age: 77
End: 2022-08-26

## 2022-08-26 RX ORDER — INSULIN GLARGINE 100 [IU]/ML
14-40 INJECTION, SOLUTION SUBCUTANEOUS AT BEDTIME
Qty: 15 ML | Refills: 3 | Status: SHIPPED | OUTPATIENT
Start: 2022-08-26 | End: 2022-09-21

## 2022-08-26 NOTE — PROGRESS NOTES
To be completed in Nursing note:    Please reference list for forms that require a visit for completion.  Please remind patients that providers are given 3-5 business days to complete and return forms.      Form type: [facility] HOME HEALTH CERTIFICATION AND PLAN OF CARE 8/212022 TO 10/19/2022    Date form received: 8/24/2022    Date form completed by Physician: 8/25/2022    How was form returned to patient (mailed, faxed, or at  for patient to ):    Fax to [phone]    Date form mailed/faxed/left at  for patient and sent to HIM for scanning:    Fax on 8/26/2022    Once form is left for patient, faxed, or mailed PCS will then close the documentation only encounter.

## 2022-09-21 ENCOUNTER — OFFICE VISIT (OUTPATIENT)
Dept: FAMILY MEDICINE | Facility: CLINIC | Age: 77
End: 2022-09-21
Payer: COMMERCIAL

## 2022-09-21 VITALS
DIASTOLIC BLOOD PRESSURE: 72 MMHG | HEIGHT: 58 IN | BODY MASS INDEX: 23.22 KG/M2 | HEART RATE: 83 BPM | OXYGEN SATURATION: 98 % | RESPIRATION RATE: 16 BRPM | SYSTOLIC BLOOD PRESSURE: 124 MMHG | WEIGHT: 110.6 LBS | TEMPERATURE: 98.7 F

## 2022-09-21 DIAGNOSIS — E11.65 TYPE 2 DIABETES MELLITUS WITH HYPERGLYCEMIA, WITHOUT LONG-TERM CURRENT USE OF INSULIN (H): ICD-10-CM

## 2022-09-21 DIAGNOSIS — R80.9 MICROALBUMINURIA: ICD-10-CM

## 2022-09-21 DIAGNOSIS — Z12.11 SCREEN FOR COLON CANCER: Primary | ICD-10-CM

## 2022-09-21 DIAGNOSIS — Z13.820 SCREENING FOR OSTEOPOROSIS: ICD-10-CM

## 2022-09-21 LAB
ANION GAP SERPL CALCULATED.3IONS-SCNC: 9 MMOL/L (ref 7–15)
BUN SERPL-MCNC: 12.2 MG/DL (ref 8–23)
CALCIUM SERPL-MCNC: 9.3 MG/DL (ref 8.8–10.2)
CHLORIDE SERPL-SCNC: 101 MMOL/L (ref 98–107)
CHOLEST SERPL-MCNC: 289 MG/DL
CREAT SERPL-MCNC: 1.01 MG/DL (ref 0.51–0.95)
DEPRECATED HCO3 PLAS-SCNC: 27 MMOL/L (ref 22–29)
GFR SERPL CREATININE-BSD FRML MDRD: 57 ML/MIN/1.73M2
GLUCOSE SERPL-MCNC: 346 MG/DL (ref 70–99)
HBA1C MFR BLD: 10.2 % (ref 0–5.6)
HDLC SERPL-MCNC: 55 MG/DL
LDLC SERPL CALC-MCNC: 159 MG/DL
NONHDLC SERPL-MCNC: 234 MG/DL
POTASSIUM SERPL-SCNC: 4.6 MMOL/L (ref 3.4–5.3)
SODIUM SERPL-SCNC: 137 MMOL/L (ref 136–145)
TRIGL SERPL-MCNC: 376 MG/DL

## 2022-09-21 PROCEDURE — 36415 COLL VENOUS BLD VENIPUNCTURE: CPT | Performed by: STUDENT IN AN ORGANIZED HEALTH CARE EDUCATION/TRAINING PROGRAM

## 2022-09-21 PROCEDURE — 80061 LIPID PANEL: CPT | Performed by: STUDENT IN AN ORGANIZED HEALTH CARE EDUCATION/TRAINING PROGRAM

## 2022-09-21 PROCEDURE — 99214 OFFICE O/P EST MOD 30 MIN: CPT | Performed by: STUDENT IN AN ORGANIZED HEALTH CARE EDUCATION/TRAINING PROGRAM

## 2022-09-21 PROCEDURE — 83036 HEMOGLOBIN GLYCOSYLATED A1C: CPT | Performed by: STUDENT IN AN ORGANIZED HEALTH CARE EDUCATION/TRAINING PROGRAM

## 2022-09-21 PROCEDURE — 80048 BASIC METABOLIC PNL TOTAL CA: CPT | Performed by: STUDENT IN AN ORGANIZED HEALTH CARE EDUCATION/TRAINING PROGRAM

## 2022-09-21 RX ORDER — GLUCOSAMINE HCL/CHONDROITIN SU 500-400 MG
CAPSULE ORAL
Qty: 100 EACH | Refills: 3 | Status: SHIPPED | OUTPATIENT
Start: 2022-09-21 | End: 2023-06-30

## 2022-09-21 RX ORDER — PEN NEEDLE, DIABETIC 32GX 5/32"
NEEDLE, DISPOSABLE MISCELLANEOUS
Qty: 100 EACH | Refills: 3 | Status: SHIPPED | OUTPATIENT
Start: 2022-09-21 | End: 2023-06-30

## 2022-09-21 RX ORDER — INSULIN GLARGINE 100 [IU]/ML
14-40 INJECTION, SOLUTION SUBCUTANEOUS AT BEDTIME
Qty: 15 ML | Refills: 3 | Status: SHIPPED | OUTPATIENT
Start: 2022-09-21 | End: 2023-06-30

## 2022-09-21 ASSESSMENT — PATIENT HEALTH QUESTIONNAIRE - PHQ9: SUM OF ALL RESPONSES TO PHQ QUESTIONS 1-9: 6

## 2022-09-21 NOTE — NURSING NOTE
Due to patient being non-English speaking/uses sign language, an  was used for this visit. Only for face-to-face interpretation by an external agency, date and length of interpretation can be found on the scanned worksheet.     name: annamarie mcgowan   Agency: [employer]  Language: Yasmin   Telephone number:   Type of interpretation: Face-to-face, spoken

## 2022-09-21 NOTE — PROGRESS NOTES
Chief Complaint   Patient presents with     Diabetes       Follow up on DM per pt        Nursing Notes:   Cindy Levine  9/21/2022  3:51 PM  Signed  Due to patient being non-English speaking/uses sign language, an  was used for this visit. Only for face-to-face interpretation by an external agency, date and length of interpretation can be found on the scanned worksheet.     name: annamarie mcgowan   Agency: [employer]  Language: Yasmin   Telephone number:   Type of interpretation: Face-to-face, spoken              Assessment/Plan:  Emmanuel Vences is a 77 year old female here for follow up DM, with improved fasting BGs per report from her son although unknown BGs during remainder of the day as she did not bring her glucometer to clinic today (forgot at home). Given report that all FBGs are now <150 and without further information available, will hold further changes to glargine and ask for pt to RTC with glucometer to assess for need for mealtime insulin, vs switch januvia to GLP-1 agonist for improved postprandial control (would prefer victoza for efficacy, and bydureon not indicated for GFR <45). Continue to avoid SGLT-2 given recent pyelonephritis.  Missed last pharmacy follow up - will recommend this at next appt.     We also reviewed HCM and agrees to FIT screening and DEXA.     Emmanuel was seen today for diabetes.    Diagnoses and all orders for this visit:    Screen for colon cancer  -     Fecal colorectal cancer screen FIT - Future (S+30); Future  -     Fecal colorectal cancer screen FIT - Future (S+30)    Type 2 diabetes mellitus with hyperglycemia, without long-term current use of insulin (H)  -     insulin glargine (LANTUS SOLOSTAR) 100 UNIT/ML pen; Inject 14-40 Units Subcutaneous At Bedtime Adjust dose per physician instructions.  -     insulin pen needle (ULTICARE MICRO) 32G X 4 MM miscellaneous; Use 1 pen needles daily or as directed.  -     alcohol swab prep pads; Use to swab area of injection/amber as  directed.  -     Lipid Profile  -     Hemoglobin A1c    Screening for osteoporosis  -     DEXA HIP/PELVIS/SPINE - Future; Future    Microalbuminuria  -     Basic metabolic panel; Future  -     Basic metabolic panel        Follow-up with Kaylin Joe on 10/11/22 for diabetes, with glucometer.    Future Appointments   Date Time Provider Department Center   10/12/2022  1:10 PM Kaylin Joe MD [facility]         Kaylin MADDEN. MD [place]      There are no Patient Instructions on file for this visit.    Subjective:  Emmanuel Vences is a 77 year old female here for follow up diabetes, accompanied by her SON who administers insulin to her.  Forgot her BG monitor at home today.    She is taking 14 units of lantus insulin and her fasting blood sugars are now 110-140/150.   Highest fasting blood sugars recently (last 2 weeks) are 150 in the last two weeks.     Denies concerns with diabetes medications.   States no sx hyperglycemia, no symptomatic lows (tired/hungry/sweaty/shaky).       Patient Active Problem List   Diagnosis     Health Care Home     Esophageal reflux     Osteoarthrosis, unspecified whether generalized or localized, involving lower leg     Lumbosacral spondylosis without myelopathy     Hyperlipidemia     Type 2 diabetes mellitus with hyperglycemia, without long-term current use of insulin (H)     Seasonal allergies     Microalbuminuria     Age-related osteoporosis without current pathological fracture     Chronic kidney disease, stage 1     Major depressive disorder, recurrent episode, mild (H)     Dehydration     Hyponatremia     Acute kidney insufficiency     Acute cystitis with hematuria     Sepsis, due to unspecified organism, unspecified whether acute organ dysfunction present (H)     Acute streptococcal pharyngitis       Current Outpatient Medications   Medication     alcohol swab prep pads     insulin glargine (LANTUS SOLOSTAR) 100 UNIT/ML pen     insulin pen needle (ULTICARE MICRO) 32G X 4 MM  "miscellaneous     acetaminophen (TYLENOL) 500 MG tablet     alendronate (FOSAMAX) 70 MG tablet     aspirin (ASPIRIN LOW DOSE) 81 MG EC tablet     atorvastatin (LIPITOR) 80 MG tablet     blood glucose (NO BRAND SPECIFIED) lancets standard     blood glucose (NO BRAND SPECIFIED) test strip     calcium carb-cholecalciferol (OS-MAME) 500-200 MG-UNIT tablet     capsaicin (ZOSTRIX) 0.025 % external cream     cetirizine (ZYRTEC) 10 MG tablet     Continuous Blood Gluc Sensor (FREESTYLE PRECIOUS 2 SENSOR) MISC     diclofenac (VOLTAREN) 1 % topical gel     JANUVIA 100 MG tablet     lisinopril (ZESTRIL) 2.5 MG tablet     metFORMIN (GLUCOPHAGE XR) 500 MG 24 hr tablet     Multiple Vitamins-Minerals (CEROVITE SENIOR) TABS     No current facility-administered medications for this visit.       Objective:  /72 (BP Location: Right arm, Patient Position: Sitting, Cuff Size: Adult Regular)   Pulse 83   Temp 98.7  F (37.1  C) (Oral)   Resp 16   Ht 1.481 m (4' 10.31\")   Wt 50.2 kg (110 lb 9.6 oz)   SpO2 98%   BMI 22.87 kg/m    Body mass index is 22.87 kg/m .  Gen: A/O, in NAD.  Cardio: S1, S2, no MRG. RRR.  Resp: CTAB, no WRR.  Neuro: Grossly intact.    "

## 2022-09-26 ENCOUNTER — TELEPHONE (OUTPATIENT)
Dept: FAMILY MEDICINE | Facility: CLINIC | Age: 77
End: 2022-09-26

## 2022-09-26 DIAGNOSIS — E78.5 HYPERLIPIDEMIA LDL GOAL <100: Primary | ICD-10-CM

## 2022-09-26 RX ORDER — ROSUVASTATIN CALCIUM 40 MG/1
40 TABLET, COATED ORAL DAILY
Qty: 90 TABLET | Refills: 3 | Status: SHIPPED | OUTPATIENT
Start: 2022-09-26 | End: 2023-09-18

## 2022-09-26 NOTE — TELEPHONE ENCOUNTER
Spoke with granddaughter (speaks English) who interpreted for patient's son. Son states patient takes atorvastatin daily with no side effects reported.    Note routed to   /HI

## 2022-09-26 NOTE — TELEPHONE ENCOUNTER
Given that atorvastatin is being taken regularly, recommend transition from atorvastatin 80mg to rosuvastatin 40mg. We can recheck lipids in 6 weeks and add ezetimibe vs PCSK-9 if remains above goal.    Kaylin Joe MD

## 2022-09-26 NOTE — TELEPHONE ENCOUNTER
----- Message from Kaylin Joe MD sent at 9/23/2022  8:12 AM CDT -----  Sid Montoya, I am hoping to get information about whether this patient is taking her atorvastatin.  Her cholesterol levels remain very high, and I do not know if it is due to noncompliance or nonresponse to treatment.  Could please contact her to ask about this? I would also like to find out, if she isn't taking it, whether she is tolerating it when she does take it or if she is experiencing side effects. Her son helps to set up and administer her medications, so might be the best source of information.    Thank you!

## 2022-09-29 PROCEDURE — 82274 ASSAY TEST FOR BLOOD FECAL: CPT | Performed by: STUDENT IN AN ORGANIZED HEALTH CARE EDUCATION/TRAINING PROGRAM

## 2022-10-01 LAB — HEMOCCULT STL QL IA: NEGATIVE

## 2022-10-04 ENCOUNTER — OFFICE VISIT (OUTPATIENT)
Dept: FAMILY MEDICINE | Facility: CLINIC | Age: 77
End: 2022-10-04
Payer: COMMERCIAL

## 2022-10-04 VITALS
HEART RATE: 93 BPM | WEIGHT: 107.2 LBS | TEMPERATURE: 98.1 F | RESPIRATION RATE: 16 BRPM | SYSTOLIC BLOOD PRESSURE: 93 MMHG | BODY MASS INDEX: 22.17 KG/M2 | OXYGEN SATURATION: 98 % | DIASTOLIC BLOOD PRESSURE: 64 MMHG

## 2022-10-04 DIAGNOSIS — R30.0 DYSURIA: Primary | ICD-10-CM

## 2022-10-04 DIAGNOSIS — N39.0 URINARY TRACT INFECTION WITHOUT HEMATURIA, SITE UNSPECIFIED: ICD-10-CM

## 2022-10-04 LAB
ALBUMIN UR-MCNC: >=300 MG/DL
APPEARANCE UR: ABNORMAL
BACTERIA #/AREA URNS HPF: ABNORMAL /HPF
BILIRUB UR QL STRIP: NEGATIVE
COLOR UR AUTO: YELLOW
GLUCOSE UR STRIP-MCNC: NEGATIVE MG/DL
HGB UR QL STRIP: ABNORMAL
KETONES UR STRIP-MCNC: NEGATIVE MG/DL
LEUKOCYTE ESTERASE UR QL STRIP: ABNORMAL
NITRATE UR QL: NEGATIVE
PH UR STRIP: 6 [PH] (ref 5–8)
RBC #/AREA URNS AUTO: ABNORMAL /HPF
SP GR UR STRIP: 1.02 (ref 1–1.03)
SQUAMOUS #/AREA URNS AUTO: ABNORMAL /LPF
UROBILINOGEN UR STRIP-ACNC: 0.2 E.U./DL
WBC #/AREA URNS AUTO: >100 /HPF
WBC CLUMPS #/AREA URNS HPF: PRESENT /HPF

## 2022-10-04 PROCEDURE — 87086 URINE CULTURE/COLONY COUNT: CPT

## 2022-10-04 PROCEDURE — 99213 OFFICE O/P EST LOW 20 MIN: CPT | Mod: GC

## 2022-10-04 PROCEDURE — 81001 URINALYSIS AUTO W/SCOPE: CPT

## 2022-10-04 RX ORDER — CIPROFLOXACIN 500 MG/1
500 TABLET, FILM COATED ORAL 2 TIMES DAILY
Qty: 14 TABLET | Refills: 0 | Status: SHIPPED | OUTPATIENT
Start: 2022-10-04 | End: 2022-10-12

## 2022-10-04 NOTE — NURSING NOTE
Due to patient being non-English speaking/uses sign language, an  was used for this visit. Only for face-to-face interpretation by an external agency, date and length of interpretation can be found on the scanned worksheet.     name: Chava  Agency: [employer]  Language: Yasmin   Telephone number: [phone]  Type of interpretation: Face-to-face, spoken

## 2022-10-04 NOTE — PROGRESS NOTES
Preceptor Attestation:    I discussed the patient with the resident and evaluated the patient in person. I have verified the content of the note, which accurately reflects my assessment of the patient and the plan of care.   Supervising Physician:  Ike Pizarro MD.  Blood pressure 93/64, pulse 93, temperature 98.1  F (36.7  C), temperature source Oral, resp. rate 16, weight 48.6 kg (107 lb 3.2 oz), SpO2 98 %, not currently breastfeeding.

## 2022-10-04 NOTE — PROGRESS NOTES
Assessment & Plan     Dysuria  Urinary tract infection without hematuria, site unspecified  -Discussed with patient that it is likely she has a urinary tract infection and have sent in the below medication to her local pharmacy for treatment.  -Discussed with patient that I would like her to follow-up with an additional clinic appointment in 2 days to verify that her symptoms are improving and to also address her blood sugar levels if time allows.  - UA reflex to Microscopic  - Urine Microscopic Exam  - Urine Culture  - ciprofloxacin (CIPRO) 500 MG tablet  Dispense: 14 tablet; Refill: 0    Return in about 2 days (around 10/6/2022) for Follow up.    Tr Garcia DO  [facility]A Benitez is a 77 year old accompanied by her family member, presenting for the following health issues:  other (Fever for the past week /Pain while urinating )      HPI     The patient presents today accompanied by a family member. She states that for the past week she has had occasional fevers and chills.  In addition over the past few days she has noticed pain with urination specifically at the end of her urine stream.  She denies urinary frequency.  She affirms left-sided flank pain for the last few days.  She affirms occasional nausea and vomiting, but believes she is able to keep sufficient liquid intake.  She reports no other concerns at this time.  Denies blood in the urine.    Review of Systems   Constitutional, HEENT, cardiovascular, pulmonary, gi and gu systems are negative, except as otherwise noted.  -See HPI for pertinent positives and negatives.  ROS reviewed  Tr Garcia DO        Objective    BP 93/64 (BP Location: Left arm, Patient Position: Sitting, Cuff Size: Adult Regular)   Pulse 93   Temp 98.1  F (36.7  C) (Oral)   Resp 16   Wt 48.6 kg (107 lb 3.2 oz)   SpO2 98%   BMI 22.17 kg/m    Body mass index is 22.17 kg/m .  Physical Exam   GENERAL: healthy, alert and no  distress  EYES: Eyes grossly normal to inspection, PERRL and conjunctivae and sclerae normal  HENT: External ears normal, nose and mouth without ulcers or lesions  NECK: no adenopathy, no asymmetry, masses, or scars  RESP: lungs clear to auscultation - no rales, rhonchi or wheezes  CV: regular rate and rhythm, normal S1 S2, no murmur, click or rub, no peripheral edema and peripheral pulses strong  ABDOMEN: soft, nontender, no hepatosplenomegaly, no masses and bowel sounds normal  MS: no gross musculoskeletal defects noted, no edema  SKIN: no suspicious lesions or rashes  NEURO: Normal strength and tone, mentation intact and speech normal  PSYCH: mentation appears normal, affect normal/bright        ----- Service Performed and Documented by Resident or Fellow ------     Precepted by Dr Ike Garcia DO

## 2022-10-04 NOTE — PATIENT INSTRUCTIONS
-Follow-up 2 days for recheck of symptoms  -Follow-up appointment to address blood sugar control.  -Ciprofloxacin sent to pharmacy-please take this medication twice daily for the next 7 days  -return to clinic sooner of symptoms do not improve or worsen.

## 2022-10-04 NOTE — LETTER
"October 12, 2022      Emmanuel Say  [address] [place] [address]        Dear Ms.Say,    We are writing to inform you of your test results.    I am writing you this letter regarding your results because we were unable to reach you by phone. Your lab results below:     Please call the clinic to update your phone number and other demographics. Thanks.       \"Your urine test results indicate that there was a moderate amount of mixed bacteria in the urine. This can be a normal lab finding, however, given your urinary symptoms and the fact that your flank pain has improved since starting the medication, I would like you to continue taking the antibiotic to completion. Please return to the clinic if your symptoms do not improve or if they get worse.\"     Thank you so much. Please let me know if you have any questions or concerns.    Resulted Orders   UA reflex to Microscopic   Result Value Ref Range    Color Urine Yellow Colorless, Straw, Light Yellow, Yellow    Appearance Urine Cloudy (A) Clear    Glucose Urine Negative Negative mg/dL    Bilirubin Urine Negative Negative    Ketones Urine Negative Negative mg/dL    Specific Gravity Urine 1.020 1.005 - 1.030    Blood Urine Moderate (A) Negative    pH Urine 6.0 5.0 - 8.0    Protein Albumin Urine >=300 (A) Negative mg/dL    Urobilinogen Urine 0.2 0.2, 1.0 E.U./dL    Nitrite Urine Negative Negative    Leukocyte Esterase Urine Large (A) Negative   Urine Microscopic Exam   Result Value Ref Range    Bacteria Urine Few (A) None Seen /HPF    RBC Urine None Seen 0-2 /HPF /HPF    WBC Urine >100 (A) 0-5 /HPF /HPF    Squamous Epithelials Urine None Seen None Seen /LPF    WBC Clumps Urine Present (A) None Seen /HPF   Urine Culture   Result Value Ref Range    Culture 50,000-100,000 CFU/mL Mixture of urogenital richar        If you have any questions or concerns, please call the clinic at the number listed above.       Sincerely,      Ike Pizarro MD          "

## 2022-10-06 ENCOUNTER — OFFICE VISIT (OUTPATIENT)
Dept: FAMILY MEDICINE | Facility: CLINIC | Age: 77
End: 2022-10-06
Payer: COMMERCIAL

## 2022-10-06 VITALS
DIASTOLIC BLOOD PRESSURE: 64 MMHG | RESPIRATION RATE: 16 BRPM | TEMPERATURE: 97.9 F | HEART RATE: 97 BPM | BODY MASS INDEX: 21.96 KG/M2 | SYSTOLIC BLOOD PRESSURE: 98 MMHG | WEIGHT: 106.2 LBS | OXYGEN SATURATION: 100 %

## 2022-10-06 DIAGNOSIS — N30.01 ACUTE CYSTITIS WITH HEMATURIA: Primary | ICD-10-CM

## 2022-10-06 DIAGNOSIS — Z23 NEED FOR PROPHYLACTIC VACCINATION AND INOCULATION AGAINST INFLUENZA: ICD-10-CM

## 2022-10-06 LAB
ALBUMIN UR-MCNC: 100 MG/DL
APPEARANCE UR: ABNORMAL
BACTERIA #/AREA URNS HPF: ABNORMAL /HPF
BACTERIA UR CULT: NORMAL
BILIRUB UR QL STRIP: NEGATIVE
COLOR UR AUTO: YELLOW
GLUCOSE UR STRIP-MCNC: 500 MG/DL
HGB UR QL STRIP: ABNORMAL
KETONES UR STRIP-MCNC: NEGATIVE MG/DL
LEUKOCYTE ESTERASE UR QL STRIP: ABNORMAL
NITRATE UR QL: NEGATIVE
PH UR STRIP: 5.5 [PH] (ref 5–8)
RBC #/AREA URNS AUTO: ABNORMAL /HPF
SP GR UR STRIP: 1.01 (ref 1–1.03)
SQUAMOUS #/AREA URNS AUTO: ABNORMAL /LPF
UROBILINOGEN UR STRIP-ACNC: 0.2 E.U./DL
WBC #/AREA URNS AUTO: >100 /HPF
WBC CLUMPS #/AREA URNS HPF: PRESENT /HPF

## 2022-10-06 PROCEDURE — 90471 IMMUNIZATION ADMIN: CPT | Performed by: STUDENT IN AN ORGANIZED HEALTH CARE EDUCATION/TRAINING PROGRAM

## 2022-10-06 PROCEDURE — 81001 URINALYSIS AUTO W/SCOPE: CPT | Performed by: STUDENT IN AN ORGANIZED HEALTH CARE EDUCATION/TRAINING PROGRAM

## 2022-10-06 PROCEDURE — 87086 URINE CULTURE/COLONY COUNT: CPT | Performed by: STUDENT IN AN ORGANIZED HEALTH CARE EDUCATION/TRAINING PROGRAM

## 2022-10-06 PROCEDURE — 99213 OFFICE O/P EST LOW 20 MIN: CPT | Mod: 25 | Performed by: STUDENT IN AN ORGANIZED HEALTH CARE EDUCATION/TRAINING PROGRAM

## 2022-10-06 PROCEDURE — 90662 IIV NO PRSV INCREASED AG IM: CPT | Performed by: STUDENT IN AN ORGANIZED HEALTH CARE EDUCATION/TRAINING PROGRAM

## 2022-10-06 RX ORDER — PHENAZOPYRIDINE HYDROCHLORIDE 200 MG/1
200 TABLET, FILM COATED ORAL 3 TIMES DAILY PRN
Status: CANCELLED | OUTPATIENT
Start: 2022-10-06

## 2022-10-06 NOTE — PROGRESS NOTES
Assessment and Plan   Emmanuel was seen today for uti.  Patient was diagnosed recently with a UTI and started on ciprofloxacin 500 mg twice daily on 10/4.  Patient still having symptoms of dysuria but no CVA tenderness or fever.  Continue same treatment, if symptoms worsening, or started having fever, recommended follow-up    Diagnoses and all orders for this visit:    Acute cystitis with hematuria  -     UA reflex to Microscopic  -     Urine Microscopic Exam   Continue Cipro 500 mg twice daily  Other orders  -     FLU VAC PRESRV FREE QUAD SPLIT VIR 3+YRS IM             Options for treatment and follow-up care were reviewed with the patient. Patient engaged in the decision making process and verbalized understanding of the options discussed and agreed with the final plan.    Patient was staffed with attending physician MD Isrrael Leon MD PGY3           HPI       Emmanuel Vences is a 77 year old year old female w/ PMH of   Patient Active Problem List   Diagnosis     Health Care Home     Esophageal reflux     Osteoarthrosis, unspecified whether generalized or localized, involving lower leg     Lumbosacral spondylosis without myelopathy     Hyperlipidemia     Type 2 diabetes mellitus with hyperglycemia, without long-term current use of insulin (H)     Seasonal allergies     Microalbuminuria     Age-related osteoporosis without current pathological fracture     Chronic kidney disease, stage 1     Major depressive disorder, recurrent episode, mild (H)     Dehydration     Hyponatremia     Acute kidney insufficiency     Acute cystitis with hematuria     Sepsis, due to unspecified organism, unspecified whether acute organ dysfunction present (H)     Acute streptococcal pharyngitis    who presents for UTI follow-up.  Patient was seen recently on 10/4 and was diagnosed with urinary tract infection.  During today's visit patient still having dysuria but denies biliuria, fever/chills, or back pain.  Her urine  color between yellowish and sometimes red.  Also, endorses lower abdominal pain.  Denies nausea, vomiting, constipation, or diarrhea.        A [employer]  was used for  this visit.    +++++++    Problem, Medication and Allergy Lists were   reviewed and updated if needed.     Patient Active Problem List    Diagnosis Date Noted     Acute streptococcal pharyngitis 05/16/2022     Priority: Medium     Dehydration 05/15/2022     Priority: Medium     Hyponatremia 05/15/2022     Priority: Medium     Acute kidney insufficiency 05/15/2022     Priority: Medium     Acute cystitis with hematuria 05/15/2022     Priority: Medium     Sepsis, due to unspecified organism, unspecified whether acute organ dysfunction present (H) 05/15/2022     Priority: Medium     Major depressive disorder, recurrent episode, mild (H) 02/16/2022     Priority: Medium     Chronic kidney disease, stage 1 12/14/2021     Priority: Medium     Age-related osteoporosis without current pathological fracture 08/31/2020     Priority: Medium     Microalbuminuria 09/26/2016     Priority: Medium     Seasonal allergies 05/06/2014     Priority: Medium     Type 2 diabetes mellitus with hyperglycemia, without long-term current use of insulin (H) 05/22/2013     Priority: Medium     Hyperlipidemia 02/21/2013     Priority: Medium     Health Care Home 02/19/2013     Priority: Medium     Tier Level: 1    DX V65.8 REPLACED WITH 24388 HEALTH CARE HOME (04/08/2013)       Esophageal reflux 02/19/2013     Priority: Medium     Osteoarthrosis, unspecified whether generalized or localized, involving lower leg 02/19/2013     Priority: Medium     Problem list name updated by automated process. Provider to review  Replacing diagnoses that were inactivated after the 10/1/2021 regulatory import.       Lumbosacral spondylosis without myelopathy 02/19/2013     Priority: Medium       Patient is an established patient of this clinic.         Review of Systems:   10 point ROS negative  other than as specified above.         Physical Exam:   BP 98/64   Pulse 97   Temp 97.9  F (36.6  C) (Oral)   Resp 16   Wt 48.2 kg (106 lb 3.2 oz)   SpO2 100%   BMI 21.96 kg/m      Vitals were reviewed and were normal     Exam:  Constitutional: healthy, alert, no distress, and cooperative  Head: Normocephalic. No masses, lesions, tenderness or abnormalities  Neck: Neck supple. No adenopathy.  ENT: throat normal without erythema or exudate  Cardiovascular: RRR w/o audible murmur  Respiratory: bilateral clear lungs w/o wheezing, crackles or rhonchi; breathing comfortably on RA  Gastrointestinal: Abdomen soft, suprapubic tenderness, BS normal.negative CVA bilateral  : Deferred   Musculoskeletal: extremities normal- no gross deformities noted, gait normal, and normal muscle tone  Skin: no suspicious lesions or rashes  Neurologic: grossly normal CN; normal strength, sensation, & tone  Psychiatric: mentation appears normal and affect normal/bright        Results:      Results from this visit  Results for orders placed or performed in visit on 10/06/22   UA reflex to Microscopic     Status: Abnormal   Result Value Ref Range    Color Urine Yellow Colorless, Straw, Light Yellow, Yellow    Appearance Urine Cloudy (A) Clear    Glucose Urine 500  (A) Negative mg/dL    Bilirubin Urine Negative Negative    Ketones Urine Negative Negative mg/dL    Specific Gravity Urine 1.015 1.005 - 1.030    Blood Urine Moderate (A) Negative    pH Urine 5.5 5.0 - 8.0    Protein Albumin Urine 100  (A) Negative mg/dL    Urobilinogen Urine 0.2 0.2, 1.0 E.U./dL    Nitrite Urine Negative Negative    Leukocyte Esterase Urine Moderate (A) Negative   Urine Microscopic Exam     Status: Abnormal   Result Value Ref Range    Bacteria Urine Few (A) None Seen /HPF    RBC Urine None Seen 0-2 /HPF /HPF    WBC Urine >100 (A) 0-5 /HPF /HPF    Squamous Epithelials Urine Few (A) None Seen /LPF    WBC Clumps Urine Present (A) None Seen /HPF

## 2022-10-07 ENCOUNTER — TELEPHONE (OUTPATIENT)
Dept: FAMILY MEDICINE | Facility: CLINIC | Age: 77
End: 2022-10-07

## 2022-10-07 NOTE — TELEPHONE ENCOUNTER
"----- Message from Tr Garcia DO sent at 10/6/2022  3:53 PM CDT -----  Hello,    Can you please call this patient and inform her of her results.    \"Your urine test results indicate that there was a moderate amount of mixed bacteria in the urine. This can be a normal lab finding, however, given your urinary symptoms and the fact that your flank pain has improved since starting the medication, I would like you to continue taking the antibiotic to completion. Please return to the clinic if your symptoms do not improve or if they get worse.\"    Thank you so much. Please let me know if you have any questions or concerns.    Tr Garcia DO    "

## 2022-10-07 NOTE — TELEPHONE ENCOUNTER
Patient states she is feeling better today she will continue the antibiotics and will call clinic if her symptoms worsen    #[id number]    shavon

## 2022-10-08 LAB — BACTERIA UR CULT: NO GROWTH

## 2022-10-11 DIAGNOSIS — R80.9 MICROALBUMINURIA: ICD-10-CM

## 2022-10-11 RX ORDER — LISINOPRIL 2.5 MG/1
TABLET ORAL
Qty: 30 TABLET | Refills: 3 | Status: SHIPPED | OUTPATIENT
Start: 2022-10-11 | End: 2023-01-23

## 2022-10-12 ENCOUNTER — OFFICE VISIT (OUTPATIENT)
Dept: FAMILY MEDICINE | Facility: CLINIC | Age: 77
End: 2022-10-12
Payer: COMMERCIAL

## 2022-10-12 VITALS
SYSTOLIC BLOOD PRESSURE: 105 MMHG | RESPIRATION RATE: 18 BRPM | DIASTOLIC BLOOD PRESSURE: 68 MMHG | TEMPERATURE: 98.4 F | WEIGHT: 107.2 LBS | HEART RATE: 94 BPM | OXYGEN SATURATION: 98 % | BODY MASS INDEX: 22.17 KG/M2

## 2022-10-12 DIAGNOSIS — E11.65 TYPE 2 DIABETES MELLITUS WITH HYPERGLYCEMIA, WITHOUT LONG-TERM CURRENT USE OF INSULIN (H): Primary | ICD-10-CM

## 2022-10-12 PROCEDURE — 99214 OFFICE O/P EST MOD 30 MIN: CPT | Performed by: STUDENT IN AN ORGANIZED HEALTH CARE EDUCATION/TRAINING PROGRAM

## 2022-10-12 RX ORDER — LIRAGLUTIDE 6 MG/ML
INJECTION SUBCUTANEOUS
Qty: 9 ML | Refills: 3 | Status: SHIPPED | OUTPATIENT
Start: 2022-10-12 | End: 2022-12-16

## 2022-10-12 NOTE — PROGRESS NOTES
Chief Complaint   Patient presents with     Diabetes     DM check in        There are no exam notes on file for this visit.        Assessment/Plan:  Emmanuel Vences is a 77 year old female here for Diabetes follow up, with controlled FBGs (even falling to hypoglycemic range) on small doses of lantus (14 units HS), but extremely high postprandial BGs to the 300s on a combination of Januvia and metformin.    - Discontinue Januvia in favor of Victoza - discussed SE and pt in agreement with trial  - Decrease lantus to 12 units daily given hypoglycemia  - Given well controlled BGs fasting and postprandial hyperglycemia, if improved postprandial control not seen with transition to Victoza plan to check C-peptide levels and initiate mealtime insulin.  - Follow up 4 weeks    Near future: Reassess depression    Emmanuel was seen today for diabetes.    Diagnoses and all orders for this visit:    Type 2 diabetes mellitus with hyperglycemia, without long-term current use of insulin (H)  -     liraglutide (VICTOZA) 18 MG/3ML solution; Inject 0.6 mg Subcutaneous daily for 7 days, THEN 1.2 mg daily.      Future Appointments   Date Time Provider Department Center   11/15/2022  3:30 PM Kaylin Joe MD [facility]         Kaylin Joe MD      Patient Instructions   Please decrease lantus from 14 to 12 units of lantus.    Please start victoza 0.6mg daily for one week, then increase to 1.2mg daily.     STOP Januvia daily once you start the Victoza.       Subjective:  Emmanuel Vences is a 77 year old female here for follow up.    Urinary sx improved.  No dysuria, no blood in urine.  No bowel symptoms. Normal stools.     She has been feeling some sx of hyperglycemia - no excessive thirst.   Feels sweaty sometimes though - possibly hypoglycemia symptom    Insulin - Taking, no problems with the injection.  Injecting 14 units HS. Using Freestyle Tate as instructed.    Started rosuvastatin 40mg in place of atorvastatin 80mg.  Tolerating well.         Patient Active Problem List   Diagnosis     Health Care Home     Esophageal reflux     Osteoarthrosis, unspecified whether generalized or localized, involving lower leg     Lumbosacral spondylosis without myelopathy     Hyperlipidemia     Type 2 diabetes mellitus with hyperglycemia, without long-term current use of insulin (H)     Seasonal allergies     Microalbuminuria     Age-related osteoporosis without current pathological fracture     Chronic kidney disease, stage 1     Major depressive disorder, recurrent episode, mild (H)     Dehydration     Hyponatremia     Acute kidney insufficiency     Acute cystitis with hematuria     Sepsis, due to unspecified organism, unspecified whether acute organ dysfunction present (H)     Acute streptococcal pharyngitis       Current Outpatient Medications   Medication     acetaminophen (TYLENOL) 500 MG tablet     alcohol swab prep pads     alendronate (FOSAMAX) 70 MG tablet     aspirin (ASPIRIN LOW DOSE) 81 MG EC tablet     blood glucose (NO BRAND SPECIFIED) lancets standard     blood glucose (NO BRAND SPECIFIED) test strip     calcium carb-cholecalciferol (OS-MAME) 500-200 MG-UNIT tablet     capsaicin (ZOSTRIX) 0.025 % external cream     cetirizine (ZYRTEC) 10 MG tablet     Continuous Blood Gluc Sensor (FREESTYLE PRECIOUS 2 SENSOR) MISC     diclofenac (VOLTAREN) 1 % topical gel     insulin glargine (LANTUS SOLOSTAR) 100 UNIT/ML pen     insulin pen needle (ULTICARE MICRO) 32G X 4 MM miscellaneous     liraglutide (VICTOZA) 18 MG/3ML solution     lisinopril (ZESTRIL) 2.5 MG tablet     metFORMIN (GLUCOPHAGE XR) 500 MG 24 hr tablet     Multiple Vitamins-Minerals (CEROVITE SENIOR) TABS     rosuvastatin (CRESTOR) 40 MG tablet     No current facility-administered medications for this visit.       Objective:  /68   Pulse 94   Temp 98.4  F (36.9  C) (Oral)   Resp 18   Wt 48.6 kg (107 lb 3.2 oz)   SpO2 98%   BMI 22.17 kg/m    Body mass index is 22.17 kg/m .  Gen: A/O x3, in  NAD.  Cardio: S1, S2, no MRG. RRR.  Resp: CTAB, no WRR.  Neuro: Grossly intact.

## 2022-10-12 NOTE — PATIENT INSTRUCTIONS
Please decrease lantus from 14 to 12 units of lantus.    Please start victoza 0.6mg daily for one week, then increase to 1.2mg daily.     STOP Januvia daily once you start the Victoza.

## 2022-10-12 NOTE — RESULT ENCOUNTER NOTE
Attempted to call patient, but no answer. Will try to call again.  Also mail out result to patient.

## 2022-10-12 NOTE — NURSING NOTE
Due to patient being non-English speaking/uses sign language, an  was used for this visit. Only for face-to-face interpretation by an external agency, date and length of interpretation can be found on the scanned worksheet.     name: Neymar Pham  Agency: [employer]  Language: Yasmin   Telephone number: [phone]  Type of interpretation: Face-to-face, spoken

## 2022-10-18 ENCOUNTER — MEDICAL CORRESPONDENCE (OUTPATIENT)
Dept: HEALTH INFORMATION MANAGEMENT | Facility: CLINIC | Age: 77
End: 2022-10-18

## 2022-10-19 ENCOUNTER — DOCUMENTATION ONLY (OUTPATIENT)
Dept: FAMILY MEDICINE | Facility: CLINIC | Age: 77
End: 2022-10-19

## 2022-10-20 NOTE — PROGRESS NOTES
To be completed in Nursing note:    Please reference list for forms that require a visit for completion.  Please remind patients that providers are given 3-5 business days to complete and return forms.      Form type: [facility] ~ PLAN OF CARE UPDATES  ORDER NO: [id number]    Date form received: 10/12/2022    Date form completed by Physician: 10/18/2022    How was form returned to patient (mailed, faxed, or at  for patient to ):    Fax to [phone]    Date form mailed/faxed/left at  for patient and sent to HIM for scanning:    Fax on 10/19/2022    Once form is left for patient, faxed, or mailed PCS will then close the documentation only encounter.

## 2022-11-01 ENCOUNTER — DOCUMENTATION ONLY (OUTPATIENT)
Dept: FAMILY MEDICINE | Facility: CLINIC | Age: 77
End: 2022-11-01

## 2022-11-04 NOTE — PROGRESS NOTES
To be completed in Nursing note:    Please reference list for forms that require a visit for completion.  Please remind patients that providers are given 3-5 business days to complete and return forms.      Form type: [facility] HOME HEALTH CERTIFICATION AND PLAN OF CARE 10/20/2022 TO 12/18/2022    Date form received: 10/26/2022    Date form completed by Physician: 11/1/2022    How was form returned to patient (mailed, faxed, or at  for patient to ):    Fax to [phone]    Date form mailed/faxed/left at  for patient and sent to HIM for scanning:    Fax on 11/1/2022    Once form is left for patient, faxed, or mailed PCS will then close the documentation only encounter.

## 2022-11-15 ENCOUNTER — OFFICE VISIT (OUTPATIENT)
Dept: FAMILY MEDICINE | Facility: CLINIC | Age: 77
End: 2022-11-15
Payer: COMMERCIAL

## 2022-11-15 VITALS
HEART RATE: 96 BPM | RESPIRATION RATE: 20 BRPM | WEIGHT: 115 LBS | OXYGEN SATURATION: 99 % | SYSTOLIC BLOOD PRESSURE: 107 MMHG | BODY MASS INDEX: 23.78 KG/M2 | TEMPERATURE: 98.3 F | DIASTOLIC BLOOD PRESSURE: 72 MMHG

## 2022-11-15 DIAGNOSIS — E11.65 TYPE 2 DIABETES MELLITUS WITH HYPERGLYCEMIA, WITHOUT LONG-TERM CURRENT USE OF INSULIN (H): Primary | ICD-10-CM

## 2022-11-15 LAB
ANION GAP SERPL CALCULATED.3IONS-SCNC: 11 MMOL/L (ref 7–15)
BUN SERPL-MCNC: 14.6 MG/DL (ref 8–23)
CALCIUM SERPL-MCNC: 9.8 MG/DL (ref 8.8–10.2)
CHLORIDE SERPL-SCNC: 100 MMOL/L (ref 98–107)
CREAT SERPL-MCNC: 1.38 MG/DL (ref 0.51–0.95)
DEPRECATED HCO3 PLAS-SCNC: 24 MMOL/L (ref 22–29)
GFR SERPL CREATININE-BSD FRML MDRD: 39 ML/MIN/1.73M2
GLUCOSE SERPL-MCNC: 368 MG/DL (ref 70–99)
POTASSIUM SERPL-SCNC: 4.5 MMOL/L (ref 3.4–5.3)
SODIUM SERPL-SCNC: 135 MMOL/L (ref 136–145)

## 2022-11-15 PROCEDURE — 36415 COLL VENOUS BLD VENIPUNCTURE: CPT | Performed by: STUDENT IN AN ORGANIZED HEALTH CARE EDUCATION/TRAINING PROGRAM

## 2022-11-15 PROCEDURE — 84681 ASSAY OF C-PEPTIDE: CPT | Performed by: STUDENT IN AN ORGANIZED HEALTH CARE EDUCATION/TRAINING PROGRAM

## 2022-11-15 PROCEDURE — 80048 BASIC METABOLIC PNL TOTAL CA: CPT | Performed by: STUDENT IN AN ORGANIZED HEALTH CARE EDUCATION/TRAINING PROGRAM

## 2022-11-15 PROCEDURE — 99214 OFFICE O/P EST MOD 30 MIN: CPT | Mod: GC | Performed by: STUDENT IN AN ORGANIZED HEALTH CARE EDUCATION/TRAINING PROGRAM

## 2022-11-15 NOTE — PROGRESS NOTES
Assessment & Plan     Type 2 diabetes mellitus with hyperglycemia, without long-term current use of insulin (H)  Emmanuel Vences is a 77-year-old woman presenting for diabetes check.  Last A1c 10.29 2122.  Current regimen Lantus 12 units once daily plus metformin.  Was started on Victoza at her last visit, but had significant side effects including vomiting so she has not continued this medication.  She denies any further low blood sugars in the mornings.  Unfortunately, benita system down.  Review of the benita meter shows 7-day average between midnight and 6 AM of 149, with much higher sugars throughout the day averaging 245-259.  Discussed other alternatives with patient, including restarting Januvia.  Patient states she does not want to do anything different, and wants to continue metformin and Lantus.  Recommended following up with PCP in 1 month.  Did collect BMP and C-peptide today.  - Basic metabolic panel  - C-peptide  - Basic metabolic panel  - C-peptide    Diagnosis or treatment significantly limited by social determinants of health - Limited income, low health literacy, language barrier    Patient was discussed with attending physician, Dr. Maurice Kimble MD, who agrees with the assessment and plan.    Thais Alejandra MD, PGY-3  [facility]  11/15/2022      Subjective   Emmanuel Vneces is a 77 year old female who presents for the following health issues  accompanied by her  and family member    Chief Complaint   Patient presents with     Diabetes     DM     Medication Problem     Having vomiting and side effect when taking the victoza insuline      Last seen 10/12/2022.  Fasting blood sugars were controlled to hypoglycemic on 14 units of Lantus, which was decreased to 12 units daily.  However, she was having extremely high postprandial blood sugars into the 300s on Januvia and metformin.  Januvia was discontinued.  Victoza was started.    Today, reports she had side effects from  Victoza. A little while after getting the injection, she got burning sensation in her throat, nausea, and then vomiting. She took the medication 3 times. The symptoms occurred every time after taking the medication. Symptoms occurred about 30 minutes after taking the medication.     Currently taking Lantus 12 units daily and metformin 1 tab in AM and 1 in PM.     Blood sugars have been up and down. Numbers are lower in the morning but higher in the daytime or afternoon. She thinks the sugars after eating are lower than they were at the previous visit.     Average glucoses in 7 days:  12 am: 149  6 am: 169  12 pm: 245  6 pm: 259    She would like to continue taking metformin and insulin as she is. She does not want to restart Januvia. She does not want to retry Victoza given her side effects.         Objective    Vitals:    11/15/22 1540   BP: 107/72   Pulse: 96   Resp: 20   Temp: 98.3  F (36.8  C)   TempSrc: Tympanic   SpO2: 99%   Weight: 52.2 kg (115 lb)     Body mass index is 23.78 kg/m .  Physical Exam   General: alert, appears comfortable, no acute distress  HEENT: atraumatic, conjunctiva clear without erythema, EOM's intact, mask in place  Neck: supple  Cardiac: normal rate and rhythm with no murmurs or extra sounds  Resp: lungs clear to auscultation bilaterally with no crackles or wheezes, no increased work of breathing  Skin: no rashes or suspicious legions on exposed skin  Neuro: CN's grossly intact  Psych: affect congruent with mood

## 2022-11-15 NOTE — PROGRESS NOTES
Preceptor Attestation:    I discussed the patient with the resident and evaluated the patient in person. I have verified the content of the note, which accurately reflects my assessment of the patient and the plan of care.   Supervising Physician:  Maurice Kimble MD.

## 2022-11-16 LAB — C PEPTIDE SERPL-MCNC: 10.8 NG/ML (ref 0.9–6.9)

## 2022-12-06 ENCOUNTER — MEDICAL CORRESPONDENCE (OUTPATIENT)
Dept: HEALTH INFORMATION MANAGEMENT | Facility: CLINIC | Age: 77
End: 2022-12-06

## 2022-12-16 ENCOUNTER — OFFICE VISIT (OUTPATIENT)
Dept: FAMILY MEDICINE | Facility: CLINIC | Age: 77
End: 2022-12-16
Payer: COMMERCIAL

## 2022-12-16 ENCOUNTER — DOCUMENTATION ONLY (OUTPATIENT)
Dept: FAMILY MEDICINE | Facility: CLINIC | Age: 77
End: 2022-12-16

## 2022-12-16 VITALS
TEMPERATURE: 98 F | SYSTOLIC BLOOD PRESSURE: 103 MMHG | DIASTOLIC BLOOD PRESSURE: 65 MMHG | WEIGHT: 112.4 LBS | BODY MASS INDEX: 23.24 KG/M2 | RESPIRATION RATE: 18 BRPM | HEART RATE: 100 BPM | OXYGEN SATURATION: 99 %

## 2022-12-16 DIAGNOSIS — M54.50 CHRONIC BILATERAL LOW BACK PAIN WITHOUT SCIATICA: ICD-10-CM

## 2022-12-16 DIAGNOSIS — M17.0 PRIMARY OSTEOARTHRITIS OF BOTH KNEES: ICD-10-CM

## 2022-12-16 DIAGNOSIS — E11.65 TYPE 2 DIABETES MELLITUS WITH HYPERGLYCEMIA, WITHOUT LONG-TERM CURRENT USE OF INSULIN (H): Primary | ICD-10-CM

## 2022-12-16 DIAGNOSIS — G89.29 CHRONIC BILATERAL LOW BACK PAIN WITHOUT SCIATICA: ICD-10-CM

## 2022-12-16 PROBLEM — N28.9 ACUTE KIDNEY INSUFFICIENCY: Status: RESOLVED | Noted: 2022-05-15 | Resolved: 2022-12-16

## 2022-12-16 PROBLEM — J02.0 ACUTE STREPTOCOCCAL PHARYNGITIS: Status: RESOLVED | Noted: 2022-05-16 | Resolved: 2022-12-16

## 2022-12-16 PROCEDURE — 99214 OFFICE O/P EST MOD 30 MIN: CPT | Performed by: STUDENT IN AN ORGANIZED HEALTH CARE EDUCATION/TRAINING PROGRAM

## 2022-12-16 RX ORDER — SITAGLIPTIN 100 MG/1
100 TABLET, FILM COATED ORAL DAILY
COMMUNITY
Start: 2022-12-06 | End: 2022-12-16

## 2022-12-16 NOTE — PROGRESS NOTES
Briefly met with patient.  She was willing to try Jardiance 10 mg daily.  She was not willing to increase her Lantus insulin today.  We called [facility] and stopped them automatically sending her the Januvia and Victoza.  Michelle Marshall, YovaniD

## 2022-12-16 NOTE — PROGRESS NOTES
Chief Complaint   Patient presents with     Diabetes     DM follow up     Back Pain     Burning pain on lower back and already told the provider last time.        There are no exam notes on file for this visit.        Assessment/Plan:  Emmanuel Vences is a 77 year old female here for follow-up diabetes mellitus and patient with poorly controlled type 2 diabetes, also seeing clinical pharmacy today for diabetes care.  She has been difficult to engage in care for diabetes due to initial reluctance to utilize insulin and now later on intolerance of trial of medication including UTI while taking an SGLT2 inhibitor and nausea with vomiting after recent trial of a GLP-1 agonist.  We have discontinued the Victoza that she was taking briefly.  She declines to restart Januvia 100 mg daily which she was taking with some success previously.  She has been discontinued on glipizide which she was taking previously.  We will trial empagliflozin 10 mg daily for 1 month with plan to increase to 25 mg daily thereafter given that her recent hospitalization was due to a combination of UTI with strep pharyngitis.  We will monitor closely for tolerance of the medication and recurrence of UTI symptoms.  Plan to follow-up in 4 weeks.  If tolerating well we will increase to 25 mg dosage.    Regarding her low back pain, it is focal and associated with lordosis, suspect that this is musculoskeletal pain related to postural changes associated with aging.  The patient gets some relief from topical therapies so I encouraged her to continue to use these.  She declines physical therapy or any intervention that might improve the cause of her pain.  Anticipate that this will continue to progress over time.    Emmanuel was seen today for diabetes and back pain.    Diagnoses and all orders for this visit:    Type 2 diabetes mellitus with hyperglycemia, without long-term current use of insulin (H)  -     empagliflozin (JARDIANCE) 10 MG TABS tablet; Take 1 tablet  (10 mg) by mouth daily    Chronic bilateral low back pain without sciatica    Primary osteoarthritis of both knees        Future Appointments   Date Time Provider Department Center   1/17/2023  4:10 PM Kaylin Joe MD [facility] [place]       Kaylin MADDEN. MD [place]      There are no Patient Instructions on file for this visit.    Subjective:  Emmanuel Vences is a 77 year old female here for follow up visit.   States she has low back pain - a burning sensation in the low back. She is unsure how to make it go away.  The pain is focal, denies radicular symptoms down either leg.  Denies dysuria or frequency.  Denies constipation, bloody bowel movements.  When it happens, it is quite bothersome.  When she has this she mixes traditional medicine which helps cool it down. Says other creams she has been given have been unhelpful. Asked about whether she could like some exercises to do at home and she states she does not particularly want to she could do them.  Is not looking for anything in particular out of this appointment with regard to her back pain.    Regarding her diabetes she has been taking Lantus 12 units at bedtime as prescribed.  She has also been taking metformin 2000 mg daily with dinner.  Trial of Victoza and reports severe nausea with vomiting 3 times and then decided to discontinue use.  He also discontinued the Januvia in order to trial Victoza but declines restarting Januvia today.  He reports feeling otherwise well and has no complaints.  States that she is like an aging car that rattles a little bit now but is still running.      Patient Active Problem List   Diagnosis     Health Care Home     Esophageal reflux     Osteoarthrosis, unspecified whether generalized or localized, involving lower leg     Lumbosacral spondylosis without myelopathy     Hyperlipidemia     Type 2 diabetes mellitus with hyperglycemia, without long-term current use of insulin (H)     Seasonal allergies     Microalbuminuria      Age-related osteoporosis without current pathological fracture     Chronic kidney disease, stage 1     Major depressive disorder, recurrent episode, mild (H)     Dehydration     Hyponatremia     Acute cystitis with hematuria     Sepsis, due to unspecified organism, unspecified whether acute organ dysfunction present (H)       Current Outpatient Medications   Medication     acetaminophen (TYLENOL) 500 MG tablet     alcohol swab prep pads     alendronate (FOSAMAX) 70 MG tablet     aspirin (ASPIRIN LOW DOSE) 81 MG EC tablet     blood glucose (NO BRAND SPECIFIED) lancets standard     blood glucose (NO BRAND SPECIFIED) test strip     calcium carb-cholecalciferol (OS-MAME) 500-200 MG-UNIT tablet     capsaicin (ZOSTRIX) 0.025 % external cream     cetirizine (ZYRTEC) 10 MG tablet     Continuous Blood Gluc Sensor (FREESTYLE PRECIOUS 2 SENSOR) MISC     diclofenac (VOLTAREN) 1 % topical gel     empagliflozin (JARDIANCE) 10 MG TABS tablet     insulin glargine (LANTUS SOLOSTAR) 100 UNIT/ML pen     insulin pen needle (ULTICARE MICRO) 32G X 4 MM miscellaneous     lisinopril (ZESTRIL) 2.5 MG tablet     metFORMIN (GLUCOPHAGE XR) 500 MG 24 hr tablet     Multiple Vitamins-Minerals (CEROVITE SENIOR) TABS     rosuvastatin (CRESTOR) 40 MG tablet     No current facility-administered medications for this visit.       Objective:  /65 (BP Location: Right arm, Patient Position: Sitting, Cuff Size: Adult Small)   Pulse 100   Temp 98  F (36.7  C) (Tympanic)   Resp 18   Wt 51 kg (112 lb 6.4 oz)   SpO2 99%   BMI 23.24 kg/m    Body mass index is 23.24 kg/m .  Gen: A/O x3, in NAD.  Cardio: S1, S2, no MRG. RRR.  Resp: CTAB, no WRR.  MSK: With bilateral paraspinous and lumbar spine musculature tenderness, with lordosis.  Neuro: Grossly intact.

## 2022-12-16 NOTE — PROGRESS NOTES
To be completed in Nursing note:    Please reference list for forms that require a visit for completion.  Please remind patients that providers are given 3-5 business days to complete and return forms.      Form type: [facility] ~ REPORT OF PARTICIPANT PHYSICAL EXAMINATION     Date form received: 11/17/2022    Date form completed by Physician: 12/16/2022    How was form returned to patient (mailed, faxed, or at  for patient to ):    Fax to [phone]    Date form mailed/faxed/left at  for patient and sent to HIM for scanning:    Fax on 12/16/2022    Once form is left for patient, faxed, or mailed PCS will then close the documentation only encounter.

## 2022-12-26 ENCOUNTER — DOCUMENTATION ONLY (OUTPATIENT)
Dept: FAMILY MEDICINE | Facility: CLINIC | Age: 77
End: 2022-12-26

## 2023-01-05 ENCOUNTER — DOCUMENTATION ONLY (OUTPATIENT)
Dept: FAMILY MEDICINE | Facility: CLINIC | Age: 78
End: 2023-01-05
Payer: COMMERCIAL

## 2023-01-05 NOTE — PROGRESS NOTES
To be completed in Nursing note:    Please reference list for forms that require a visit for completion.  Please remind patients that providers are given 3-5 business days to complete and return forms.      Form type: REPORT OF PARTICIPANT PHYSICAL EXAMINATION ~ [facility]     Date form received: 1/3/2023    Date form completed by Physician: 1/17/2023 got it back    How was form returned to patient (mailed, faxed, or at  for patient to ):    Fax to [phone]    Date form mailed/faxed/left at  for patient and sent to HIM for scanning:    Fax on 1/18/2023    Once form is left for patient, faxed, or mailed PCS will then close the documentation only encounter.

## 2023-01-11 DIAGNOSIS — M81.0 AGE-RELATED OSTEOPOROSIS WITHOUT CURRENT PATHOLOGICAL FRACTURE: ICD-10-CM

## 2023-01-12 RX ORDER — ASPIRIN 81 MG
TABLET, DELAYED RELEASE (ENTERIC COATED) ORAL
Qty: 60 TABLET | Refills: 4 | Status: SHIPPED | OUTPATIENT
Start: 2023-01-12 | End: 2023-02-28

## 2023-01-17 ENCOUNTER — OFFICE VISIT (OUTPATIENT)
Dept: FAMILY MEDICINE | Facility: CLINIC | Age: 78
End: 2023-01-17
Payer: COMMERCIAL

## 2023-01-17 VITALS
RESPIRATION RATE: 16 BRPM | DIASTOLIC BLOOD PRESSURE: 72 MMHG | BODY MASS INDEX: 23.37 KG/M2 | SYSTOLIC BLOOD PRESSURE: 118 MMHG | HEART RATE: 97 BPM | WEIGHT: 113 LBS | TEMPERATURE: 98 F | OXYGEN SATURATION: 97 %

## 2023-01-17 DIAGNOSIS — E11.65 TYPE 2 DIABETES MELLITUS WITH HYPERGLYCEMIA, WITHOUT LONG-TERM CURRENT USE OF INSULIN (H): Primary | ICD-10-CM

## 2023-01-17 PROCEDURE — 99214 OFFICE O/P EST MOD 30 MIN: CPT | Performed by: STUDENT IN AN ORGANIZED HEALTH CARE EDUCATION/TRAINING PROGRAM

## 2023-01-17 NOTE — PROGRESS NOTES
No chief complaint on file.      There are no exam notes on file for this visit.        Assessment/Plan:  Emmanuel Vences is a 77 year old female here for follow-up diabetes, with improving control.  Based upon wearable glucose monitor her glucose management indicator is equivalent to hemoglobin A1c of 8.5%.  She was congratulated on her significantly improved blood sugars.  She has had no low blood sugars since she was last seen and does spend 37% of the time in the very high category.  We reviewed that it would be best to try to bring down some of her postprandial blood sugars closer to target range and may be able to best achieve this by increasing her Jardiance from 10 to 25 mg daily.  She reluctantly accepted this change and will trial this over the next 1 month.  She will continue all other medications the same with Lantus insulin at 12 units at bedtime and metformin 2000 mg daily.  At next visit I anticipate increasing her Lantus a bit further but will take eat to avoid hypoglycemic episodes.  Given her age and pre-existing microvascular disease a reasonable glucose goal is a hemoglobin A1c of less than 8%.    I also noted borderline tachycardia today which appears to be chronic for her.  She does not have other signs or symptoms of respiratory or cardiac disease.  We will continue to monitor her heart rate and for signs of fluid overload.    Diagnoses and all orders for this visit:    Type 2 diabetes mellitus with hyperglycemia, without long-term current use of insulin (H)  -     empagliflozin (JARDIANCE) 25 MG TABS tablet; Take 1 tablet (25 mg) by mouth daily        Follow-up diabetes in 4 weeks time.    Future Appointments   Date Time Provider Department [place]   2/22/2023  4:10 PM Kaylin Joe MD [facility]       Kaylin Joe MD      There are no Patient Instructions on file for this visit.    Subjective:  Emmanuel Vences is a 77 year old female here for follow up.  She is doing well.  She feels good on  current medication regimen and would like to continue as much the same as possible.  She is compliant on her current Jardiance 10 mg daily, metformin 2000 mg daily, Lantus 12 units at bedtime.    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  States that it is helpful to decrease her loneliness to be able to spend time with others.  We reviewed a questionnaire from her adult  at her [facility] and she clarified the following:  -She is intolerant of dairy  -She ambulates with a cane and feels stable with this, does not feel unstable or have any history of falls when using her cane  -She does not take medication independently.  It is set up by her son who then also administers medication to her in the morning and evening.    Reviewed mildly elevated pulse, patient denies feeling short of breath or having new or increasing swelling.        Patient Active Problem List   Diagnosis     Health Care Home     Esophageal reflux     Osteoarthrosis, unspecified whether generalized or localized, involving lower leg     Lumbosacral spondylosis without myelopathy     Hyperlipidemia     Type 2 diabetes mellitus with hyperglycemia, without long-term current use of insulin (H)     Seasonal allergies     Microalbuminuria     Age-related osteoporosis without current pathological fracture     Chronic kidney disease, stage 1     Major depressive disorder, recurrent episode, mild (H)     Dehydration     Hyponatremia     Acute cystitis with hematuria     Sepsis, due to unspecified organism, unspecified whether acute organ dysfunction present (H)       Current Outpatient Medications   Medication     empagliflozin (JARDIANCE) 25 MG TABS tablet     acetaminophen (TYLENOL) 500 MG tablet     alcohol swab prep pads     alendronate (FOSAMAX) 70 MG tablet     aspirin (ASPIRIN LOW DOSE) 81 MG EC tablet     blood glucose (NO BRAND SPECIFIED) lancets standard     blood glucose (NO BRAND  SPECIFIED) test strip     Calcium Carb-Cholecalciferol 600-5 MG-MCG TABS     capsaicin (ZOSTRIX) 0.025 % external cream     cetirizine (ZYRTEC) 10 MG tablet     Continuous Blood Gluc Sensor (FREESTYLE PRECIOUS 2 SENSOR) MISC     diclofenac (VOLTAREN) 1 % topical gel     insulin glargine (LANTUS SOLOSTAR) 100 UNIT/ML pen     insulin pen needle (ULTICARE MICRO) 32G X 4 MM miscellaneous     lisinopril (ZESTRIL) 2.5 MG tablet     metFORMIN (GLUCOPHAGE XR) 500 MG 24 hr tablet     Multiple Vitamins-Minerals (CEROVITE SENIOR) TABS     rosuvastatin (CRESTOR) 40 MG tablet     No current facility-administered medications for this visit.       Objective:  /72   Pulse 97   Temp 98  F (36.7  C) (Oral)   Resp 16   Wt 51.3 kg (113 lb)   SpO2 97%   BMI 23.37 kg/m    Body mass index is 23.37 kg/m .  Gen: A/O x3, in NAD.  Cardio: S1, S2, no MRG. RRR -pulse in the high 90s.  Resp: CTAB, no WRR.  Ext: No edema of BLE.   Neuro: Grossly intact.

## 2023-02-11 ENCOUNTER — TRANSFERRED RECORDS (OUTPATIENT)
Dept: HEALTH INFORMATION MANAGEMENT | Facility: CLINIC | Age: 78
End: 2023-02-11
Payer: COMMERCIAL

## 2023-02-11 LAB — RETINOPATHY: NEGATIVE

## 2023-02-23 DIAGNOSIS — L29.9 PRURITIC DISORDER: ICD-10-CM

## 2023-02-23 DIAGNOSIS — E11.65 TYPE 2 DIABETES MELLITUS WITH HYPERGLYCEMIA, WITHOUT LONG-TERM CURRENT USE OF INSULIN (H): ICD-10-CM

## 2023-02-24 DIAGNOSIS — Z53.9 DIAGNOSIS NOT YET DEFINED: Primary | ICD-10-CM

## 2023-02-27 ENCOUNTER — DOCUMENTATION ONLY (OUTPATIENT)
Dept: FAMILY MEDICINE | Facility: CLINIC | Age: 78
End: 2023-02-27
Payer: COMMERCIAL

## 2023-02-27 RX ORDER — CETIRIZINE HYDROCHLORIDE 10 MG/1
10 TABLET ORAL DAILY
Qty: 30 TABLET | Refills: 4 | Status: SHIPPED | OUTPATIENT
Start: 2023-02-27 | End: 2023-07-24

## 2023-02-27 RX ORDER — MULTIVIT-MIN/FA/LYCOPEN/LUTEIN .4-300-25
1 TABLET ORAL DAILY
Qty: 30 TABLET | Refills: 4 | Status: SHIPPED | OUTPATIENT
Start: 2023-02-27 | End: 2023-02-28

## 2023-02-27 NOTE — PROGRESS NOTES
To be completed in Nursing note:    Please reference list for forms that require a visit for completion.  Please remind patients that providers are given 3-5 business days to complete and return forms.      Form type: [facility] ~ HOME HEALTH CERTIFICATION AND PLAN OF CARE 2/17/2023 TO 4/17/2023    Date form received: 2/22/2023    Date form completed by Physician: 2/24/2023    How was form returned to patient (mailed, faxed, or at  for patient to ):    Fax to [phone]    Date form mailed/faxed/left at  for patient and sent to HIM for scanning:    Fax on 2/27/2023    Once form is left for patient, faxed, or mailed PCS will then close the documentation only encounter.

## 2023-02-28 ENCOUNTER — OFFICE VISIT (OUTPATIENT)
Dept: FAMILY MEDICINE | Facility: CLINIC | Age: 78
End: 2023-02-28
Payer: COMMERCIAL

## 2023-02-28 VITALS
SYSTOLIC BLOOD PRESSURE: 119 MMHG | HEART RATE: 89 BPM | WEIGHT: 113 LBS | TEMPERATURE: 98.1 F | RESPIRATION RATE: 16 BRPM | OXYGEN SATURATION: 100 % | BODY MASS INDEX: 23.37 KG/M2 | DIASTOLIC BLOOD PRESSURE: 71 MMHG

## 2023-02-28 DIAGNOSIS — E11.65 TYPE 2 DIABETES MELLITUS WITH HYPERGLYCEMIA, WITHOUT LONG-TERM CURRENT USE OF INSULIN (H): Primary | ICD-10-CM

## 2023-02-28 LAB
ANION GAP SERPL CALCULATED.3IONS-SCNC: 11 MMOL/L (ref 7–15)
BUN SERPL-MCNC: 15.6 MG/DL (ref 8–23)
CALCIUM SERPL-MCNC: 9.1 MG/DL (ref 8.8–10.2)
CHLORIDE SERPL-SCNC: 103 MMOL/L (ref 98–107)
CHOLEST SERPL-MCNC: 308 MG/DL
CREAT SERPL-MCNC: 1.26 MG/DL (ref 0.51–0.95)
DEPRECATED HCO3 PLAS-SCNC: 24 MMOL/L (ref 22–29)
GFR SERPL CREATININE-BSD FRML MDRD: 44 ML/MIN/1.73M2
GLUCOSE SERPL-MCNC: 280 MG/DL (ref 70–99)
HBA1C MFR BLD: 8.9 % (ref 0–5.6)
HDLC SERPL-MCNC: 62 MG/DL
LDLC SERPL CALC-MCNC: 197 MG/DL
NONHDLC SERPL-MCNC: 246 MG/DL
POTASSIUM SERPL-SCNC: 4.2 MMOL/L (ref 3.4–5.3)
SODIUM SERPL-SCNC: 138 MMOL/L (ref 136–145)
TRIGL SERPL-MCNC: 244 MG/DL

## 2023-02-28 PROCEDURE — 80048 BASIC METABOLIC PNL TOTAL CA: CPT | Performed by: STUDENT IN AN ORGANIZED HEALTH CARE EDUCATION/TRAINING PROGRAM

## 2023-02-28 PROCEDURE — 99214 OFFICE O/P EST MOD 30 MIN: CPT | Performed by: STUDENT IN AN ORGANIZED HEALTH CARE EDUCATION/TRAINING PROGRAM

## 2023-02-28 PROCEDURE — 80061 LIPID PANEL: CPT | Performed by: STUDENT IN AN ORGANIZED HEALTH CARE EDUCATION/TRAINING PROGRAM

## 2023-02-28 PROCEDURE — 36415 COLL VENOUS BLD VENIPUNCTURE: CPT | Performed by: STUDENT IN AN ORGANIZED HEALTH CARE EDUCATION/TRAINING PROGRAM

## 2023-02-28 PROCEDURE — 83036 HEMOGLOBIN GLYCOSYLATED A1C: CPT | Performed by: STUDENT IN AN ORGANIZED HEALTH CARE EDUCATION/TRAINING PROGRAM

## 2023-02-28 PROCEDURE — 82306 VITAMIN D 25 HYDROXY: CPT | Performed by: STUDENT IN AN ORGANIZED HEALTH CARE EDUCATION/TRAINING PROGRAM

## 2023-02-28 NOTE — PROGRESS NOTES
Chief Complaint   Patient presents with     Diabetes     Follow up        There are no exam notes on file for this visit.        Assessment/Plan:  Emmanuel Say is a 77 year old female here for DM follow up with mixed medication adherence and limited motivation to adjust diet as she very much enjoys a typical Yasmin diet.  Discussed that she should continue a Yasmin diet but consider exploring if there are veg or protein dishes she could add that would decrease rice intake a bit, or trial switch to brown rice.  She also set a goal of reducing pill burden - feels she could be more adherent to meds if there were fewer of them.  Identified multiple meds to stop today:  - Aspirin  - Multivitamin  - Calcium-vit D tab    Discussed she can then take all meds 1x/day.    Will check vit D level and can add back a vit D only supplement if needed.  Recheck A1c, lipids today as well.     Emmanuel was seen today for diabetes.    Diagnoses and all orders for this visit:    Type 2 diabetes mellitus with hyperglycemia, without long-term current use of insulin (H)  -     Vitamin D deficiency screening; Future  -     Hemoglobin A1c; Future  -     Basic metabolic panel; Future  -     Lipid panel reflex to direct LDL Fasting; Future  -     Lipid panel reflex to direct LDL Fasting  -     Basic metabolic panel  -     Hemoglobin A1c  -     Vitamin D deficiency screening        Follow-up with Kaylin Joe in 4w for review med adherence with reduced pill burden.    Future Appointments   Date Time Provider Department Center   4/4/2023  3:10 PM Kaylin Joe MD [facility]       Kaylin Joe MD      Patient Instructions   Please INCREASE your lantus insulin from 12 to 14 units daily.           Subjective:  Emmanuel Say is a 77 year old female here for follow up DM.  She has noticed if she eats more her BG goes up and if she eats less it goes down.   Eats a lot of boiled vegetables, not a lot of protein - meat or fish.    She does not  regularly adhere to her diabetes medication because the pills are large.  1-2 days per month does not take medication the whole day.   A couple of times says she wants to take a break from medication.  A couple of times a week takes medication only tfor the morning or only for the evening.     Would like to simplify her medications and only take the most important so she didn't have such a high pill burden.  Does not think we should adjust DM regimen because of spotty medication adherence.    Patient Active Problem List   Diagnosis     Health Care Home     Esophageal reflux     Osteoarthrosis, unspecified whether generalized or localized, involving lower leg     Lumbosacral spondylosis without myelopathy     Hyperlipidemia     Type 2 diabetes mellitus with hyperglycemia, without long-term current use of insulin (H)     Seasonal allergies     Microalbuminuria     Age-related osteoporosis without current pathological fracture     Chronic kidney disease, stage 1     Major depressive disorder, recurrent episode, mild (H)     Dehydration     Hyponatremia     Acute cystitis with hematuria     Sepsis, due to unspecified organism, unspecified whether acute organ dysfunction present (H)       Current Outpatient Medications   Medication     acetaminophen (TYLENOL) 500 MG tablet     alcohol swab prep pads     alendronate (FOSAMAX) 70 MG tablet     blood glucose (NO BRAND SPECIFIED) lancets standard     blood glucose (NO BRAND SPECIFIED) test strip     capsaicin (ZOSTRIX) 0.025 % external cream     cetirizine (ZYRTEC) 10 MG tablet     Continuous Blood Gluc Sensor (FREESTYLE PRECIOUS 2 SENSOR) MISC     diclofenac (VOLTAREN) 1 % topical gel     empagliflozin (JARDIANCE) 25 MG TABS tablet     insulin glargine (LANTUS SOLOSTAR) 100 UNIT/ML pen     insulin pen needle (ULTICARE MICRO) 32G X 4 MM miscellaneous     lisinopril (ZESTRIL) 2.5 MG tablet     metFORMIN (GLUCOPHAGE XR) 500 MG 24 hr tablet     rosuvastatin (CRESTOR) 40 MG tablet      No current facility-administered medications for this visit.       Objective:  /71   Pulse 89   Temp 98.1  F (36.7  C) (Oral)   Resp 16   Wt 51.3 kg (113 lb)   SpO2 100%   BMI 23.37 kg/m    Body mass index is 23.37 kg/m .  Gen: A/O x3, in NAD.  Cardio: S1, S2, no MRG. RRR.  Resp: CTAB, no WRR.  Neuro: Grossly intact.

## 2023-03-04 LAB — DEPRECATED CALCIDIOL+CALCIFEROL SERPL-MC: 9 UG/L (ref 20–75)

## 2023-04-04 ENCOUNTER — TRANSFERRED RECORDS (OUTPATIENT)
Dept: HEALTH INFORMATION MANAGEMENT | Facility: CLINIC | Age: 78
End: 2023-04-04

## 2023-04-04 ENCOUNTER — OFFICE VISIT (OUTPATIENT)
Dept: FAMILY MEDICINE | Facility: CLINIC | Age: 78
End: 2023-04-04
Payer: COMMERCIAL

## 2023-04-04 ENCOUNTER — DOCUMENTATION ONLY (OUTPATIENT)
Dept: FAMILY MEDICINE | Facility: CLINIC | Age: 78
End: 2023-04-04

## 2023-04-04 VITALS
TEMPERATURE: 97.8 F | WEIGHT: 113 LBS | DIASTOLIC BLOOD PRESSURE: 71 MMHG | OXYGEN SATURATION: 100 % | SYSTOLIC BLOOD PRESSURE: 112 MMHG | RESPIRATION RATE: 16 BRPM | BODY MASS INDEX: 23.37 KG/M2 | HEART RATE: 92 BPM

## 2023-04-04 DIAGNOSIS — M17.10 UNILATERAL PRIMARY OSTEOARTHRITIS, UNSPECIFIED KNEE: ICD-10-CM

## 2023-04-04 DIAGNOSIS — E55.9 HYPOVITAMINOSIS D: ICD-10-CM

## 2023-04-04 DIAGNOSIS — E11.65 TYPE 2 DIABETES MELLITUS WITH HYPERGLYCEMIA, WITHOUT LONG-TERM CURRENT USE OF INSULIN (H): Primary | ICD-10-CM

## 2023-04-04 DIAGNOSIS — E78.2 MIXED HYPERLIPIDEMIA: ICD-10-CM

## 2023-04-04 PROCEDURE — 99214 OFFICE O/P EST MOD 30 MIN: CPT | Performed by: STUDENT IN AN ORGANIZED HEALTH CARE EDUCATION/TRAINING PROGRAM

## 2023-04-04 NOTE — PROGRESS NOTES
Chief Complaint   Patient presents with     Diabetes     Follow up     Forms     Handicap parking form      Other     Pt request walker          Assessment/Plan:  Emmanuel Vences is a 77 year old female here for Diabetes, Forms, and Walker request.    Emmanuel was seen today for diabetes, forms and other.    Diagnoses and all orders for this visit:    Type 2 diabetes mellitus with hyperglycemia, without long-term current use of insulin (H): We could improve her fasting by increasing Lantus to 14 units. However, considering the patient's age and length of time diagnosed with DM, we can continue with the current course. A refill of Lantus was ordered in-office today.  Based upon her CGM, A1c for the past week would be 7.9%. Congratulated on improved adherence and BG control!    Mixed hyperlipidemia: If she continues to take her medications routinely, her cholesterol levels may improve in another 6 weeks.   - Plan to recheck lipids at next appt    Hypovitaminosis D: A once-weekly vitamin D supplement was recommended to assist with her bone density and to help prevent bone fractures.   -     vitamin D3 (CHOLECALCIFEROL) 1.25 MG (35199 UT) capsule; Take 1 capsule (50,000 Units) by mouth every 7 days    Osteoarthrosis, unspecified whether generalized or localized, involving lower leg: A physical therapy mobility assessment was recommended in order to obtain a wheelchair, and a referral was provided today.   A request for a walker was ordered today, and a disability parking placard was issued in-office for the risk of falling due to impaired balance associated with osteoarthritis and osteoporosis.   -     Walker Order for DME - ONLY FOR DME  -     Physical Therapy Referral; Future      Return in about 8 weeks (around 5/30/2023) for medicare wellness exam, f/u DM.     Future Appointments   Date Time Provider Department Center   5/30/2023  3:10 PM Kaylin Joe MD [facility]       Kaylin Joe MD      Subjective:  Emmanuel Vences  is a 77 year old female here to discuss previous medication changes and to check her glucometer. She is accompanied by her son and .     Last visit stopped a few medications and continued others (stopped ASA, MVI, calcium/vit D supplement).  Has had a little easier time taking the medications that were not discontinued    Patient states that she has not experienced any differences or big changes. She states that it has become easier to take the remaining medications. However, she has forgotten at times, and her son did remind her. She has been taking her medications regularly, although she stated at her last visit that she would not take them. The patient presents with her medication box today. She has been compliant with taking Alendronate 70 mg once weekly and on an empty stomach with the required full glass of water, sitting upright for 30 minutes after. She is also compliant with the 12-unit Lantus injections once daily, which she takes in the evening. She has not tried taking 14 units of Lantus, as discussed at her last visit.     The patient presents with forms to request a walker and a wheelchair due to knee pain and difficulty ambulating. A wheelchair is occasionally required when she is sick, weak, has severe knee pain, or has to go out to appointments.     Glucometer results  She is in the target range of her blood sugar 51% of the time, 25% high, and 24% low. Her A1c is 7.9% and meets her goal. Her glucose levels have improved since her last visit. The hyperglycemic symptoms that the patient was experiencing 1-2 years ago have improved also.     Lab results  The patient's last lab results revealed having stable chronic kidney disease that does not show any rapid worsening. Her cholesterol continues to remain high. Her vitamin D level was very low.     Patient Active Problem List   Diagnosis     Health Care Home     Esophageal reflux     Osteoarthrosis, unspecified whether generalized or  localized, involving lower leg     Lumbosacral spondylosis without myelopathy     Hyperlipidemia     Type 2 diabetes mellitus with hyperglycemia, without long-term current use of insulin (H)     Seasonal allergies     Microalbuminuria     Age-related osteoporosis without current pathological fracture     Chronic kidney disease, stage 1     Major depressive disorder, recurrent episode, mild (H)     Dehydration     Hyponatremia     Acute cystitis with hematuria     Sepsis, due to unspecified organism, unspecified whether acute organ dysfunction present (H)       Current Outpatient Medications   Medication     acetaminophen (TYLENOL) 500 MG tablet     alcohol swab prep pads     alendronate (FOSAMAX) 70 MG tablet     blood glucose (NO BRAND SPECIFIED) lancets standard     blood glucose (NO BRAND SPECIFIED) test strip     capsaicin (ZOSTRIX) 0.025 % external cream     cetirizine (ZYRTEC) 10 MG tablet     Continuous Blood Gluc Sensor (FREESTYLE PRECIOUS 2 SENSOR) MISC     diclofenac (VOLTAREN) 1 % topical gel     empagliflozin (JARDIANCE) 25 MG TABS tablet     insulin glargine (LANTUS SOLOSTAR) 100 UNIT/ML pen     insulin pen needle (ULTICARE MICRO) 32G X 4 MM miscellaneous     lisinopril (ZESTRIL) 2.5 MG tablet     metFORMIN (GLUCOPHAGE XR) 500 MG 24 hr tablet     rosuvastatin (CRESTOR) 40 MG tablet     vitamin D3 (CHOLECALCIFEROL) 1.25 MG (27939 UT) capsule     No current facility-administered medications for this visit.       Objective:  /71 (BP Location: Right arm, Patient Position: Sitting, Cuff Size: Adult Regular)   Pulse 92   Temp 97.8  F (36.6  C) (Oral)   Resp 16   Wt 51.3 kg (113 lb)   SpO2 100%   BMI 23.37 kg/m    Body mass index is 23.37 kg/m .  Gen: A/O x3, in NAD.  Detailed physical exam was not completed.   CV/Resp: Breathing was comfortable without visible wheeze.  Neuro: Speaking in full sentences. Grossly and neurologically intact.           Scribe Attestation: By signing our names below, we,  Anastasiya Dobbs, Scribes, attest that this documentation has been prepared under the direction and in the presence of Dr. Kaylin Joe MD.  - Electronically Signed: Anastasiya Dobbs 04/04/23

## 2023-04-05 NOTE — PROGRESS NOTES
Telephone call to the client's home for annual health risk assessment.  An  was present.    Completed plan of care will be faxed to PCP.    Emmanuel's reports her medical concerns is her high sugars, kidney problems and not feeling well.  She expresses difficulty with ambulating due to leg pain. She is requesting a 4 wheel walker due to instability when transferring and ambulating.     Emmanuel gets monthly pullups and chux's.  She has a nurse that goes to the home every two weeks to assist with medication management.  She is having an increase in PCA hours from 5.75 to 6.75 hours a day.  She gets 7 hours a week of homemaking.  Also, is attending adult day care two times a week.  She has an [employer] worker that contacts her two times a week.  She reports the [employer] worker helps with stress and makes her feel better.     Other  Importance of safe proper disposal of controlled substances discussed with client and resources for med disposal sites provided.      CC reviewed and received expressed/verbal approval by (member or legal rep) of the care plan, including choice of services and providers, choices related to sharing the plan or portions of the plan with others, appeals rights, and data privacy information .

## 2023-04-07 ENCOUNTER — DOCUMENTATION ONLY (OUTPATIENT)
Dept: FAMILY MEDICINE | Facility: CLINIC | Age: 78
End: 2023-04-07
Payer: COMMERCIAL

## 2023-04-07 NOTE — PROGRESS NOTES
"To be completed in Nursing note:    Please reference list for forms that require a visit for completion.  Please remind patients that providers are given 3-5 business days to complete and return forms.      Form type: [employer] ~ STANDARD WRITTEN ORDER:   IN BOX ITEM FOR ASM - SEAT ATTACHMENT, EACH   IN BOX ITEM FOR ASM - BRAKE WALKER BRAKE ATTACHMENT   USE ASM [id number] ROLLATOR BLACK 6\" WHEELS, 300LB WT CAP    Date form received: 4/5/2023    Date form completed by Physician: 4/10/2023    How was form returned to patient (mailed, faxed, or at  for patient to ):    Fax to [phone]    Date form mailed/faxed/left at  for patient and sent to HIM for scanning:    Fax on 4/11/2023    Once form is left for patient, faxed, or mailed PCS will then close the documentation only encounter.     "

## 2023-04-10 ENCOUNTER — MEDICAL CORRESPONDENCE (OUTPATIENT)
Dept: HEALTH INFORMATION MANAGEMENT | Facility: CLINIC | Age: 78
End: 2023-04-10
Payer: COMMERCIAL

## 2023-04-25 ENCOUNTER — DOCUMENTATION ONLY (OUTPATIENT)
Dept: FAMILY MEDICINE | Facility: CLINIC | Age: 78
End: 2023-04-25
Payer: COMMERCIAL

## 2023-04-25 NOTE — PROGRESS NOTES
To be completed in Nursing note:    Please reference list for forms that require a visit for completion.  Please remind patients that providers are given 3-5 business days to complete and return forms.      Form type: [facility] ~ HOME HEALTH CERTIFICATION AND PLAN OF CARE 4/18/2023 TO 6/16/2023     Date form received: 4/20/2023    Date form completed by Physician: 4/27/2023    How was form returned to patient (mailed, faxed, or at  for patient to ):    Fax to [phone]    Date form mailed/faxed/left at  for patient and sent to HIM for scanning:    Fax on 4/28/2023    Once form is left for patient, faxed, or mailed PCS will then close the documentation only encounter.

## 2023-04-27 DIAGNOSIS — Z53.9 DIAGNOSIS NOT YET DEFINED: Primary | ICD-10-CM

## 2023-05-09 DIAGNOSIS — M81.0 AGE-RELATED OSTEOPOROSIS WITHOUT CURRENT PATHOLOGICAL FRACTURE: ICD-10-CM

## 2023-05-09 RX ORDER — ALENDRONATE SODIUM 70 MG/1
70 TABLET ORAL
Qty: 12 TABLET | Refills: 4 | Status: SHIPPED | OUTPATIENT
Start: 2023-05-09 | End: 2024-04-23

## 2023-05-24 ENCOUNTER — OFFICE VISIT (OUTPATIENT)
Dept: FAMILY MEDICINE | Facility: CLINIC | Age: 78
End: 2023-05-24
Payer: COMMERCIAL

## 2023-05-24 VITALS
OXYGEN SATURATION: 99 % | BODY MASS INDEX: 24.16 KG/M2 | HEART RATE: 81 BPM | SYSTOLIC BLOOD PRESSURE: 115 MMHG | TEMPERATURE: 98.1 F | DIASTOLIC BLOOD PRESSURE: 72 MMHG | RESPIRATION RATE: 20 BRPM | WEIGHT: 112 LBS | HEIGHT: 57 IN

## 2023-05-24 DIAGNOSIS — R41.3 IMPAIRED MEMORY: Primary | ICD-10-CM

## 2023-05-24 DIAGNOSIS — Z00.00 ENCOUNTER FOR MEDICARE ANNUAL WELLNESS EXAM: ICD-10-CM

## 2023-05-24 DIAGNOSIS — R30.0 DYSURIA: ICD-10-CM

## 2023-05-24 LAB
ALBUMIN UR-MCNC: NEGATIVE MG/DL
APPEARANCE UR: CLEAR
BILIRUB UR QL STRIP: NEGATIVE
COLOR UR AUTO: YELLOW
GLUCOSE UR STRIP-MCNC: NEGATIVE MG/DL
HBA1C MFR BLD: 7.8 % (ref 0–5.6)
HGB UR QL STRIP: ABNORMAL
KETONES UR STRIP-MCNC: NEGATIVE MG/DL
LEUKOCYTE ESTERASE UR QL STRIP: ABNORMAL
NITRATE UR QL: NEGATIVE
PH UR STRIP: 6 [PH] (ref 5–8)
SP GR UR STRIP: <=1.005 (ref 1–1.03)
UROBILINOGEN UR STRIP-ACNC: 0.2 E.U./DL

## 2023-05-24 PROCEDURE — 36415 COLL VENOUS BLD VENIPUNCTURE: CPT | Performed by: STUDENT IN AN ORGANIZED HEALTH CARE EDUCATION/TRAINING PROGRAM

## 2023-05-24 PROCEDURE — 80061 LIPID PANEL: CPT | Performed by: STUDENT IN AN ORGANIZED HEALTH CARE EDUCATION/TRAINING PROGRAM

## 2023-05-24 PROCEDURE — 87086 URINE CULTURE/COLONY COUNT: CPT | Performed by: STUDENT IN AN ORGANIZED HEALTH CARE EDUCATION/TRAINING PROGRAM

## 2023-05-24 PROCEDURE — 81003 URINALYSIS AUTO W/O SCOPE: CPT | Performed by: STUDENT IN AN ORGANIZED HEALTH CARE EDUCATION/TRAINING PROGRAM

## 2023-05-24 PROCEDURE — 99214 OFFICE O/P EST MOD 30 MIN: CPT | Mod: 25 | Performed by: STUDENT IN AN ORGANIZED HEALTH CARE EDUCATION/TRAINING PROGRAM

## 2023-05-24 PROCEDURE — 80053 COMPREHEN METABOLIC PANEL: CPT | Performed by: STUDENT IN AN ORGANIZED HEALTH CARE EDUCATION/TRAINING PROGRAM

## 2023-05-24 PROCEDURE — 99397 PER PM REEVAL EST PAT 65+ YR: CPT | Performed by: STUDENT IN AN ORGANIZED HEALTH CARE EDUCATION/TRAINING PROGRAM

## 2023-05-24 PROCEDURE — 83036 HEMOGLOBIN GLYCOSYLATED A1C: CPT | Performed by: STUDENT IN AN ORGANIZED HEALTH CARE EDUCATION/TRAINING PROGRAM

## 2023-05-24 ASSESSMENT — ENCOUNTER SYMPTOMS
SHORTNESS OF BREATH: 0
SORE THROAT: 0
HEMATOCHEZIA: 0
PARESTHESIAS: 0
ABDOMINAL PAIN: 0
DIARRHEA: 0
DIZZINESS: 0
FEVER: 0
NERVOUS/ANXIOUS: 0
HEARTBURN: 0
CHILLS: 0
FREQUENCY: 0
DYSURIA: 1
NAUSEA: 0
WEAKNESS: 0
MYALGIAS: 0
JOINT SWELLING: 0
EYE PAIN: 0
PALPITATIONS: 0
COUGH: 0
HEMATURIA: 0
CONSTIPATION: 0
ARTHRALGIAS: 1
BREAST MASS: 0
HEADACHES: 0

## 2023-05-24 ASSESSMENT — ACTIVITIES OF DAILY LIVING (ADL)
CURRENT_FUNCTION: HOUSEWORK REQUIRES ASSISTANCE
CURRENT_FUNCTION: BATHING REQUIRES ASSISTANCE
CURRENT_FUNCTION: MONEY MANAGEMENT REQUIRES ASSISTANCE
CURRENT_FUNCTION: PREPARING MEALS REQUIRES ASSISTANCE
CURRENT_FUNCTION: TRANSPORTATION REQUIRES ASSISTANCE
CURRENT_FUNCTION: LAUNDRY REQUIRES ASSISTANCE
CURRENT_FUNCTION: TELEPHONE REQUIRES ASSISTANCE
CURRENT_FUNCTION: MEDICATION ADMINISTRATION REQUIRES ASSISTANCE
CURRENT_FUNCTION: SHOPPING REQUIRES ASSISTANCE

## 2023-05-24 NOTE — PROGRESS NOTES
lSUBJECTIVE:   Emmanuel is a 77 year old who presents for Preventive Visit.      5/24/2023    12:40 PM   Additional Questions   Roomed by everardo mackey   Accompanied by son   Patient has been advised of split billing requirements and indicates understanding: Yes  Are you in the first 12 months of your Medicare coverage?  No    HPI    Acute concerns:  Has pressure after urinating, wondering if she has an infection.   Denies hematuria or odor.  Denies frequency.  Denies increase in incontinence  Has knee pains, has been going on for a while.    Have you ever done Advance Care Planning? (For example, a Health Directive, POLST, or a discussion with a medical provider or your loved ones about your wishes): No, advance care planning information given to patient to review.  Patient declined advance care planning discussion at this time.    Hearing: Feels hearing is worsening.   However, declines referral for audiology - has had hearing aids and didn't feel comfortable with them in place.    Fall risk  Fallen 2 or more times in the past year?: No  Any fall with injury in the past year?: No   Rarely goes out.     Cognitive Screening   1) Repeat 3 items (Leader, Season, Table) - unable to successfully repeat all three words after 3 attempts  2) Clock draw: ABNORMAL - well formed [place], numbers bunched on the clock, hands in relatively accurate locations  3) 3 item recall: Recalls NO objects   Results: 0 items recalled: PROBABLE COGNITIVE IMPAIRMENT, **INFORM PROVIDER**    Mini-CogTM Copyright LAUREEN Ramirez. Licensed by the author for use in [facility]; reprinted with permission (heaven@.[facility]). All rights reserved.      Reviewed and updated as needed this visit by clinical staff   Tobacco  Allergies  Meds              Reviewed and updated as needed this visit by Provider                 Social History     Tobacco Use     Smoking status: Former     Packs/day: 0.25     Years: 50.00     Pack years: 12.50     Types:  Cigarettes     Quit date: 2019     Years since quittin.1     Smokeless tobacco: Former     Types: Chew     Tobacco comments:     chews betel nut   Vaping Use     Vaping status: Never Used   Substance Use Topics     Alcohol use: No   Smoked rolled tobacco - quit when she was really sick last time.   Still chews betel nut -   Used to smoke 2-3 hand rolled cigarette per day.   Discussed betel nut risk of mouth/throat cancers            2023    12:48 PM   Alcohol Use   Prescreen: >3 drinks/day or >7 drinks/week? Not Applicable     Do you have a current opioid prescription? No  Do you use any other controlled substances or medications that are not prescribed by a provider? None    Current providers sharing in care for this patient include:   Patient Care Team:  Kaylin Joe MD as PCP - General (Family Medicine)  Mere Lainez as [facility] Coordinator  Don Law as Other (see comments)  Armida SAMANIEGO  [employer] [facility]  [facility]  Kaylin Joe MD as Assigned PCP  Jazz Andre [facility] as MTM Donna Little [facility] as Assigned MTM Pharmacist    The following health maintenance items are reviewed in Epic and correct as of today:  Health Maintenance   Topic Date Due     DEPRESSION ACTION PLAN  Never done     DEXA  2023 (Originally 2020)     ADVANCE CARE PLANNING  2023 (Originally 1945)     COVID-19 Vaccine (4 - Pfizer series) 2023 (Originally 10/18/2022)     LUNG CANCER SCREENING  2024 (Originally 8/15/1995)     DIABETIC FOOT EXAM  2023     MICROALBUMIN  2023     PHQ-9  2023     COLORECTAL CANCER SCREENING  2023     A1C  2023     EYE EXAM  2024     MEDICARE ANNUAL WELLNESS VISIT  2024     BMP  2024     LIPID  2024     FALL RISK ASSESSMENT  2024     DTAP/TDAP/TD IMMUNIZATION (10 - Td or Tdap) 2025     HEPATITIS C SCREENING  Completed      "INFLUENZA VACCINE  Completed     Pneumococcal Vaccine: 65+ Years  Completed     URINALYSIS  Completed     ZOSTER IMMUNIZATION  Completed     IPV IMMUNIZATION  Aged Out     MENINGITIS IMMUNIZATION  Aged Out     MAMMO SCREENING  Discontinued     Mammogram Screening - Patient over age 75, has elected to discontinue screenings.  Pertinent mammograms are reviewed under the imaging tab.    Review of Systems  Constitutional, HEENT, cardiovascular, pulmonary, gi and gu systems are negative, except as otherwise noted.    OBJECTIVE:   /72 (BP Location: Left arm, Patient Position: Sitting, Cuff Size: Adult Regular)   Pulse 81   Temp 98.1  F (36.7  C) (Oral)   Resp 20   Ht 1.456 m (4' 9.32\")   Wt 50.8 kg (112 lb)   SpO2 99%   BMI 23.96 kg/m   Estimated body mass index is 23.96 kg/m  as calculated from the following:    Height as of this encounter: 1.456 m (4' 9.32\").    Weight as of this encounter: 50.8 kg (112 lb).  Physical Exam  GENERAL: healthy, alert and no distress  EYES: Eyes grossly normal to inspection, PERRL and conjunctivae and sclerae normal  HENT: ear canals and TM's normal, nose and mouth without ulcers or lesions  NECK: no adenopathy, no asymmetry, masses, or scars and thyroid normal to palpation  RESP: lungs clear to auscultation - no rales, rhonchi or wheezes  CV: regular rate and rhythm, normal S1 S2, no S3 or S4, no murmur, click or rub, no peripheral edema and peripheral pulses strong  MS: no gross musculoskeletal defects noted, no edema  NEURO: Normal strength and tone, mentation impaired as above in mini-cog and speech normal    Diagnostic Test Results:  Labs reviewed in Epic    ASSESSMENT / PLAN:   Emmanuel was seen today for wellness visit.  At her appointment today multiple areas of concern were raised, although the patient would not agree to further assessment or intervention of any idem.  Items of concern include identified osteoporosis on alendronate without follow-up scan to assess for " response (offered and declined repeat DEXA scan), impaired memory with 0 out of 3 items recalled and on abnormal clock drawing (offered and declined speech therapy referral for cognitive assessment and intervention), patient reported decreased hearing without interest in further assessment of hearing (offered and declined audiology referral), history of tobacco use, quantity somewhat uncertain given patient smoked handrolled cigarettes, declining CT lung cancer screening.  Patient was encouraged to consider participation in these healthcare maintenance measures.  I will reach out to her  to see if there is anything they can do to provide assistance with facilitating these interventions, as the primary reason for declining the DEXA scan was in familiarity with going to other healthcare facilities for care.    Diagnoses and all orders for this visit:    Impaired memory  -     Cancel: Speech Therapy Referral; Future    Dysuria: With history of urinary tract infection.  We will send in a urinalysis.  -     UA Macroscopic with reflex to Microscopic and Culture; Future  -     UA Macroscopic with reflex to Microscopic and Culture  -     Cancel: Urine Microscopic Exam  -     Urine Culture    Encounter for Medicare annual wellness exam  -     Hemoglobin A1c; Future  -     Lipid Panel; Future  -     Comprehensive Metabolic Panel; Future  -     Hemoglobin A1c  -     Lipid Panel  -     Comprehensive Metabolic Panel        Patient has been advised of split billing requirements and indicates understanding: Yes      COUNSELING:  Reviewed preventive health counseling, as reflected in patient instructions       Hearing screening       Bladder control       Osteoporosis prevention/bone health       Consider lung cancer screening for ages 55-80 years (77 for Medicare) and 20 pack-year smoking history         Advanced Planning         She reports that she quit smoking about 4 years ago. Her smoking use included cigarettes.  "She has quit using smokeless tobacco.  Her smokeless tobacco use included chew.      Appropriate preventive services were discussed with this patient, including applicable screening as appropriate for cardiovascular disease, diabetes, osteopenia/osteoporosis, and glaucoma.  As appropriate for age/gender, discussed screening for colorectal cancer, prostate cancer, breast cancer, and cervical cancer. Checklist reviewing preventive services available has been given to the patient.    Reviewed patients plan of care and provided an AVS. The Basic Care Plan (routine screening as documented in Health Maintenance) for Emmanuel meets the Care Plan requirement. This Care Plan has been established and reviewed with the Patient.      Kaylin Joe MD  [facility]    Identified Health Risks:    I have reviewed Opioid Use Disorder and Substance Use Disorder risk factors and made any needed referrals.       Answers for HPI/ROS submitted by the patient on 5/24/2023  In general, how would you rate your overall physical health?: fair  Frequency of exercise:: 2-3 days/week  Do you usually eat at least 4 servings of fruit and vegetables a day, include whole grains & fiber, and avoid regularly eating high fat or \"junk\" foods? : No  Taking medications regularly:: Yes  Medication side effects:: None  Activities of Daily Living: telephone requires assistance, transportation requires assistance, shopping requires assistance, preparing meals requires assistance, housework requires assistance, bathing requires assistance, laundry requires assistance, medication administration requires assistance, money management requires assistance  Home safety: no safety concerns identified  Hearing Impairment:: no hearing concerns  In the past 6 months, have you been bothered by leaking of urine?: Yes  abdominal pain: No  Blood in stool: No  Blood in urine: No  chest pain: No  chills: No  congestion: No  constipation: No  cough: " No  diarrhea: No  dizziness: No  ear pain: No  eye pain: No  nervous/anxious: No  fever: No  frequency: No  genital sores: No  headaches: No  hearing loss: No  heartburn: No  arthralgias: Yes  joint swelling: No  peripheral edema: No  mood changes: No  myalgias: No  nausea: No  dysuria: Yes  palpitations: No  Skin sensation changes: No  sore throat: No  urgency: No  rash: No  shortness of breath: No  visual disturbance: No  weakness: No  pelvic pain: No  vaginal bleeding: No  vaginal discharge: No  tenderness: No  breast mass: No  breast discharge: No  In general, how would you rate your overall mental or emotional health?: fair  Additional concerns today:: No  Duration of exercise:: 15-30 minutes

## 2023-05-24 NOTE — Clinical Note
Hi, I see you are listed as the [facility] coordinator for this patient. I identified multiple needs/risks for this patient at her most annual physical yesterday, including needing a repeat DEXA scan and an abnormal mini-Cog which would benefit from speech therapy cognitive assessment and intervention, as well as lung cancer screening CT and audiology eval. However, the patient declined any interventions citing lack of familiarity with accessing medical care at sites other than [facility]. I would prioritize the DEXA scan.  Is there anything that you might be able to do to provide assistance in accessing a repeat DEXA (such as coordinating transport+interpretive services)? Thank you! Dr. Joe

## 2023-05-24 NOTE — PATIENT INSTRUCTIONS
Patient Education   Personalized Prevention Plan  You are due for the preventive services outlined below.  Your care team is available to assist you in scheduling these services.  If you have already completed any of these items, please share that information with your care team to update in your medical record.  Health Maintenance Due   Topic Date Due     Discuss Advance Care Planning  Never done     Depression Action Plan  Never done     Osteoporosis Screening  04/30/2020     COVID-19 Vaccine (4 - Pfizer series) 10/18/2022

## 2023-05-25 LAB
ALBUMIN SERPL BCG-MCNC: 4.3 G/DL (ref 3.5–5.2)
ALP SERPL-CCNC: 115 U/L (ref 35–104)
ALT SERPL W P-5'-P-CCNC: 15 U/L (ref 10–35)
ANION GAP SERPL CALCULATED.3IONS-SCNC: 11 MMOL/L (ref 7–15)
AST SERPL W P-5'-P-CCNC: 18 U/L (ref 10–35)
BILIRUB SERPL-MCNC: 0.3 MG/DL
BUN SERPL-MCNC: 9.8 MG/DL (ref 8–23)
CALCIUM SERPL-MCNC: 9.3 MG/DL (ref 8.8–10.2)
CHLORIDE SERPL-SCNC: 102 MMOL/L (ref 98–107)
CHOLEST SERPL-MCNC: 281 MG/DL
CREAT SERPL-MCNC: 1.08 MG/DL (ref 0.51–0.95)
DEPRECATED HCO3 PLAS-SCNC: 26 MMOL/L (ref 22–29)
GFR SERPL CREATININE-BSD FRML MDRD: 53 ML/MIN/1.73M2
GLUCOSE SERPL-MCNC: 239 MG/DL (ref 70–99)
HDLC SERPL-MCNC: 58 MG/DL
LDLC SERPL CALC-MCNC: ABNORMAL MG/DL
NONHDLC SERPL-MCNC: 223 MG/DL
POTASSIUM SERPL-SCNC: 3.9 MMOL/L (ref 3.4–5.3)
PROT SERPL-MCNC: 7.8 G/DL (ref 6.4–8.3)
SODIUM SERPL-SCNC: 139 MMOL/L (ref 136–145)
TRIGL SERPL-MCNC: 413 MG/DL

## 2023-05-26 ENCOUNTER — TELEPHONE (OUTPATIENT)
Dept: FAMILY MEDICINE | Facility: CLINIC | Age: 78
End: 2023-05-26
Payer: COMMERCIAL

## 2023-05-26 LAB — BACTERIA UR CULT: NORMAL

## 2023-05-26 NOTE — TELEPHONE ENCOUNTER
----- Message from Kaylin Joe MD sent at 5/26/2023  9:21 AM CDT -----  Team - please call patient with results - her urine culture returned negative for infection, and her hemoglobin A1c is now at goal of <8%!  Her other labs appear stable and we can review further when she is seen in clinic next month.  She should continue her current treatment plan.   Thanks,  Dr. Joe

## 2023-05-26 NOTE — TELEPHONE ENCOUNTER
Provider's message relayed to pt over the phone using Yasmin domingo Pt verbalized understand and no further question.     Troy Fang MA on 5/26/2023 at 1:48 PM

## 2023-06-16 DIAGNOSIS — E11.65 TYPE 2 DIABETES MELLITUS WITH HYPERGLYCEMIA, WITHOUT LONG-TERM CURRENT USE OF INSULIN (H): ICD-10-CM

## 2023-06-28 DIAGNOSIS — Z53.9 DIAGNOSIS NOT YET DEFINED: Primary | ICD-10-CM

## 2023-06-29 ENCOUNTER — DOCUMENTATION ONLY (OUTPATIENT)
Dept: FAMILY MEDICINE | Facility: CLINIC | Age: 78
End: 2023-06-29
Payer: COMMERCIAL

## 2023-06-29 NOTE — PROGRESS NOTES
To be completed in Nursing note:    Please reference list for forms that require a visit for completion.  Please remind patients that providers are given 3-5 business days to complete and return forms.      Form type: [facility] ~ home health certification and plan of care from 6/17/2023 to 8/15/2023    Date form received: 6/21/2023    Date form completed by Physician: 6/28/2023    How was form returned to patient (mailed, faxed, or at  for patient to ):    Fax to [phone]    Date form mailed/faxed/left at  for patient and sent to HIM for scanning:    Fax on 6/29/2023    Once form is left for patient, faxed, or mailed PCS will then close the documentation only encounter.

## 2023-06-30 ENCOUNTER — OFFICE VISIT (OUTPATIENT)
Dept: FAMILY MEDICINE | Facility: CLINIC | Age: 78
End: 2023-06-30
Payer: COMMERCIAL

## 2023-06-30 VITALS
DIASTOLIC BLOOD PRESSURE: 71 MMHG | SYSTOLIC BLOOD PRESSURE: 104 MMHG | BODY MASS INDEX: 23.62 KG/M2 | WEIGHT: 110.4 LBS | HEART RATE: 84 BPM | OXYGEN SATURATION: 100 %

## 2023-06-30 DIAGNOSIS — H91.90 DECREASED HEARING, UNSPECIFIED LATERALITY: Primary | ICD-10-CM

## 2023-06-30 DIAGNOSIS — E11.65 TYPE 2 DIABETES MELLITUS WITH HYPERGLYCEMIA, WITHOUT LONG-TERM CURRENT USE OF INSULIN (H): ICD-10-CM

## 2023-06-30 PROCEDURE — 99214 OFFICE O/P EST MOD 30 MIN: CPT | Performed by: STUDENT IN AN ORGANIZED HEALTH CARE EDUCATION/TRAINING PROGRAM

## 2023-06-30 RX ORDER — PEN NEEDLE, DIABETIC 32GX 5/32"
NEEDLE, DISPOSABLE MISCELLANEOUS
Qty: 100 EACH | Refills: 3 | Status: SHIPPED | OUTPATIENT
Start: 2023-06-30 | End: 2023-12-13

## 2023-06-30 RX ORDER — GLUCOSAMINE HCL/CHONDROITIN SU 500-400 MG
CAPSULE ORAL
Qty: 100 EACH | Refills: 3 | Status: SHIPPED | OUTPATIENT
Start: 2023-06-30 | End: 2023-12-13

## 2023-06-30 RX ORDER — INSULIN GLARGINE 100 [IU]/ML
14-40 INJECTION, SOLUTION SUBCUTANEOUS AT BEDTIME
Qty: 15 ML | Refills: 3 | Status: SHIPPED | OUTPATIENT
Start: 2023-06-30 | End: 2023-12-13

## 2023-06-30 NOTE — PROGRESS NOTES
Chief Complaint   Patient presents with     Diabetes       There are no exam notes on file for this visit.        Assessment/Plan:  Emmanuel Vences is a 77 year old female here for diabetes follow-up, currently with hyperglycemia after running out of Lantus and not refilling it last week.  In clinic today we were able to review freestyle benita readings and noted that blood sugars range about 60 to 100 units higher on average than when the patient was taking her Lantus.  Based upon this information I was able to  her that she is likely to develop complications of hyperglycemia without reinitiating Lantus, which could even include recurrent urinary tract infections such as what caused hospitalization in the recent past.  Based upon this counseling the patient opted to reinitiate Lantus therapy so a prescription was provided.  We will plan to reinitiate Lantus at 14 units at bedtime.  The patient reiterated her preference not to start a GLP-1 agonist.    With decreased hearing she agreed to an audiology referral today but continues to decline repeat DEXA scan.    Emmanuel was seen today for diabetes.    Diagnoses and all orders for this visit:    Decreased hearing, unspecified laterality  -     Cancel: Adult Audiology  Referral; Future  -     Adult Audiology  Referral; Future    Type 2 diabetes mellitus with hyperglycemia, without long-term current use of insulin (H)  -     insulin glargine (LANTUS SOLOSTAR) 100 UNIT/ML pen; Inject 14-40 Units Subcutaneous At Bedtime Adjust dose per physician instructions.  -     alcohol swab prep pads; Use to swab area of injection/amber as directed.  -     insulin pen needle (ULTICARE MICRO) 32G X 4 MM miscellaneous; Use 1 pen needles daily or as directed.        Follow-up with Kaylin Joe in 6 weeks for diabetes.    Future Appointments   Date Time Provider Department Center   8/15/2023  4:30 PM Kaylin Joe MD [facility]         Kaylin MADDEN. Heath,  MD      There are no Patient Instructions on file for this visit.    Subjective:  Emmanuel Vences is a 77 year old female here for follow up visit - for diabetes.    She doesn't want to do the DEXA.     She has noticed some decreased hearing which is impacting her quality of life so agrees to an audiology evaluation.  Does have some questions about whether she can get to the audiology clinic and is nervous about navigating an unfamiliar building.    For diabetes she has currently taking:   Jardiance 25mg daily  Metformin 2000mg  Lantus 14 units - RAN OUT so has not taken in the past 1 week or so  Would prefer not to reinitiate Lantus if she can avoid it, dislikes injections  Using freestyle precious for glucose monitoring      Patient Active Problem List   Diagnosis     Health Care Home     Esophageal reflux     Osteoarthrosis, unspecified whether generalized or localized, involving lower leg     Lumbosacral spondylosis without myelopathy     Hyperlipidemia     Type 2 diabetes mellitus with hyperglycemia, without long-term current use of insulin (H)     Seasonal allergies     Microalbuminuria     Age-related osteoporosis without current pathological fracture     Chronic kidney disease, stage 1     Major depressive disorder, recurrent episode, mild (H)     Dehydration     Hyponatremia     Acute cystitis with hematuria     Sepsis, due to unspecified organism, unspecified whether acute organ dysfunction present (H)       Current Outpatient Medications   Medication     acetaminophen (TYLENOL) 500 MG tablet     alcohol swab prep pads     alendronate (FOSAMAX) 70 MG tablet     blood glucose (NO BRAND SPECIFIED) lancets standard     blood glucose (NO BRAND SPECIFIED) test strip     capsaicin (ZOSTRIX) 0.025 % external cream     cetirizine (ZYRTEC) 10 MG tablet     Continuous Blood Gluc Sensor (FREESTYLE PRECIOUS 2 SENSOR) MISC     diclofenac (VOLTAREN) 1 % topical gel     empagliflozin (JARDIANCE) 25 MG TABS tablet     insulin glargine  (LANTUS SOLOSTAR) 100 UNIT/ML pen     insulin pen needle (ULTICARE MICRO) 32G X 4 MM miscellaneous     lisinopril (ZESTRIL) 2.5 MG tablet     metFORMIN (GLUCOPHAGE XR) 500 MG 24 hr tablet     rosuvastatin (CRESTOR) 40 MG tablet     vitamin D3 (CHOLECALCIFEROL) 1.25 MG (53372 UT) capsule     No current facility-administered medications for this visit.       Objective:  /71 (BP Location: Left arm, Patient Position: Sitting, Cuff Size: Adult Regular)   Pulse 84   Wt 50.1 kg (110 lb 6.4 oz)   SpO2 100%   Breastfeeding No   BMI 23.62 kg/m    Body mass index is 23.62 kg/m .  Gen: A/O x3, in NAD.  Cardio: S1, S2, no MRG. RRR.  Resp: CTAB, no WRR.  Neuro: Grossly intact.    See pharmacy note for Tate reads.

## 2023-07-17 DIAGNOSIS — L29.9 PRURITIC DISORDER: ICD-10-CM

## 2023-07-17 DIAGNOSIS — E11.65 TYPE 2 DIABETES MELLITUS WITH HYPERGLYCEMIA, WITHOUT LONG-TERM CURRENT USE OF INSULIN (H): ICD-10-CM

## 2023-07-17 DIAGNOSIS — R80.9 MICROALBUMINURIA: ICD-10-CM

## 2023-07-24 DIAGNOSIS — R41.3 IMPAIRED MEMORY: ICD-10-CM

## 2023-07-24 DIAGNOSIS — Z87.891 PERSONAL HISTORY OF TOBACCO USE: Primary | ICD-10-CM

## 2023-07-24 PROCEDURE — G0296 VISIT TO DETERM LDCT ELIG: HCPCS | Performed by: STUDENT IN AN ORGANIZED HEALTH CARE EDUCATION/TRAINING PROGRAM

## 2023-07-24 RX ORDER — ASPIRIN 81 MG/1
TABLET ORAL
Qty: 30 TABLET | Refills: 4 | OUTPATIENT
Start: 2023-07-24

## 2023-07-24 RX ORDER — CETIRIZINE HYDROCHLORIDE 10 MG/1
10 TABLET ORAL DAILY
Qty: 90 TABLET | Refills: 1 | Status: SHIPPED | OUTPATIENT
Start: 2023-07-24 | End: 2024-01-17

## 2023-07-24 RX ORDER — MULTIVIT-MIN/FA/LYCOPEN/LUTEIN .4-300-25
1 TABLET ORAL DAILY
Qty: 30 TABLET | Refills: 4 | OUTPATIENT
Start: 2023-07-24

## 2023-07-24 RX ORDER — LISINOPRIL 2.5 MG/1
TABLET ORAL
Qty: 90 TABLET | Refills: 1 | Status: SHIPPED | OUTPATIENT
Start: 2023-07-24 | End: 2024-01-17

## 2023-07-24 NOTE — PATIENT INSTRUCTIONS
Lung Cancer Screening   Frequently Asked Questions  If you are at high-risk for lung cancer, getting screened with low-dose computed tomography (LDCT) every year can help save your life. This handout offers answers to some of the most common questions about lung cancer screening. If you have other questions, please call [phone] ([phone]).     What is it?  Lung cancer screening uses special X-ray technology to create an image of your lung tissue. The exam is quick and easy and takes less than 10 seconds. We don t give you any medicine or use any needles. You can eat before and after the exam. You don t need to change your clothes as long as the clothing on your chest doesn t contain metal. But, you do need to be able to hold your breath for at least 6 seconds during the exam.    What is the goal of lung cancer screening?  The goal of lung cancer screening is to save lives. Many times, lung cancer is not found until a person starts having physical symptoms. Lung cancer screening can help detect lung cancer in the earliest stages when it may be easier to treat.    Who should be screened for lung cancer?  We suggest lung cancer screening for anyone who is at high-risk for lung cancer. You are in the high-risk group if you:      are between the ages of 55 and 79, and    have smoked at least 1 pack of cigarettes a day for 20 or more years, and    still smoke or have quit within the past 15 years.    However, if you have a new cough or shortness of breath, you should talk to your doctor before being screened.    Why does it matter if I have symptoms?  Certain symptoms can be a sign that you have a condition in your lungs that should be checked and treated by your doctor. These symptoms include fever, chest pain, a new or changing cough, shortness of breath that you have never felt before, coughing up blood or unexplained weight loss. Having any of these symptoms can greatly affect the results of lung  cancer screening.       Should all smokers get an LDCT lung cancer screening exam?  It depends. Lung cancer screening is for a very specific group of men and women who have a history of heavy smoking over a long period of time (see  Who should be screened for lung cancer  above).  I am in the high-risk group, but have been diagnosed with cancer in the past. Is LDCT lung cancer screening right for me?  In some cases, you should not have LDCT lung screening, such as when your doctor is already following your cancer with CT scan studies. Your doctor will help you decide if LDCT lung screening is right for you.  Do I need to have a screening exam every year?  Yes. If you are in the high-risk group described earlier, you should get an LDCT lung cancer screening exam every year until you are 79, or are no longer willing or able to undergo screening and possible procedures to diagnose and treat lung cancer.  How effective is LDCT at preventing death from lung cancer?  Studies have shown that LDCT lung cancer screening can lower the risk of death from lung cancer by 20 percent in people who are at high-risk.  What are the risks?  There are some risks and limitations of LDCT lung cancer screening. We want to make sure you understand the risks and benefits, so please let us know if you have any questions. Your doctor may want to talk with you more about these risks.    Radiation exposure: As with any exam that uses radiation, there is a very small increased risk of cancer. The amount of radiation in LDCT is small--about the same amount a person would get from a mammogram. Your doctor orders the exam when he or she feels the potential benefits outweigh the risks.    False negatives: No test is perfect, including LDCT. It is possible that you may have a medical condition, including lung cancer, that is not found during your exam. This is called a false negative result.    False positives and more testing: LDCT very often finds  something in the lung that could be cancer, but in fact is not. This is called a false positive result. False positive tests often cause anxiety. To make sure these findings are not cancer, you may need to have more tests. These tests will be done only if you give us permission. Sometimes patients need a treatment that can have side effects, such as a biopsy. For more information on false positives, see  What can I expect from the results?     Findings not related to lung cancer: Your LDCT exam also takes pictures of areas of your body next to your lungs. In a very small number of cases, the CT scan will show an abnormal finding in one of these areas, such as your kidneys, adrenal glands, liver or thyroid. This finding may not be serious, but you may need more tests. Your doctor can help you decide what other tests you may need, if any.  What can I expect from the results?  About 1 out of 4 LDCT exams will find something that may need more tests. Most of the time, these findings are lung nodules. Lung nodules are very small collections of tissue in the lung. These nodules are very common, and the vast majority--more than 97 percent--are not cancer (benign). Most are normal lymph nodes or small areas of scarring from past infections.  But, if a small lung nodule is found to be cancer, the cancer can be cured more than 90 percent of the time. To know if the nodule is cancer, we may need to get more images before your next yearly screening exam. If the nodule has suspicious features (for example, it is large, has an odd shape or grows over time), we will refer you to a specialist for further testing.  Will my doctor also get the results?  Yes. Your doctor will get a copy of your results.  Is it okay to keep smoking now that there s a cancer screening exam?  No. Tobacco is one of the strongest cancer-causing agents. It causes not only lung cancer, but other cancers and cardiovascular (heart) diseases as well. The damage  caused by smoking builds over time. This means that the longer you smoke, the higher your risk of disease. While it is never too late to quit, the sooner you quit, the better.  Where can I find help to quit smoking?  The best way to prevent lung cancer is to stop smoking. If you have already quit smoking, congratulations and keep it up! For help on quitting smoking, please call [employer] at 4-489-QUITNOW ([phone]) or the American Cancer Society at 1-455.229.1801 to find local resources near you.  One-on-one health coaching:  If you d prefer to work individually with a health care provider on tobacco cessation, we offer:      Medication Therapy Management:  Our specially trained pharmacists work closely with you and your doctor to help you quit smoking.  Call [phone] or [phone] (toll free).

## 2023-07-24 NOTE — PROGRESS NOTES
Lung Cancer Screening Shared Decision Making Visit     Emmanuel Vences, a 77 year old female, is eligible for lung cancer screening    History   Smoking Status     Former     Packs/day: 0.25     Years: 50.00     Types: Cigarettes     Quit date: 4/1/2019   Smokeless Tobacco     Current     Types: Chew       I have discussed with patient the risks and benefits of screening for lung cancer with low-dose CT.     The risks include:    radiation exposure: one low dose chest CT has as much ionizing radiation as about 15 chest x-rays, or 6 months of background radiation living in [place]      false positives: most findings/nodules are NOT cancer, but some might still require additional diagnostic evaluation, including biopsy    over-diagnosis: some slow growing cancers that might never have been clinically significant will be detected and treated unnecessarily     The benefit of early detection of lung cancer is contingent upon adherence to annual screening or more frequent follow up if indicated.     Furthermore, to benefit from screening, Khu must be willing and able to undergo diagnostic procedures, if indicated. Although no specific guide is available for determining severity of comorbidities, it is reasonable to withhold screening in patients who have greater mortality risk from other diseases.     We did discuss that the best way to prevent lung cancer is to not smoke.    Some patients may value a numeric estimation of lung cancer risk when evaluating if lung cancer screening is right for them, here is one calculator:    ShouldIScreen   Return to the ER for further concerns or worsening symptoms  Follow up with your primary care physician in 1-2 days

## 2023-07-25 ENCOUNTER — TELEPHONE (OUTPATIENT)
Dept: FAMILY MEDICINE | Facility: CLINIC | Age: 78
End: 2023-07-25
Payer: COMMERCIAL

## 2023-08-01 ENCOUNTER — HOSPITAL ENCOUNTER (OUTPATIENT)
Dept: CT IMAGING | Facility: HOSPITAL | Age: 78
Discharge: HOME | End: 2023-08-01
Attending: STUDENT IN AN ORGANIZED HEALTH CARE EDUCATION/TRAINING PROGRAM | Admitting: STUDENT IN AN ORGANIZED HEALTH CARE EDUCATION/TRAINING PROGRAM
Payer: COMMERCIAL

## 2023-08-01 DIAGNOSIS — Z87.891 PERSONAL HISTORY OF TOBACCO USE: ICD-10-CM

## 2023-08-01 PROCEDURE — 71271 CT THORAX LUNG CANCER SCR C-: CPT

## 2023-08-15 ENCOUNTER — OFFICE VISIT (OUTPATIENT)
Dept: FAMILY MEDICINE | Facility: CLINIC | Age: 78
End: 2023-08-15
Payer: COMMERCIAL

## 2023-08-15 VITALS
HEART RATE: 85 BPM | BODY MASS INDEX: 23.92 KG/M2 | WEIGHT: 111.8 LBS | TEMPERATURE: 98.3 F | RESPIRATION RATE: 18 BRPM | SYSTOLIC BLOOD PRESSURE: 119 MMHG | OXYGEN SATURATION: 100 % | DIASTOLIC BLOOD PRESSURE: 74 MMHG

## 2023-08-15 DIAGNOSIS — H91.90 DECREASED HEARING, UNSPECIFIED LATERALITY: ICD-10-CM

## 2023-08-15 DIAGNOSIS — E11.65 TYPE 2 DIABETES MELLITUS WITH HYPERGLYCEMIA, WITHOUT LONG-TERM CURRENT USE OF INSULIN (H): ICD-10-CM

## 2023-08-15 DIAGNOSIS — N18.1 CHRONIC KIDNEY DISEASE, STAGE 1: Primary | ICD-10-CM

## 2023-08-15 PROCEDURE — 99213 OFFICE O/P EST LOW 20 MIN: CPT | Performed by: STUDENT IN AN ORGANIZED HEALTH CARE EDUCATION/TRAINING PROGRAM

## 2023-08-15 NOTE — PROGRESS NOTES
Chief Complaint   Patient presents with    Diabetes    Hearing Aid Consultation     Patient miss their appointment for hearing aid consultation and need to reschedule       There are no exam notes on file for this visit.        Assessment/Plan:  Emmanuel Vences is a 78 year old female here for follow up DM after restarting lantus, still also on metformin and jardiance.  A1c <8 which is reasonable goal for this patient, though she does have marked postprandial hyperglycemia.  Discussed that she would likely see benefit either with a GLP-1 or addition of mealtime novolog.  She does not wish to pursue further treatment at this moment but seems open to discussing further in the future.     Regarding hearing appt, given audiology contact info and [employer] worker will help to schedule.     Emmanuel was seen today for diabetes and hearing aid consultation.    Diagnoses and all orders for this visit:    Chronic kidney disease, stage 1  -     Albumin Random Urine Quantitative with Creat Ratio; Future    Type 2 diabetes mellitus with hyperglycemia, without long-term current use of insulin (H)    Decreased hearing, unspecified laterality        Follow-up with Kaylin Joe in 6w-2 mo for diabetes follow up.    No future appointments.    Kaylin Joe MD      Patient Instructions   To reschedule audiology, please call the audiology office at [phone].    Subjective:  Emmanuel Vences is a 78 year old female here for follow up -     She is back to receiving daily lantus, and FBGs good. Seeing very high values after eating meals.   Does not receive mealtime insulin, only Lantus.   She has not previously wished to add another medication to her regimen and still feels that way though she does have some concern about postprandial highs.     Also wants to get back to audiology for hearing aid eval - hearing is getting worse. They missed appt and lost number.    Has a new [employer] worker who also speaks Yasmin here with her today! She can help  arrange an appt.     Patient Active Problem List   Diagnosis    Health Care Home    Esophageal reflux    Osteoarthrosis, unspecified whether generalized or localized, involving lower leg    Lumbosacral spondylosis without myelopathy    Hyperlipidemia    Type 2 diabetes mellitus with hyperglycemia, without long-term current use of insulin (H)    Seasonal allergies    Microalbuminuria    Age-related osteoporosis without current pathological fracture    Chronic kidney disease, stage 1    Major depressive disorder, recurrent episode, mild (H)    Dehydration    Hyponatremia    Acute cystitis with hematuria    Sepsis, due to unspecified organism, unspecified whether acute organ dysfunction present (H)    Decreased hearing, unspecified laterality       Current Outpatient Medications   Medication    acetaminophen (TYLENOL) 500 MG tablet    alcohol swab prep pads    alendronate (FOSAMAX) 70 MG tablet    blood glucose (NO BRAND SPECIFIED) lancets standard    blood glucose (NO BRAND SPECIFIED) test strip    capsaicin (ZOSTRIX) 0.025 % external cream    cetirizine (ZYRTEC) 10 MG tablet    Continuous Blood Gluc Sensor (FREESTYLE PRECIOUS 2 SENSOR) MISC    diclofenac (VOLTAREN) 1 % topical gel    empagliflozin (JARDIANCE) 25 MG TABS tablet    insulin glargine (LANTUS SOLOSTAR) 100 UNIT/ML pen    insulin pen needle (ULTICARE MICRO) 32G X 4 MM miscellaneous    lisinopril (ZESTRIL) 2.5 MG tablet    metFORMIN (GLUCOPHAGE XR) 500 MG 24 hr tablet    rosuvastatin (CRESTOR) 40 MG tablet    vitamin D3 (CHOLECALCIFEROL) 1.25 MG (19178 UT) capsule     No current facility-administered medications for this visit.       Objective:  /74   Pulse 85   Temp 98.3  F (36.8  C) (Oral)   Resp 18   Wt 50.7 kg (111 lb 12.8 oz)   SpO2 100%   BMI 23.92 kg/m    Body mass index is 23.92 kg/m .  Gen: A/O x3, in NAD.  Cardio: S1, S2, no MRG. RRR.  Resp: CTAB, no WRR.  Neuro: Grossly intact.    CGM reviewed, projected A1c 7.8%, does have PP highs to  the 300s, FBGs generally within range.

## 2023-08-18 PROBLEM — H91.90 DECREASED HEARING, UNSPECIFIED LATERALITY: Status: ACTIVE | Noted: 2023-08-18

## 2023-08-24 ENCOUNTER — DOCUMENTATION ONLY (OUTPATIENT)
Dept: FAMILY MEDICINE | Facility: CLINIC | Age: 78
End: 2023-08-24
Payer: COMMERCIAL

## 2023-08-24 DIAGNOSIS — Z53.9 DIAGNOSIS NOT YET DEFINED: Primary | ICD-10-CM

## 2023-08-24 NOTE — PROGRESS NOTES
To be completed in Nursing note:    Please reference list for forms that require a visit for completion.  Please remind patients that providers are given 3-5 business days to complete and return forms.      Form type: [employer] [facility] ~ home health certification and plan of care from 8/18/2023 to 10/14/2023    Date form received: 8/23/2023    Date form completed by Physician: 8/24/2023    How was form returned to patient (mailed, faxed, or at  for patient to ):    Fax to [phone]    Date form mailed/faxed/left at  for patient and sent to HIM for scanning:    Fax on 8/24/2023    Once form is left for patient, faxed, or mailed PCS will then close the documentation only encounter.

## 2023-08-28 DIAGNOSIS — E11.65 TYPE 2 DIABETES MELLITUS WITH HYPERGLYCEMIA, WITHOUT LONG-TERM CURRENT USE OF INSULIN (H): ICD-10-CM

## 2023-08-28 RX ORDER — METFORMIN HCL 500 MG
TABLET, EXTENDED RELEASE 24 HR ORAL
Qty: 360 TABLET | Refills: 3 | Status: SHIPPED | OUTPATIENT
Start: 2023-08-28 | End: 2024-01-26

## 2023-09-15 ENCOUNTER — OFFICE VISIT (OUTPATIENT)
Dept: PHARMACY | Facility: CLINIC | Age: 78
End: 2023-09-15
Payer: COMMERCIAL

## 2023-09-15 VITALS
HEART RATE: 91 BPM | RESPIRATION RATE: 18 BRPM | WEIGHT: 113 LBS | SYSTOLIC BLOOD PRESSURE: 124 MMHG | OXYGEN SATURATION: 100 % | BODY MASS INDEX: 24.18 KG/M2 | DIASTOLIC BLOOD PRESSURE: 74 MMHG

## 2023-09-15 DIAGNOSIS — E78.5 HYPERLIPIDEMIA LDL GOAL <100: ICD-10-CM

## 2023-09-15 DIAGNOSIS — R80.9 MICROALBUMINURIA: ICD-10-CM

## 2023-09-15 DIAGNOSIS — M17.10 UNILATERAL PRIMARY OSTEOARTHRITIS, UNSPECIFIED KNEE: ICD-10-CM

## 2023-09-15 DIAGNOSIS — M81.0 AGE-RELATED OSTEOPOROSIS WITHOUT CURRENT PATHOLOGICAL FRACTURE: ICD-10-CM

## 2023-09-15 DIAGNOSIS — E78.2 MIXED HYPERLIPIDEMIA: ICD-10-CM

## 2023-09-15 DIAGNOSIS — J30.2 SEASONAL ALLERGIES: ICD-10-CM

## 2023-09-15 DIAGNOSIS — E11.65 TYPE 2 DIABETES MELLITUS WITH HYPERGLYCEMIA, WITHOUT LONG-TERM CURRENT USE OF INSULIN (H): Primary | ICD-10-CM

## 2023-09-15 PROCEDURE — 99607 MTMS BY PHARM ADDL 15 MIN: CPT | Performed by: PHARMACIST

## 2023-09-15 PROCEDURE — 99605 MTMS BY PHARM NP 15 MIN: CPT | Performed by: PHARMACIST

## 2023-09-15 NOTE — PROGRESS NOTES
Medication Therapy Management (MTM) Encounter    ASSESSMENT:                            Medication Adherence/Access: No issues identified    Type 2 Diabetes:    Stable.  Meeting HbA1c goal of <8, which I believe is appropriate for her age.      Hyperlipidemia   Stable     Seasonal Allergic Rhinitis:  Stable    Osteoporosis:   Patient could benefit from  alendronate from other medications.    Osteoarthritis:  Stable    PLAN:                            Continue current medications    Follow-up: as needed with Pharmacist; 1 month with Dr Joe.     SUBJECTIVE/OBJECTIVE:                          Emmanuel Vences is a 78 year old female seen for an initial visit. She was referred to me from Dr Joe. Patient saw Dr Zhao is .  Patient was accompanied by [email or web address] worker/family friend. Patient seen with Yasmin Pham) .     Reason for visit: MTM.    Allergies/ADRs: Reviewed in chart  Past Medical History: Reviewed in chart  Tobacco: She reports that she quit smoking about 4 years ago. Her smoking use included cigarettes. She has a 12.50 pack-year smoking history. Her smokeless tobacco use includes chew.  Social History     Tobacco Use    Smoking status: Former     Packs/day: 0.25     Years: 50.00     Pack years: 12.50     Types: Cigarettes     Quit date: 2019     Years since quittin.4    Smokeless tobacco: Current     Types: Chew    Tobacco comments:     chews betel nut   Vaping Use    Vaping Use: Never used   Substance Use Topics    Alcohol use: No    Drug use: No       Medication Adherence/Access: no issues reported.  Patient has HHN and son helps reminder her to take her medications.  Patient rarely misses doses of medications.     Diabetes   Type 2 Diabetes:    Metformin XR 2000mg daily  Jardiance 25mg daily  Lantus 14 units at bedtime   Aspirin 81mg daily  Lisinopril 2.5mg daily for microalbuminuria     Patient is not experiencing side effects.  Blood sugar monitoring: Continuous Glucose  Monitor     Current diabetes symptoms: none  Diet/Exercise: Walks.       Urine Albumin:   Lab Results   Component Value Date    UMALCR 420.45 (H) 08/23/2022      Lab Results   Component Value Date    A1C 7.8 (H) 05/24/2023     Hyperlipidemia     rosuvastatin 40mg daily  Patient reports no significant myalgias or other side effects.       Recent Labs   Lab Test 05/24/23  1345 02/28/23  1612 09/21/22  1631 08/31/20  1548 08/31/20  1504 06/25/20  1602   CHOL 281* 308* 289*   < > 311.2* 310.7*   HDL 58 62 55   < > 54.9 61.8   LDL  --  197* 159*   < > Unable to calculate Trig >=400 193*   TRIG 413* 244* 376*   < > 407.8* 279.6*   CHOLHDLRATIO  --   --   --   --  5.7* 5.0*    < > = values in this interval not displayed.       Seasonal Allergic Rhinitis:  Cetirizine 10mg daily  Patient states this works well for her symptoms.     Osteoporosis:   Alendronate 70mg once weekly - doesn't know what day of the week she takes it; takes with other medications.  Cholecalciferol 50,000 international unit(s)   Last Vitamin D level was very low (9 micrograms/L)   DXA in 2018 showed spine bone density L1-L4 T-score -3.6; Left femoral neck T- score -2.7 and right femoral neck T-score -2.4.  No hx of fracture.   Does not really like dairy products.     Osteoarthritis:  Acetaminophen 500mg; 2 tablets three times daily prn  Has capscaisin and diclofenac gel on medication list but does not like the because they both cause a burning sensation on her skin.    Moving/walking ice helps    Today's Vitals: /74 (BP Location: Right arm, Patient Position: Sitting, Cuff Size: Adult Regular)   Pulse 91   Resp 18   Wt 113 lb (51.3 kg)   SpO2 100%   BMI 24.18 kg/m    ----------------      I spent 30 minutes with this patient today. All changes were made via collaborative practice agreement with Dr Joe. A copy of the visit note was provided to the patient's provider(s).    A summary of these recommendations was given to the patient.    Michelle  Yovani MarshallD      Medication Therapy Recommendations  Age-related osteoporosis without current pathological fracture    Current Medication: alendronate (FOSAMAX) 70 MG tablet   Rationale: Does not understand instructions - Adherence - Adherence   Recommendation: Provide Education   Status: Patient Agreed - Adherence/Education

## 2023-09-15 NOTE — NURSING NOTE
Due to patient being non-English speaking/uses sign language, an  was used for this visit. Only for face-to-face interpretation by an external agency, date and length of interpretation can be found on the scanned worksheet.       name: Neymar Pham   Language: Yasmin  Agency:  [employer]  Telephone number: [phone]  Type of interpretation:  Face-to-face, spoken

## 2023-09-18 RX ORDER — ROSUVASTATIN CALCIUM 40 MG/1
TABLET, COATED ORAL
Qty: 90 TABLET | Refills: 3 | Status: SHIPPED | OUTPATIENT
Start: 2023-09-18 | End: 2024-09-04

## 2023-10-10 ENCOUNTER — OFFICE VISIT (OUTPATIENT)
Dept: AUDIOLOGY | Facility: CLINIC | Age: 78
End: 2023-10-10
Attending: STUDENT IN AN ORGANIZED HEALTH CARE EDUCATION/TRAINING PROGRAM
Payer: COMMERCIAL

## 2023-10-10 DIAGNOSIS — H91.90 DECREASED HEARING, UNSPECIFIED LATERALITY: ICD-10-CM

## 2023-10-10 DIAGNOSIS — H90.3 SENSORINEURAL HEARING LOSS (SNHL) OF BOTH EARS: Primary | ICD-10-CM

## 2023-10-10 PROCEDURE — 92557 COMPREHENSIVE HEARING TEST: CPT | Performed by: AUDIOLOGIST

## 2023-10-10 PROCEDURE — 92550 TYMPANOMETRY & REFLEX THRESH: CPT | Performed by: AUDIOLOGIST

## 2023-10-10 NOTE — Clinical Note
"Hi Dr. Joe, This patient was seen for audiogram and has symmetrical hearing. She is a candidate for hearing aids, but requires medical clearance before I can fit her. Are you willing to add a documentation only note to state \"patient is medically cleared for hearing aids\"? Let me know. Thank you! Izabella Tao, CCC-A [place] Licensed Audiologist #[id number]    "

## 2023-10-10 NOTE — PROGRESS NOTES
AUDIOLOGY REPORT    SUBJECTIVE:  Emmanuel Vences is a 78 year old female who was seen in the Audiology Clinic at the [facility] for audiologic evaluation, referred by Kaylin Joe M.D.  Patient is here with  and an [id number] worker today. She reports difficulty hearing for the past 2-3 years. She has intermittent tinnitus in both ears. She reports intermittent episodes of vertigo if she moves in a particular way (ie. looking up). Her most recent episode of vertigo was a month ago. She has aural fullness bilaterally. She denies otalgia, otorrhea, past ear surgery, family history  of hearing loss, noise exposure, and prior use of amplification.      OBJECTIVE:    Otoscopic exam indicates ears are clear of cerumen bilaterally     Pure Tone Thresholds assessed using conventional audiometry with good  reliability from 250-8000 Hz bilaterally using insert earphones and circumaural headphones     RIGHT:  normal sloping to moderately-severe sensorineural hearing loss    LEFT: normal sloping to moderately-severe sensorineural hearing loss      Tympanogram:    RIGHT: normal mobility    LEFT:   round tracing    Reflexes (reported by stimulus ear):   RIGHT: Ipsilateral is present at normal levels   RIGHT: Contralateral is absent at frequencies tested   LEFT:   Ipsilateral is absent at frequencies tested   LEFT:   Contralateral is absent at frequencies tested    Speech DetectionThreshold:    RIGHT: 25 dB HL    LEFT:   25 dB HL  Word Recognition Score:     RIGHT: 100% at 65 dB HL using monitored live voice via     LEFT:   100% at 65 dB HL using monitored live voice via       ASSESSMENT:   Pure tone audiometry showed normal sloping to moderately-severe sensorineural hearing loss. Today s results were discussed with the patient in detail.     PLAN:  Patient was counseled regarding hearing loss and impact on communication.  Patient is a good candidate for amplification at this time.  It is recommended that the patient return on 11/7/2023 for hearing aid consultation pending medical clearance from Dr. Joe.  Please call this clinic with questions regarding these results or recommendations.      Izabella Tao, CCC-A  [place] Licensed Audiologist #[id number]

## 2023-10-18 ENCOUNTER — OFFICE VISIT (OUTPATIENT)
Dept: FAMILY MEDICINE | Facility: CLINIC | Age: 78
End: 2023-10-18
Payer: COMMERCIAL

## 2023-10-18 VITALS
DIASTOLIC BLOOD PRESSURE: 77 MMHG | OXYGEN SATURATION: 99 % | HEART RATE: 84 BPM | BODY MASS INDEX: 24.05 KG/M2 | TEMPERATURE: 98.4 F | RESPIRATION RATE: 24 BRPM | WEIGHT: 112.4 LBS | SYSTOLIC BLOOD PRESSURE: 117 MMHG

## 2023-10-18 DIAGNOSIS — E55.9 HYPOVITAMINOSIS D: ICD-10-CM

## 2023-10-18 DIAGNOSIS — E78.2 MIXED HYPERLIPIDEMIA: ICD-10-CM

## 2023-10-18 DIAGNOSIS — N39.46 MIXED INCONTINENCE: ICD-10-CM

## 2023-10-18 DIAGNOSIS — R80.9 MICROALBUMINURIA: ICD-10-CM

## 2023-10-18 DIAGNOSIS — D64.9 ANEMIA, UNSPECIFIED TYPE: ICD-10-CM

## 2023-10-18 DIAGNOSIS — E11.65 TYPE 2 DIABETES MELLITUS WITH HYPERGLYCEMIA, WITHOUT LONG-TERM CURRENT USE OF INSULIN (H): ICD-10-CM

## 2023-10-18 DIAGNOSIS — N18.1 CHRONIC KIDNEY DISEASE, STAGE 1: ICD-10-CM

## 2023-10-18 DIAGNOSIS — D64.9 ANEMIA, UNSPECIFIED TYPE: Primary | ICD-10-CM

## 2023-10-18 DIAGNOSIS — Z12.11 SCREEN FOR COLON CANCER: Primary | ICD-10-CM

## 2023-10-18 LAB
ANION GAP SERPL CALCULATED.3IONS-SCNC: 11 MMOL/L (ref 7–15)
BUN SERPL-MCNC: 13.9 MG/DL (ref 8–23)
CALCIUM SERPL-MCNC: 9.2 MG/DL (ref 8.8–10.2)
CHLORIDE SERPL-SCNC: 101 MMOL/L (ref 98–107)
CHOLEST SERPL-MCNC: 287 MG/DL
CREAT SERPL-MCNC: 1.17 MG/DL (ref 0.51–0.95)
CREAT UR-MCNC: 25.5 MG/DL
DEPRECATED HCO3 PLAS-SCNC: 25 MMOL/L (ref 22–29)
EGFRCR SERPLBLD CKD-EPI 2021: 48 ML/MIN/1.73M2
ERYTHROCYTE [DISTWIDTH] IN BLOOD BY AUTOMATED COUNT: 12.8 % (ref 10–15)
GLUCOSE SERPL-MCNC: 262 MG/DL (ref 70–99)
HBA1C MFR BLD: 7.3 % (ref 0–5.6)
HCT VFR BLD AUTO: 30 % (ref 35–47)
HDLC SERPL-MCNC: 65 MG/DL
HGB BLD-MCNC: 10.2 G/DL (ref 11.7–15.7)
LDLC SERPL CALC-MCNC: 165 MG/DL
MCH RBC QN AUTO: 30.8 PG (ref 26.5–33)
MCHC RBC AUTO-ENTMCNC: 34 G/DL (ref 31.5–36.5)
MCV RBC AUTO: 91 FL (ref 78–100)
MICROALBUMIN UR-MCNC: 60.7 MG/L
MICROALBUMIN/CREAT UR: 238.04 MG/G CR (ref 0–25)
NONHDLC SERPL-MCNC: 222 MG/DL
PLATELET # BLD AUTO: 214 10E3/UL (ref 150–450)
POTASSIUM SERPL-SCNC: 4.1 MMOL/L (ref 3.4–5.3)
RBC # BLD AUTO: 3.31 10E6/UL (ref 3.8–5.2)
SODIUM SERPL-SCNC: 137 MMOL/L (ref 135–145)
TRIGL SERPL-MCNC: 287 MG/DL
VIT D+METAB SERPL-MCNC: 36 NG/ML (ref 20–50)
WBC # BLD AUTO: 7.6 10E3/UL (ref 4–11)

## 2023-10-18 PROCEDURE — 90678 RSV VACC PREF BIVALENT IM: CPT | Performed by: STUDENT IN AN ORGANIZED HEALTH CARE EDUCATION/TRAINING PROGRAM

## 2023-10-18 PROCEDURE — 85027 COMPLETE CBC AUTOMATED: CPT | Performed by: STUDENT IN AN ORGANIZED HEALTH CARE EDUCATION/TRAINING PROGRAM

## 2023-10-18 PROCEDURE — 82570 ASSAY OF URINE CREATININE: CPT | Performed by: STUDENT IN AN ORGANIZED HEALTH CARE EDUCATION/TRAINING PROGRAM

## 2023-10-18 PROCEDURE — 99214 OFFICE O/P EST MOD 30 MIN: CPT | Mod: 25 | Performed by: STUDENT IN AN ORGANIZED HEALTH CARE EDUCATION/TRAINING PROGRAM

## 2023-10-18 PROCEDURE — 36415 COLL VENOUS BLD VENIPUNCTURE: CPT | Performed by: STUDENT IN AN ORGANIZED HEALTH CARE EDUCATION/TRAINING PROGRAM

## 2023-10-18 PROCEDURE — 83036 HEMOGLOBIN GLYCOSYLATED A1C: CPT | Performed by: STUDENT IN AN ORGANIZED HEALTH CARE EDUCATION/TRAINING PROGRAM

## 2023-10-18 PROCEDURE — 90471 IMMUNIZATION ADMIN: CPT | Performed by: STUDENT IN AN ORGANIZED HEALTH CARE EDUCATION/TRAINING PROGRAM

## 2023-10-18 PROCEDURE — 80061 LIPID PANEL: CPT | Performed by: STUDENT IN AN ORGANIZED HEALTH CARE EDUCATION/TRAINING PROGRAM

## 2023-10-18 PROCEDURE — 82043 UR ALBUMIN QUANTITATIVE: CPT | Performed by: STUDENT IN AN ORGANIZED HEALTH CARE EDUCATION/TRAINING PROGRAM

## 2023-10-18 PROCEDURE — 90472 IMMUNIZATION ADMIN EACH ADD: CPT | Performed by: STUDENT IN AN ORGANIZED HEALTH CARE EDUCATION/TRAINING PROGRAM

## 2023-10-18 PROCEDURE — 80048 BASIC METABOLIC PNL TOTAL CA: CPT | Performed by: STUDENT IN AN ORGANIZED HEALTH CARE EDUCATION/TRAINING PROGRAM

## 2023-10-18 PROCEDURE — 90662 IIV NO PRSV INCREASED AG IM: CPT | Performed by: STUDENT IN AN ORGANIZED HEALTH CARE EDUCATION/TRAINING PROGRAM

## 2023-10-18 PROCEDURE — 82306 VITAMIN D 25 HYDROXY: CPT | Performed by: STUDENT IN AN ORGANIZED HEALTH CARE EDUCATION/TRAINING PROGRAM

## 2023-10-18 NOTE — PROGRESS NOTES
Prior to immunization administration, verified patients identity using patient s name and date of birth. Please see Immunization Activity for additional information.     Screening Questionnaire for Adult Immunization    Are you sick today?   No   Do you have allergies to medications, food, a vaccine component or latex?   No   Have you ever had a serious reaction after receiving a vaccination?   No   Do you have a long-term health problem with heart, lung, kidney, or metabolic disease (e.g., diabetes), asthma, a blood disorder, no spleen, complement component deficiency, a cochlear implant, or a spinal fluid leak?  Are you on long-term aspirin therapy?   Yes   Do you have cancer, leukemia, HIV/AIDS, or any other immune system problem?   No   Do you have a parent, brother, or sister with an immune system problem?   No   In the past 3 months, have you taken medications that affect  your immune system, such as prednisone, other steroids, or anticancer drugs; drugs for the treatment of rheumatoid arthritis, Crohn s disease, or psoriasis; or have you had radiation treatments?   No   Have you had a seizure, or a brain or other nervous system problem?   No   During the past year, have you received a transfusion of blood or blood    products, or been given immune (gamma) globulin or antiviral drug?   No   For women: Are you pregnant or is there a chance you could become       pregnant during the next month?   No   Have you received any vaccinations in the past 4 weeks?   No     Immunization questionnaire was positive for at least one answer.  Notified Dr. Joe.      Patient instructed to remain in clinic for 15 minutes afterwards, and to report any adverse reactions.     Screening performed by Juancarlos Warner CMA on 10/18/2023 at 3:57 PM.

## 2023-10-18 NOTE — PROGRESS NOTES
Chief Complaint   Patient presents with    Diabetes       There are no exam notes on file for this visit.        Assessment/Plan:  Emmanuel Vences is a 78 year old female here for follow up diabetes, doing well on current medications with A1c within goal for age despite some postprandial hyperglycemia. With chronic back and knee pain, adequately controlled with voltaren gel and twice daily stretches.  Ambulates with cane and walker, no falls, has grab bars in bathroom.     Needs new Rx for pull ups as she has urinary and occasional fecal incontinence.    Emmanuel was seen today for diabetes.    Diagnoses and all orders for this visit:    Screen for colon cancer  -     Fecal colorectal cancer screen FIT - Future (S+30); Future    Anemia, unspecified type  -     CBC with platelets    Hypovitaminosis D  -     Vitamin D deficiency screening    Mixed hyperlipidemia  -     Lipid panel reflex to direct LDL Non-fasting    Type 2 diabetes mellitus with hyperglycemia, without long-term current use of insulin (H)  -     Basic metabolic panel  -     Albumin Random Urine Quantitative with Creat Ratio  -     Hemoglobin A1c    Microalbuminuria  -     Basic metabolic panel    Chronic kidney disease, stage 1  -     Cancel: Albumin Random Urine Quantitative with Creat Ratio    Mixed incontinence  -     Incontinence Supplies Order for DME - ONLY FOR DME    Other orders  -     INFLUENZA VACCINE 65+ (FLUZONE HD)  -     RSV VACCINE (ABRYSVO)        Follow-up with Kaylin Joe in 6 weeks for DM.  If control continues, space to 2-3 mos between appts.    Future Appointments   Date Time Provider Department Center   11/7/2023  8:00 AM Denisse Fisher [facility]   12/7/2023  9:30 AM Denisse Fisher [facility]   12/29/2023  2:00 PM [facility]      Patient Instructions   Continue your current diabetes medications:  Lantus 14 units daily  Jardiance 25mg daily  Metformin  2000mg daily      Subjective:  Emmanuel Say is a 78 year old female here for follow up.     Having a lot of low back pain and has knee pains.  Pain comes and goes, has to do eercises, otherwise can't stand   Applies cream which helps  For the knees, she can apply the same cream but doesn't want injection -     Mobility - uses single point cane, no falls  Says very strong from years of [place]  Home safety -     Her son helps to take care of her.   Someties accidents with bowel movements and urine,     Reviewed precious - control appears good.     Patient Active Problem List   Diagnosis    Health Care Home    Esophageal reflux    Osteoarthrosis, unspecified whether generalized or localized, involving lower leg    Lumbosacral spondylosis without myelopathy    Hyperlipidemia    Type 2 diabetes mellitus with hyperglycemia, without long-term current use of insulin (H)    Seasonal allergies    Microalbuminuria    Age-related osteoporosis without current pathological fracture    Chronic kidney disease, stage 1    Major depressive disorder, recurrent episode, mild (H24)    Dehydration    Hyponatremia    Acute cystitis with hematuria    Sepsis, due to unspecified organism, unspecified whether acute organ dysfunction present (H)    Decreased hearing, unspecified laterality       Current Outpatient Medications   Medication    acetaminophen (TYLENOL) 500 MG tablet    alcohol swab prep pads    alendronate (FOSAMAX) 70 MG tablet    blood glucose (NO BRAND SPECIFIED) lancets standard    blood glucose (NO BRAND SPECIFIED) test strip    cetirizine (ZYRTEC) 10 MG tablet    Continuous Blood Gluc Sensor (FREESTYLE PRECIOUS 2 SENSOR) MISC    empagliflozin (JARDIANCE) 25 MG TABS tablet    insulin glargine (LANTUS SOLOSTAR) 100 UNIT/ML pen    insulin pen needle (ULTICARE MICRO) 32G X 4 MM miscellaneous    lisinopril (ZESTRIL) 2.5 MG tablet    metFORMIN (GLUCOPHAGE XR) 500 MG 24 hr tablet    rosuvastatin (CRESTOR) 40 MG  tablet    vitamin D3 (CHOLECALCIFEROL) 1.25 MG (65615 UT) capsule     No current facility-administered medications for this visit.       Objective:  /77   Pulse 84   Temp 98.4  F (36.9  C) (Oral)   Resp 24   Wt 51 kg (112 lb 6.4 oz)   SpO2 99%   BMI 24.05 kg/m    Body mass index is 24.05 kg/m .  Gen: A/O x3, in NAD.  Cardio: S1, S2, no MRG. RRR.  Resp: CTAB, no WRR.  Ext: No edema of BLE. Arthritic appearance of both knees - protuberant.  Neuro: Grossly intact.

## 2023-10-18 NOTE — PATIENT INSTRUCTIONS
Continue your current diabetes medications:  Lantus 14 units daily  Jardiance 25mg daily  Metformin 2000mg daily

## 2023-10-20 DIAGNOSIS — Z53.9 DIAGNOSIS NOT YET DEFINED: Primary | ICD-10-CM

## 2023-11-06 NOTE — PROGRESS NOTES
AUDIOLOGY REPORT    SUBJECTIVE: Emmanuel Vences is a 78 year old female was seen in the Audiology Clinic at  [facility] on 11/07/23 to discuss concerns with hearing and functional communication difficulties.  Emmanuel has been seen previously on 10/10/2023, and results revealed a normal sloping to moderately-severe sensorineural hearing loss in both ears. The patient was medically evaluated and determined to be cleared for binaural hearing aids by Dr. Kaylin Joe on 10/10/2023. Emmanuel notes difficulty with communication in a variety of listening situations. Patient is here with her [facility] worker and  today.     OBJECTIVE:    Patient is a hearing aid candidate. Patient would like to move forward with a hearing aid evaluation today. Therefore, the patient was presented with different options for amplification to help aid in communication. Discussed styles, levels of technology and monaural vs. binaural fitting.     The hearing aid(s) mutually chosen were:  Binaural: Phonak Audeo Lumity 70-R   COLOR: Graphite Gray  BATTERY SIZE: rechargeable  EARMOLD/TIPS: to be chosen at fitting  CANAL/ LENGTH: 2S right and left    She has an iPhone 11 Pro Max.      ASSESSMENT:     Reviewed purchase information and warranty information with patient. The 45 day trial period was explained to patient. The patient was given a copy of the [place] [employer] consumer brochure on purchasing hearing instruments. Patient risk factors have been provided to the patient in writing prior to the sale of the hearing aid per FDA regulation. The risk factors are also available in the User Instructional Booklet to be presented on the day of the hearing aid fitting. Hearing aids ordered. Hearing aid evaluation completed.    PLAN: Emmanuel is scheduled to return on 12/7/2023 for a hearing aid fitting and programming. Purchase agreement will be completed on that date. Please contact this clinic with any questions or  concerns.    Izabella Tao, CCC-A  [place] Licensed Audiologist #[id number]

## 2023-11-07 ENCOUNTER — OFFICE VISIT (OUTPATIENT)
Dept: AUDIOLOGY | Facility: CLINIC | Age: 78
End: 2023-11-07
Payer: COMMERCIAL

## 2023-11-07 DIAGNOSIS — H90.3 SENSORINEURAL HEARING LOSS (SNHL) OF BOTH EARS: Primary | ICD-10-CM

## 2023-11-07 PROCEDURE — 92591 PR HEARING AID EXAM BINAURAL: CPT | Performed by: AUDIOLOGIST

## 2023-11-21 DIAGNOSIS — E11.65 TYPE 2 DIABETES MELLITUS WITH HYPERGLYCEMIA, WITHOUT LONG-TERM CURRENT USE OF INSULIN (H): ICD-10-CM

## 2023-11-21 RX ORDER — MULTIVIT-MIN/FA/LYCOPEN/LUTEIN .4-300-25
1 TABLET ORAL DAILY
Qty: 30 TABLET | Refills: 4 | Status: SHIPPED | OUTPATIENT
Start: 2023-11-21 | End: 2024-04-23

## 2023-11-27 ENCOUNTER — DOCUMENTATION ONLY (OUTPATIENT)
Dept: FAMILY MEDICINE | Facility: CLINIC | Age: 78
End: 2023-11-27
Payer: COMMERCIAL

## 2023-11-27 NOTE — PROGRESS NOTES
To be completed in Nursing note:    Please reference list for forms that require a visit for completion.  Please remind patients that providers are given 3-5 business days to complete and return forms.      Form type: HOME HEALTH CERTIFICATION AND PLAN OF CARE 10/15/2023 TO 12/13/2023~ [facility]    Date form received: 11/22/2023    Date form completed by Physician: 11/30/2023     How was form returned to patient (mailed, faxed, or at  for patient to ):    Fax to [phone]    Date form mailed/faxed/left at  for patient and sent to HIM for scanning:    Fax on 12/1/2023    Once form is left for patient, faxed, or mailed PCS will then close the documentation only encounter.

## 2023-11-30 DIAGNOSIS — Z53.9 DIAGNOSIS NOT YET DEFINED: Primary | ICD-10-CM

## 2023-12-07 ENCOUNTER — OFFICE VISIT (OUTPATIENT)
Dept: AUDIOLOGY | Facility: CLINIC | Age: 78
End: 2023-12-07
Payer: COMMERCIAL

## 2023-12-07 DIAGNOSIS — H90.3 SENSORINEURAL HEARING LOSS (SNHL) OF BOTH EARS: Primary | ICD-10-CM

## 2023-12-07 PROCEDURE — V5020 CONFORMITY EVALUATION: HCPCS | Mod: RT | Performed by: AUDIOLOGIST

## 2023-12-07 PROCEDURE — V5160 DISPENSING FEE BINAURAL: HCPCS | Performed by: AUDIOLOGIST

## 2023-12-07 PROCEDURE — V5011 HEARING AID FITTING/CHECKING: HCPCS | Mod: LT | Performed by: AUDIOLOGIST

## 2023-12-07 PROCEDURE — V5261 HEARING AID, DIGIT, BIN, BTE: HCPCS | Mod: NU | Performed by: AUDIOLOGIST

## 2023-12-07 PROCEDURE — V5011 HEARING AID FITTING/CHECKING: HCPCS | Mod: RT | Performed by: AUDIOLOGIST

## 2023-12-07 PROCEDURE — 92593 PR HEARING AID CHECK, BINAURAL: CPT | Performed by: AUDIOLOGIST

## 2023-12-07 PROCEDURE — V5020 CONFORMITY EVALUATION: HCPCS | Mod: LT | Performed by: AUDIOLOGIST

## 2023-12-08 NOTE — PATIENT INSTRUCTIONS

## 2023-12-08 NOTE — PROGRESS NOTES
AUDIOLOGY REPORT - HEARING AID FITTING    SUBJECTIVE: Emmanuel Vences, a 78 year old female, was seen in the Audiology Clinic at [facility] today for a Binaural hearing aid fitting. Emmanuel has been seen previously on 10/10/2023, and results revealed a normal sloping to moderately-severe sensorineural hearing loss in both ears.  The patient was given medical clearance to pursue amplification by Dr. Kaylin Joe on 10/10/2023.      OBJECTIVE:  Prior to fitting, a hearing aid check was performed to ensure device functionality. The hearing aid conformity evaluation was completed.The hearing aids were placed and they provided a good fit. Real-ear-probe-microphone measurements were completed on the [employer] system and were a good match to NAL-NL1 target with soft sounds audible, moderate sounds comfortable, and loud sounds below discomfort. UCLs are verified through maximum power output measures and demonstrate appropriate limiting of loud inputs. Ms. Vences was oriented to proper hearing aid use, care, cleaning (no water, dry brush), batteries (size rechargeable, toxicity, low-battery signal), aid insertion/removal, user booklet, warranty information, storage cases, and other hearing aid details. The patient confirmed understanding of hearing aid use and care, and showed proper insertion of hearing aid and batteries while in the office today. Ms. Vences reported good volume and sound quality today.    She is set at 90% of targets today and given a volume control. She did very well with insertion/removal of the devices. Will teach her how to change domes and wax traps and pair her cell phone if interested at the next visit.     EAR(S) FIT: Binaural  MA HEARING AID MAKE: Right: Phonak Audeo L70-R; Left: Phonak Audeo L70-R    MA HEARING AID MODEL #: Right: [id number]; Left: [id number]  HEARING AID STYLE: Right: CANDIE; Left: CANDIE  DOME SIZE: Right: small vented  ; Left::  small vented    LENGTH: Right:   4.0  S2R; Left:  4.0  S2L   SERIAL NUMBERS: Right: [id number]; Left: [id number]  WARRANTY END DATE: Right: 2/4/2027; Left:: 2/4/2027      The patient Does Not Require a signed a waiver that is on file agreeing to the upgrade charge, per their insurance contract.         ASSESSMENT: Binaural Phonak Audeo Lumity 70-R hearing aid fitting completed today. Verification measures were performed. The 45 day trial period was explained to patient, and they expressed understanding. Ms. Vences signed the Hearing Aid Purchase Agreement and was given a copy, as well as details on her hearing aids. Patient was counseled that exact out of pocket amounts cannot be determined for hearing aid claims being sent to insurance. Any insurance coverage information presented to the patient is an estimate only, and is not a guarantee of payment. Patient has been advised to check with their own insurance.    PLAN: Ms. Vences will return for follow-up on 12/29/2023 for a hearing aid review appointment. Please call this clinic with questions regarding today s appointment.    Izabella Tao, CCC-A  [place] Licensed Audiologist #[id number]

## 2023-12-13 ENCOUNTER — OFFICE VISIT (OUTPATIENT)
Dept: FAMILY MEDICINE | Facility: CLINIC | Age: 78
End: 2023-12-13
Payer: COMMERCIAL

## 2023-12-13 VITALS
SYSTOLIC BLOOD PRESSURE: 129 MMHG | HEIGHT: 57 IN | RESPIRATION RATE: 18 BRPM | HEART RATE: 79 BPM | WEIGHT: 117 LBS | TEMPERATURE: 99 F | OXYGEN SATURATION: 98 % | BODY MASS INDEX: 25.24 KG/M2 | DIASTOLIC BLOOD PRESSURE: 70 MMHG

## 2023-12-13 DIAGNOSIS — M81.0 AGE-RELATED OSTEOPOROSIS WITHOUT CURRENT PATHOLOGICAL FRACTURE: Primary | ICD-10-CM

## 2023-12-13 DIAGNOSIS — Z12.11 SCREEN FOR COLON CANCER: ICD-10-CM

## 2023-12-13 DIAGNOSIS — E11.65 TYPE 2 DIABETES MELLITUS WITH HYPERGLYCEMIA, WITHOUT LONG-TERM CURRENT USE OF INSULIN (H): ICD-10-CM

## 2023-12-13 DIAGNOSIS — M15.0 PRIMARY OSTEOARTHRITIS INVOLVING MULTIPLE JOINTS: ICD-10-CM

## 2023-12-13 DIAGNOSIS — H57.13 EYE PAIN, BILATERAL: ICD-10-CM

## 2023-12-13 DIAGNOSIS — H04.203 BILATERAL EPIPHORA: ICD-10-CM

## 2023-12-13 PROCEDURE — 99214 OFFICE O/P EST MOD 30 MIN: CPT | Performed by: STUDENT IN AN ORGANIZED HEALTH CARE EDUCATION/TRAINING PROGRAM

## 2023-12-13 RX ORDER — GLUCOSAMINE HCL/CHONDROITIN SU 500-400 MG
CAPSULE ORAL
Qty: 100 EACH | Refills: 3 | Status: SHIPPED | OUTPATIENT
Start: 2023-12-13 | End: 2024-08-14

## 2023-12-13 RX ORDER — PEN NEEDLE, DIABETIC 32GX 5/32"
NEEDLE, DISPOSABLE MISCELLANEOUS
Qty: 100 EACH | Refills: 3 | Status: SHIPPED | OUTPATIENT
Start: 2023-12-13 | End: 2024-08-14

## 2023-12-13 RX ORDER — KETOTIFEN FUMARATE 0.35 MG/ML
1 SOLUTION/ DROPS OPHTHALMIC 2 TIMES DAILY
Qty: 10 ML | Refills: 1 | Status: SHIPPED | OUTPATIENT
Start: 2023-12-13

## 2023-12-13 RX ORDER — MELOXICAM 7.5 MG/1
7.5 TABLET ORAL DAILY
Qty: 30 TABLET | Refills: 11 | Status: SHIPPED | OUTPATIENT
Start: 2023-12-13

## 2023-12-13 RX ORDER — INSULIN GLARGINE 100 [IU]/ML
14-40 INJECTION, SOLUTION SUBCUTANEOUS AT BEDTIME
Qty: 15 ML | Refills: 3 | Status: SHIPPED | OUTPATIENT
Start: 2023-12-13 | End: 2024-08-14

## 2023-12-13 NOTE — PATIENT INSTRUCTIONS
1) Please scan your glucometer more often - 2-3 times per day.  2) Continue medications as you are taking them.   3) STOP tylenol and START once daily meloxicam instead.  4) Make an eye doctor appointment to evaluation for glaucoma.  You can try to contact the [facility] but I have also placed an ophthalmology referral.

## 2023-12-13 NOTE — PROGRESS NOTES
Chief Complaint   Patient presents with    Follow Up     Dm       Refill Request       There are no exam notes on file for this visit.        Assessment/Plan:  Emmanuel Vences is a 78 year old female here for follow-up type 2 diabetes mellitus, preventative care, osteoarthritis, also with a concern today of watering eyes with eye pain concerning for glaucoma.  Her diabetes is moderately well-controlled through the use of current medications although she is not scanning her continuous glucose monitor frequently and was educated about increased scanning to improve data capture.  She continues to have some ambivalence about engagement with her diabetes and a fair amount of time was spent discussing normal deconditioning related to age versus avoidable adverse effects of disease processes when they are well-controlled.    Regarding her eye symptoms she will contact the [facility] eye clinic about evaluation for eye pressures and and ophthalmology referral was placed in case she is unable to obtain an appointment or ophthalmology services are unavailable.    She has ongoing multijoint pain and we will do a trial of replacing her acetaminophen for once daily meloxicam to determine whether this improves her overall pain control.    We will follow-up in 6 weeks to review her diabetes control.        Emmanuel was seen today for follow up and refill request.    Diagnoses and all orders for this visit:    Age-related osteoporosis without current pathological fracture    Type 2 diabetes mellitus with hyperglycemia, without long-term current use of insulin (H)  -     insulin glargine (LANTUS SOLOSTAR) 100 UNIT/ML pen; Inject 14-40 Units Subcutaneous at bedtime Adjust dose per physician instructions.  -     insulin pen needle (ULTICARE MICRO) 32G X 4 MM miscellaneous; Use 1 pen needles daily or as directed.  -     alcohol swab prep pads; Use to swab area of injection/amber as directed.    Screen for colon cancer  -     Fecal colorectal cancer screen  FIT - Future (S+30); Future  -     Fecal colorectal cancer screen FIT - Future (S+30)    Bilateral epiphora  -     Adult Eye  Referral; Future  -     ketotifen fumarate 0.035% 0.035 % SOLN ophthalmic solution; Place 1 drop into both eyes 2 times daily    Eye pain, bilateral  -     Adult Eye  Referral; Future    Primary osteoarthritis involving multiple joints  -     meloxicam (MOBIC) 7.5 MG tablet; Take 1 tablet (7.5 mg) by mouth daily Take with food.        Future Appointments   Date Time Provider Department Center   12/29/2023  2:00 PM Denisse Fisher, Alyce [facility]   1/26/2024 11:00 AM Kaylin Joe MD [facility]       Kaylin Joe MD      Patient Instructions   1) Please scan your glucometer more often - 2-3 times per day.  2) Continue medications as you are taking them.   3) STOP tylenol and START once daily meloxicam instead.  4) Make an eye doctor appointment to evaluation for glaucoma.  You can try to contact the [facility] but I have also placed an ophthalmology referral.     Subjective:  Emmanuel Vences is a 78 year old female here for follow up diabetes.    Also having a lot of joint pains, and tylenol isn't very effective. Voltaren burns.    Hesitant to add a med but can try to medication.    Collecting information from about 40% of the time over the past 30 days.  High 60%, in range 40%.   No lows.  Average FBGs about 145    Receiving insulin injections every day, adherent with her medications as her pillbox has been filled.    Patient Active Problem List   Diagnosis    Health Care Home    Esophageal reflux    Osteoarthrosis, unspecified whether generalized or localized, involving lower leg    Lumbosacral spondylosis without myelopathy    Hyperlipidemia    Type 2 diabetes mellitus with hyperglycemia, without long-term current use of insulin (H)    Seasonal allergies    Microalbuminuria    Age-related osteoporosis without current pathological fracture    Chronic  "kidney disease, stage 1    Major depressive disorder, recurrent episode, mild (H24)    Dehydration    Hyponatremia    Acute cystitis with hematuria    Sepsis, due to unspecified organism, unspecified whether acute organ dysfunction present (H)    Decreased hearing, unspecified laterality       Current Outpatient Medications   Medication    alcohol swab prep pads    insulin glargine (LANTUS SOLOSTAR) 100 UNIT/ML pen    insulin pen needle (ULTICARE MICRO) 32G X 4 MM miscellaneous    ketotifen fumarate 0.035% 0.035 % SOLN ophthalmic solution    meloxicam (MOBIC) 7.5 MG tablet    alendronate (FOSAMAX) 70 MG tablet    blood glucose (NO BRAND SPECIFIED) lancets standard    blood glucose (NO BRAND SPECIFIED) test strip    cetirizine (ZYRTEC) 10 MG tablet    Continuous Blood Gluc Sensor (FREESTYLE PRECIOUS 2 SENSOR) MISC    empagliflozin (JARDIANCE) 25 MG TABS tablet    lisinopril (ZESTRIL) 2.5 MG tablet    metFORMIN (GLUCOPHAGE XR) 500 MG 24 hr tablet    Multiple Vitamins-Minerals (CEROVITE SENIOR) TABS    rosuvastatin (CRESTOR) 40 MG tablet    vitamin D3 (CHOLECALCIFEROL) 1.25 MG (06381 UT) capsule     No current facility-administered medications for this visit.       Objective:  /70 (BP Location: Right arm, Patient Position: Sitting, Cuff Size: Adult Regular)   Pulse 79   Temp 99  F (37.2  C) (Oral)   Resp 18   Ht 1.456 m (4' 9.32\")   Wt 53.1 kg (117 lb)   SpO2 98%   BMI 25.04 kg/m    Body mass index is 25.04 kg/m .  Gen: A/O x3, in NAD.  Cardio: S1, S2, no MRG. RRR.  Resp: CTAB, no WRR.  Neuro: Grossly intact.    "

## 2023-12-26 ENCOUNTER — OFFICE VISIT (OUTPATIENT)
Dept: OPHTHALMOLOGY | Facility: CLINIC | Age: 78
End: 2023-12-26
Attending: OPHTHALMOLOGY
Payer: COMMERCIAL

## 2023-12-26 DIAGNOSIS — H57.13 EYE PAIN, BILATERAL: ICD-10-CM

## 2023-12-26 DIAGNOSIS — H04.203 BILATERAL EPIPHORA: ICD-10-CM

## 2023-12-26 PROCEDURE — 99203 OFFICE O/P NEW LOW 30 MIN: CPT | Performed by: OPHTHALMOLOGY

## 2023-12-26 PROCEDURE — G0463 HOSPITAL OUTPT CLINIC VISIT: HCPCS | Performed by: OPHTHALMOLOGY

## 2023-12-26 ASSESSMENT — REFRACTION_WEARINGRX
OD_ADD: +3.00
OD_CYLINDER: +1.25
OS_AXIS: 169
OD_SPHERE: -1.25
OD_AXIS: 006
OS_ADD: +3.00
SPECS_TYPE: BIFOCAL
OS_SPHERE: -0.75
OS_CYLINDER: +0.75

## 2023-12-26 ASSESSMENT — CUP TO DISC RATIO
OD_RATIO: 0.8
OS_RATIO: 0.65

## 2023-12-26 ASSESSMENT — CONF VISUAL FIELD
OS_INFERIOR_TEMPORAL_RESTRICTION: 0
OD_SUPERIOR_NASAL_RESTRICTION: 3
OD_SUPERIOR_TEMPORAL_RESTRICTION: 3
OS_INFERIOR_NASAL_RESTRICTION: 0
METHOD: COUNTING FINGERS
OS_SUPERIOR_NASAL_RESTRICTION: 0
OS_NORMAL: 1
OS_SUPERIOR_TEMPORAL_RESTRICTION: 0

## 2023-12-26 ASSESSMENT — VISUAL ACUITY
CORRECTION_TYPE: GLASSES
OS_CC: 20/25
OD_CC: 20/30
METHOD: SNELLEN - SINGLE
OD_PH_CC: 20/25

## 2023-12-26 ASSESSMENT — TONOMETRY
OD_IOP_MMHG: 13
OS_IOP_MMHG: 13
IOP_METHOD: ICARE

## 2023-12-26 ASSESSMENT — SLIT LAMP EXAM - LIDS
COMMENTS: MGD
COMMENTS: MGD

## 2023-12-26 ASSESSMENT — ENCOUNTER SYMPTOMS: JOINT SWELLING: 1

## 2023-12-26 ASSESSMENT — EXTERNAL EXAM - LEFT EYE: OS_EXAM: NORMAL

## 2023-12-26 ASSESSMENT — EXTERNAL EXAM - RIGHT EYE: OD_EXAM: NORMAL

## 2023-12-26 NOTE — NURSING NOTE
Chief Complaints and History of Present Illnesses   Patient presents with    Consult For     Epiphora -bilateral referred by Kaylin Joe MD     Chief Complaint(s) and History of Present Illness(es)       Consult For              Laterality: both eyes    Onset: 3 months ago    Course: gradually worsening    Associated symptoms: dryness, tearing and foreign body sensation.  Negative for eye pain    Treatments tried: no treatments    Pain scale: 0/10    Comments: Epiphora -bilateral referred by Kaylin Joe MD              Comments    Exam interpreted.  She states that she frequently has a foreign body sensation and she is bothered by tearing.  The problem has been bothering her for several months.  The tearing blurs her vision.      Lab Results       Component                Value               Date                       A1C                      7.3                 10/18/2023                 A1C                      7.8                 05/24/2023                 A1C                      8.9                 02/28/2023                 A1C                      10.2                09/21/2022                 A1C                      11.5                05/15/2022                 A1C                      12.0                08/31/2020                 A1C                      13.5                06/25/2020              Billie Topete, COT 9:55 AM  December 26, 2023

## 2023-12-26 NOTE — PATIENT INSTRUCTIONS
Start artificial tears (Refresh, Blink, Systane) twice a day and as needed to both eyes     Start warm compresses 1-2x daily for 5 minutes to both eyelids

## 2023-12-26 NOTE — PROGRESS NOTES
Chief Complaint(s) and History of Present Illness(es)     Consult For            Laterality: both eyes    Onset: 3 months ago    Course: gradually worsening    Associated symptoms: dryness, tearing and foreign body sensation.    Negative for eye pain    Treatments tried: no treatments    Pain scale: 0/10    Comments: Epiphora -bilateral referred by Kaylin Joe MD          Comments    Exam interpreted.  She states that she frequently has a foreign body   sensation and she is bothered by tearing.  The problem has been bothering   her for several months.  The tearing blurs her vision.      Lab Results       Component                Value               Date                       A1C                      7.3                 10/18/2023                 A1C                      7.8                 05/24/2023                 A1C                      8.9                 02/28/2023                 A1C                      10.2                09/21/2022                 A1C                      11.5                05/15/2022                 A1C                      12.0                08/31/2020                 A1C                      13.5                06/25/2020              Billie Topete, COT 9:55 AM  December 26, 2023        Review of systems for the eyes was negative other than the pertinent positives/negatives listed in the HPI.      Assessment & Plan      Emmanuel Vences is a 78 year old female with the following diagnoses:   1. Bilateral epiphora    2. Eye pain, bilateral         New patient her to establish care and assess bilateral foreign body sensation and tearing  History obtained via PCA helping as interpretor   Chronic watery eyes and foreign body sensation since cataract extraction 3-4 years ago  Last visit with [facility] in [place] 10 months ago . No concerns per patient at that time    Discussed contributing factors  Recommend lid hygiene and warm compresses  Recommend artifical tears twice a day and as  needed      Recommend return to [facility] in 2 months for annual dilated fundus exam and glaucoma screening  [facility] as needed       Patient disposition:   Return for 2 months with [facility] clinic for annual exam.           Attending Physician Attestation:  Complete documentation of historical and exam elements from today's encounter can be found in the full encounter summary report (not reduplicated in this progress note).  I personally obtained the chief complaint(s) and history of present illness.  I confirmed and edited as necessary the review of systems, past medical/surgical history, family history, social history, and examination findings as documented by others; and I examined the patient myself.  I personally reviewed the relevant tests, images, and reports as documented above.  I formulated and edited as necessary the assessment and plan and discussed the findings and management plan with the patient and family. . - Roney Hawley MD

## 2023-12-29 ENCOUNTER — DOCUMENTATION ONLY (OUTPATIENT)
Dept: FAMILY MEDICINE | Facility: CLINIC | Age: 78
End: 2023-12-29

## 2023-12-29 ENCOUNTER — OFFICE VISIT (OUTPATIENT)
Dept: AUDIOLOGY | Facility: CLINIC | Age: 78
End: 2023-12-29
Payer: COMMERCIAL

## 2023-12-29 DIAGNOSIS — H90.3 SENSORINEURAL HEARING LOSS (SNHL) OF BOTH EARS: Primary | ICD-10-CM

## 2023-12-29 DIAGNOSIS — Z53.9 DIAGNOSIS NOT YET DEFINED: Primary | ICD-10-CM

## 2023-12-29 PROCEDURE — V5010 ASSESSMENT FOR HEARING AID: HCPCS | Performed by: AUDIOLOGIST

## 2023-12-29 NOTE — PROGRESS NOTES
AUDIOLOGY REPORT    SUBJECTIVE:Emmanuel Vences is a 78 year old female who was seen in the Audiology Clinic at the [facility] on 12/29/2023  for a follow-up check regarding the fitting of new hearing aids. She was accompanied by caregiver and  today. Emmanuel has been seen previously on 10/10/2023, and results revealed a normal sloping to moderately-severe sensorineural hearing loss in both ears.  The patient was given medical clearance to pursue amplification by Dr. Kaylin Joe on 10/10/2023.   The patient has been seen previously in this clinic and was fit with binaural Phonak Audeo L70-R hearing aids on 12/7/2023.  Emmanuel reports the hearing aids are comfortable and staying in place all day. She likes the sound and does not want any programming changes today. She would like connectivity to her phone.    OBJECTIVE:     Based on patient report, the following changes were made:    Hearing aids were cleaned. She was shown how to change domes and wax traps and demonstrated this independently. She was given extra supplies.   Paired her cell phone for streaming phone calls and media. Practiced with both of these things and she did well.  No programming changes made as she is happy with the sound quality.  She is overall satisfied with the hearing aids and would like to keep them.    Reviewed 45 day trial period, care, cleaning (no water, dry brush), batteries (size rechargeable), toxicity, low-battery signal), aid insertion/removal, volume adjustment (if applicable), user booklet, warranty information, storage cases, and other hearing aid details.     No charge visit today (in warranty hearing aid check).     ASSESSMENT: A follow-up appointment for hearing aid fitting was completed today.  Changes to hearing aid was completed as outlined above.     PLAN:Emmanuel will return for follow-up as needed, or at least every 6-9 months for cleaning and assessment of hearing aid.  . Please call this clinic with  any questions regarding today s appointment.    Izabella Tao, CCC-A  [place] Licensed Audiologist #[id number]

## 2024-01-02 NOTE — PROGRESS NOTES
To be completed in Nursing note:    Please reference list for forms that require a visit for completion.  Please remind patients that providers are given 3-5 business days to complete and return forms.      Form type: [facility] HOME HEALTH CERTIFICATION AN PLAN OF CARE 12/14/2023 TO 2/11/2024    Date form received: 12/20/2023    Date form completed by Physician: 12/29/2023 GOT IT BACK     How was form returned to patient (mailed, faxed, or at  for patient to ):    Fax to [phone]    Date form mailed/faxed/left at  for patient and sent to HIM for scanning:    Fax on 12/29/2023    Once form is left for patient, faxed, or mailed PCS will then close the documentation only encounter.

## 2024-01-14 DIAGNOSIS — L29.9 PRURITIC DISORDER: ICD-10-CM

## 2024-01-15 NOTE — TELEPHONE ENCOUNTER
Medication requested:  CETIRIZINE HCL 10 MG TABS 10 Tablet   Last office visit: 12/13/23  Future [facility] appointments: 1/26/24  Medication last refilled: 12/12/23  Last qualifying labs: none    Routing refill request to provider for review/approval because:  Antihistamines Protocol [id number]/14/2024 10:44 AM   Protocol Details Patient is 3-64 years of age     ADEN Rojas, BSN

## 2024-01-16 DIAGNOSIS — R80.9 MICROALBUMINURIA: ICD-10-CM

## 2024-01-17 RX ORDER — LISINOPRIL 2.5 MG/1
TABLET ORAL
Qty: 90 TABLET | Refills: 1 | Status: SHIPPED | OUTPATIENT
Start: 2024-01-17 | End: 2024-07-15

## 2024-01-17 RX ORDER — CETIRIZINE HYDROCHLORIDE 10 MG/1
10 TABLET ORAL DAILY
Qty: 30 TABLET | Refills: 1 | Status: SHIPPED | OUTPATIENT
Start: 2024-01-17 | End: 2024-08-14

## 2024-01-17 NOTE — TELEPHONE ENCOUNTER
Medication requested: LISINOPRIL 2.5 MG TABLET 2.5 Tablet   Last office visit: 12/13/23  Future [facility] appointments: 1/26/24  Medication last refilled: 10/19/23  Last qualifying labs: none    Routing refill request to provider for review/approval because:  ACE Inhibitors (Including Combos) Protocol [id number]/16/2024 09:16 AM   Protocol Details Normal serum creatinine on file in past 12 months     ADEN Rojas, BSN

## 2024-01-26 ENCOUNTER — OFFICE VISIT (OUTPATIENT)
Dept: FAMILY MEDICINE | Facility: CLINIC | Age: 79
End: 2024-01-26
Payer: COMMERCIAL

## 2024-01-26 VITALS
TEMPERATURE: 98.9 F | RESPIRATION RATE: 20 BRPM | HEART RATE: 77 BPM | SYSTOLIC BLOOD PRESSURE: 126 MMHG | DIASTOLIC BLOOD PRESSURE: 75 MMHG | BODY MASS INDEX: 24.08 KG/M2 | HEIGHT: 57 IN | OXYGEN SATURATION: 98 % | WEIGHT: 111.6 LBS

## 2024-01-26 DIAGNOSIS — R39.81 FUNCTIONAL INCONTINENCE: ICD-10-CM

## 2024-01-26 DIAGNOSIS — F33.0 MAJOR DEPRESSIVE DISORDER, RECURRENT EPISODE, MILD (H): ICD-10-CM

## 2024-01-26 DIAGNOSIS — E11.65 TYPE 2 DIABETES MELLITUS WITH HYPERGLYCEMIA, WITHOUT LONG-TERM CURRENT USE OF INSULIN (H): ICD-10-CM

## 2024-01-26 DIAGNOSIS — M81.0 AGE-RELATED OSTEOPOROSIS WITHOUT CURRENT PATHOLOGICAL FRACTURE: Primary | ICD-10-CM

## 2024-01-26 DIAGNOSIS — M47.817 LUMBOSACRAL SPONDYLOSIS WITHOUT MYELOPATHY: ICD-10-CM

## 2024-01-26 LAB — HBA1C MFR BLD: 7.5 % (ref 0–5.6)

## 2024-01-26 PROCEDURE — 36415 COLL VENOUS BLD VENIPUNCTURE: CPT | Performed by: STUDENT IN AN ORGANIZED HEALTH CARE EDUCATION/TRAINING PROGRAM

## 2024-01-26 PROCEDURE — 99214 OFFICE O/P EST MOD 30 MIN: CPT | Performed by: STUDENT IN AN ORGANIZED HEALTH CARE EDUCATION/TRAINING PROGRAM

## 2024-01-26 PROCEDURE — 83036 HEMOGLOBIN GLYCOSYLATED A1C: CPT | Performed by: STUDENT IN AN ORGANIZED HEALTH CARE EDUCATION/TRAINING PROGRAM

## 2024-01-26 RX ORDER — METFORMIN HCL 500 MG
1000 TABLET, EXTENDED RELEASE 24 HR ORAL
Qty: 180 TABLET | Refills: 3 | Status: SHIPPED | OUTPATIENT
Start: 2024-01-26 | End: 2025-01-25

## 2024-01-26 ASSESSMENT — PATIENT HEALTH QUESTIONNAIRE - PHQ9: SUM OF ALL RESPONSES TO PHQ QUESTIONS 1-9: 7

## 2024-01-26 NOTE — PROGRESS NOTES
Chief Complaint   Patient presents with    Diabetes     F/U DM       There are no exam notes on file for this visit.        Assessment/Plan:  Emmanuel Vences is a 78 year old female here for diabetes, also with concerns for body pain which are affecting her mood.  Has ongoing depressive symptoms which she indicates are worsened by body pain.  She has seen some mild improvements with the addition of meloxicam daily.  We will continue this therapy for now and continue discussions about means of addressing her depressed symptoms.  It may be helpful to offer treatment with an antidepressant that has pain relieving qualities such as duloxetine or tricyclic antidepressant.  We also spent some time addressing chest pain that she experienced 2 years ago and which she has worries about returning.  She was encouraged to seek care if such symptoms recurred and provided reassurance that if she were to have the symptoms her son would not be blamed for them.    Regarding her diabetes, she is on too high of a dosage for her current kidney function so dosage was dropped back to 1000 mg daily.  The patient expressed her understanding and we are rechecking a hemoglobin A1c today.  Will follow-up in 2 months to recheck blood sugars and make sure that she is remaining stable on this lowered dose.    Finally, the patient is in need of a reorder of incontinence supplies for functional incontinence.  She requires these supplies in order to remain active in the community and continue her daily activities without risk of clothing soilage or sores.    Emmanuel was seen today for diabetes.    Diagnoses and all orders for this visit:    Age-related osteoporosis without current pathological fracture    Type 2 diabetes mellitus with hyperglycemia, without long-term current use of insulin (H)  -     metFORMIN (GLUCOPHAGE XR) 500 MG 24 hr tablet; Take 2 tablets (1,000 mg) by mouth daily (with dinner)  -     Hemoglobin A1c; Future  -     Continuous Blood Gluc  Sensor (FREESTYLE PRECIOUS 2 SENSOR) MISC; Change every 14 days.  -     Hemoglobin A1c    Functional incontinence  -     Incontinence Supplies Order    Lumbosacral spondylosis without myelopathy    Major depressive disorder, recurrent episode, mild (H24)        Follow-up with Kaylin Joe in 2 mos for diabetes, depression.  Future Appointments   Date Time Provider Department Center   3/22/2024 10:00 AM Kaylin Joe MD [facility]   6/27/2024 11:00 AM Denisse Fisher, [facility]           Kaylin MADDEN. MD [facility]      There are no Patient Instructions on file for this visit.    Subjective:  Emmanuel Vences is a 78 year old female here for follow up.  With some sometimes not feeling happy, heavy heart  Dizzy/lightheaded.     Heavy body every day, hard to get up.   When she is in pain feels more depressed.   Might have some improvement in her pain on meloxicam 7.5 mg daily, but somewhat unsure.  Thinks that increased pain is leading to depressed mood because it is harder to do things.  However, also states that she thinks that everybody has pain to some extent and normalizes her symptoms.    Had some burning in her chest associated with sweating, had this two years ago.   Her symptoms resolved entirely but she has been worrying more recently about this recurring and happening overnight, and is worried that if she has to seek medical care for this her son will be blamed for not taking good enough care of her.  Last episode was 1-2 years ago.    She has no other major concerns about her diabetes, is taking all of the medications as recommended.    Patient Active Problem List   Diagnosis    Health Care Home    Esophageal reflux    Osteoarthrosis, unspecified whether generalized or localized, involving lower leg    Lumbosacral spondylosis without myelopathy    Hyperlipidemia    Type 2 diabetes mellitus with hyperglycemia, without long-term current use of insulin (H)    Seasonal allergies    Microalbuminuria     "Age-related osteoporosis without current pathological fracture    Chronic kidney disease, stage 1    Major depressive disorder, recurrent episode, mild (H24)    Dehydration    Hyponatremia    Acute cystitis with hematuria    Sepsis, due to unspecified organism, unspecified whether acute organ dysfunction present (H)    Decreased hearing, unspecified laterality       Current Outpatient Medications   Medication    Continuous Blood Gluc Sensor (FREESTYLE PRECIOUS 2 SENSOR) MISC    insulin glargine (LANTUS SOLOSTAR) 100 UNIT/ML pen    insulin pen needle (ULTICARE MICRO) 32G X 4 MM miscellaneous    metFORMIN (GLUCOPHAGE XR) 500 MG 24 hr tablet    alcohol swab prep pads    alendronate (FOSAMAX) 70 MG tablet    blood glucose (NO BRAND SPECIFIED) lancets standard    blood glucose (NO BRAND SPECIFIED) test strip    cetirizine (ZYRTEC) 10 MG tablet    empagliflozin (JARDIANCE) 25 MG TABS tablet    ketotifen fumarate 0.035% 0.035 % SOLN ophthalmic solution    lisinopril (ZESTRIL) 2.5 MG tablet    meloxicam (MOBIC) 7.5 MG tablet    Multiple Vitamins-Minerals (CEROVITE SENIOR) TABS    rosuvastatin (CRESTOR) 40 MG tablet    vitamin D3 (CHOLECALCIFEROL) 1.25 MG (80727 UT) capsule     No current facility-administered medications for this visit.       Objective:  /75   Pulse 77   Temp 98.9  F (37.2  C) (Oral)   Resp 20   Ht 1.448 m (4' 9\")   Wt 50.6 kg (111 lb 9.6 oz)   SpO2 98%   BMI 24.15 kg/m    Body mass index is 24.15 kg/m .  Gen: A/O x3, in NAD.  Cardio: S1, S2, no MRG. RRR.  Resp: CTAB, no WRR.  Neuro: Grossly intact.    DME (Durable Medical Equipment) Orders and Documentation  Orders Placed This Encounter   Procedures    Incontinence Supplies Order        The patient was assessed and it was determined the patient is in need of the following listed DME Supplies/Equipment. Please complete supporting documentation below to demonstrate medical necessity.              "

## 2024-01-30 DIAGNOSIS — E55.9 HYPOVITAMINOSIS D: ICD-10-CM

## 2024-01-30 NOTE — TELEPHONE ENCOUNTER
Medication requested: VITAMIN D3 99281 UNIT CAPS 1.25 MG Capsule   Last office visit: 1/26/24  Future [facility] appointments: none  Medication last refilled: 1/4/24  Last qualifying labs:   Component      Latest Ref Rng 10/18/2023  2:46 PM   Vitamin D, Total (25-Hydroxy)      20 - 50 ng/mL 36          Routing refill request to provider for review/approval because:  Vitamin Supplements Protocol [id number]/30/2024 09:25 AM   Protocol Details The patient does not have an order for high-dose vitamin D     Crystal, RN, BSN

## 2024-01-31 RX ORDER — CHOLECALCIFEROL (VITAMIN D3) 1250 MCG
1250 CAPSULE ORAL
Qty: 4 CAPSULE | Refills: 3 | Status: SHIPPED | OUTPATIENT
Start: 2024-01-31 | End: 2024-05-21

## 2024-02-28 ENCOUNTER — DOCUMENTATION ONLY (OUTPATIENT)
Dept: FAMILY MEDICINE | Facility: CLINIC | Age: 79
End: 2024-02-28
Payer: COMMERCIAL

## 2024-02-28 NOTE — PROGRESS NOTES
To be completed in Nursing note:    Please reference list for forms that require a visit for completion.  Please remind patients that providers are given 3-5 business days to complete and return forms.      Form type: [facility] HOME HEALTH CERTIFICATION AN PLAN OF CARE 2/12/2024 TO 4/11/2024    Date form received: 2/14/2024    Date form completed by Physician: 2/28/2024    How was form returned to patient (mailed, faxed, or at  for patient to ):    Fax to [phone]    Date form mailed/faxed/left at  for patient and sent to HIM for scanning:    Fax on 2/28/2024    Once form is left for patient, faxed, or mailed PCS will then close the documentation only encounter.

## 2024-03-04 DIAGNOSIS — Z53.9 DIAGNOSIS NOT YET DEFINED: Primary | ICD-10-CM

## 2024-03-19 LAB — RETINOPATHY: NORMAL

## 2024-03-22 ENCOUNTER — OFFICE VISIT (OUTPATIENT)
Dept: FAMILY MEDICINE | Facility: CLINIC | Age: 79
End: 2024-03-22
Payer: COMMERCIAL

## 2024-03-22 VITALS
RESPIRATION RATE: 20 BRPM | OXYGEN SATURATION: 99 % | BODY MASS INDEX: 24.81 KG/M2 | WEIGHT: 115 LBS | HEIGHT: 57 IN | TEMPERATURE: 98.3 F | SYSTOLIC BLOOD PRESSURE: 125 MMHG | DIASTOLIC BLOOD PRESSURE: 77 MMHG | HEART RATE: 88 BPM

## 2024-03-22 DIAGNOSIS — M81.0 AGE-RELATED OSTEOPOROSIS WITHOUT CURRENT PATHOLOGICAL FRACTURE: Primary | ICD-10-CM

## 2024-03-22 DIAGNOSIS — E11.65 TYPE 2 DIABETES MELLITUS WITH HYPERGLYCEMIA, WITHOUT LONG-TERM CURRENT USE OF INSULIN (H): ICD-10-CM

## 2024-03-22 PROCEDURE — 99213 OFFICE O/P EST LOW 20 MIN: CPT | Performed by: STUDENT IN AN ORGANIZED HEALTH CARE EDUCATION/TRAINING PROGRAM

## 2024-03-22 NOTE — PROGRESS NOTES
Chief Complaint   Patient presents with    Diabetes       There are no exam notes on file for this visit.        Assessment/Plan:  Emmanuel Vences is a 78 year old female here for diabetes, also with right knee pain more than left knee pain, limiting mobility. We reviewed the option of a hinged knee brace as she has used creams and soft supports and found soft supports less helpful. She would be interested in a hinged knee brace particularly if it can be delivered to her house. She can then bring it to clinic to learn to apply the brace. Brace was ordered today.    She was continued on her current medication dose - fasting Bgs appear consistent with prior and I do not have an estimated A1c at this time. Will recheck at time of next visit.    Emmanuel was seen today for diabetes.    Diagnoses and all orders for this visit:    Age-related osteoporosis without current pathological fracture  -     Ankle/Knee Bracing Supplies Order Hinged Knee Brace; Right    Type 2 diabetes mellitus with hyperglycemia, without long-term current use of insulin (H)        Follow-up with Kaylin Joe in 1 mo for knee brace application, DM (lab first).    Future Appointments   Date Time Provider Department [place]   4/19/2024  4:10 PM Kaylin Joe MD [facility]   [facility]27[facility]24 11[facility]         Kaylin MADDEN. MD [facility]      There are no Patient Instructions on file for this visit.    Subjective:  Emmanuel Vences is a 78 year old female here for follow up - having low back and knee pains, and with low back pain cannot bend forward.  Would be interested in knee bracing.  Right knee more bothersome.      The whole knee bothers - not more medial or latera.    Lantus 14 units daily.  FBGs still good but PP Bgs can climb to 300s.    Patient Active Problem List   Diagnosis    Health Care Home    Esophageal reflux    Osteoarthrosis, unspecified whether generalized or localized, involving lower leg    Lumbosacral spondylosis  "without myelopathy    Hyperlipidemia    Type 2 diabetes mellitus with hyperglycemia, without long-term current use of insulin (H)    Seasonal allergies    Microalbuminuria    Age-related osteoporosis without current pathological fracture    Chronic kidney disease, stage 1    Major depressive disorder, recurrent episode, mild (H24)    Dehydration    Hyponatremia    Acute cystitis with hematuria    Sepsis, due to unspecified organism, unspecified whether acute organ dysfunction present (H)    Decreased hearing, unspecified laterality       Current Outpatient Medications   Medication    alcohol swab prep pads    alendronate (FOSAMAX) 70 MG tablet    blood glucose (NO BRAND SPECIFIED) lancets standard    blood glucose (NO BRAND SPECIFIED) test strip    cetirizine (ZYRTEC) 10 MG tablet    cholecalciferol (VITAMIN D3) 1250 mcg (84964 units) capsule    Continuous Blood Gluc Sensor (FREESTYLE PRECIOUS 2 SENSOR) MISC    empagliflozin (JARDIANCE) 25 MG TABS tablet    insulin glargine (LANTUS SOLOSTAR) 100 UNIT/ML pen    insulin pen needle (ULTICARE MICRO) 32G X 4 MM miscellaneous    ketotifen fumarate 0.035% 0.035 % SOLN ophthalmic solution    lisinopril (ZESTRIL) 2.5 MG tablet    meloxicam (MOBIC) 7.5 MG tablet    metFORMIN (GLUCOPHAGE XR) 500 MG 24 hr tablet    Multiple Vitamins-Minerals (CEROVITE SENIOR) TABS    rosuvastatin (CRESTOR) 40 MG tablet     No current facility-administered medications for this visit.       Objective:  /77   Pulse 88   Temp 98.3  F (36.8  C) (Oral)   Resp 20   Ht 1.448 m (4' 9\")   Wt 52.2 kg (115 lb)   SpO2 99%   BMI 24.89 kg/m    Body mass index is 24.89 kg/m .  Gen: A/O x3, in NAD.  Cardio: S1, S2, no MRG. RRR.  Resp: CTAB, no WRR.  Ext: No edema of BLE. R knee bulbous c/w OA  Neuro: Grossly intact.    DME (Durable Medical Equipment) Orders and Documentation  Orders Placed This Encounter   Procedures    Ankle/Knee Bracing Supplies Order Hinged Knee Brace; Right        The patient was " assessed and it was determined the patient is in need of the following listed DME Supplies/Equipment. Please complete supporting documentation below to demonstrate medical necessity.

## 2024-04-11 ENCOUNTER — MEDICAL CORRESPONDENCE (OUTPATIENT)
Dept: HEALTH INFORMATION MANAGEMENT | Facility: CLINIC | Age: 79
End: 2024-04-11
Payer: COMMERCIAL

## 2024-04-15 ENCOUNTER — TRANSFERRED RECORDS (OUTPATIENT)
Dept: MULTI SPECIALTY CLINIC | Facility: CLINIC | Age: 79
End: 2024-04-15

## 2024-04-18 ENCOUNTER — DOCUMENTATION ONLY (OUTPATIENT)
Dept: FAMILY MEDICINE | Facility: CLINIC | Age: 79
End: 2024-04-18
Payer: COMMERCIAL

## 2024-04-18 NOTE — PROGRESS NOTES
To be completed in Nursing note:    Please reference list for forms that require a visit for completion.  Please remind patients that providers are given 3-5 business days to complete and return forms.      Form type: [facility] HOME HEALTH CERTIFICATION AN PLAN OF CARE 4/12/2024 TO 6/10/2024    Date form received: 4/17/2024    Date form completed by Physician: 4/18/2024    How was form returned to patient (mailed, faxed, or at  for patient to ):    Fax to [phone]    Date form mailed/faxed/left at  for patient and sent to HIM for scanning:    Fax on 4/18/2024    Once form is left for patient, faxed, or mailed PCS will then close the documentation only encounter.

## 2024-04-19 ENCOUNTER — OFFICE VISIT (OUTPATIENT)
Dept: FAMILY MEDICINE | Facility: CLINIC | Age: 79
End: 2024-04-19
Payer: COMMERCIAL

## 2024-04-19 VITALS
TEMPERATURE: 97.9 F | DIASTOLIC BLOOD PRESSURE: 78 MMHG | RESPIRATION RATE: 16 BRPM | OXYGEN SATURATION: 100 % | HEIGHT: 58 IN | HEART RATE: 89 BPM | BODY MASS INDEX: 24.22 KG/M2 | WEIGHT: 115.4 LBS | SYSTOLIC BLOOD PRESSURE: 116 MMHG

## 2024-04-19 DIAGNOSIS — Z12.11 SCREEN FOR COLON CANCER: ICD-10-CM

## 2024-04-19 DIAGNOSIS — E11.65 TYPE 2 DIABETES MELLITUS WITH HYPERGLYCEMIA, WITHOUT LONG-TERM CURRENT USE OF INSULIN (H): ICD-10-CM

## 2024-04-19 DIAGNOSIS — M81.0 AGE-RELATED OSTEOPOROSIS WITHOUT CURRENT PATHOLOGICAL FRACTURE: Primary | ICD-10-CM

## 2024-04-19 DIAGNOSIS — E11.65 TYPE 2 DIABETES MELLITUS WITH HYPERGLYCEMIA, WITHOUT LONG-TERM CURRENT USE OF INSULIN (H): Primary | ICD-10-CM

## 2024-04-19 LAB — HBA1C MFR BLD: 6.8 % (ref 0–5.6)

## 2024-04-19 PROCEDURE — 99213 OFFICE O/P EST LOW 20 MIN: CPT | Performed by: STUDENT IN AN ORGANIZED HEALTH CARE EDUCATION/TRAINING PROGRAM

## 2024-04-19 PROCEDURE — 36415 COLL VENOUS BLD VENIPUNCTURE: CPT | Performed by: STUDENT IN AN ORGANIZED HEALTH CARE EDUCATION/TRAINING PROGRAM

## 2024-04-19 PROCEDURE — 83036 HEMOGLOBIN GLYCOSYLATED A1C: CPT | Performed by: STUDENT IN AN ORGANIZED HEALTH CARE EDUCATION/TRAINING PROGRAM

## 2024-04-19 NOTE — Clinical Note
Hi, I am seeing this patient today - we were supposed to see each other for a knee brace application but she has not gotten the brace. Can you please look into this? It was ordered 3/22/24. Thanks! Dr. Joe

## 2024-04-19 NOTE — NURSING NOTE
Due to patient being non-English speaking/uses sign language, an  was used for this visit. Only for face-to-face interpretation by an external agency, date and length of interpretation can be found on the scanned worksheet.     name: Neymar Pham  Agency: [employer]  Language: Yasmin   Telephone number: [phone]  Type of interpretation: Face-to-face, spoken

## 2024-04-19 NOTE — PROGRESS NOTES
Chief Complaint   Patient presents with    Diabetes     Follow up DM        Nursing Notes:   Mayi CalvilloWADE  4/19/2024  4:15 PM  Signed  Due to patient being non-English speaking/uses sign language, an  was used for this visit. Only for face-to-face interpretation by an external agency, date and length of interpretation can be found on the scanned worksheet.     name: Neymar Pham  Agency: [employer]  Language: Yasmin   Telephone number: [phone]  Type of interpretation: Face-to-face, spoken            Assessment/Plan:  Emmanuel Vences is a 78 year old female here for application of knee brace.  Unfortunately, her knee brace was never delivered and the family just brought her prior knee brace prescription back in to clinic.  Her chart was sent to [facility] to try to arrange for delivery.  Alternatively, the family may be able to have the grandchild go and  the brace.  I will schedule her back with me in a couple of weeks to apply it.    He did a monitoring hemoglobin A1c today and she was congratulated on achieving a hemoglobin A1c of less than 7!  We will continue all treatments as she is currently taking them without changes    Emmanuel was seen today for diabetes.    Diagnoses and all orders for this visit:    Age-related osteoporosis without current pathological fracture    Type 2 diabetes mellitus with hyperglycemia, without long-term current use of insulin (H)  -     Hemoglobin A1c    Screen for colon cancer        Follow-up with Kaylin Joe in 2 weeks for knee brace application.  I will make sure that she receives a phone call 2 days in advance of her appointment to make sure that she has obtained the brace.    Future Appointments   Date Time Provider Department [place]   5/8/2024  2:10 PM Kaylin Joe MD [facility]   6/27/2024 11:00 AM Denisse Fisher, Alyce MDAUDI [facility]           Kaylin Joe MD      There are no Patient Instructions on file for this  visit.    Subjective:  Emmanuel Vences is a 78 year old female here for brace applciation, though she never got the brace and didn't hear about it being delivered.   Her knee pain - says she will die with the knee pain.  Like an old car with loose screws.   Would be happy with a soft brace today.     Denies symptomatic hypoglycemia.  Denies polydipsia or polyuria.      Patient Active Problem List   Diagnosis    Health Care Home    Esophageal reflux    Osteoarthrosis, unspecified whether generalized or localized, involving lower leg    Lumbosacral spondylosis without myelopathy    Hyperlipidemia    Type 2 diabetes mellitus with hyperglycemia, without long-term current use of insulin (H)    Seasonal allergies    Microalbuminuria    Age-related osteoporosis without current pathological fracture    Chronic kidney disease, stage 1    Major depressive disorder, recurrent episode, mild (H24)    Dehydration    Hyponatremia    Acute cystitis with hematuria    Sepsis, due to unspecified organism, unspecified whether acute organ dysfunction present (H)    Decreased hearing, unspecified laterality       Current Outpatient Medications   Medication Sig Dispense Refill    alcohol swab prep pads Use to swab area of injection/amber as directed. 100 each 3    alendronate (FOSAMAX) 70 MG tablet TAKE 1 TABLET (70 MG) BY MOUTH EVERY 7 DAYS 12 tablet 4    blood glucose (NO BRAND SPECIFIED) lancets standard Use to test blood sugar fasting daily or as directed. 100 each 11    blood glucose (NO BRAND SPECIFIED) test strip Use to test blood sugar daily fasting or as directed. 100 strip 4    cetirizine (ZYRTEC) 10 MG tablet TAKE 1 TABLET (10 MG) BY MOUTH DAILY 30 tablet 1    cholecalciferol (VITAMIN D3) 1250 mcg (58948 units) capsule TAKE 1 CAPSULE (50,000 UNITS) BY MOUTH EVERY 7 DAYS 4 capsule 3    Continuous Blood Gluc Sensor (FREESTYLE PRECIOUS 2 SENSOR) MISC Change every 14 days. 2 each 11    empagliflozin (JARDIANCE) 25 MG TABS tablet TAKE 1 TABLET  "(25 MG) BY MOUTH DAILY 30 tablet 1    insulin glargine (LANTUS SOLOSTAR) 100 UNIT/ML pen Inject 14-40 Units Subcutaneous at bedtime Adjust dose per physician instructions. 15 mL 3    insulin pen needle (ULTICARE MICRO) 32G X 4 MM miscellaneous Use 1 pen needles daily or as directed. 100 each 3    ketotifen fumarate 0.035% 0.035 % SOLN ophthalmic solution Place 1 drop into both eyes 2 times daily 10 mL 1    lisinopril (ZESTRIL) 2.5 MG tablet TAKE 1 TABLET (2.5 MG) BY MOUTH DAILY FOR BLOOD PRESSURE 90 tablet 1    meloxicam (MOBIC) 7.5 MG tablet Take 1 tablet (7.5 mg) by mouth daily Take with food. 30 tablet 11    metFORMIN (GLUCOPHAGE XR) 500 MG 24 hr tablet Take 2 tablets (1,000 mg) by mouth daily (with dinner) 180 tablet 3    Multiple Vitamins-Minerals (CEROVITE SENIOR) TABS TAKE 1 TABLET BY MOUTH DAILY 30 tablet 4    rosuvastatin (CRESTOR) 40 MG tablet TAKE 1 TABLET (40 MG) BY MOUTH DAILY FOR CHOLESTEROL 90 tablet 3     No current facility-administered medications for this visit.       Objective:  /78   Pulse 89   Temp 97.9  F (36.6  C) (Oral)   Resp 16   Ht 1.462 m (4' 9.56\")   Wt 52.3 kg (115 lb 6.4 oz)   SpO2 100%   BMI 24.49 kg/m    Body mass index is 24.49 kg/m .  Gen: A/O x3, in NAD.  Cardio: S1, S2, no MRG. RRR.  Resp: CTAB, no WRR.  Ext: Right knee with bulbous appearance of OA.  Neuro: Grossly intact.    "

## 2024-04-23 DIAGNOSIS — E11.65 TYPE 2 DIABETES MELLITUS WITH HYPERGLYCEMIA, WITHOUT LONG-TERM CURRENT USE OF INSULIN (H): ICD-10-CM

## 2024-04-23 DIAGNOSIS — M81.0 AGE-RELATED OSTEOPOROSIS WITHOUT CURRENT PATHOLOGICAL FRACTURE: ICD-10-CM

## 2024-04-23 DIAGNOSIS — Z53.9 DIAGNOSIS NOT YET DEFINED: Primary | ICD-10-CM

## 2024-04-23 RX ORDER — ALENDRONATE SODIUM 70 MG/1
70 TABLET ORAL
Qty: 12 TABLET | Refills: 4 | Status: SHIPPED | OUTPATIENT
Start: 2024-04-23

## 2024-04-23 RX ORDER — EMPAGLIFLOZIN 25 MG/1
25 TABLET, FILM COATED ORAL DAILY
Qty: 30 TABLET | Refills: 1 | Status: SHIPPED | OUTPATIENT
Start: 2024-04-23 | End: 2024-06-18

## 2024-04-23 RX ORDER — MULTIVIT-MIN/FA/LYCOPEN/LUTEIN .4-300-25
1 TABLET ORAL DAILY
Qty: 30 TABLET | Refills: 4 | Status: SHIPPED | OUTPATIENT
Start: 2024-04-23 | End: 2024-09-16

## 2024-04-24 ENCOUNTER — PATIENT OUTREACH (OUTPATIENT)
Dept: CARE COORDINATION | Facility: CLINIC | Age: 79
End: 2024-04-24
Payer: COMMERCIAL

## 2024-04-24 NOTE — PROGRESS NOTES
Care Coordination: 4/24/2024    [facility] [place]: [id number]  Device: Ankle / Knee Bracing Supplies, Hinged Knee Brace; Right.   Vendor: [employer]  Fax: [phone]      Lobito Herrera Sr.   Care Coordination  [address] [address]  [email or web address]   Office: [phone] Direct: [phone]  [facility]

## 2024-04-30 ENCOUNTER — TELEPHONE (OUTPATIENT)
Dept: AUDIOLOGY | Facility: CLINIC | Age: 79
End: 2024-04-30
Payer: COMMERCIAL

## 2024-05-08 ENCOUNTER — OFFICE VISIT (OUTPATIENT)
Dept: FAMILY MEDICINE | Facility: CLINIC | Age: 79
End: 2024-05-08
Payer: COMMERCIAL

## 2024-05-08 VITALS
HEART RATE: 75 BPM | TEMPERATURE: 97.2 F | BODY MASS INDEX: 23.22 KG/M2 | HEIGHT: 58 IN | SYSTOLIC BLOOD PRESSURE: 100 MMHG | WEIGHT: 110.6 LBS | OXYGEN SATURATION: 99 % | DIASTOLIC BLOOD PRESSURE: 67 MMHG | RESPIRATION RATE: 16 BRPM

## 2024-05-08 DIAGNOSIS — Z12.11 SCREEN FOR COLON CANCER: ICD-10-CM

## 2024-05-08 DIAGNOSIS — M81.0 AGE-RELATED OSTEOPOROSIS WITHOUT CURRENT PATHOLOGICAL FRACTURE: Primary | ICD-10-CM

## 2024-05-08 PROCEDURE — 99212 OFFICE O/P EST SF 10 MIN: CPT | Performed by: STUDENT IN AN ORGANIZED HEALTH CARE EDUCATION/TRAINING PROGRAM

## 2024-05-08 ASSESSMENT — PATIENT HEALTH QUESTIONNAIRE - PHQ9: SUM OF ALL RESPONSES TO PHQ QUESTIONS 1-9: 6

## 2024-05-09 NOTE — PROGRESS NOTES
Chief Complaint   Patient presents with    Knee Pain     Check knee brace       There are no exam notes on file for this visit.        Assessment/Plan:  Emmanuel Vences is a 78 year old female here for knee brace application - obtained Don Irma hinged knee brace for knee osteoarthritis, and brings it in today to apply.  Application done without complication. Discussed use when she will be on her feet for stability and support.     Emmanuel was seen today for knee pain.    Diagnoses and all orders for this visit:    Age-related osteoporosis without current pathological fracture    Screen for colon cancer        Follow-up with Kaylin Joe in 3 mo for DM2 follow up.    Future Appointments   Date Time Provider Department Center   6/25/2024 10:30 AM Denisse Fisher, Alyce MDAUDI [facility]       Kaylin MADDEN. MD [place]      There are no Patient Instructions on file for this visit.    Subjective:  Emmanuel Vences is a 78 year old female here for brace application - received and the person who delivered placed it on her as well.    No other concerns.     Patient Active Problem List   Diagnosis    Health Care Home    Esophageal reflux    Osteoarthrosis, unspecified whether generalized or localized, involving lower leg    Lumbosacral spondylosis without myelopathy    Hyperlipidemia    Type 2 diabetes mellitus with hyperglycemia, without long-term current use of insulin (H)    Seasonal allergies    Microalbuminuria    Age-related osteoporosis without current pathological fracture    Chronic kidney disease, stage 1    Major depressive disorder, recurrent episode, mild (H24)    Dehydration    Hyponatremia    Acute cystitis with hematuria    Sepsis, due to unspecified organism, unspecified whether acute organ dysfunction present (H)    Decreased hearing, unspecified laterality       Current Outpatient Medications   Medication Sig Dispense Refill    alcohol swab prep pads Use to swab area of injection/amber as directed. 100 each 3    alendronate  "(FOSAMAX) 70 MG tablet TAKE 1 TABLET (70 MG) BY MOUTH EVERY 7 DAYS 12 tablet 4    blood glucose (NO BRAND SPECIFIED) lancets standard Use to test blood sugar fasting daily or as directed. 100 each 11    blood glucose (NO BRAND SPECIFIED) test strip Use to test blood sugar daily fasting or as directed. 100 strip 4    cetirizine (ZYRTEC) 10 MG tablet TAKE 1 TABLET (10 MG) BY MOUTH DAILY 30 tablet 1    cholecalciferol (VITAMIN D3) 1250 mcg (82699 units) capsule TAKE 1 CAPSULE (50,000 UNITS) BY MOUTH EVERY 7 DAYS 4 capsule 3    Continuous Blood Gluc Sensor (FREESTYLE PRECIOUS 2 SENSOR) MISC Change every 14 days. 2 each 11    insulin glargine (LANTUS SOLOSTAR) 100 UNIT/ML pen Inject 14-40 Units Subcutaneous at bedtime Adjust dose per physician instructions. 15 mL 3    insulin pen needle (ULTICARE MICRO) 32G X 4 MM miscellaneous Use 1 pen needles daily or as directed. 100 each 3    JARDIANCE 25 MG TABS tablet TAKE 1 TABLET (25 MG) BY MOUTH DAILY 30 tablet 1    ketotifen fumarate 0.035% 0.035 % SOLN ophthalmic solution Place 1 drop into both eyes 2 times daily 10 mL 1    lisinopril (ZESTRIL) 2.5 MG tablet TAKE 1 TABLET (2.5 MG) BY MOUTH DAILY FOR BLOOD PRESSURE 90 tablet 1    meloxicam (MOBIC) 7.5 MG tablet Take 1 tablet (7.5 mg) by mouth daily Take with food. 30 tablet 11    metFORMIN (GLUCOPHAGE XR) 500 MG 24 hr tablet Take 2 tablets (1,000 mg) by mouth daily (with dinner) 180 tablet 3    Multiple Vitamins-Minerals (CEROVITE SENIOR) TABS TAKE 1 TABLET BY MOUTH DAILY 30 tablet 4    rosuvastatin (CRESTOR) 40 MG tablet TAKE 1 TABLET (40 MG) BY MOUTH DAILY FOR CHOLESTEROL 90 tablet 3     No current facility-administered medications for this visit.       Objective:  /67   Pulse 75   Temp 97.2  F (36.2  C) (Tympanic)   Resp 16   Ht 1.461 m (4' 9.5\")   Wt 50.2 kg (110 lb 9.6 oz)   SpO2 99%   BMI 23.52 kg/m    Body mass index is 23.52 kg/m .  Gen: A/O x3, in NAD.  Ext: R knee with normal range of flexion and extension. "  Knee brace applied without difficulty.   Neuro: Grossly intact.

## 2024-05-21 DIAGNOSIS — E55.9 HYPOVITAMINOSIS D: ICD-10-CM

## 2024-05-21 RX ORDER — CHOLECALCIFEROL (VITAMIN D3) 1250 MCG
1250 CAPSULE ORAL
Qty: 4 CAPSULE | Refills: 3 | Status: SHIPPED | OUTPATIENT
Start: 2024-05-21 | End: 2024-09-11

## 2024-05-21 NOTE — TELEPHONE ENCOUNTER
Requested Renewals     Name from pharmacy: VITAMIN D3 88727 UNIT CAPS 1.25 MG Capsule         Will file in chart as: cholecalciferol (VITAMIN D3) 1250 mcg (47530 units) capsule    Sig: TAKE 1 CAPSULE (50,000 UNITS) BY MOUTH EVERY 7 DAYS    Disp: 4 capsule    Refills: 3    Start: 5/21/2024    Class: E-Prescribe    Non-formulary For: Hypovitaminosis D    Last ordered: 3 months ago (1/31/2024) by Kaylin Joe MD    Last refill: 4/23/2024    Rx #: [id number]    Vitamin Supplements Protocol Djjwua1905/21/2024 09:06 AM   Protocol Details The patient does not have an order for high-dose vitamin D    Medication is active on med list    Medication indicated for associated diagnosis    The patient is 2 years of age or older    Recent (12 mo) or future (90 days) visit within the authorizing provider's specialty      To be filled at: [facility] - [place], [place] - [address]

## 2024-06-16 ENCOUNTER — MEDICAL CORRESPONDENCE (OUTPATIENT)
Dept: HEALTH INFORMATION MANAGEMENT | Facility: CLINIC | Age: 79
End: 2024-06-16
Payer: COMMERCIAL

## 2024-06-18 DIAGNOSIS — E11.65 TYPE 2 DIABETES MELLITUS WITH HYPERGLYCEMIA, WITHOUT LONG-TERM CURRENT USE OF INSULIN (H): ICD-10-CM

## 2024-06-18 RX ORDER — EMPAGLIFLOZIN 25 MG/1
25 TABLET, FILM COATED ORAL DAILY
Qty: 30 TABLET | Refills: 1 | Status: SHIPPED | OUTPATIENT
Start: 2024-06-18 | End: 2024-08-14

## 2024-06-20 ENCOUNTER — DOCUMENTATION ONLY (OUTPATIENT)
Dept: FAMILY MEDICINE | Facility: CLINIC | Age: 79
End: 2024-06-20
Payer: COMMERCIAL

## 2024-06-20 NOTE — PROGRESS NOTES
To be completed in Nursing note:    Please reference list for forms that require a visit for completion.  Please remind patients that providers are given 3-5 business days to complete and return forms.      Form type: [facility] HOME HEALTH CERTIFICATION AN PLAN OF CARE 6/11/2024 to 8/9/2024    Date form received: 6/19/2024    Date form completed by Physician: 6/21/2024    How was form returned to patient (mailed, faxed, or at  for patient to ):    Fax to [phone]    Date form mailed/faxed/left at  for patient and sent to HIM for scanning:    Fax on 6/21/2024    Once form is left for patient, faxed, or mailed PCS will then close the documentation only encounter.

## 2024-06-21 DIAGNOSIS — Z53.9 DIAGNOSIS NOT YET DEFINED: Primary | ICD-10-CM

## 2024-06-25 ENCOUNTER — OFFICE VISIT (OUTPATIENT)
Dept: AUDIOLOGY | Facility: CLINIC | Age: 79
End: 2024-06-25
Payer: COMMERCIAL

## 2024-06-25 DIAGNOSIS — H91.90 DECREASED HEARING, UNSPECIFIED LATERALITY: Primary | ICD-10-CM

## 2024-06-25 DIAGNOSIS — H90.3 SENSORINEURAL HEARING LOSS (SNHL) OF BOTH EARS: ICD-10-CM

## 2024-06-25 PROCEDURE — V5299 HEARING SERVICE: HCPCS | Performed by: AUDIOLOGIST

## 2024-06-25 NOTE — PROGRESS NOTES
"AUDIOLOGY REPORT    HEARING AID CHECK    SUBJECTIVE:Emmanuel Vences is a 78 year old female who was seen in the Audiology Clinic at the [facility] on 6/25/2024  for a hearing aid check.Emmanuel has been seen previously on 10/10/2023, and results revealed a normal sloping to moderately-severe sensorineural hearing loss in both ears.  The patient was given medical clearance to pursue amplification by Dr. Kaylin Joe on 10/10/2023.   The patient has been seen previously in this clinic and was fit with binaural Phonak Audeo L70-R hearing aids on 12/7/2023.     OBJECTIVE:     Otoscopy revealed clear ear canals bilaterally.    Based on patient report, the following changes were made:    Patient reports she is liking the hearing aids, but only wears them \"when necessary\".  Data logging showed she is wearing them less than an hour per day. Counseled her on this today.  Cleaned both hearing aids and changed domes/wax traps. Reviewed this and she demonstrated this independently today. She has supplies.  No programming changes made today.    No charge visit today (in warranty hearing aid check).    ASSESSMENT: Hearing aid check was completed today. Changes to hearing aid was completed as outlined above.     PLAN:Emmanuel will return for follow-up as needed, or at least every 9-12 months for cleaning and assessment of hearing aid.   Please call this clinic with any questions regarding today s appointment.    Carlita Tao., CCC-A  [place] Licensed Audiologist #[id number]    "

## 2024-07-14 DIAGNOSIS — R80.9 MICROALBUMINURIA: ICD-10-CM

## 2024-07-15 RX ORDER — LISINOPRIL 2.5 MG/1
TABLET ORAL
Qty: 90 TABLET | Refills: 3 | Status: SHIPPED | OUTPATIENT
Start: 2024-07-15

## 2024-07-15 NOTE — TELEPHONE ENCOUNTER
Name from pharmacy: LISINOPRIL 2.5 MG TABLET 2.5 Tablet          Will file in chart as: lisinopril (ZESTRIL) 2.5 MG tablet    Sig: TAKE 1 TABLET (2.5 MG) BY MOUTH DAILY FOR BLOOD PRESSURE    Disp: 90 tablet    Refills: 1    Start: 7/14/2024    Class: E-Prescribe    Non-formulary For: Microalbuminuria    Last ordered: 6 months ago (1/17/2024) by Kaylin Joe MD    Last refill: 4/9/2024    Rx #: [id number]    ACE Inhibitors (Including Combos) Protocol Ydaces6507/14/2024 11:38 AM   Protocol Details Medication indicated for associated diagnosis    Has GFR on file in past 12 months and most recent value is normal        Crystal RN, BSN

## 2024-08-04 ENCOUNTER — MEDICAL CORRESPONDENCE (OUTPATIENT)
Dept: HEALTH INFORMATION MANAGEMENT | Facility: CLINIC | Age: 79
End: 2024-08-04
Payer: COMMERCIAL

## 2024-08-13 DIAGNOSIS — E11.65 TYPE 2 DIABETES MELLITUS WITH HYPERGLYCEMIA, WITHOUT LONG-TERM CURRENT USE OF INSULIN (H): ICD-10-CM

## 2024-08-13 DIAGNOSIS — L29.9 PRURITIC DISORDER: ICD-10-CM

## 2024-08-14 ENCOUNTER — OFFICE VISIT (OUTPATIENT)
Dept: FAMILY MEDICINE | Facility: CLINIC | Age: 79
End: 2024-08-14
Payer: COMMERCIAL

## 2024-08-14 VITALS
DIASTOLIC BLOOD PRESSURE: 70 MMHG | RESPIRATION RATE: 20 BRPM | HEART RATE: 83 BPM | OXYGEN SATURATION: 99 % | TEMPERATURE: 98.5 F | WEIGHT: 113.8 LBS | HEIGHT: 57 IN | SYSTOLIC BLOOD PRESSURE: 108 MMHG | BODY MASS INDEX: 24.55 KG/M2

## 2024-08-14 DIAGNOSIS — N18.1 CHRONIC KIDNEY DISEASE, STAGE 1: ICD-10-CM

## 2024-08-14 DIAGNOSIS — M17.10 UNILATERAL PRIMARY OSTEOARTHRITIS, UNSPECIFIED KNEE: ICD-10-CM

## 2024-08-14 DIAGNOSIS — M81.0 AGE-RELATED OSTEOPOROSIS WITHOUT CURRENT PATHOLOGICAL FRACTURE: Primary | ICD-10-CM

## 2024-08-14 DIAGNOSIS — Z12.11 SCREEN FOR COLON CANCER: ICD-10-CM

## 2024-08-14 DIAGNOSIS — E11.65 TYPE 2 DIABETES MELLITUS WITH HYPERGLYCEMIA, WITHOUT LONG-TERM CURRENT USE OF INSULIN (H): ICD-10-CM

## 2024-08-14 LAB
ANION GAP SERPL CALCULATED.3IONS-SCNC: 13 MMOL/L (ref 7–15)
BUN SERPL-MCNC: 13.1 MG/DL (ref 8–23)
CALCIUM SERPL-MCNC: 10 MG/DL (ref 8.8–10.4)
CHLORIDE SERPL-SCNC: 99 MMOL/L (ref 98–107)
CHOLEST SERPL-MCNC: 263 MG/DL
CREAT SERPL-MCNC: 1.29 MG/DL (ref 0.51–0.95)
EGFRCR SERPLBLD CKD-EPI 2021: 42 ML/MIN/1.73M2
ERYTHROCYTE [DISTWIDTH] IN BLOOD BY AUTOMATED COUNT: 12.3 % (ref 10–15)
FASTING STATUS PATIENT QL REPORTED: ABNORMAL
FASTING STATUS PATIENT QL REPORTED: ABNORMAL
GLUCOSE SERPL-MCNC: 262 MG/DL (ref 70–99)
HBA1C MFR BLD: 7.5 % (ref 0–5.6)
HCO3 SERPL-SCNC: 25 MMOL/L (ref 22–29)
HCT VFR BLD AUTO: 32.1 % (ref 35–47)
HDLC SERPL-MCNC: 60 MG/DL
HGB BLD-MCNC: 10.7 G/DL (ref 11.7–15.7)
LDLC SERPL CALC-MCNC: 138 MG/DL
MCH RBC QN AUTO: 30.1 PG (ref 26.5–33)
MCHC RBC AUTO-ENTMCNC: 33.3 G/DL (ref 31.5–36.5)
MCV RBC AUTO: 90 FL (ref 78–100)
NONHDLC SERPL-MCNC: 203 MG/DL
PLATELET # BLD AUTO: 232 10E3/UL (ref 150–450)
POTASSIUM SERPL-SCNC: 4.5 MMOL/L (ref 3.4–5.3)
RBC # BLD AUTO: 3.55 10E6/UL (ref 3.8–5.2)
SODIUM SERPL-SCNC: 137 MMOL/L (ref 135–145)
TRIGL SERPL-MCNC: 323 MG/DL
WBC # BLD AUTO: 7.9 10E3/UL (ref 4–11)

## 2024-08-14 PROCEDURE — 83036 HEMOGLOBIN GLYCOSYLATED A1C: CPT | Performed by: STUDENT IN AN ORGANIZED HEALTH CARE EDUCATION/TRAINING PROGRAM

## 2024-08-14 PROCEDURE — 85027 COMPLETE CBC AUTOMATED: CPT | Performed by: STUDENT IN AN ORGANIZED HEALTH CARE EDUCATION/TRAINING PROGRAM

## 2024-08-14 PROCEDURE — 99214 OFFICE O/P EST MOD 30 MIN: CPT | Performed by: STUDENT IN AN ORGANIZED HEALTH CARE EDUCATION/TRAINING PROGRAM

## 2024-08-14 PROCEDURE — 80048 BASIC METABOLIC PNL TOTAL CA: CPT | Performed by: STUDENT IN AN ORGANIZED HEALTH CARE EDUCATION/TRAINING PROGRAM

## 2024-08-14 PROCEDURE — 80061 LIPID PANEL: CPT | Performed by: STUDENT IN AN ORGANIZED HEALTH CARE EDUCATION/TRAINING PROGRAM

## 2024-08-14 PROCEDURE — 36415 COLL VENOUS BLD VENIPUNCTURE: CPT | Performed by: STUDENT IN AN ORGANIZED HEALTH CARE EDUCATION/TRAINING PROGRAM

## 2024-08-14 RX ORDER — EMPAGLIFLOZIN 25 MG/1
25 TABLET, FILM COATED ORAL DAILY
Qty: 90 TABLET | Refills: 3 | Status: SHIPPED | OUTPATIENT
Start: 2024-08-14

## 2024-08-14 RX ORDER — GLUCOSAMINE HCL/CHONDROITIN SU 500-400 MG
CAPSULE ORAL
Qty: 100 EACH | Refills: 3 | Status: SHIPPED | OUTPATIENT
Start: 2024-08-14

## 2024-08-14 RX ORDER — CETIRIZINE HYDROCHLORIDE 10 MG/1
10 TABLET ORAL DAILY
Qty: 90 TABLET | Refills: 3 | Status: SHIPPED | OUTPATIENT
Start: 2024-08-14

## 2024-08-14 RX ORDER — INSULIN GLARGINE 100 [IU]/ML
14-40 INJECTION, SOLUTION SUBCUTANEOUS AT BEDTIME
Qty: 15 ML | Refills: 3 | Status: SHIPPED | OUTPATIENT
Start: 2024-08-14

## 2024-08-14 RX ORDER — PEN NEEDLE, DIABETIC 32GX 5/32"
NEEDLE, DISPOSABLE MISCELLANEOUS
Qty: 100 EACH | Refills: 3 | Status: SHIPPED | OUTPATIENT
Start: 2024-08-14

## 2024-08-14 NOTE — PROGRESS NOTES
Chief Complaint   Patient presents with    Knee Pain     F/U KNEE PAIN     Diabetes     CK       There are no exam notes on file for this visit.        Assessment/Plan:  Emmanuel Vences is a 78 year old female here for routine follow up of DM and knee pain.  Reviewed glucometer, and average  over the past two weeks with 0 low readings in the past 90 days. Average BG would equate to A1c of over 8%. Wishes to keep all medications the same. Previously has been very sensitive to insulin adjustments - will increase from 14 to 16 units lantus.     Regarding knee pain, has good days and bad days. Brace helpful as well as topicals and intermittent tylenol or mobic (though mobic makes her drowsy). Continue same management.     Due for AWV - schedule this at next visit.  Follow up response to insulin adjustments in 2 mos.     Emmanuel was seen today for knee pain and diabetes.    Diagnoses and all orders for this visit:    Age-related osteoporosis without current pathological fracture    Screen for colon cancer    Type 2 diabetes mellitus with hyperglycemia, without long-term current use of insulin (H)  -     insulin glargine (LANTUS SOLOSTAR) 100 UNIT/ML pen; Inject 14-40 Units subcutaneously at bedtime Adjust dose per physician instructions.  -     insulin pen needle (ULTICARE MICRO) 32G X 4 MM miscellaneous; Use 1 pen needles daily or as directed.  -     alcohol swab prep pads; Use to swab area of injection/amber as directed.  -     Hemoglobin A1c; Future  -     Lipid panel reflex to direct LDL Non-fasting; Future  -     CBC with platelets; Future  -     Hemoglobin A1c  -     Lipid panel reflex to direct LDL Non-fasting  -     CBC with platelets    Chronic kidney disease, stage 1  -     Basic metabolic panel; Future  -     Basic metabolic panel    Osteoarthrosis, unspecified whether generalized or localized, involving lower leg        Future Appointments   Date Time Provider Department Center   6/26/2025  1:30 PM Natalia  [facility]       Kaylin Joe MD      Patient Instructions   Please INCREASE lantus insulin to 16 units    Subjective:  Emmanuel Vences is a 78 year old female here for follow up.     Coming in today - follow up - same help    She states knee pain has been up and down, more pain on the right knee  Nothing helpful  Using cream for the pain and wearing the knee brace, those are helping a little bit.   Meloxicam makes her feel sleepy so doesn't take it every day.     Bgs have been a little high, looks better before eating and normal before eating   Scanning less before eatng which is why values look higher.  Taking insulin daily fasting in the evening 5-6 pm    Patient Active Problem List   Diagnosis    Esophageal reflux    Osteoarthrosis, unspecified whether generalized or localized, involving lower leg    Lumbosacral spondylosis without myelopathy    Hyperlipidemia    Type 2 diabetes mellitus with hyperglycemia, without long-term current use of insulin (H)    Seasonal allergies    Microalbuminuria    Age-related osteoporosis without current pathological fracture    Chronic kidney disease, stage 1    Major depressive disorder, recurrent episode, mild (H24)    Dehydration    Hyponatremia    Acute cystitis with hematuria    Sepsis, due to unspecified organism, unspecified whether acute organ dysfunction present (H)    Decreased hearing, unspecified laterality       Current Outpatient Medications   Medication Sig Dispense Refill    alcohol swab prep pads Use to swab area of injection/amber as directed. 100 each 3    alendronate (FOSAMAX) 70 MG tablet TAKE 1 TABLET (70 MG) BY MOUTH EVERY 7 DAYS 12 tablet 4    blood glucose (NO BRAND SPECIFIED) lancets standard Use to test blood sugar fasting daily or as directed. 100 each 11    blood glucose (NO BRAND SPECIFIED) test strip Use to test blood sugar daily fasting or as directed. 100 strip 4    cholecalciferol (VITAMIN D3) 1250 mcg (25759 units) capsule TAKE 1  "CAPSULE (50,000 UNITS) BY MOUTH EVERY 7 DAYS 4 capsule 3    Continuous Blood Gluc Sensor (FREESTYLE PRECIOUS 2 SENSOR) MISC Change every 14 days. 2 each 11    insulin glargine (LANTUS SOLOSTAR) 100 UNIT/ML pen Inject 14-40 Units subcutaneously at bedtime Adjust dose per physician instructions. 15 mL 3    insulin pen needle (ULTICARE MICRO) 32G X 4 MM miscellaneous Use 1 pen needles daily or as directed. 100 each 3    ketotifen fumarate 0.035% 0.035 % SOLN ophthalmic solution Place 1 drop into both eyes 2 times daily 10 mL 1    lisinopril (ZESTRIL) 2.5 MG tablet TAKE 1 TABLET (2.5 MG) BY MOUTH DAILY FOR BLOOD PRESSURE 90 tablet 3    meloxicam (MOBIC) 7.5 MG tablet Take 1 tablet (7.5 mg) by mouth daily Take with food. 30 tablet 11    metFORMIN (GLUCOPHAGE XR) 500 MG 24 hr tablet Take 2 tablets (1,000 mg) by mouth daily (with dinner) 180 tablet 3    Multiple Vitamins-Minerals (CEROVITE SENIOR) TABS TAKE 1 TABLET BY MOUTH DAILY 30 tablet 4    rosuvastatin (CRESTOR) 40 MG tablet TAKE 1 TABLET (40 MG) BY MOUTH DAILY FOR CHOLESTEROL 90 tablet 3    cetirizine (ALLERGY RELIEF CETIRIZINE) 10 MG tablet TAKE 1 TABLET (10 MG) BY MOUTH DAILY 90 tablet 3    empagliflozin (JARDIANCE) 25 MG TABS tablet TAKE 1 TABLET (25 MG) BY MOUTH DAILY 90 tablet 3     No current facility-administered medications for this visit.       Objective:  /70 (BP Location: Left arm, Patient Position: Sitting, Cuff Size: Adult Small)   Pulse 83   Temp 98.5  F (36.9  C) (Oral)   Resp 20   Ht 1.448 m (4' 9\")   Wt 51.6 kg (113 lb 12.8 oz)   SpO2 99%   BMI 24.63 kg/m    Body mass index is 24.63 kg/m .  Gen: A/O x3, in NAD.  Cardio: S1, S2, no MRG. RRR.  Resp: CTAB, no WRR.  Neuro: Grossly intact.    "

## 2024-08-14 NOTE — TELEPHONE ENCOUNTER
Name from pharmacy: CETIRIZINE 10MG TAB 10 Tablet          Will file in chart as: ALLERGY RELIEF CETIRIZINE 10 MG tablet    Sig: TAKE 1 TABLET (10 MG) BY MOUTH DAILY    Disp: 30 tablet    Refills: 1    Start: 8/13/2024    Class: E-Prescribe    Non-formulary For: Pruritic disorder    Last ordered: 7 months ago (1/17/2024) by Kaylin Joe MD    Last refill: 2/12/2024    Rx #: [id number]    Antihistamines Protocol Lrlnqx3508/13/2024 09:06 AM   Protocol Details Patient is 3-64 years of age    Recent (12 mo) or future (30 days) visit within the authorizing provider's specialty    Medication is active on med list    Medication indicated for associated diagnosis       Name from pharmacy: JARDIANCE 25 MG TAB 25 Tablet         Will file in chart as: JARDIANCE 25 MG TABS tablet    Sig: TAKE 1 TABLET (25 MG) BY MOUTH DAILY    Disp: 30 tablet    Refills: 1    Start: 8/13/2024    Class: E-Prescribe    For: Type 2 diabetes mellitus with hyperglycemia, without long-term current use of insulin (H)    Last ordered: 1 month ago (6/18/2024) by Kaylin Joe MD    Last refill: 7/15/2024    Rx #: [id number]    Sodium Glucose Co-Transport Inhibitor Agents Yzjsqt8408/13/2024 09:06 AM   Protocol Details Has GFR on file in past 12 months and most recent value is normal        ADEN Rojas, BSN

## 2024-08-15 DIAGNOSIS — Z53.9 DIAGNOSIS NOT YET DEFINED: Primary | ICD-10-CM

## 2024-08-21 ENCOUNTER — DOCUMENTATION ONLY (OUTPATIENT)
Dept: FAMILY MEDICINE | Facility: CLINIC | Age: 79
End: 2024-08-21
Payer: COMMERCIAL

## 2024-08-21 NOTE — PROGRESS NOTES
To be completed in Nursing note:    Please reference list for forms that require a visit for completion.  Please remind patients that providers are given 3-5 business days to complete and return forms.      Form type: [facility] HOME HEALTH CERTIFICATION AN PLAN OF CARE   8/10/2024 to 10/8/2024    Date form received: 8/14/2024    Date form completed by Physician: 8/21/2024 got form back    How was form returned to patient (mailed, faxed, or at  for patient to ):    Fax to [phone]    Date form mailed/faxed/left at  for patient and sent to HIM for scanning:    Fax on 8/21/2024    Once form is left for patient, faxed, or mailed PCS will then close the documentation only encounter.

## 2024-09-02 DIAGNOSIS — E78.5 HYPERLIPIDEMIA LDL GOAL <100: ICD-10-CM

## 2024-09-04 RX ORDER — ROSUVASTATIN CALCIUM 40 MG/1
TABLET, COATED ORAL
Qty: 90 TABLET | Refills: 3 | Status: SHIPPED | OUTPATIENT
Start: 2024-09-04

## 2024-09-04 NOTE — TELEPHONE ENCOUNTER
Name from pharmacy: ROSUVASTATIN CALCIUM 40 MG 40 Tablet          Will file in chart as: rosuvastatin (CRESTOR) 40 MG tablet    Sig: TAKE 1 TABLET (40 MG) BY MOUTH DAILY FOR CHOLESTEROL    Disp: 90 tablet    Refills: 3    Start: 9/2/2024    Class: E-Prescribe    Non-formulary For: Hyperlipidemia LDL goal <100    Last ordered: 11 months ago (9/18/2023) by Kaylin Joe MD    Last refill: 6/4/2024    Rx #: [id number]    Antihyperlipidemic agents Yisgxr1909/02/2024 09:28 AM   Protocol Details LDL on file in the past 12 months    Medication is active on med list    Recent (12 mo) or future (90 days) visit within the authorizing provider's specialty    Patient is age 18 years or older    No active pregnancy on record    No positive pregnancy test in past 12 mos        LDL Cholesterol Calculated   Date Value Ref Range Status   08/14/2024 138 (H) <=100 mg/dL Final   08/31/2020 Unable to calculate Trig >=400 0 - 129 mg/dL Final     LDL Cholesterol Direct   Date Value Ref Range Status   08/31/2020 199 (H) <=129 mg/dl Final     Prescription approved per [facility] Refill Protocol.  Crystal RN, BSN

## 2024-09-09 DIAGNOSIS — E55.9 HYPOVITAMINOSIS D: ICD-10-CM

## 2024-09-10 NOTE — TELEPHONE ENCOUNTER
Name from pharmacy: VITAMIN D3 97672 UNIT CAPS 1.25 MG Capsule          Will file in chart as: cholecalciferol (VITAMIN D3) 1250 mcg (82598 units) capsule    Sig: TAKE 1 CAPSULE (50,000 UNITS) BY MOUTH EVERY 7 DAYS    Disp: 4 capsule    Refills: 3    Start: 9/9/2024    Class: E-Prescribe    Non-formulary For: Hypovitaminosis D    Last ordered: 3 months ago (5/21/2024) by Kaylin Joe MD    Last refill: 8/13/2024    Rx #: [id number]    Vitamin Supplements Protocol Hroocw2409/09/2024 09:14 AM   Protocol Details The patient does not have an order for high-dose vitamin D        Jaxon Thrasher RN, MSN

## 2024-09-11 DIAGNOSIS — E11.65 TYPE 2 DIABETES MELLITUS WITH HYPERGLYCEMIA, WITHOUT LONG-TERM CURRENT USE OF INSULIN (H): ICD-10-CM

## 2024-09-11 RX ORDER — CHOLECALCIFEROL (VITAMIN D3) 1250 MCG
1250 CAPSULE ORAL
Qty: 4 CAPSULE | Refills: 3 | Status: SHIPPED | OUTPATIENT
Start: 2024-09-11

## 2024-09-12 NOTE — TELEPHONE ENCOUNTER
Name from pharmacy: CEROVITE SENIOR TABLET Tablet         Will file in chart as: Multiple Vitamins-Minerals (CEROVITE SENIOR) TABS    Sig: TAKE 1 TABLET BY MOUTH DAILY    Disp: 30 tablet    Refills: 4    Start: 9/11/2024    Class: E-Prescribe    For: Type 2 diabetes mellitus with hyperglycemia, without long-term current use of insulin (H)    Last ordered: 4 months ago (4/23/2024) by Kaylin Joe MD    Last refill: 8/13/2024    Rx #: [id number]    Vitamin Supplements Protocol Hypuwx7509/11/2024 09:28 AM   Protocol Details Medication indicated for associated diagnosis          Jaxon Thrasher RN, MSN

## 2024-09-16 RX ORDER — MULTIVIT-MIN/FA/LYCOPEN/LUTEIN .4-300-25
1 TABLET ORAL DAILY
Qty: 90 TABLET | Refills: 3 | Status: SHIPPED | OUTPATIENT
Start: 2024-09-16

## 2024-10-15 ENCOUNTER — OFFICE VISIT (OUTPATIENT)
Dept: FAMILY MEDICINE | Facility: CLINIC | Age: 79
End: 2024-10-15
Payer: COMMERCIAL

## 2024-10-15 VITALS
BODY MASS INDEX: 23.47 KG/M2 | OXYGEN SATURATION: 99 % | RESPIRATION RATE: 18 BRPM | HEART RATE: 85 BPM | WEIGHT: 111.8 LBS | SYSTOLIC BLOOD PRESSURE: 119 MMHG | TEMPERATURE: 98.6 F | HEIGHT: 58 IN | DIASTOLIC BLOOD PRESSURE: 73 MMHG

## 2024-10-15 DIAGNOSIS — N18.1 CHRONIC KIDNEY DISEASE, STAGE 1: ICD-10-CM

## 2024-10-15 DIAGNOSIS — Z12.11 SCREEN FOR COLON CANCER: Primary | ICD-10-CM

## 2024-10-15 DIAGNOSIS — Z00.00 ROUTINE GENERAL MEDICAL EXAMINATION AT A HEALTH CARE FACILITY: ICD-10-CM

## 2024-10-15 DIAGNOSIS — G31.84 MILD COGNITIVE IMPAIRMENT: ICD-10-CM

## 2024-10-15 DIAGNOSIS — R61 NIGHT SWEATS: ICD-10-CM

## 2024-10-15 DIAGNOSIS — Z71.89 ADVANCE CARE PLANNING: ICD-10-CM

## 2024-10-15 LAB
ALBUMIN SERPL BCG-MCNC: 4.6 G/DL (ref 3.5–5.2)
ALP SERPL-CCNC: 104 U/L (ref 40–150)
ALT SERPL W P-5'-P-CCNC: 14 U/L (ref 0–50)
ANION GAP SERPL CALCULATED.3IONS-SCNC: 12 MMOL/L (ref 7–15)
AST SERPL W P-5'-P-CCNC: 22 U/L (ref 0–45)
BASOPHILS # BLD AUTO: 0.1 10E3/UL (ref 0–0.2)
BASOPHILS NFR BLD AUTO: 1 %
BILIRUB SERPL-MCNC: 0.3 MG/DL
BUN SERPL-MCNC: 14.4 MG/DL (ref 8–23)
CALCIUM SERPL-MCNC: 10 MG/DL (ref 8.8–10.4)
CHLORIDE SERPL-SCNC: 99 MMOL/L (ref 98–107)
CREAT SERPL-MCNC: 1.19 MG/DL (ref 0.51–0.95)
CREAT UR-MCNC: 24.2 MG/DL
EGFRCR SERPLBLD CKD-EPI 2021: 46 ML/MIN/1.73M2
EOSINOPHIL # BLD AUTO: 0.5 10E3/UL (ref 0–0.7)
EOSINOPHIL NFR BLD AUTO: 7 %
ERYTHROCYTE [DISTWIDTH] IN BLOOD BY AUTOMATED COUNT: 12.7 % (ref 10–15)
GLUCOSE SERPL-MCNC: 126 MG/DL (ref 70–99)
HCO3 SERPL-SCNC: 23 MMOL/L (ref 22–29)
HCT VFR BLD AUTO: 38 % (ref 35–47)
HGB BLD-MCNC: 12.5 G/DL (ref 11.7–15.7)
IMM GRANULOCYTES # BLD: 0 10E3/UL
IMM GRANULOCYTES NFR BLD: 0 %
LYMPHOCYTES # BLD AUTO: 2.4 10E3/UL (ref 0.8–5.3)
LYMPHOCYTES NFR BLD AUTO: 35 %
MCH RBC QN AUTO: 30 PG (ref 26.5–33)
MCHC RBC AUTO-ENTMCNC: 32.9 G/DL (ref 31.5–36.5)
MCV RBC AUTO: 91 FL (ref 78–100)
MICROALBUMIN UR-MCNC: 75.2 MG/L
MICROALBUMIN/CREAT UR: 310.74 MG/G CR (ref 0–25)
MONOCYTES # BLD AUTO: 0.3 10E3/UL (ref 0–1.3)
MONOCYTES NFR BLD AUTO: 5 %
NEUTROPHILS # BLD AUTO: 3.4 10E3/UL (ref 1.6–8.3)
NEUTROPHILS NFR BLD AUTO: 51 %
NRBC # BLD AUTO: 0 10E3/UL
NRBC BLD AUTO-RTO: 0 /100
PLATELET # BLD AUTO: 278 10E3/UL (ref 150–450)
POTASSIUM SERPL-SCNC: 4.7 MMOL/L (ref 3.4–5.3)
PROT SERPL-MCNC: 8.9 G/DL (ref 6.4–8.3)
RBC # BLD AUTO: 4.17 10E6/UL (ref 3.8–5.2)
RETICS # AUTO: 0.1 10E6/UL (ref 0.03–0.1)
RETICS/RBC NFR AUTO: 2.5 % (ref 0.5–2)
SODIUM SERPL-SCNC: 134 MMOL/L (ref 135–145)
WBC # BLD AUTO: 6.7 10E3/UL (ref 4–11)

## 2024-10-15 PROCEDURE — 90662 IIV NO PRSV INCREASED AG IM: CPT | Performed by: STUDENT IN AN ORGANIZED HEALTH CARE EDUCATION/TRAINING PROGRAM

## 2024-10-15 PROCEDURE — 99212 OFFICE O/P EST SF 10 MIN: CPT | Mod: 25 | Performed by: STUDENT IN AN ORGANIZED HEALTH CARE EDUCATION/TRAINING PROGRAM

## 2024-10-15 PROCEDURE — 82570 ASSAY OF URINE CREATININE: CPT | Performed by: STUDENT IN AN ORGANIZED HEALTH CARE EDUCATION/TRAINING PROGRAM

## 2024-10-15 PROCEDURE — 36415 COLL VENOUS BLD VENIPUNCTURE: CPT | Performed by: STUDENT IN AN ORGANIZED HEALTH CARE EDUCATION/TRAINING PROGRAM

## 2024-10-15 PROCEDURE — 85025 COMPLETE CBC W/AUTO DIFF WBC: CPT | Performed by: STUDENT IN AN ORGANIZED HEALTH CARE EDUCATION/TRAINING PROGRAM

## 2024-10-15 PROCEDURE — 82043 UR ALBUMIN QUANTITATIVE: CPT | Performed by: STUDENT IN AN ORGANIZED HEALTH CARE EDUCATION/TRAINING PROGRAM

## 2024-10-15 PROCEDURE — 85045 AUTOMATED RETICULOCYTE COUNT: CPT | Performed by: STUDENT IN AN ORGANIZED HEALTH CARE EDUCATION/TRAINING PROGRAM

## 2024-10-15 PROCEDURE — 80053 COMPREHEN METABOLIC PANEL: CPT | Performed by: STUDENT IN AN ORGANIZED HEALTH CARE EDUCATION/TRAINING PROGRAM

## 2024-10-15 PROCEDURE — 99207 BLOOD MORPHOLOGY PATHOLOGIST REVIEW: CPT | Performed by: PATHOLOGY

## 2024-10-15 PROCEDURE — 99397 PER PM REEVAL EST PAT 65+ YR: CPT | Mod: 25 | Performed by: STUDENT IN AN ORGANIZED HEALTH CARE EDUCATION/TRAINING PROGRAM

## 2024-10-15 PROCEDURE — 90471 IMMUNIZATION ADMIN: CPT | Performed by: STUDENT IN AN ORGANIZED HEALTH CARE EDUCATION/TRAINING PROGRAM

## 2024-10-15 PROCEDURE — 91320 SARSCV2 VAC 30MCG TRS-SUC IM: CPT | Performed by: STUDENT IN AN ORGANIZED HEALTH CARE EDUCATION/TRAINING PROGRAM

## 2024-10-15 PROCEDURE — 90480 ADMN SARSCOV2 VAC 1/ONLY CMP: CPT | Performed by: STUDENT IN AN ORGANIZED HEALTH CARE EDUCATION/TRAINING PROGRAM

## 2024-10-15 SDOH — HEALTH STABILITY: PHYSICAL HEALTH: ON AVERAGE, HOW MANY MINUTES DO YOU ENGAGE IN EXERCISE AT THIS LEVEL?: 30 MIN

## 2024-10-15 SDOH — HEALTH STABILITY: PHYSICAL HEALTH: ON AVERAGE, HOW MANY DAYS PER WEEK DO YOU ENGAGE IN MODERATE TO STRENUOUS EXERCISE (LIKE A BRISK WALK)?: 2 DAYS

## 2024-10-15 ASSESSMENT — SOCIAL DETERMINANTS OF HEALTH (SDOH): HOW OFTEN DO YOU GET TOGETHER WITH FRIENDS OR RELATIVES?: ONCE A WEEK

## 2024-10-15 ASSESSMENT — PATIENT HEALTH QUESTIONNAIRE - PHQ9
10. IF YOU CHECKED OFF ANY PROBLEMS, HOW DIFFICULT HAVE THESE PROBLEMS MADE IT FOR YOU TO DO YOUR WORK, TAKE CARE OF THINGS AT HOME, OR GET ALONG WITH OTHER PEOPLE: SOMEWHAT DIFFICULT
SUM OF ALL RESPONSES TO PHQ QUESTIONS 1-9: 3
SUM OF ALL RESPONSES TO PHQ QUESTIONS 1-9: 3

## 2024-10-15 NOTE — PROGRESS NOTES
Preventive Care Visit  [facility]  Kaylin Joe MD, Family Medicine  Oct 15, 2024      Assessment & Plan     Screen for colon cancer  - Fecal colorectal cancer screen FIT - Future (S+30)  - Fecal colorectal cancer screen FIT - Future (S+30)    Chronic kidney disease, stage 1  - Albumin Random Urine Quantitative with Creat Ratio  - Albumin Random Urine Quantitative with Creat Ratio    Routine general medical examination at a health care facility  - Discussed recommendation for repeat CT chest, patient declines.  - Discussed FIT, accepts.    Night sweats: With 1-2cm firm Virchow's node palpated base of L side of neck/supraclavicular. Obtain CBCd and US soft tissue to assess further - though patient declines to complete prior to returning from an upcoming trip to [facility].  Reasonable - no major recent change in health status.  - CBC with Platelets & Differential  - Lab Blood Morphology Pathologist Review  - Comprehensive metabolic panel  - US Head Neck Soft Tissue  - Lab Blood Morphology Pathologist Review  - Comprehensive metabolic panel    Advance care planning: POLST, advance directive completed today - one copy for scanning, one to patient/family.    Mild cognitive impairment: Discussed basics of routines, keeping vision aids, hearing aids, social connections, hobbies to stave off memory related changes as long as possible. Fully able to engage in advance care planning at this time so POLST and advance directive completed today - expressed clear preference to be DNR/DNI, which is consistent with our prior conversations regarding her care.          Counseling  Appropriate preventive services were addressed with this patient via screening, questionnaire, or discussion as appropriate for fall prevention, nutrition, physical activity, Tobacco-use cessation, social engagement, weight loss and cognition.  Checklist reviewing preventive services available has been given to the  patient.  Reviewed patient's diet, addressing concerns and/or questions.   She is at risk for lack of exercise and has been provided with information to increase physical activity for the benefit of her well-being.     Discussed possible causes of fatigue. Updated plan of care.  Patient reported difficulty with activities of daily living were addressed today.The patient was provided with written information regarding signs of hearing loss.   We have previously discussed incontinence and she receives incontinence supplies.      FUTURE APPOINTMENTS:  - Pretravel planning visit - 1 week  - US neck - nodule/night sweats - agrees to do after trip to [facility], in December  - Follow up with me - 2 mos, after US    Return in about 1 week (around 10/22/2024) for pretravel visit.    Future Appointments   Date Time Provider Department Center   6/26/2025  1:30 PM Denisse Fisher AuD [facility]         Subjective   Emmanuel is a 79 year old, presenting for the following:  Physical (Yearly check up ), Diabetes (Dm ck ), and Imm/Inj (Flu and covid vaccine )       Health Care Directive  Patient does not have a Health Care Directive or Living Will: Advance Directive received and scanned. Click on Code in the patient header to view.    HPI  Sometimes no appetite  Sometimes sweaty while sleeping  Sometimes memory is poor    Symptoms have been going on for:  Sweatiness - not every day - 2-3 years ago started. Waking up not heavy sweats just warm, some sweat on scalp. Tired, some SOB.  No lumps or bumps/swollen glands but some poking pain in her upper arms/shoulders.     # Forgetfulness and poor appetite.  - Forgets cup of water on table, to drink while doing something else.   - When she comes home won't recognize the house  - Sometimes has sundowning sort of episodes where she thinks it is somewhere else or sometime else.   - Not recently but has forgotten to turn off stove - she used to forget and leave on the stove, but no longer  permitted to cook so hasn't happened in some time.  Doesn't necessarily want to cook because not taking chances.                   10/15/2024   General Health   How would you rate your overall physical health? (!) FAIR   Feel stress (tense, anxious, or unable to sleep) Not at all            10/15/2024   Nutrition   Diet: Regular (no restrictions)            10/15/2024   Exercise   Days per week of moderate/strenous exercise 2 days   Average minutes spent exercising at this level 30 min      (!) EXERCISE CONCERN      10/15/2024   Social Factors   Frequency of gathering with friends or relatives Once a week   Worry food won't last until get money to buy more No   Food not last or not have enough money for food? No   Do you have housing? (Housing is defined as stable permanent housing and does not include staying ouside in a car, in a tent, in an abandoned building, in an overnight shelter, or couch-surfing.) Yes   Are you worried about losing your housing? No   Lack of transportation? No   Unable to get utilities (heat,electricity)? No            10/15/2024   Fall Risk   Fallen 2 or more times in the past year? No   Trouble with walking or balance? Yes   Gait Speed Test (Document in seconds) 13   Gait Speed Test Interpretation Greater than 5.01 seconds - ABNORMAL             10/15/2024   Activities of Daily Living- Home Safety   Needs help with the following daily activites Telephone use    Transportation    Shopping    Preparing meals    Housework    Bathing    Laundry    Medication administration    Money management    Toileting    Dressing   Safety concerns in the home None of the above       Multiple values from one day are sorted in reverse-chronological order         10/15/2024   Dental   Dentist two times every year? (!) NO            10/15/2024   Hearing Screening   Hearing concerns? (!) IT'S HARD TO FOLLOW A CONVERSATION IN A NOISY RESTAURANT OR CROWDED ROOM.    (!) TROUBLE UNDESTANDING A SPEAKER IN A PUBLIC  MEETING OR [employer] SERVICE.    (!) TROUBLE FOLLOWING DIALOGUE IN THE THEATHER.    (!) TROUBLE UNDERSTANDING SOFT OR WHISPERED SPEECH.       Multiple values from one day are sorted in reverse-chronological order         10/15/2024   Driving Risk Screening   Patient/family members have concerns about driving No            10/15/2024   General Alertness/Fatigue Screening   Have you been more tired than usual lately? (!) YES            10/15/2024   Urinary Incontinence Screening   Bothered by leaking urine in past 6 months Yes            10/15/2024   TB Screening   Were you born outside of the [place]? Yes          Today's PHQ-9 Score:       10/15/2024     2:12 PM   PHQ-9 SCORE   PHQ-9 Total Score MyChart 3 (Minimal depression)   PHQ-9 Total Score 3         10/15/2024   Substance Use   Alcohol more than 3/day or more than 7/wk Not Applicable   Do you have a current opioid prescription? No   How severe/bad is pain from 1 to 10? 5/10   Do you use any other substances recreationally? No        Social History     Tobacco Use    Smoking status: Former     Current packs/day: 0.00     Average packs/day: 0.3 packs/day for 50.0 years (12.5 ttl pk-yrs)     Types: Cigarettes     Start date: 1969     Quit date: 2019     Years since quittin.5    Smokeless tobacco: Current     Types: Chew    Tobacco comments:     chews betel nut   Vaping Use    Vaping status: Never Used   Substance Use Topics    Alcohol use: No    Drug use: No          Mammogram Screening - After age 74- determine frequency with patient based on health status, life expectancy and patient goals    ASCVD Risk   The 10-year ASCVD risk score (Brandon CAMPBELL, et al., 2019) is: 37%    Values used to calculate the score:      Age: 79 years      Sex: Female      Is Non- : No      Diabetic: Yes      Tobacco smoker: No      Systolic Blood Pressure: 119 mmHg      Is BP treated: No      HDL Cholesterol: 60 mg/dL      Total Cholesterol: 263  "mg/dL            Reviewed and updated as needed this visit by Provider                      Current providers sharing in care for this patient include:  Patient Care Team:  Kaylin Joe MD as PCP - General (Family Medicine)  Don Law as Other (see comments)  Armida SAMANIEGO  [employer] as Home Care Company  [employer]  Kaylin Joe MD as Assigned PCP  Denisse Fisher AuD as Audiologist (Audiology)  Michelle Marshall, [facility] as Pharmacist (Pharmacist)  Roney Hawley MD as MD (Ophthalmology)  Roney Hawley MD as Assigned Surgical Provider  Michelle Marshall, [facility] as Assigned [facility] Pharmacist  Loida Ray LSW as     The following health maintenance items are reviewed in Epic and correct as of today:  Health Maintenance   Topic Date Due    DEPRESSION ACTION PLAN  Never done    DIABETIC FOOT EXAM  06/28/2023    COLORECTAL CANCER SCREENING  09/29/2023    LUNG CANCER SCREENING  08/01/2024    A1C  02/14/2025    EYE EXAM  03/19/2025    DTAP/TDAP/TD IMMUNIZATION (11 - Td or Tdap) 06/22/2025    LIPID  08/14/2025    MEDICARE ANNUAL WELLNESS VISIT  10/15/2025    BMP  10/15/2025    MICROALBUMIN  10/15/2025    FALL RISK ASSESSMENT  10/15/2025    PHQ-9  10/15/2025    DEXA  04/15/2026    ADVANCE CARE PLANNING  10/15/2029    HEPATITIS C SCREENING  Completed    INFLUENZA VACCINE  Completed    Pneumococcal Vaccine: 65+ Years  Completed    URINALYSIS  Completed    ZOSTER IMMUNIZATION  Completed    RSV VACCINE  Completed    COVID-19 Vaccine  Completed    HPV IMMUNIZATION  Aged Out    MENINGITIS IMMUNIZATION  Aged Out    RSV MONOCLONAL ANTIBODY  Aged Out    MAMMO SCREENING  Discontinued            Objective    Exam  /73   Pulse 85   Temp 98.6  F (37  C) (Oral)   Resp 18   Ht 1.485 m (4' 10.47\")   Wt 50.7 kg (111 lb 12.8 oz)   SpO2 99%   BMI 23.00 kg/m     Estimated body mass index is 23 kg/m  as calculated from the following:    Height as of this encounter: " "1.485 m (4' 10.47\").    Weight as of this encounter: 50.7 kg (111 lb 12.8 oz).    Physical Exam  GENERAL: alert and no distress  NECK: no adenopathy, no asymmetry, masses, or scars  RESP: lungs clear to auscultation - no rales, rhonchi or wheezes  CV: regular rate and rhythm, normal S1 S2, no S3 or S4, no murmur, click or rub, no peripheral edema  MS: no gross musculoskeletal defects noted, no edema  SKIN: no suspicious lesions or rashes  NEURO: Normal strength and tone, mentation intact and speech normal  LYMPH: no cervical adenopathy.  With approx 1.5cm firm supraclavicular lymph node palpated        10/15/2024   Mini Cog   Mini-Cog Not Completed (choose reason) Language barrier and no  present    Language barrier and no  present   Clock Draw Score 0 Abnormal    0 Abnormal   3 Item Recall 1 object recalled   Mini Cog Total Score 1       Multiple values from one day are sorted in reverse-chronological order             Signed Electronically by: Kaylin Joe MD    Answers submitted by the patient for this visit:  Patient Health Questionnaire (Submitted on 10/15/2024)  If you checked off any problems, how difficult have these problems made it for you to do your work, take care of things at home, or get along with other people?: Somewhat difficult  PHQ9 TOTAL SCORE: 3    "

## 2024-10-15 NOTE — PROGRESS NOTES
Preventive Care Visit  [facility]  Kaylin Joe MD, Family Medicine  Oct 15, 2024  {Provider  Link to SmartSet :381515}    {PROVIDER CHARTING PREFERENCE:457540}    Manoj Benitez is a 79 year old, presenting for the following:  Physical (Yearly check up ), Diabetes (Dm ck ), and Imm/Inj (Flu and covid vaccine )    {(!) Visit Details have not yet been documented.  Please enter Visit Details and then use this list to pull in documentation. (Optional):331542}     Health Care Directive  Patient does not have a Health Care Directive or Living Will: {ADVANCE_DIRECTIVE_STATUS:808528}    HPI  ***  {MA/LPN/RN Pre-Provider Visit Orders- hCG/UA/Strep (Optional):035441}  {SUPERLIST (Optional):586817}  {additonal problems for provider to add (Optional):831033}      10/15/2024   General Health   How would you rate your overall physical health? (!) FAIR   Feel stress (tense, anxious, or unable to sleep) Not at all            10/15/2024   Nutrition   Diet: Regular (no restrictions)            10/15/2024   Exercise   Days per week of moderate/strenous exercise 2 days   Average minutes spent exercising at this level 30 min      (!) EXERCISE CONCERN      10/15/2024   Social Factors   Frequency of gathering with friends or relatives Once a week   Worry food won't last until get money to buy more No   Food not last or not have enough money for food? No   Do you have housing? (Housing is defined as stable permanent housing and does not include staying ouside in a car, in a tent, in an abandoned building, in an overnight shelter, or couch-surfing.) Yes   Are you worried about losing your housing? No   Lack of transportation? No   Unable to get utilities (heat,electricity)? No            10/15/2024   Fall Risk   Fallen 2 or more times in the past year? No   Trouble with walking or balance? Yes   Gait Speed Test (Document in seconds) 13   Gait Speed Test Interpretation Greater than 5.01 seconds - ABNORMAL              10/15/2024   Activities of Daily Living- Home Safety   Needs help with the following daily activites Telephone use    Transportation    Shopping    Preparing meals    Housework    Bathing    Laundry    Medication administration    Money management    Toileting    Dressing   Safety concerns in the home None of the above       Multiple values from one day are sorted in reverse-chronological order         10/15/2024   Dental   Dentist two times every year? (!) NO            10/15/2024   Hearing Screening   Hearing concerns? (!) IT'S HARD TO FOLLOW A CONVERSATION IN A NOISY RESTAURANT OR CROWDED ROOM.    (!) TROUBLE UNDESTANDING A SPEAKER IN A PUBLIC MEETING OR [employer] SERVICE.    (!) TROUBLE FOLLOWING DIALOGUE IN THE THEATHER.    (!) TROUBLE UNDERSTANDING SOFT OR WHISPERED SPEECH.       Multiple values from one day are sorted in reverse-chronological order         10/15/2024   Driving Risk Screening   Patient/family members have concerns about driving No            10/15/2024   General Alertness/Fatigue Screening   Have you been more tired than usual lately? (!) YES            10/15/2024   Urinary Incontinence Screening   Bothered by leaking urine in past 6 months Yes            10/15/2024   TB Screening   Were you born outside of the [place]? Yes          Today's PHQ-9 Score:       10/15/2024     2:12 PM   PHQ-9 SCORE   PHQ-9 Total Score MyChart 3 (Minimal depression)   PHQ-9 Total Score 3         10/15/2024   Substance Use   Alcohol more than 3/day or more than 7/wk Not Applicable   Do you have a current opioid prescription? No   How severe/bad is pain from 1 to 10? 5/10   Do you use any other substances recreationally? No        Social History     Tobacco Use    Smoking status: Former     Current packs/day: 0.00     Average packs/day: 0.3 packs/day for 50.0 years (12.5 ttl pk-yrs)     Types: Cigarettes     Start date: 1969     Quit date: 2019     Years since quittin.5    Smokeless tobacco: Current      Types: Chew    Tobacco comments:     chews betel nut   Vaping Use    Vaping status: Never Used   Substance Use Topics    Alcohol use: No    Drug use: No     {Provider  If there are gaps in the social history shown above, please follow the link to update and then refresh the note Link to Social and Substance History :170325}     {Mammogram Decision Support (Optional):322068}    ASCVD Risk   The 10-year ASCVD risk score (Brandon CAMPBELL, et al., 2019) is: 37%    Values used to calculate the score:      Age: 79 years      Sex: Female      Is Non- : No      Diabetic: Yes      Tobacco smoker: No      Systolic Blood Pressure: 119 mmHg      Is BP treated: No      HDL Cholesterol: 60 mg/dL      Total Cholesterol: 263 mg/dL    {Link to Fracture Risk Assessment Tool (Optional):668902}    {Provider  REQUIRED FOR AWV Use the storyboard to review patient history, after sections have been marked as reviewed, refresh note to capture documentation:002262}  {Provider   REQUIRED AWV use this link to review and update sexual activity history  after section has been marked as reviewed, refresh note to capture documentation:699600}  Reviewed and updated as needed this visit by Provider                    {HISTORY OPTIONS (Optional):101162}  Current providers sharing in care for this patient include:  Patient Care Team:  Kaylin Joe MD as PCP - General (Family Medicine)  Don Law as Other (see comments)  Armida SAMANIEGO  [employer] as Home Care Company  [employer]  Kaylin Joe MD as Assigned PCP  Denisse Fisher AuD as Audiologist (Audiology)  Michelle Marshall [facility] as Pharmacist (Pharmacist)  Roney Hawley MD as MD (Ophthalmology)  Roney Hawley MD as Assigned Surgical Provider  Michelle Marshall [facility] as Assigned [facility] Pharmacist  Loida Ray LSW as     The following health maintenance items are reviewed in Epic and correct as of  "today:  Health Maintenance   Topic Date Due    DEPRESSION ACTION PLAN  Never done    DIABETIC FOOT EXAM  06/28/2023    COLORECTAL CANCER SCREENING  09/29/2023    MEDICARE ANNUAL WELLNESS VISIT  05/24/2024    LUNG CANCER SCREENING  08/01/2024    INFLUENZA VACCINE (1) 09/01/2024    COVID-19 Vaccine (4 - 2024-25 season) 09/01/2024    MICROALBUMIN  10/18/2024    A1C  02/14/2025    EYE EXAM  03/19/2025    DTAP/TDAP/TD IMMUNIZATION (11 - Td or Tdap) 06/22/2025    BMP  08/14/2025    LIPID  08/14/2025    FALL RISK ASSESSMENT  10/15/2025    PHQ-9  10/15/2025    DEXA  04/15/2026    ADVANCE CARE PLANNING  05/25/2028    HEPATITIS C SCREENING  Completed    Pneumococcal Vaccine: 65+ Years  Completed    URINALYSIS  Completed    ZOSTER IMMUNIZATION  Completed    RSV VACCINE  Completed    HPV IMMUNIZATION  Aged Out    MENINGITIS IMMUNIZATION  Aged Out    RSV MONOCLONAL ANTIBODY  Aged Out    MAMMO SCREENING  Discontinued       {ROS Picklists (Optional):976734}     Objective    Exam  /73   Pulse 85   Temp 98.6  F (37  C) (Oral)   Resp 18   Ht 1.485 m (4' 10.47\")   Wt 50.7 kg (111 lb 12.8 oz)   SpO2 99%   BMI 23.00 kg/m     Estimated body mass index is 23 kg/m  as calculated from the following:    Height as of this encounter: 1.485 m (4' 10.47\").    Weight as of this encounter: 50.7 kg (111 lb 12.8 oz).    Physical Exam  {Exam Choices (Optional):489951}        10/15/2024   Mini Cog   Mini-Cog Not Completed (choose reason) Language barrier and no  present    Language barrier and no  present   Clock Draw Score 0 Abnormal    0 Abnormal   3 Item Recall 1 object recalled   Mini Cog Total Score 1       Multiple values from one day are sorted in reverse-chronological order     {A Mini-Cog total score of 0-2 suggests the possibility of dementia, score of 3-5 suggests no dementia:397884}        Signed Electronically by: Kaylin Joe MD  {Email feedback regarding this note to " [email or web address]   :701118}  Answers submitted by the patient for this visit:  Patient Health Questionnaire (Submitted on 10/15/2024)  If you checked off any problems, how difficult have these problems made it for you to do your work, take care of things at home, or get along with other people?: Somewhat difficult  PHQ9 TOTAL SCORE: 3

## 2024-10-15 NOTE — PATIENT INSTRUCTIONS
Patient Education   You have been provided the Preventive Care Advice document.    Additional copies can be found here: [email or web address]  Preventive Care Advice   This is general advice given by our system to help you stay healthy. However, your care team may have specific advice just for you. Please talk to your care team about your preventive care needs.  Nutrition  Eat 5 or more servings of fruits and vegetables each day.  Try wheat bread, brown rice and whole grain pasta (instead of white bread, rice, and pasta).  Get enough calcium and vitamin D. Check the label on foods and aim for 100% of the RDA (recommended daily allowance).  Lifestyle  Exercise at least 150 minutes each week  (30 minutes a day, 5 days a week).  Do muscle strengthening activities 2 days a week. These help control your weight and prevent disease.  No smoking.  Wear sunscreen to prevent skin cancer.  Have a dental exam and cleaning every 6 months.  Yearly exams  See your health care team every year to talk about:  Any changes in your health.  Any medicines your care team has prescribed.  Preventive care, family planning, and ways to prevent chronic diseases.  Shots (vaccines)   HPV shots (up to age 26), if you've never had them before.  Hepatitis B shots (up to age 59), if you've never had them before.  COVID-19 shot: Get this shot when it's due.  Flu shot: Get a flu shot every year.  Tetanus shot: Get a tetanus shot every 10 years.  Pneumococcal, hepatitis A, and RSV shots: Ask your care team if you need these based on your risk.  Shingles shot (for age 50 and up)  General health tests  Diabetes screening:  Starting at age 35, Get screened for diabetes at least every 3 years.  If you are younger than age 35, ask your care team if you should be screened for diabetes.  Cholesterol test: At age 39, start having a cholesterol test every 5 years, or more often if advised.  Bone density scan (DEXA): At age 50, ask your care team if you  should have this scan for osteoporosis (brittle bones).  Hepatitis C: Get tested at least once in your life.  STIs (sexually transmitted infections)  Before age 24: Ask your care team if you should be screened for STIs.  After age 24: Get screened for STIs if you're at risk. You are at risk for STIs (including HIV) if:  You are sexually active with more than one person.  You don't use condoms every time.  You or a partner was diagnosed with a sexually transmitted infection.  If you are at risk for HIV, ask about PrEP medicine to prevent HIV.  Get tested for HIV at least once in your life, whether you are at risk for HIV or not.  Cancer screening tests  Cervical cancer screening: If you have a cervix, begin getting regular cervical cancer screening tests starting at age 21.  Breast cancer scan (mammogram): If you've ever had breasts, begin having regular mammograms starting at age 40. This is a scan to check for breast cancer.  Colon cancer screening: It is important to start screening for colon cancer at age 45.  Have a colonoscopy test every 10 years (or more often if you're at risk) Or, ask your provider about stool tests like a FIT test every year or Cologuard test every 3 years.  To learn more about your testing options, visit:   .  For help making a decision, visit:   https://bit.ly/yc81707.  Prostate cancer screening test: If you have a prostate, ask your care team if a prostate cancer screening test (PSA) at age 55 is right for you.  Lung cancer screening: If you are a current or former smoker ages 50 to 80, ask your care team if ongoing lung cancer screenings are right for you.  For informational purposes only. Not to replace the advice of your health care provider. Copyright   2023 [place] [employer]. All rights reserved. Clinically reviewed by the [facility]. [employer] 509704 - REV 01/24.  Learning About Activities of Daily Living  What are activities of daily living?      Activities of daily living (ADLs) are the basic self-care tasks you do every day. These include eating, bathing, dressing, and moving around.  As you age, and if you have health problems, you may find that it's harder to do some of these tasks. If so, your doctor can suggest ideas that may help.  To measure what kind of help you may need, your doctor will ask how well you are able to do ADLs. Let your doctor know if there are any tasks that you are having trouble doing. This is an important first step to getting help. And when you have the help you need, you can stay as independent as possible.  How will a doctor assess your ADLs?  Asking about ADLs is part of a routine health checkup your doctor will likely do as you age. Your health check might be done in a doctor's office, in your home, or at a hospital. The goal is to find out if you are having any problems that could make it hard to care for yourself or that make it unsafe for you to be on your own.  To measure your ADLs, your doctor will ask how hard it is for you to do routine tasks. Your doctor may also want to know if you have changed the way you do a task because of a health problem. Your doctor may watch how you:  Walk back and forth.  Keep your balance while you stand or walk.  Move from sitting to standing or from a bed to a chair.  Button or unbutton a shirt or sweater.  Remove and put on your shoes.  It's common to feel a little worried or anxious if you find you can't do all the things you used to be able to do. Talking with your doctor about ADLs is a way to make sure you're as safe as possible and able to care for yourself as well as you can. You may want to bring a caregiver, friend, or family member to your checkup. They can help you talk to your doctor.  Follow-up care is a key part of your treatment and safety. Be sure to make and go to all appointments, and call your doctor if you are having problems. It's also a good idea to know your test  results and keep a list of the medicines you take.  Current as of: October 24, 2023  Content Version: 14.2 2024 [facility] [employer].   Care instructions adapted under license by your healthcare professional. If you have questions about a medical condition or this instruction, always ask your healthcare professional. Healthwise, Incorporated disclaims any warranty or liability for your use of this information.    Preventing Falls: Care Instructions  Injuries and health problems such as trouble walking or poor eyesight can increase your risk of falling. So can some medicines. But there are things you can do to help prevent falls. You can exercise to get stronger. You can also arrange your home to make it safer.    Talk to your doctor about the medicines you take. Ask if any of them increase the risk of falls and whether they can be changed or stopped.   Try to exercise regularly. It can help improve your strength and balance. This can help lower your risk of falling.         Practice fall safety and prevention.   Wear low-heeled shoes that fit well and give your feet good support. Talk to your doctor if you have foot problems that make this hard.  Carry a cellphone or wear a medical alert device that you can use to call for help.  Use stepladders instead of chairs to reach high objects. Don't climb if you're at risk for falls. Ask for help, if needed.  Wear the correct eyeglasses, if you need them.        Make your home safer.   Remove rugs, cords, clutter, and furniture from walkways.  Keep your house well lit. Use night-lights in hallways and bathrooms.  Install and use sturdy handrails on stairways.  Wear nonskid footwear, even inside. Don't walk barefoot or in socks without shoes.        Be safe outside.   Use handrails, curb cuts, and ramps whenever possible.  Keep your hands free by using a shoulder bag or backpack.  Try to walk in well-lit areas. Watch out for uneven ground, changes in pavement, and  "debris.  Be careful in the winter. Walk on the grass or gravel when sidewalks are slippery. Use de-icer on steps and walkways. Add non-slip devices to shoes.    Put grab bars and nonskid mats in your shower or tub and near the toilet. Try to use a shower chair or bath bench when bathing.   Get into a tub or shower by putting in your weaker leg first. Get out with your strong side first. Have a phone or medical alert device in the bathroom with you.   Where can you learn more?  Go to https://www.[employer].net/patiented  Enter G117 in the search box to learn more about \"Preventing Falls: Care Instructions.\"  Current as of: July 17, 2023  Content Version: 14.2 2024 [employer].   Care instructions adapted under license by your healthcare professional. If you have questions about a medical condition or this instruction, always ask your healthcare professional. Health[employer] disclaims any warranty or liability for your use of this information.    Hearing Loss: Care Instructions  Overview     Hearing loss is a sudden or slow decrease in how well you hear. It can range from slight to profound. Permanent hearing loss can occur with aging. It also can happen when you are exposed long-term to loud noise. Examples include listening to loud music, riding motorcycles, or being around other loud machines.  Hearing loss can affect your work and home life. It can make you feel lonely or depressed. You may feel that you have lost your independence. But hearing aids and other devices can help you hear better and feel connected to others.  Follow-up care is a key part of your treatment and safety. Be sure to make and go to all appointments, and call your doctor if you are having problems. It's also a good idea to know your test results and keep a list of the medicines you take.  How can you care for yourself at home?  Avoid loud noises whenever possible. This helps keep your hearing from getting " "worse.  Always wear hearing protection around loud noises.  Wear a hearing aid as directed.  A professional can help you pick a hearing aid that will work best for you.  You can also get hearing aids over the counter for mild to moderate hearing loss.  Have hearing tests as your doctor suggests. They can show whether your hearing has changed. Your hearing aid may need to be adjusted.  Use other devices as needed. These may include:  Telephone amplifiers and hearing aids that can connect to a television, stereo, radio, or microphone.  Devices that use lights or vibrations. These alert you to the doorbell, a ringing telephone, or a baby monitor.  Television closed-captioning. This shows the words at the bottom of the screen. Most new TVs can do this.  TTY (text telephone). This lets you type messages back and forth on the telephone instead of talking or listening. These devices are also called TDD. When messages are typed on the keyboard, they are sent over the phone line to a receiving TTY. The message is shown on a monitor.  Use text messaging, social media, and email if it is hard for you to communicate by telephone.  Try to learn a listening technique called speechreading. It is not lipreading. You pay attention to people's gestures, expressions, posture, and tone of voice. These clues can help you understand what a person is saying. Face the person you are talking to, and have them face you. Make sure the lighting is good. You need to see the other person's face clearly.  Think about counseling if you need help to adjust to your hearing loss.  When should you call for help?  Watch closely for changes in your health, and be sure to contact your doctor if:    You think your hearing is getting worse.     You have new symptoms, such as dizziness or nausea.   Where can you learn more?  Go to https://www.healthwise.net/patiented  Enter R798 in the search box to learn more about \"Hearing Loss: Care " "Instructions.\"  Current as of: September 27, 2023  Content Version: 14.2 2024 [facility] [employer].   Care instructions adapted under license by your healthcare professional. If you have questions about a medical condition or this instruction, always ask your healthcare professional. Healthwise, Incorporated disclaims any warranty or liability for your use of this information.    Learning About Sleeping Well  What does sleeping well mean?     Sleeping well means getting enough sleep to feel good and stay healthy. How much sleep is enough varies among people.  The number of hours you sleep and how you feel when you wake up are both important. If you do not feel refreshed, you probably need more sleep. Another sign of not getting enough sleep is feeling tired during the day.  Experts recommend that adults get at least 7 or more hours of sleep per day. Children and older adults need more sleep.  Why is getting enough sleep important?  Getting enough quality sleep is a basic part of good health. When your sleep suffers, your physical health, mood, and your thoughts can suffer too. You may find yourself feeling more grumpy or stressed. Not getting enough sleep also can lead to serious problems, including injury, accidents, anxiety, and depression.  What might cause poor sleeping?  Many things can cause sleep problems, including:  Changes to your sleep schedule.  Stress. Stress can be caused by fear about a single event, such as giving a speech. Or you may have ongoing stress, such as worry about work or school.  Depression, anxiety, and other mental or emotional conditions.  Changes in your sleep habits or surroundings. This includes changes that happen where you sleep, such as noise, light, or sleeping in a different bed. It also includes changes in your sleep pattern, such as having jet lag or working a late shift.  Health problems, such as pain, breathing problems, and restless legs syndrome.  Lack of regular " "exercise.  Using alcohol, nicotine, or caffeine before bed.  How can you help yourself?  Here are some tips that may help you sleep more soundly and wake up feeling more refreshed.  Your sleeping area   Use your bedroom only for sleeping and sex. A bit of light reading may help you fall asleep. But if it doesn't, do your reading elsewhere in the house. Try not to use your TV, computer, smartphone, or tablet while you are in bed.  Be sure your bed is big enough to stretch out comfortably, especially if you have a sleep partner.  Keep your bedroom quiet, dark, and cool. Use curtains, blinds, or a sleep mask to block out light. To block out noise, use earplugs, soothing music, or a \"white noise\" machine.  Your evening and bedtime routine   Create a relaxing bedtime routine. You might want to take a warm shower or bath, or listen to soothing music.  Go to bed at the same time every night. And get up at the same time every morning, even if you feel tired.  What to avoid   Limit caffeine (coffee, tea, caffeinated sodas) during the day, and don't have any for at least 6 hours before bedtime.  Avoid drinking alcohol before bedtime. Alcohol can cause you to wake up more often during the night.  Try not to smoke or use tobacco, especially in the evening. Nicotine can keep you awake.  Limit naps during the day, especially close to bedtime.  Avoid lying in bed awake for too long. If you can't fall asleep or if you wake up in the middle of the night and can't get back to sleep within about 20 minutes, get out of bed and go to another room until you feel sleepy.  Avoid taking medicine right before bed that may keep you awake or make you feel hyper or energized. Your doctor can tell you if your medicine may do this and if you can take it earlier in the day.  If you can't sleep   Imagine yourself in a peaceful, pleasant scene. Focus on the details and feelings of being in a place that is relaxing.  Get up and do a quiet or boring " "activity until you feel sleepy.  Avoid drinking any liquids before going to bed to help prevent waking up often to use the bathroom.  Where can you learn more?  Go to https://www.[employer].net/patiented  Enter J942 in the search box to learn more about \"Learning About Sleeping Well.\"  Current as of: July 10, 2023  Content Version: 14.2 2024 [facility] [employer].   Care instructions adapted under license by your healthcare professional. If you have questions about a medical condition or this instruction, always ask your healthcare professional. Healthwise, Incorporated disclaims any warranty or liability for your use of this information.    Bladder Training: Care Instructions  Your Care Instructions     Bladder training is used to treat urge incontinence and stress incontinence. Urge incontinence means that the need to urinate comes on so fast that you can't get to a toilet in time. Stress incontinence means that you leak urine because of pressure on your bladder. For example, it may happen when you laugh, cough, or lift something heavy.  Bladder training can increase how long you can wait before you have to urinate. It can also help your bladder hold more urine. And it can give you better control over the urge to urinate.  It is important to remember that bladder training takes a few weeks to a few months to make a difference. You may not see results right away, but don't give up.  Follow-up care is a key part of your treatment and safety. Be sure to make and go to all appointments, and call your doctor if you are having problems. It's also a good idea to know your test results and keep a list of the medicines you take.  How can you care for yourself at home?  Work with your doctor to come up with a bladder training program that is right for you. You may use one or more of the following methods.  Delayed urination  In the beginning, try to keep from urinating for 5 minutes after you first feel the need to " "go.  While you wait, take deep, slow breaths to relax. Kegel exercises can also help you delay the need to go to the bathroom.  After some practice, when you can easily wait 5 minutes to urinate, try to wait 10 minutes before you urinate.  Slowly increase the waiting period until you are able to control when you have to urinate.  Scheduled urination  Empty your bladder when you first wake up in the morning.  Schedule times throughout the day when you will urinate.  Start by going to the bathroom every hour, even if you don't need to go.  Slowly increase the time between trips to the bathroom.  When you have found a schedule that works well for you, keep doing it.  If you wake up during the night and have to urinate, do it. Apply your schedule to waking hours only.  Kegel exercises  These tighten and strengthen pelvic muscles, which can help you control the flow of urine. (If doing these exercises causes pain, stop doing them and talk with your doctor.) To do Kegel exercises:  Squeeze your muscles as if you were trying not to pass gas. Or squeeze your muscles as if you were stopping the flow of urine. Your belly, legs, and buttocks shouldn't move.  Hold the squeeze for 3 seconds, then relax for 5 to 10 seconds.  Start with 3 seconds, then add 1 second each week until you are able to squeeze for 10 seconds.  Repeat the exercise 10 times a session. Do 3 to 8 sessions a day.  When should you call for help?  Watch closely for changes in your health, and be sure to contact your doctor if:    Your incontinence is getting worse.     You do not get better as expected.   Where can you learn more?  Go to https://www.[employer].net/patiented  Enter V684 in the search box to learn more about \"Bladder Training: Care Instructions.\"  Current as of: November 15, 2023  Content Version: 14.2 2024 [employer].   Care instructions adapted under license by your healthcare professional. If you have questions about a medical " condition or this instruction, always ask your healthcare professional. Healthwise, Incorporated disclaims any warranty or liability for your use of this information.

## 2024-10-16 LAB
PATH REPORT.COMMENTS IMP SPEC: NORMAL
PATH REPORT.FINAL DX SPEC: NORMAL
PATH REPORT.MICROSCOPIC SPEC OTHER STN: NORMAL
PATH REPORT.RELEVANT HX SPEC: NORMAL

## 2024-10-22 PROCEDURE — 82274 ASSAY TEST FOR BLOOD FECAL: CPT | Performed by: STUDENT IN AN ORGANIZED HEALTH CARE EDUCATION/TRAINING PROGRAM

## 2024-10-28 ENCOUNTER — DOCUMENTATION ONLY (OUTPATIENT)
Dept: OTHER | Facility: CLINIC | Age: 79
End: 2024-10-28
Payer: COMMERCIAL

## 2024-10-28 DIAGNOSIS — Z53.9 DIAGNOSIS NOT YET DEFINED: Primary | ICD-10-CM

## 2024-10-28 LAB — HEMOCCULT STL QL IA: NEGATIVE

## 2024-10-29 ENCOUNTER — DOCUMENTATION ONLY (OUTPATIENT)
Dept: FAMILY MEDICINE | Facility: CLINIC | Age: 79
End: 2024-10-29
Payer: COMMERCIAL

## 2024-10-29 NOTE — PROGRESS NOTES
To be completed in Nursing note:    Please reference list for forms that require a visit for completion.  Please remind patients that providers are given 3-5 business days to complete and return forms.      Form type: [facility] ~ HOME HEALTH CERTIFICATION AN PLAN OF CARE   10/9/2024 TO 12/07/2024    Date form received: 10/16/2024    Date form completed by Physician: 10/28/2024    How was form returned to patient (mailed, faxed, or at  for patient to ):    Fax to [phone]    Date form mailed/faxed/left at  for patient and sent to HIM for scanning:    Fax on 10/28/2024    Once form is left for patient, faxed, or mailed PCS will then close the documentation only encounter.

## 2024-11-14 ENCOUNTER — MEDICAL CORRESPONDENCE (OUTPATIENT)
Dept: HEALTH INFORMATION MANAGEMENT | Facility: CLINIC | Age: 79
End: 2024-11-14
Payer: COMMERCIAL

## 2024-12-09 ENCOUNTER — MEDICAL CORRESPONDENCE (OUTPATIENT)
Dept: HEALTH INFORMATION MANAGEMENT | Facility: CLINIC | Age: 79
End: 2024-12-09
Payer: COMMERCIAL

## 2024-12-16 ENCOUNTER — DOCUMENTATION ONLY (OUTPATIENT)
Dept: FAMILY MEDICINE | Facility: CLINIC | Age: 79
End: 2024-12-16
Payer: COMMERCIAL

## 2024-12-16 DIAGNOSIS — M15.0 PRIMARY OSTEOARTHRITIS INVOLVING MULTIPLE JOINTS: ICD-10-CM

## 2024-12-16 RX ORDER — MELOXICAM 7.5 MG/1
7.5 TABLET ORAL DAILY PRN
Qty: 30 TABLET | Refills: 11 | Status: SHIPPED | OUTPATIENT
Start: 2024-12-16

## 2024-12-16 NOTE — PROGRESS NOTES
To be completed in Nursing note:    Please reference list for forms that require a visit for completion.  Please remind patients that providers are given 3-5 business days to complete and return forms.      Form type: [facility] ~ Medical Report    Date form received: 12/10/2024    Date form completed by Physician: 12/12/2024    How was form returned to patient (mailed, faxed, or at  for patient to ):    Fax to [phone]    Date form mailed/faxed/left at  for patient and sent to HIM for scanning:    Fax on 12/16/2024    Once form is left for patient, faxed, or mailed PCS will then close the documentation only encounter.

## 2024-12-16 NOTE — TELEPHONE ENCOUNTER
Name from pharmacy: MELOXICAM 7.5 MG TABLET 7.5 Tablet          Will file in chart as: meloxicam (MOBIC) 7.5 MG tablet    Sig: Take 1 tablet (7.5 mg) by mouth daily Take with food.    Disp: 30 tablet    Refills: 11    Start: 12/16/2024    Class: E-Prescribe    Non-formulary For: Primary osteoarthritis involving multiple joints    Last ordered: 1 year ago (12/13/2023) by Kaylin Joe MD    Last refill: 11/10/2024    Rx #: [id number]    NSAID Medications [id number]/16/2024 09:12 AM   Protocol Details Patient is age 6-64 years    Always Fail Criteria - Chart Review Required    Normal GFR on file in past 12 months            Routed to provider due to failed RN protocol.       Jaxon Thrasher, RN, MSN

## 2024-12-17 ENCOUNTER — HOSPITAL ENCOUNTER (OUTPATIENT)
Dept: ULTRASOUND IMAGING | Facility: HOSPITAL | Age: 79
Discharge: HOME | End: 2024-12-17
Attending: STUDENT IN AN ORGANIZED HEALTH CARE EDUCATION/TRAINING PROGRAM
Payer: COMMERCIAL

## 2024-12-17 DIAGNOSIS — R61 NIGHT SWEATS: ICD-10-CM

## 2024-12-17 PROCEDURE — 76536 US EXAM OF HEAD AND NECK: CPT

## 2025-01-03 DIAGNOSIS — E55.9 HYPOVITAMINOSIS D: ICD-10-CM

## 2025-01-03 NOTE — TELEPHONE ENCOUNTER
Name from pharmacy: VITAMIN D3 1.25 MG (12396 U 1.25 MG Capsule          Will file in chart as: cholecalciferol (VITAMIN D3) 1250 mcg (82431 units) capsule    Sig: TAKE 1 CAPSULE (50,000 UNITS) BY MOUTH EVERY 7 DAYS    Disp: 4 capsule    Refills: 3    Start: 1/3/2025    Class: E-Prescribe    Non-formulary For: Hypovitaminosis D    Last ordered: 3 months ago (9/11/2024) by Kaylin Joe MD    Last refill: 12/3/2024    Rx #: [id number]    Vitamin Supplements Protocol Owwelf6001/03/2025 09:18 AM   Protocol Details The patient does not have an order for high-dose vitamin D        ADEN Rojas, BSN

## 2025-01-07 RX ORDER — CHOLECALCIFEROL (VITAMIN D3) 1250 MCG
1250 CAPSULE ORAL
Qty: 4 CAPSULE | Refills: 3 | Status: SHIPPED | OUTPATIENT
Start: 2025-01-07

## 2025-02-04 DIAGNOSIS — E11.65 TYPE 2 DIABETES MELLITUS WITH HYPERGLYCEMIA, WITHOUT LONG-TERM CURRENT USE OF INSULIN (H): ICD-10-CM

## 2025-02-04 NOTE — TELEPHONE ENCOUNTER
Name from pharmacy: FREESTYLE PRECIOUS 2 SENSOR MI Miscellaneous         Will file in chart as: Continuous Glucose Sensor (FREESTYLE PRECIOUS 2 SENSOR) MISC    Sig: Change every 14 days.    Disp: 2 each    Refills: 11    Start: 2/4/2025    Class: E-Prescribe    Non-formulary For: Type 2 diabetes mellitus with hyperglycemia, without long-term current use of insulin (H)    Last ordered: 1 year ago (1/26/2024) by Kaylin Joe MD    Last refill: 1/7/2025    Rx #: [id number]       Jaxon Thrasher RN, MSN

## 2025-02-12 ENCOUNTER — TRANSFERRED RECORDS (OUTPATIENT)
Dept: HEALTH INFORMATION MANAGEMENT | Facility: CLINIC | Age: 80
End: 2025-02-12
Payer: COMMERCIAL

## 2025-02-24 DIAGNOSIS — E11.65 TYPE 2 DIABETES MELLITUS WITH HYPERGLYCEMIA, WITHOUT LONG-TERM CURRENT USE OF INSULIN (H): ICD-10-CM

## 2025-02-24 RX ORDER — METFORMIN HYDROCHLORIDE 500 MG/1
1000 TABLET, EXTENDED RELEASE ORAL
Qty: 180 TABLET | Refills: 3 | Status: SHIPPED | OUTPATIENT
Start: 2025-02-24 | End: 2026-02-24

## 2025-02-24 NOTE — TELEPHONE ENCOUNTER
Name from pharmacy: METFORMIN HCL  MG TB2 500 Tablet          Will file in chart as: metFORMIN (GLUCOPHAGE XR) 500 MG 24 hr tablet    Sig: Take 2 tablets (1,000 mg) by mouth daily (with dinner)    Disp: 180 tablet    Refills: 3    Start: 2/24/2025    Class: E-Prescribe    Non-formulary For: Type 2 diabetes mellitus with hyperglycemia, without long-term current use of insulin (H)    Last ordered: 1 year ago (1/26/2024) by Kaylin Joe MD    Last refill: 11/23/2024    Rx #: [id number]    Biguanide Agents [id number]/24/2025 09:11 AM   Protocol Details Patient has documented A1c within the specified period of time.    Has GFR on file in past 12 months and most recent value is normal        ADEN Rojas, BSN

## 2025-02-25 DIAGNOSIS — Z53.9 DIAGNOSIS NOT YET DEFINED: Primary | ICD-10-CM

## 2025-02-26 ENCOUNTER — DOCUMENTATION ONLY (OUTPATIENT)
Dept: FAMILY MEDICINE | Facility: CLINIC | Age: 80
End: 2025-02-26
Payer: COMMERCIAL

## 2025-02-26 NOTE — PROGRESS NOTES
To be completed in Nursing note:    Please reference list for forms that require a visit for completion.  Please remind patients that providers are given 3-5 business days to complete and return forms.      Form type: [facility] HOME HEALTH CERTIFICATION AN PLAN OF CARE 2/6/2025 to 4/6/2025    Date form received: 2/12/2025    Date form completed by Physician: 2/25/2025    How was form returned to patient (mailed, faxed, or at  for patient to ):    Fax to [phone]    Date form mailed/faxed/left at  for patient and sent to HIM for scanning:    Fax on 2/26/2025    Once form is left for patient, faxed, or mailed PCS will then close the documentation only encounter.

## 2025-03-13 ENCOUNTER — MEDICAL CORRESPONDENCE (OUTPATIENT)
Dept: HEALTH INFORMATION MANAGEMENT | Facility: CLINIC | Age: 80
End: 2025-03-13
Payer: COMMERCIAL

## 2025-04-07 ENCOUNTER — MEDICAL CORRESPONDENCE (OUTPATIENT)
Dept: HEALTH INFORMATION MANAGEMENT | Facility: CLINIC | Age: 80
End: 2025-04-07
Payer: COMMERCIAL

## 2025-04-10 DIAGNOSIS — Z53.9 DIAGNOSIS NOT YET DEFINED: Primary | ICD-10-CM

## 2025-04-24 DIAGNOSIS — E55.9 HYPOVITAMINOSIS D: ICD-10-CM

## 2025-04-24 RX ORDER — CHOLECALCIFEROL (VITAMIN D3) 1250 MCG
1250 CAPSULE ORAL
Qty: 4 CAPSULE | Refills: 3 | Status: SHIPPED | OUTPATIENT
Start: 2025-04-24

## 2025-04-24 NOTE — TELEPHONE ENCOUNTER
Name from pharmacy: VITAMIN D3 33650 UNIT CAPS 1.25 MG Capsule          Will file in chart as: cholecalciferol (VITAMIN D3) 1250 mcg (70063 units) capsule    Sig: TAKE 1 CAPSULE (50,000 UNITS) BY MOUTH EVERY 7 DAYS    Disp: 4 capsule    Refills: 3    Start: 4/24/2025    Class: E-Prescribe    Non-formulary For: Hypovitaminosis D    Last ordered: 3 months ago (1/7/2025) by Kaylin Joe MD    Last refill: 3/28/2025    Rx #: [id number]    Vitamin Supplements Protocol Medqqk3604/24/2025 09:02 AM   Protocol Details The patient does not have an order for high-dose vitamin D          Jaxon Thrasher RN, MSN

## 2025-05-23 DIAGNOSIS — N18.1 CHRONIC KIDNEY DISEASE, STAGE 1: Primary | ICD-10-CM

## 2025-05-23 DIAGNOSIS — M81.0 AGE-RELATED OSTEOPOROSIS WITHOUT CURRENT PATHOLOGICAL FRACTURE: ICD-10-CM

## 2025-05-23 NOTE — TELEPHONE ENCOUNTER
Name from pharmacy: ALENDRONATE NA 70 MG TAB 70 Tablet          Will file in chart as: alendronate (FOSAMAX) 70 MG tablet    Sig: TAKE 1 TABLET (70 MG) BY MOUTH EVERY 7 DAYS    Disp: 12 tablet    Refills: 4    Start: 5/23/2025    Class: E-Prescribe    Non-formulary For: Age-related osteoporosis without current pathological fracture    Last ordered: 1 year ago (4/23/2024) by Kaylin Joe MD    Last refill: 2/28/2025    Rx #: [id number]    Bisphosphonates Btlpda5905/23/2025 09:19 AM   Protocol Details Most recent Creatinine Clearance in last 12 months >35          Jaxon Thrasher, RN, MSN

## 2025-05-27 PROBLEM — N18.1 CHRONIC KIDNEY DISEASE, STAGE 1: Status: ACTIVE | Noted: 2021-12-14

## 2025-05-27 RX ORDER — ALENDRONATE SODIUM 70 MG/1
70 TABLET ORAL
Qty: 12 TABLET | Refills: 4 | Status: SHIPPED | OUTPATIENT
Start: 2025-05-27

## 2025-06-07 ENCOUNTER — TRANSFERRED RECORDS (OUTPATIENT)
Dept: HEALTH INFORMATION MANAGEMENT | Facility: CLINIC | Age: 80
End: 2025-06-07
Payer: COMMERCIAL

## 2025-07-02 ENCOUNTER — RESULTS FOLLOW-UP (OUTPATIENT)
Dept: FAMILY MEDICINE | Facility: CLINIC | Age: 80
End: 2025-07-02

## 2025-07-02 ENCOUNTER — OFFICE VISIT (OUTPATIENT)
Dept: FAMILY MEDICINE | Facility: CLINIC | Age: 80
End: 2025-07-02
Payer: COMMERCIAL

## 2025-07-02 VITALS
RESPIRATION RATE: 18 BRPM | BODY MASS INDEX: 23.3 KG/M2 | SYSTOLIC BLOOD PRESSURE: 137 MMHG | DIASTOLIC BLOOD PRESSURE: 75 MMHG | HEIGHT: 58 IN | WEIGHT: 111 LBS | TEMPERATURE: 98.7 F | HEART RATE: 96 BPM | OXYGEN SATURATION: 96 %

## 2025-07-02 DIAGNOSIS — Z53.20 LUNG CANCER SCREENING DECLINED BY PATIENT: ICD-10-CM

## 2025-07-02 DIAGNOSIS — E11.65 TYPE 2 DIABETES MELLITUS WITH HYPERGLYCEMIA, WITHOUT LONG-TERM CURRENT USE OF INSULIN (H): Primary | ICD-10-CM

## 2025-07-02 LAB
EST. AVERAGE GLUCOSE BLD GHB EST-MCNC: 192 MG/DL
HBA1C MFR BLD: 8.3 % (ref 0–5.6)

## 2025-07-02 PROCEDURE — 99214 OFFICE O/P EST MOD 30 MIN: CPT | Mod: 25

## 2025-07-02 PROCEDURE — 90471 IMMUNIZATION ADMIN: CPT

## 2025-07-02 PROCEDURE — 3075F SYST BP GE 130 - 139MM HG: CPT

## 2025-07-02 PROCEDURE — 83036 HEMOGLOBIN GLYCOSYLATED A1C: CPT

## 2025-07-02 PROCEDURE — 36415 COLL VENOUS BLD VENIPUNCTURE: CPT

## 2025-07-02 PROCEDURE — 90715 TDAP VACCINE 7 YRS/> IM: CPT

## 2025-07-02 PROCEDURE — 3052F HG A1C>EQUAL 8.0%<EQUAL 9.0%: CPT

## 2025-07-02 PROCEDURE — 99207 PR INITIAL FOOT EXAM PT LOPSS: CPT

## 2025-07-02 PROCEDURE — 3078F DIAST BP <80 MM HG: CPT

## 2025-07-02 ASSESSMENT — PATIENT HEALTH QUESTIONNAIRE - PHQ9: SUM OF ALL RESPONSES TO PHQ QUESTIONS 1-9: 8
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
## 2025-07-02 NOTE — NURSING NOTE
Prior to immunization administration, verified patients identity using patient s name and date of birth. Please see Immunization Activity for additional information.     Screening Questionnaire for Adult Immunization    Are you sick today?   No   Do you have allergies to medications, food, a vaccine component or latex?   No   Have you ever had a serious reaction after receiving a vaccination?   No   Do you have a long-term health problem with heart, lung, kidney, or metabolic disease (e.g., diabetes), asthma, a blood disorder, no spleen, complement component deficiency, a cochlear implant, or a spinal fluid leak?  Are you on long-term aspirin therapy?   No   Do you have cancer, leukemia, HIV/AIDS, or any other immune system problem?   No   Do you have a parent, brother, or sister with an immune system problem?   No   In the past 3 months, have you taken medications that affect  your immune system, such as prednisone, other steroids, or anticancer drugs; drugs for the treatment of rheumatoid arthritis, Crohn s disease, or psoriasis; or have you had radiation treatments?   No   Have you had a seizure, or a brain or other nervous system problem?   No   During the past year, have you received a transfusion of blood or blood    products, or been given immune (gamma) globulin or antiviral drug?   No   For women: Are you pregnant or is there a chance you could become       pregnant during the next month?   No   Have you received any vaccinations in the past 4 weeks?   No     Immunization questionnaire answers were all negative.      Patient instructed to remain in clinic for 15 minutes afterwards, and to report any adverse reactions.     Screening performed by Caro Blackwell MA on 7/2/2025 at 9:47 AM.

## 2025-07-02 NOTE — PROGRESS NOTES
Preceptor Attestation:    I discussed the patient with the resident and evaluated the patient in person. I have verified the content of the note, which accurately reflects my assessment of the patient and the plan of care.   Supervising Physician:  Thierry Handley MD.

## 2025-07-08 DIAGNOSIS — E11.65 TYPE 2 DIABETES MELLITUS WITH HYPERGLYCEMIA, WITHOUT LONG-TERM CURRENT USE OF INSULIN (H): ICD-10-CM

## 2025-07-08 RX ORDER — MULTIVIT-MIN/FA/LYCOPEN/LUTEIN .4-300-25
1 TABLET ORAL DAILY
Qty: 90 TABLET | Refills: 3 | Status: SHIPPED | OUTPATIENT
Start: 2025-07-08

## 2025-07-08 NOTE — TELEPHONE ENCOUNTER
Name from pharmacy: CEROVITE SENIOR TABLET Tablet          Will file in chart as: Multiple Vitamins-Minerals (CEROVITE SENIOR) TABS    Sig: TAKE 1 TABLET BY MOUTH DAILY    Disp: 90 tablet    Refills: 3    Start: 7/8/2025    Class: E-Prescribe    Non-formulary For: Type 2 diabetes mellitus with hyperglycemia, without long-term current use of insulin (H)    Last ordered: 9 months ago (9/16/2024) by Kaylin Joe MD    Last refill: 6/15/2025    Rx #: [id number]    Vitamin Supplements Protocol Rshafc6507/08/2025 11:20 AM   Protocol Details Medication indicated for associated diagnosis        ADEN Rojas, BSN

## 2025-08-04 DIAGNOSIS — L29.9 PRURITIC DISORDER: ICD-10-CM

## 2025-08-04 RX ORDER — CETIRIZINE HYDROCHLORIDE 10 MG/1
10 TABLET ORAL DAILY
Qty: 90 TABLET | Refills: 3 | Status: SHIPPED | OUTPATIENT
Start: 2025-08-04

## 2025-08-18 ENCOUNTER — DOCUMENTATION ONLY (OUTPATIENT)
Dept: FAMILY MEDICINE | Facility: CLINIC | Age: 80
End: 2025-08-18
Payer: COMMERCIAL

## 2025-08-18 DIAGNOSIS — E55.9 HYPOVITAMINOSIS D: ICD-10-CM

## 2025-08-18 RX ORDER — CHOLECALCIFEROL (VITAMIN D3) 1250 MCG
1250 CAPSULE ORAL
Qty: 4 CAPSULE | Refills: 3 | Status: SHIPPED | OUTPATIENT
Start: 2025-08-18

## 2025-08-20 DIAGNOSIS — E11.65 TYPE 2 DIABETES MELLITUS WITH HYPERGLYCEMIA, WITHOUT LONG-TERM CURRENT USE OF INSULIN (H): ICD-10-CM

## 2025-08-20 RX ORDER — EMPAGLIFLOZIN 25 MG/1
25 TABLET, FILM COATED ORAL DAILY
Qty: 90 TABLET | Refills: 3 | Status: SHIPPED | OUTPATIENT
Start: 2025-08-20

## 2025-08-24 DIAGNOSIS — E78.5 HYPERLIPIDEMIA LDL GOAL <100: ICD-10-CM

## 2025-08-25 RX ORDER — ROSUVASTATIN CALCIUM 40 MG/1
TABLET, COATED ORAL
Qty: 90 TABLET | Refills: 0 | Status: SHIPPED | OUTPATIENT
Start: 2025-08-25

## 2025-08-26 DIAGNOSIS — Z53.9 DIAGNOSIS NOT YET DEFINED: Primary | ICD-10-CM
